# Patient Record
Sex: MALE | Race: WHITE | Employment: FULL TIME | URBAN - METROPOLITAN AREA
[De-identification: names, ages, dates, MRNs, and addresses within clinical notes are randomized per-mention and may not be internally consistent; named-entity substitution may affect disease eponyms.]

---

## 2017-03-07 ENCOUNTER — TELEPHONE (OUTPATIENT)
Dept: PULMONOLOGY | Age: 42
End: 2017-03-07

## 2017-04-07 RX ORDER — METHYLPREDNISOLONE SODIUM SUCCINATE 125 MG/2ML
125 INJECTION, POWDER, LYOPHILIZED, FOR SOLUTION INTRAMUSCULAR; INTRAVENOUS ONCE
Status: CANCELLED | OUTPATIENT
Start: 2017-04-10 | End: 2017-04-10

## 2017-04-07 RX ORDER — 0.9 % SODIUM CHLORIDE 0.9 %
10 VIAL (ML) INJECTION ONCE
Status: CANCELLED | OUTPATIENT
Start: 2017-04-10 | End: 2017-04-10

## 2017-04-07 RX ORDER — DIPHENHYDRAMINE HYDROCHLORIDE 50 MG/ML
50 INJECTION INTRAMUSCULAR; INTRAVENOUS ONCE
Status: CANCELLED | OUTPATIENT
Start: 2017-04-10 | End: 2017-04-10

## 2017-04-07 RX ORDER — SODIUM CHLORIDE 9 MG/ML
100 INJECTION, SOLUTION INTRAVENOUS CONTINUOUS
Status: CANCELLED | OUTPATIENT
Start: 2017-04-10

## 2017-04-10 ENCOUNTER — HOSPITAL ENCOUNTER (OUTPATIENT)
Dept: INFUSION THERAPY | Age: 42
Discharge: HOME | End: 2017-04-11
Admitting: INTERNAL MEDICINE

## 2017-04-10 VITALS
TEMPERATURE: 97.9 F | BODY MASS INDEX: 29.32 KG/M2 | RESPIRATION RATE: 18 BRPM | WEIGHT: 204.37 LBS | HEART RATE: 78 BPM | DIASTOLIC BLOOD PRESSURE: 74 MMHG | OXYGEN SATURATION: 97 % | SYSTOLIC BLOOD PRESSURE: 128 MMHG

## 2017-04-10 DIAGNOSIS — E88.01 ALPHA-1-ANTITRYPSIN DEFICIENCY (HCC): ICD-10-CM

## 2017-04-10 RX ORDER — SODIUM CHLORIDE 9 MG/ML
INJECTION, SOLUTION INTRAVENOUS ONCE
Status: CANCELLED | OUTPATIENT
Start: 2017-04-10

## 2017-04-10 RX ORDER — SODIUM CHLORIDE 0.9 % (FLUSH) 0.9 %
10 SYRINGE (ML) INJECTION PRN
Status: CANCELLED | OUTPATIENT
Start: 2017-04-10

## 2017-04-10 RX ORDER — DIPHENHYDRAMINE HYDROCHLORIDE 50 MG/ML
50 INJECTION INTRAMUSCULAR; INTRAVENOUS ONCE
Status: CANCELLED | OUTPATIENT
Start: 2017-04-10 | End: 2017-04-10

## 2017-04-10 RX ORDER — SODIUM CHLORIDE 9 MG/ML
INJECTION, SOLUTION INTRAVENOUS ONCE
Status: COMPLETED | OUTPATIENT
Start: 2017-04-10 | End: 2017-04-10

## 2017-04-10 RX ORDER — SODIUM CHLORIDE 9 MG/ML
100 INJECTION, SOLUTION INTRAVENOUS CONTINUOUS
Status: CANCELLED | OUTPATIENT
Start: 2017-04-10

## 2017-04-10 RX ORDER — METHYLPREDNISOLONE SODIUM SUCCINATE 125 MG/2ML
125 INJECTION, POWDER, LYOPHILIZED, FOR SOLUTION INTRAMUSCULAR; INTRAVENOUS ONCE
Status: CANCELLED | OUTPATIENT
Start: 2017-04-10 | End: 2017-04-10

## 2017-04-10 RX ORDER — 0.9 % SODIUM CHLORIDE 0.9 %
10 VIAL (ML) INJECTION ONCE
Status: CANCELLED | OUTPATIENT
Start: 2017-04-10 | End: 2017-04-10

## 2017-04-10 RX ORDER — SODIUM CHLORIDE 0.9 % (FLUSH) 0.9 %
10 SYRINGE (ML) INJECTION PRN
Status: ACTIVE | OUTPATIENT
Start: 2017-04-10 | End: 2017-04-11

## 2017-04-10 RX ADMIN — SODIUM CHLORIDE: 9 INJECTION, SOLUTION INTRAVENOUS at 13:18

## 2017-04-10 RX ADMIN — Medication 10 ML: at 13:18

## 2017-04-10 RX ADMIN — Medication 10 ML: at 14:46

## 2017-04-10 RX ADMIN — Medication 10 ML: at 15:02

## 2017-04-10 RX ADMIN — Medication 10 ML: at 14:03

## 2017-04-17 ENCOUNTER — HOSPITAL ENCOUNTER (OUTPATIENT)
Dept: INFUSION THERAPY | Age: 42
Discharge: HOME | End: 2017-04-18
Admitting: INTERNAL MEDICINE

## 2017-04-17 VITALS
TEMPERATURE: 97.5 F | WEIGHT: 205 LBS | DIASTOLIC BLOOD PRESSURE: 71 MMHG | SYSTOLIC BLOOD PRESSURE: 112 MMHG | BODY MASS INDEX: 29.41 KG/M2 | HEART RATE: 88 BPM | RESPIRATION RATE: 18 BRPM | OXYGEN SATURATION: 96 %

## 2017-04-17 DIAGNOSIS — E88.01 ALPHA-1-ANTITRYPSIN DEFICIENCY (HCC): ICD-10-CM

## 2017-04-17 RX ORDER — METHYLPREDNISOLONE SODIUM SUCCINATE 125 MG/2ML
125 INJECTION, POWDER, LYOPHILIZED, FOR SOLUTION INTRAMUSCULAR; INTRAVENOUS ONCE
Status: CANCELLED | OUTPATIENT
Start: 2017-04-17 | End: 2017-04-17

## 2017-04-17 RX ORDER — DIPHENHYDRAMINE HYDROCHLORIDE 50 MG/ML
50 INJECTION INTRAMUSCULAR; INTRAVENOUS ONCE
Status: CANCELLED | OUTPATIENT
Start: 2017-04-17 | End: 2017-04-17

## 2017-04-17 RX ORDER — SODIUM CHLORIDE 9 MG/ML
100 INJECTION, SOLUTION INTRAVENOUS CONTINUOUS
Status: CANCELLED | OUTPATIENT
Start: 2017-04-17

## 2017-04-17 RX ORDER — 0.9 % SODIUM CHLORIDE 0.9 %
10 VIAL (ML) INJECTION ONCE
Status: CANCELLED | OUTPATIENT
Start: 2017-04-17 | End: 2017-04-17

## 2017-04-17 RX ORDER — SODIUM CHLORIDE 9 MG/ML
INJECTION, SOLUTION INTRAVENOUS ONCE
Status: CANCELLED | OUTPATIENT
Start: 2017-04-17

## 2017-04-17 RX ORDER — SODIUM CHLORIDE 0.9 % (FLUSH) 0.9 %
10 SYRINGE (ML) INJECTION PRN
Status: CANCELLED | OUTPATIENT
Start: 2017-04-17

## 2017-04-17 RX ORDER — SODIUM CHLORIDE 9 MG/ML
INJECTION, SOLUTION INTRAVENOUS ONCE
Status: COMPLETED | OUTPATIENT
Start: 2017-04-17 | End: 2017-04-17

## 2017-04-17 RX ADMIN — SODIUM CHLORIDE: 9 INJECTION, SOLUTION INTRAVENOUS at 13:10

## 2017-04-24 ENCOUNTER — HOSPITAL ENCOUNTER (OUTPATIENT)
Dept: INFUSION THERAPY | Age: 42
Discharge: HOME | End: 2017-04-25
Admitting: INTERNAL MEDICINE

## 2017-04-24 VITALS
OXYGEN SATURATION: 97 % | TEMPERATURE: 98.1 F | RESPIRATION RATE: 18 BRPM | HEART RATE: 72 BPM | BODY MASS INDEX: 29.41 KG/M2 | WEIGHT: 205 LBS | DIASTOLIC BLOOD PRESSURE: 75 MMHG | SYSTOLIC BLOOD PRESSURE: 118 MMHG

## 2017-04-24 DIAGNOSIS — E88.01 ALPHA-1-ANTITRYPSIN DEFICIENCY (HCC): ICD-10-CM

## 2017-04-24 RX ORDER — 0.9 % SODIUM CHLORIDE 0.9 %
10 VIAL (ML) INJECTION ONCE
Status: CANCELLED | OUTPATIENT
Start: 2017-04-24 | End: 2017-04-24

## 2017-04-24 RX ORDER — DIPHENHYDRAMINE HYDROCHLORIDE 50 MG/ML
50 INJECTION INTRAMUSCULAR; INTRAVENOUS ONCE
Status: CANCELLED | OUTPATIENT
Start: 2017-04-24 | End: 2017-04-24

## 2017-04-24 RX ORDER — SODIUM CHLORIDE 0.9 % (FLUSH) 0.9 %
10 SYRINGE (ML) INJECTION PRN
Status: ACTIVE | OUTPATIENT
Start: 2017-04-24 | End: 2017-04-25

## 2017-04-24 RX ORDER — SODIUM CHLORIDE 0.9 % (FLUSH) 0.9 %
10 SYRINGE (ML) INJECTION PRN
Status: CANCELLED | OUTPATIENT
Start: 2017-04-24

## 2017-04-24 RX ORDER — METHYLPREDNISOLONE SODIUM SUCCINATE 125 MG/2ML
125 INJECTION, POWDER, LYOPHILIZED, FOR SOLUTION INTRAMUSCULAR; INTRAVENOUS ONCE
Status: CANCELLED | OUTPATIENT
Start: 2017-04-24 | End: 2017-04-24

## 2017-04-24 RX ORDER — SODIUM CHLORIDE 9 MG/ML
INJECTION, SOLUTION INTRAVENOUS ONCE
Status: CANCELLED | OUTPATIENT
Start: 2017-04-24

## 2017-04-24 RX ORDER — SODIUM CHLORIDE 9 MG/ML
INJECTION, SOLUTION INTRAVENOUS ONCE
Status: COMPLETED | OUTPATIENT
Start: 2017-04-24 | End: 2017-04-24

## 2017-04-24 RX ORDER — SODIUM CHLORIDE 9 MG/ML
100 INJECTION, SOLUTION INTRAVENOUS CONTINUOUS
Status: CANCELLED | OUTPATIENT
Start: 2017-04-24

## 2017-04-24 RX ADMIN — SODIUM CHLORIDE: 9 INJECTION, SOLUTION INTRAVENOUS at 08:54

## 2017-04-24 RX ADMIN — Medication 10 ML: at 08:54

## 2017-04-24 RX ADMIN — Medication 20 ML: at 08:44

## 2017-04-24 RX ADMIN — Medication 10 ML: at 09:07

## 2017-04-24 RX ADMIN — Medication 10 ML: at 09:49

## 2017-04-24 RX ADMIN — Medication 10 ML: at 10:05

## 2017-05-01 ENCOUNTER — HOSPITAL ENCOUNTER (OUTPATIENT)
Dept: INFUSION THERAPY | Age: 42
Discharge: HOME | End: 2017-05-02
Admitting: INTERNAL MEDICINE

## 2017-05-01 VITALS
HEART RATE: 74 BPM | WEIGHT: 205.47 LBS | BODY MASS INDEX: 29.42 KG/M2 | OXYGEN SATURATION: 96 % | DIASTOLIC BLOOD PRESSURE: 69 MMHG | RESPIRATION RATE: 18 BRPM | TEMPERATURE: 98.2 F | SYSTOLIC BLOOD PRESSURE: 104 MMHG | HEIGHT: 70 IN

## 2017-05-01 DIAGNOSIS — E88.01 ALPHA-1-ANTITRYPSIN DEFICIENCY (HCC): ICD-10-CM

## 2017-05-01 RX ORDER — DIPHENHYDRAMINE HYDROCHLORIDE 50 MG/ML
50 INJECTION INTRAMUSCULAR; INTRAVENOUS ONCE
Status: CANCELLED | OUTPATIENT
Start: 2017-05-01 | End: 2017-05-01

## 2017-05-01 RX ORDER — SODIUM CHLORIDE 0.9 % (FLUSH) 0.9 %
10 SYRINGE (ML) INJECTION PRN
Status: ACTIVE | OUTPATIENT
Start: 2017-05-01 | End: 2017-05-02

## 2017-05-01 RX ORDER — SODIUM CHLORIDE 9 MG/ML
INJECTION, SOLUTION INTRAVENOUS ONCE
Status: COMPLETED | OUTPATIENT
Start: 2017-05-01 | End: 2017-05-01

## 2017-05-01 RX ORDER — 0.9 % SODIUM CHLORIDE 0.9 %
10 VIAL (ML) INJECTION ONCE
Status: CANCELLED | OUTPATIENT
Start: 2017-05-01 | End: 2017-05-01

## 2017-05-01 RX ORDER — METHYLPREDNISOLONE SODIUM SUCCINATE 125 MG/2ML
125 INJECTION, POWDER, LYOPHILIZED, FOR SOLUTION INTRAMUSCULAR; INTRAVENOUS ONCE
Status: CANCELLED | OUTPATIENT
Start: 2017-05-01 | End: 2017-05-01

## 2017-05-01 RX ORDER — SODIUM CHLORIDE 0.9 % (FLUSH) 0.9 %
10 SYRINGE (ML) INJECTION PRN
Status: CANCELLED | OUTPATIENT
Start: 2017-05-01

## 2017-05-01 RX ORDER — SODIUM CHLORIDE 9 MG/ML
100 INJECTION, SOLUTION INTRAVENOUS CONTINUOUS
Status: CANCELLED | OUTPATIENT
Start: 2017-05-01

## 2017-05-01 RX ORDER — SODIUM CHLORIDE 9 MG/ML
INJECTION, SOLUTION INTRAVENOUS ONCE
Status: CANCELLED | OUTPATIENT
Start: 2017-05-01

## 2017-05-01 RX ADMIN — Medication 10 ML: at 13:38

## 2017-05-01 RX ADMIN — Medication 20 ML: at 13:04

## 2017-05-01 RX ADMIN — Medication 10 ML: at 14:19

## 2017-05-01 RX ADMIN — Medication 10 ML: at 14:57

## 2017-05-01 RX ADMIN — SODIUM CHLORIDE: 9 INJECTION, SOLUTION INTRAVENOUS at 13:38

## 2017-05-08 ENCOUNTER — HOSPITAL ENCOUNTER (OUTPATIENT)
Dept: INFUSION THERAPY | Age: 42
Discharge: HOME | End: 2017-05-09
Admitting: INTERNAL MEDICINE

## 2017-05-08 VITALS
BODY MASS INDEX: 29.41 KG/M2 | TEMPERATURE: 97.5 F | DIASTOLIC BLOOD PRESSURE: 75 MMHG | HEART RATE: 61 BPM | SYSTOLIC BLOOD PRESSURE: 113 MMHG | OXYGEN SATURATION: 97 % | RESPIRATION RATE: 17 BRPM | WEIGHT: 205 LBS

## 2017-05-08 DIAGNOSIS — E88.01 ALPHA-1-ANTITRYPSIN DEFICIENCY (HCC): ICD-10-CM

## 2017-05-08 RX ORDER — SODIUM CHLORIDE 9 MG/ML
100 INJECTION, SOLUTION INTRAVENOUS CONTINUOUS
Status: CANCELLED | OUTPATIENT
Start: 2017-05-08

## 2017-05-08 RX ORDER — SODIUM CHLORIDE 9 MG/ML
INJECTION, SOLUTION INTRAVENOUS ONCE
Status: CANCELLED | OUTPATIENT
Start: 2017-05-08

## 2017-05-08 RX ORDER — METHYLPREDNISOLONE SODIUM SUCCINATE 125 MG/2ML
125 INJECTION, POWDER, LYOPHILIZED, FOR SOLUTION INTRAMUSCULAR; INTRAVENOUS ONCE
Status: CANCELLED | OUTPATIENT
Start: 2017-05-08 | End: 2017-05-08

## 2017-05-08 RX ORDER — SODIUM CHLORIDE 9 MG/ML
INJECTION, SOLUTION INTRAVENOUS ONCE
Status: COMPLETED | OUTPATIENT
Start: 2017-05-08 | End: 2017-05-08

## 2017-05-08 RX ORDER — SODIUM CHLORIDE 0.9 % (FLUSH) 0.9 %
10 SYRINGE (ML) INJECTION PRN
Status: CANCELLED | OUTPATIENT
Start: 2017-05-08

## 2017-05-08 RX ORDER — DIPHENHYDRAMINE HYDROCHLORIDE 50 MG/ML
50 INJECTION INTRAMUSCULAR; INTRAVENOUS ONCE
Status: CANCELLED | OUTPATIENT
Start: 2017-05-08 | End: 2017-05-08

## 2017-05-08 RX ORDER — SODIUM CHLORIDE 0.9 % (FLUSH) 0.9 %
10 SYRINGE (ML) INJECTION PRN
Status: DISPENSED | OUTPATIENT
Start: 2017-05-08 | End: 2017-05-09

## 2017-05-08 RX ORDER — 0.9 % SODIUM CHLORIDE 0.9 %
10 VIAL (ML) INJECTION ONCE
Status: CANCELLED | OUTPATIENT
Start: 2017-05-08 | End: 2017-05-08

## 2017-05-08 RX ADMIN — Medication 10 ML: at 10:27

## 2017-05-08 RX ADMIN — SODIUM CHLORIDE: 9 INJECTION, SOLUTION INTRAVENOUS at 08:43

## 2017-05-08 RX ADMIN — Medication 10 ML: at 10:46

## 2017-05-08 RX ADMIN — Medication 20 ML: at 08:42

## 2017-05-08 RX ADMIN — Medication 10 ML: at 09:49

## 2017-05-15 ENCOUNTER — HOSPITAL ENCOUNTER (OUTPATIENT)
Dept: INFUSION THERAPY | Age: 42
Discharge: HOME | End: 2017-05-16
Admitting: INTERNAL MEDICINE

## 2017-05-15 VITALS
RESPIRATION RATE: 17 BRPM | DIASTOLIC BLOOD PRESSURE: 74 MMHG | SYSTOLIC BLOOD PRESSURE: 127 MMHG | OXYGEN SATURATION: 98 % | BODY MASS INDEX: 29.38 KG/M2 | HEART RATE: 75 BPM | TEMPERATURE: 97.3 F | WEIGHT: 205.25 LBS | HEIGHT: 70 IN

## 2017-05-15 DIAGNOSIS — E88.01 ALPHA-1-ANTITRYPSIN DEFICIENCY (HCC): ICD-10-CM

## 2017-05-15 RX ORDER — 0.9 % SODIUM CHLORIDE 0.9 %
10 VIAL (ML) INJECTION ONCE
Status: CANCELLED | OUTPATIENT
Start: 2017-05-15 | End: 2017-05-15

## 2017-05-15 RX ORDER — METHYLPREDNISOLONE SODIUM SUCCINATE 125 MG/2ML
125 INJECTION, POWDER, LYOPHILIZED, FOR SOLUTION INTRAMUSCULAR; INTRAVENOUS ONCE
Status: CANCELLED | OUTPATIENT
Start: 2017-05-15 | End: 2017-05-15

## 2017-05-15 RX ORDER — SODIUM CHLORIDE 9 MG/ML
INJECTION, SOLUTION INTRAVENOUS ONCE
Status: COMPLETED | OUTPATIENT
Start: 2017-05-15 | End: 2017-05-15

## 2017-05-15 RX ORDER — SODIUM CHLORIDE 9 MG/ML
100 INJECTION, SOLUTION INTRAVENOUS CONTINUOUS
Status: CANCELLED | OUTPATIENT
Start: 2017-05-15

## 2017-05-15 RX ORDER — SODIUM CHLORIDE 9 MG/ML
INJECTION, SOLUTION INTRAVENOUS ONCE
Status: CANCELLED | OUTPATIENT
Start: 2017-05-15

## 2017-05-15 RX ORDER — DIPHENHYDRAMINE HYDROCHLORIDE 50 MG/ML
50 INJECTION INTRAMUSCULAR; INTRAVENOUS ONCE
Status: CANCELLED | OUTPATIENT
Start: 2017-05-15 | End: 2017-05-15

## 2017-05-15 RX ORDER — SODIUM CHLORIDE 0.9 % (FLUSH) 0.9 %
10 SYRINGE (ML) INJECTION PRN
Status: CANCELLED | OUTPATIENT
Start: 2017-05-15

## 2017-05-15 RX ADMIN — SODIUM CHLORIDE: 9 INJECTION, SOLUTION INTRAVENOUS at 08:27

## 2017-05-22 ENCOUNTER — HOSPITAL ENCOUNTER (OUTPATIENT)
Dept: INFUSION THERAPY | Age: 42
Discharge: HOME | End: 2017-05-23
Admitting: INTERNAL MEDICINE

## 2017-05-22 VITALS
DIASTOLIC BLOOD PRESSURE: 77 MMHG | TEMPERATURE: 97.7 F | HEART RATE: 62 BPM | RESPIRATION RATE: 17 BRPM | WEIGHT: 204.15 LBS | SYSTOLIC BLOOD PRESSURE: 123 MMHG | BODY MASS INDEX: 29.29 KG/M2 | OXYGEN SATURATION: 99 %

## 2017-05-22 DIAGNOSIS — E88.01 ALPHA-1-ANTITRYPSIN DEFICIENCY (HCC): ICD-10-CM

## 2017-05-22 RX ORDER — SODIUM CHLORIDE 0.9 % (FLUSH) 0.9 %
10 SYRINGE (ML) INJECTION PRN
Status: CANCELLED | OUTPATIENT
Start: 2017-05-22

## 2017-05-22 RX ORDER — METHYLPREDNISOLONE SODIUM SUCCINATE 125 MG/2ML
125 INJECTION, POWDER, LYOPHILIZED, FOR SOLUTION INTRAMUSCULAR; INTRAVENOUS ONCE
Status: CANCELLED | OUTPATIENT
Start: 2017-05-22 | End: 2017-05-22

## 2017-05-22 RX ORDER — SODIUM CHLORIDE 9 MG/ML
INJECTION, SOLUTION INTRAVENOUS ONCE
Status: CANCELLED | OUTPATIENT
Start: 2017-05-22

## 2017-05-22 RX ORDER — 0.9 % SODIUM CHLORIDE 0.9 %
10 VIAL (ML) INJECTION ONCE
Status: CANCELLED | OUTPATIENT
Start: 2017-05-22 | End: 2017-05-22

## 2017-05-22 RX ORDER — DIPHENHYDRAMINE HYDROCHLORIDE 50 MG/ML
50 INJECTION INTRAMUSCULAR; INTRAVENOUS ONCE
Status: CANCELLED | OUTPATIENT
Start: 2017-05-22 | End: 2017-05-22

## 2017-05-22 RX ORDER — SODIUM CHLORIDE 9 MG/ML
INJECTION, SOLUTION INTRAVENOUS ONCE
Status: COMPLETED | OUTPATIENT
Start: 2017-05-22 | End: 2017-05-22

## 2017-05-22 RX ORDER — SODIUM CHLORIDE 9 MG/ML
100 INJECTION, SOLUTION INTRAVENOUS CONTINUOUS
Status: CANCELLED | OUTPATIENT
Start: 2017-05-22

## 2017-05-22 RX ORDER — SODIUM CHLORIDE 0.9 % (FLUSH) 0.9 %
10 SYRINGE (ML) INJECTION PRN
Status: DISPENSED | OUTPATIENT
Start: 2017-05-22 | End: 2017-05-23

## 2017-05-22 RX ADMIN — Medication 10 ML: at 09:44

## 2017-05-22 RX ADMIN — Medication 20 ML: at 08:39

## 2017-05-22 RX ADMIN — Medication 10 ML: at 09:12

## 2017-05-22 RX ADMIN — SODIUM CHLORIDE: 9 INJECTION, SOLUTION INTRAVENOUS at 08:39

## 2017-05-22 RX ADMIN — Medication 10 ML: at 09:56

## 2017-05-30 ENCOUNTER — HOSPITAL ENCOUNTER (OUTPATIENT)
Dept: INFUSION THERAPY | Age: 42
Discharge: HOME | End: 2017-05-31
Admitting: INTERNAL MEDICINE

## 2017-05-30 ENCOUNTER — HOSPITAL ENCOUNTER (OUTPATIENT)
Dept: INFUSION THERAPY | Age: 42
Discharge: HOME | End: 2017-05-31
Admitting: INTERNAL MEDICINE

## 2017-05-30 VITALS
RESPIRATION RATE: 17 BRPM | TEMPERATURE: 97.7 F | HEART RATE: 67 BPM | OXYGEN SATURATION: 95 % | SYSTOLIC BLOOD PRESSURE: 120 MMHG | DIASTOLIC BLOOD PRESSURE: 76 MMHG

## 2017-05-30 DIAGNOSIS — E88.01 ALPHA-1-ANTITRYPSIN DEFICIENCY (HCC): ICD-10-CM

## 2017-05-30 RX ORDER — 0.9 % SODIUM CHLORIDE 0.9 %
10 VIAL (ML) INJECTION ONCE
Status: CANCELLED | OUTPATIENT
Start: 2017-05-30 | End: 2017-05-30

## 2017-05-30 RX ORDER — METHYLPREDNISOLONE SODIUM SUCCINATE 125 MG/2ML
125 INJECTION, POWDER, LYOPHILIZED, FOR SOLUTION INTRAMUSCULAR; INTRAVENOUS ONCE
Status: CANCELLED | OUTPATIENT
Start: 2017-05-30 | End: 2017-05-30

## 2017-05-30 RX ORDER — SODIUM CHLORIDE 9 MG/ML
INJECTION, SOLUTION INTRAVENOUS ONCE
Status: CANCELLED | OUTPATIENT
Start: 2017-05-30

## 2017-05-30 RX ORDER — DIPHENHYDRAMINE HYDROCHLORIDE 50 MG/ML
50 INJECTION INTRAMUSCULAR; INTRAVENOUS ONCE
Status: CANCELLED | OUTPATIENT
Start: 2017-05-30 | End: 2017-05-30

## 2017-05-30 RX ORDER — SODIUM CHLORIDE 9 MG/ML
100 INJECTION, SOLUTION INTRAVENOUS CONTINUOUS
Status: CANCELLED | OUTPATIENT
Start: 2017-05-30

## 2017-05-30 RX ORDER — SODIUM CHLORIDE 0.9 % (FLUSH) 0.9 %
10 SYRINGE (ML) INJECTION PRN
Status: ACTIVE | OUTPATIENT
Start: 2017-05-30 | End: 2017-05-31

## 2017-05-30 RX ORDER — SODIUM CHLORIDE 0.9 % (FLUSH) 0.9 %
10 SYRINGE (ML) INJECTION PRN
Status: CANCELLED | OUTPATIENT
Start: 2017-05-30

## 2017-06-05 ENCOUNTER — HOSPITAL ENCOUNTER (OUTPATIENT)
Dept: INFUSION THERAPY | Age: 42
Discharge: HOME | End: 2017-06-06
Admitting: INTERNAL MEDICINE

## 2017-06-05 VITALS
BODY MASS INDEX: 29.36 KG/M2 | TEMPERATURE: 97.6 F | OXYGEN SATURATION: 96 % | RESPIRATION RATE: 18 BRPM | DIASTOLIC BLOOD PRESSURE: 80 MMHG | HEART RATE: 76 BPM | SYSTOLIC BLOOD PRESSURE: 124 MMHG | WEIGHT: 204.59 LBS

## 2017-06-05 DIAGNOSIS — E88.01 ALPHA-1-ANTITRYPSIN DEFICIENCY (HCC): ICD-10-CM

## 2017-06-05 RX ORDER — METHYLPREDNISOLONE SODIUM SUCCINATE 125 MG/2ML
125 INJECTION, POWDER, LYOPHILIZED, FOR SOLUTION INTRAMUSCULAR; INTRAVENOUS ONCE
Status: CANCELLED | OUTPATIENT
Start: 2017-06-05 | End: 2017-06-05

## 2017-06-05 RX ORDER — SODIUM CHLORIDE 9 MG/ML
INJECTION, SOLUTION INTRAVENOUS ONCE
Status: DISCONTINUED | OUTPATIENT
Start: 2017-06-05 | End: 2018-05-01 | Stop reason: HOSPADM

## 2017-06-05 RX ORDER — SODIUM CHLORIDE 9 MG/ML
100 INJECTION, SOLUTION INTRAVENOUS CONTINUOUS
Status: CANCELLED | OUTPATIENT
Start: 2017-06-05

## 2017-06-05 RX ORDER — LISINOPRIL 20 MG/1
TABLET ORAL
Qty: 90 TABLET | Refills: 3 | Status: SHIPPED | OUTPATIENT
Start: 2017-06-05 | End: 2017-12-12 | Stop reason: SDUPTHER

## 2017-06-05 RX ORDER — 0.9 % SODIUM CHLORIDE 0.9 %
10 VIAL (ML) INJECTION ONCE
Status: CANCELLED | OUTPATIENT
Start: 2017-06-05 | End: 2017-06-05

## 2017-06-05 RX ORDER — DIPHENHYDRAMINE HYDROCHLORIDE 50 MG/ML
50 INJECTION INTRAMUSCULAR; INTRAVENOUS ONCE
Status: CANCELLED | OUTPATIENT
Start: 2017-06-05 | End: 2017-06-05

## 2017-06-05 RX ORDER — SODIUM CHLORIDE 9 MG/ML
INJECTION, SOLUTION INTRAVENOUS ONCE
Status: CANCELLED | OUTPATIENT
Start: 2017-06-05

## 2017-06-05 RX ORDER — SODIUM CHLORIDE 0.9 % (FLUSH) 0.9 %
10 SYRINGE (ML) INJECTION PRN
Status: CANCELLED | OUTPATIENT
Start: 2017-06-05

## 2017-06-12 ENCOUNTER — HOSPITAL ENCOUNTER (OUTPATIENT)
Dept: INFUSION THERAPY | Age: 42
Discharge: HOME | End: 2017-06-13
Admitting: INTERNAL MEDICINE

## 2017-06-12 VITALS
DIASTOLIC BLOOD PRESSURE: 73 MMHG | WEIGHT: 202 LBS | HEART RATE: 56 BPM | RESPIRATION RATE: 17 BRPM | TEMPERATURE: 97.8 F | SYSTOLIC BLOOD PRESSURE: 104 MMHG | OXYGEN SATURATION: 96 % | HEIGHT: 70 IN | BODY MASS INDEX: 28.92 KG/M2

## 2017-06-12 DIAGNOSIS — E88.01 ALPHA-1-ANTITRYPSIN DEFICIENCY (HCC): ICD-10-CM

## 2017-06-12 RX ORDER — SODIUM CHLORIDE 9 MG/ML
INJECTION, SOLUTION INTRAVENOUS ONCE
Status: CANCELLED | OUTPATIENT
Start: 2017-06-12

## 2017-06-12 RX ORDER — SODIUM CHLORIDE 9 MG/ML
INJECTION, SOLUTION INTRAVENOUS ONCE
Status: COMPLETED | OUTPATIENT
Start: 2017-06-12 | End: 2017-06-12

## 2017-06-12 RX ORDER — METHYLPREDNISOLONE SODIUM SUCCINATE 125 MG/2ML
125 INJECTION, POWDER, LYOPHILIZED, FOR SOLUTION INTRAMUSCULAR; INTRAVENOUS ONCE
Status: CANCELLED | OUTPATIENT
Start: 2017-06-12 | End: 2017-06-12

## 2017-06-12 RX ORDER — 0.9 % SODIUM CHLORIDE 0.9 %
10 VIAL (ML) INJECTION ONCE
Status: CANCELLED | OUTPATIENT
Start: 2017-06-12 | End: 2017-06-12

## 2017-06-12 RX ORDER — SODIUM CHLORIDE 9 MG/ML
100 INJECTION, SOLUTION INTRAVENOUS CONTINUOUS
Status: CANCELLED | OUTPATIENT
Start: 2017-06-12

## 2017-06-12 RX ORDER — SODIUM CHLORIDE 0.9 % (FLUSH) 0.9 %
10 SYRINGE (ML) INJECTION PRN
Status: CANCELLED | OUTPATIENT
Start: 2017-06-12

## 2017-06-12 RX ORDER — DIPHENHYDRAMINE HYDROCHLORIDE 50 MG/ML
50 INJECTION INTRAMUSCULAR; INTRAVENOUS ONCE
Status: CANCELLED | OUTPATIENT
Start: 2017-06-12 | End: 2017-06-12

## 2017-06-12 RX ADMIN — SODIUM CHLORIDE: 9 INJECTION, SOLUTION INTRAVENOUS at 08:41

## 2017-06-19 ENCOUNTER — HOSPITAL ENCOUNTER (OUTPATIENT)
Dept: INFUSION THERAPY | Age: 42
Discharge: HOME | End: 2017-06-20
Admitting: INTERNAL MEDICINE

## 2017-06-19 VITALS
TEMPERATURE: 97.8 F | WEIGHT: 200 LBS | OXYGEN SATURATION: 97 % | BODY MASS INDEX: 28.7 KG/M2 | RESPIRATION RATE: 17 BRPM | HEART RATE: 58 BPM | SYSTOLIC BLOOD PRESSURE: 112 MMHG | DIASTOLIC BLOOD PRESSURE: 69 MMHG

## 2017-06-19 DIAGNOSIS — E88.01 ALPHA-1-ANTITRYPSIN DEFICIENCY (HCC): ICD-10-CM

## 2017-06-19 RX ORDER — SODIUM CHLORIDE 0.9 % (FLUSH) 0.9 %
10 SYRINGE (ML) INJECTION PRN
Status: ACTIVE | OUTPATIENT
Start: 2017-06-19 | End: 2017-06-20

## 2017-06-19 RX ORDER — 0.9 % SODIUM CHLORIDE 0.9 %
10 VIAL (ML) INJECTION ONCE
Status: CANCELLED | OUTPATIENT
Start: 2017-06-19 | End: 2017-06-19

## 2017-06-19 RX ORDER — SODIUM CHLORIDE 9 MG/ML
INJECTION, SOLUTION INTRAVENOUS ONCE
Status: CANCELLED | OUTPATIENT
Start: 2017-06-19

## 2017-06-19 RX ORDER — METHYLPREDNISOLONE SODIUM SUCCINATE 125 MG/2ML
125 INJECTION, POWDER, LYOPHILIZED, FOR SOLUTION INTRAMUSCULAR; INTRAVENOUS ONCE
Status: CANCELLED | OUTPATIENT
Start: 2017-06-19 | End: 2017-06-19

## 2017-06-19 RX ORDER — DIPHENHYDRAMINE HYDROCHLORIDE 50 MG/ML
50 INJECTION INTRAMUSCULAR; INTRAVENOUS ONCE
Status: CANCELLED | OUTPATIENT
Start: 2017-06-19 | End: 2017-06-19

## 2017-06-19 RX ORDER — SODIUM CHLORIDE 9 MG/ML
INJECTION, SOLUTION INTRAVENOUS ONCE
Status: COMPLETED | OUTPATIENT
Start: 2017-06-19 | End: 2017-06-19

## 2017-06-19 RX ORDER — SODIUM CHLORIDE 0.9 % (FLUSH) 0.9 %
10 SYRINGE (ML) INJECTION PRN
Status: CANCELLED | OUTPATIENT
Start: 2017-06-19

## 2017-06-19 RX ORDER — SODIUM CHLORIDE 9 MG/ML
100 INJECTION, SOLUTION INTRAVENOUS CONTINUOUS
Status: CANCELLED | OUTPATIENT
Start: 2017-06-19

## 2017-06-19 RX ADMIN — Medication 10 ML: at 09:50

## 2017-06-19 RX ADMIN — SODIUM CHLORIDE: 9 INJECTION, SOLUTION INTRAVENOUS at 08:58

## 2017-06-19 RX ADMIN — Medication 10 ML: at 08:58

## 2017-06-19 RX ADMIN — Medication 10 ML: at 09:18

## 2017-06-19 RX ADMIN — Medication 10 ML: at 10:05

## 2017-06-19 RX ADMIN — Medication 20 ML: at 08:44

## 2017-06-26 ENCOUNTER — HOSPITAL ENCOUNTER (OUTPATIENT)
Dept: INFUSION THERAPY | Age: 42
Discharge: HOME | End: 2017-06-27
Admitting: INTERNAL MEDICINE

## 2017-06-26 VITALS
WEIGHT: 200 LBS | OXYGEN SATURATION: 97 % | HEART RATE: 73 BPM | DIASTOLIC BLOOD PRESSURE: 84 MMHG | BODY MASS INDEX: 28.7 KG/M2 | SYSTOLIC BLOOD PRESSURE: 130 MMHG | RESPIRATION RATE: 18 BRPM | TEMPERATURE: 97.8 F

## 2017-06-26 DIAGNOSIS — E88.01 ALPHA-1-ANTITRYPSIN DEFICIENCY (HCC): ICD-10-CM

## 2017-06-26 RX ORDER — 0.9 % SODIUM CHLORIDE 0.9 %
10 VIAL (ML) INJECTION ONCE
Status: CANCELLED | OUTPATIENT
Start: 2017-06-26 | End: 2017-06-26

## 2017-06-26 RX ORDER — SODIUM CHLORIDE 9 MG/ML
INJECTION, SOLUTION INTRAVENOUS ONCE
Status: CANCELLED | OUTPATIENT
Start: 2017-06-26

## 2017-06-26 RX ORDER — METHYLPREDNISOLONE SODIUM SUCCINATE 125 MG/2ML
125 INJECTION, POWDER, LYOPHILIZED, FOR SOLUTION INTRAMUSCULAR; INTRAVENOUS ONCE
Status: CANCELLED | OUTPATIENT
Start: 2017-06-26 | End: 2017-06-26

## 2017-06-26 RX ORDER — SODIUM CHLORIDE 9 MG/ML
100 INJECTION, SOLUTION INTRAVENOUS CONTINUOUS
Status: CANCELLED | OUTPATIENT
Start: 2017-06-26

## 2017-06-26 RX ORDER — SODIUM CHLORIDE 9 MG/ML
INJECTION, SOLUTION INTRAVENOUS ONCE
Status: COMPLETED | OUTPATIENT
Start: 2017-06-26 | End: 2017-06-26

## 2017-06-26 RX ORDER — DIPHENHYDRAMINE HYDROCHLORIDE 50 MG/ML
50 INJECTION INTRAMUSCULAR; INTRAVENOUS ONCE
Status: CANCELLED | OUTPATIENT
Start: 2017-06-26 | End: 2017-06-26

## 2017-06-26 RX ORDER — SODIUM CHLORIDE 0.9 % (FLUSH) 0.9 %
10 SYRINGE (ML) INJECTION PRN
Status: ACTIVE | OUTPATIENT
Start: 2017-06-26 | End: 2017-06-27

## 2017-06-26 RX ORDER — SODIUM CHLORIDE 0.9 % (FLUSH) 0.9 %
10 SYRINGE (ML) INJECTION PRN
Status: CANCELLED | OUTPATIENT
Start: 2017-06-26

## 2017-06-26 RX ADMIN — Medication 10 ML: at 10:06

## 2017-06-26 RX ADMIN — Medication 10 ML: at 09:48

## 2017-06-26 RX ADMIN — SODIUM CHLORIDE: 9 INJECTION, SOLUTION INTRAVENOUS at 08:38

## 2017-06-26 RX ADMIN — Medication 10 ML: at 08:38

## 2017-06-26 RX ADMIN — Medication 10 ML: at 09:16

## 2017-07-03 ENCOUNTER — HOSPITAL ENCOUNTER (OUTPATIENT)
Dept: INFUSION THERAPY | Age: 42
Discharge: HOME | End: 2017-07-04
Admitting: INTERNAL MEDICINE

## 2017-07-03 VITALS
WEIGHT: 200 LBS | DIASTOLIC BLOOD PRESSURE: 78 MMHG | OXYGEN SATURATION: 97 % | RESPIRATION RATE: 17 BRPM | HEART RATE: 67 BPM | BODY MASS INDEX: 28.7 KG/M2 | TEMPERATURE: 97.7 F | SYSTOLIC BLOOD PRESSURE: 116 MMHG

## 2017-07-03 DIAGNOSIS — E88.01 ALPHA-1-ANTITRYPSIN DEFICIENCY (HCC): Primary | ICD-10-CM

## 2017-07-03 DIAGNOSIS — E88.01 ALPHA-1-ANTITRYPSIN DEFICIENCY (HCC): ICD-10-CM

## 2017-07-03 RX ORDER — DIPHENHYDRAMINE HYDROCHLORIDE 50 MG/ML
50 INJECTION INTRAMUSCULAR; INTRAVENOUS ONCE
Status: CANCELLED | OUTPATIENT
Start: 2017-07-03 | End: 2017-07-03

## 2017-07-03 RX ORDER — SODIUM CHLORIDE 0.9 % (FLUSH) 0.9 %
10 SYRINGE (ML) INJECTION PRN
Status: CANCELLED | OUTPATIENT
Start: 2017-07-03

## 2017-07-03 RX ORDER — 0.9 % SODIUM CHLORIDE 0.9 %
10 VIAL (ML) INJECTION ONCE
Status: CANCELLED | OUTPATIENT
Start: 2017-07-03 | End: 2017-07-03

## 2017-07-03 RX ORDER — METHYLPREDNISOLONE SODIUM SUCCINATE 125 MG/2ML
125 INJECTION, POWDER, LYOPHILIZED, FOR SOLUTION INTRAMUSCULAR; INTRAVENOUS ONCE
Status: CANCELLED | OUTPATIENT
Start: 2017-07-03 | End: 2017-07-03

## 2017-07-03 RX ORDER — SODIUM CHLORIDE 9 MG/ML
INJECTION, SOLUTION INTRAVENOUS ONCE
Status: CANCELLED | OUTPATIENT
Start: 2017-07-03

## 2017-07-03 RX ORDER — SODIUM CHLORIDE 9 MG/ML
INJECTION, SOLUTION INTRAVENOUS ONCE
Status: COMPLETED | OUTPATIENT
Start: 2017-07-03 | End: 2017-07-03

## 2017-07-03 RX ORDER — SODIUM CHLORIDE 0.9 % (FLUSH) 0.9 %
10 SYRINGE (ML) INJECTION PRN
Status: ACTIVE | OUTPATIENT
Start: 2017-07-03 | End: 2017-07-04

## 2017-07-03 RX ORDER — SODIUM CHLORIDE 9 MG/ML
100 INJECTION, SOLUTION INTRAVENOUS CONTINUOUS
Status: CANCELLED | OUTPATIENT
Start: 2017-07-03

## 2017-07-03 RX ADMIN — Medication 10 ML: at 09:13

## 2017-07-03 RX ADMIN — Medication 10 ML: at 09:48

## 2017-07-03 RX ADMIN — Medication 10 ML: at 10:07

## 2017-07-03 RX ADMIN — SODIUM CHLORIDE: 9 INJECTION, SOLUTION INTRAVENOUS at 08:39

## 2017-07-03 RX ADMIN — Medication 20 ML: at 08:39

## 2017-07-06 ENCOUNTER — OFFICE VISIT (OUTPATIENT)
Dept: FAMILY MEDICINE CLINIC | Age: 42
End: 2017-07-06

## 2017-07-06 VITALS
WEIGHT: 205 LBS | SYSTOLIC BLOOD PRESSURE: 108 MMHG | DIASTOLIC BLOOD PRESSURE: 69 MMHG | HEART RATE: 62 BPM | BODY MASS INDEX: 29.35 KG/M2 | HEIGHT: 70 IN

## 2017-07-06 DIAGNOSIS — I10 ESSENTIAL HYPERTENSION, BENIGN: ICD-10-CM

## 2017-07-06 DIAGNOSIS — Z11.4 SCREENING FOR HIV (HUMAN IMMUNODEFICIENCY VIRUS): ICD-10-CM

## 2017-07-06 DIAGNOSIS — E88.01 ALPHA-1-ANTITRYPSIN DEFICIENCY (HCC): Primary | ICD-10-CM

## 2017-07-06 DIAGNOSIS — K22.719 BARRETT'S ESOPHAGUS WITH DYSPLASIA: ICD-10-CM

## 2017-07-06 DIAGNOSIS — K21.00 GASTROESOPHAGEAL REFLUX DISEASE WITH ESOPHAGITIS: ICD-10-CM

## 2017-07-06 DIAGNOSIS — R73.09 ELEVATED RANDOM BLOOD GLUCOSE LEVEL: ICD-10-CM

## 2017-07-06 LAB
A/G RATIO: 1.6 (ref 1.1–2.2)
ALBUMIN SERPL-MCNC: 4.1 G/DL (ref 3.4–5)
ALP BLD-CCNC: 87 U/L (ref 40–129)
ALT SERPL-CCNC: 56 U/L (ref 10–40)
ANION GAP SERPL CALCULATED.3IONS-SCNC: 12 MMOL/L (ref 3–16)
AST SERPL-CCNC: 32 U/L (ref 15–37)
BILIRUB SERPL-MCNC: <0.2 MG/DL (ref 0–1)
BUN BLDV-MCNC: 10 MG/DL (ref 7–20)
CALCIUM SERPL-MCNC: 9.3 MG/DL (ref 8.3–10.6)
CHLORIDE BLD-SCNC: 103 MMOL/L (ref 99–110)
CHOLESTEROL, TOTAL: 203 MG/DL (ref 0–199)
CO2: 26 MMOL/L (ref 21–32)
CREAT SERPL-MCNC: 0.8 MG/DL (ref 0.9–1.3)
ESTIMATED AVERAGE GLUCOSE: 162.8 MG/DL
GFR AFRICAN AMERICAN: >60
GFR NON-AFRICAN AMERICAN: >60
GLOBULIN: 2.6 G/DL
GLUCOSE BLD-MCNC: 138 MG/DL (ref 70–99)
HBA1C MFR BLD: 7.3 %
HDLC SERPL-MCNC: 25 MG/DL (ref 40–60)
LDL CHOLESTEROL CALCULATED: ABNORMAL MG/DL
LDL CHOLESTEROL DIRECT: 111 MG/DL
POTASSIUM SERPL-SCNC: 5 MMOL/L (ref 3.5–5.1)
SODIUM BLD-SCNC: 141 MMOL/L (ref 136–145)
TOTAL PROTEIN: 6.7 G/DL (ref 6.4–8.2)
TRIGL SERPL-MCNC: 364 MG/DL (ref 0–150)
VLDLC SERPL CALC-MCNC: ABNORMAL MG/DL

## 2017-07-06 PROCEDURE — 99214 OFFICE O/P EST MOD 30 MIN: CPT | Performed by: FAMILY MEDICINE

## 2017-07-06 PROCEDURE — 36415 COLL VENOUS BLD VENIPUNCTURE: CPT | Performed by: FAMILY MEDICINE

## 2017-07-06 RX ORDER — ESOMEPRAZOLE MAGNESIUM 40 MG/1
40 CAPSULE, DELAYED RELEASE ORAL 2 TIMES DAILY
Qty: 180 CAPSULE | Refills: 3 | Status: SHIPPED | OUTPATIENT
Start: 2017-07-06 | End: 2017-12-12 | Stop reason: SDUPTHER

## 2017-07-07 LAB — HIV-1 AND HIV-2 ANTIBODIES: NORMAL

## 2017-07-10 ENCOUNTER — HOSPITAL ENCOUNTER (OUTPATIENT)
Dept: PULMONOLOGY | Age: 42
Discharge: HOME | End: 2017-07-10
Attending: INTERNAL MEDICINE | Admitting: INTERNAL MEDICINE

## 2017-07-10 ENCOUNTER — HOSPITAL ENCOUNTER (OUTPATIENT)
Dept: INFUSION THERAPY | Age: 42
Discharge: HOME | End: 2017-07-11
Admitting: INTERNAL MEDICINE

## 2017-07-10 VITALS
BODY MASS INDEX: 28.92 KG/M2 | DIASTOLIC BLOOD PRESSURE: 77 MMHG | RESPIRATION RATE: 18 BRPM | HEIGHT: 70 IN | OXYGEN SATURATION: 97 % | SYSTOLIC BLOOD PRESSURE: 120 MMHG | HEART RATE: 51 BPM | TEMPERATURE: 97.5 F | WEIGHT: 202 LBS

## 2017-07-10 DIAGNOSIS — E88.01 ALPHA-1-ANTITRYPSIN DEFICIENCY (HCC): ICD-10-CM

## 2017-07-10 LAB
DLCO: NORMAL ML/MMHG SEC
FEV1/FVC: NORMAL %
FEV1: NORMAL LITERS
FVC: NORMAL LITERS
TLC: NORMAL LITERS

## 2017-07-10 PROCEDURE — 94729 DIFFUSING CAPACITY: CPT | Performed by: INTERNAL MEDICINE

## 2017-07-10 PROCEDURE — 94060 EVALUATION OF WHEEZING: CPT | Performed by: INTERNAL MEDICINE

## 2017-07-10 PROCEDURE — 94727 GAS DIL/WSHOT DETER LNG VOL: CPT | Performed by: INTERNAL MEDICINE

## 2017-07-10 RX ORDER — SODIUM CHLORIDE 9 MG/ML
INJECTION, SOLUTION INTRAVENOUS ONCE
Status: CANCELLED | OUTPATIENT
Start: 2017-07-10

## 2017-07-10 RX ORDER — SODIUM CHLORIDE 0.9 % (FLUSH) 0.9 %
10 SYRINGE (ML) INJECTION PRN
Status: CANCELLED | OUTPATIENT
Start: 2017-07-10

## 2017-07-10 RX ORDER — SODIUM CHLORIDE 0.9 % (FLUSH) 0.9 %
10 SYRINGE (ML) INJECTION PRN
Status: DISPENSED | OUTPATIENT
Start: 2017-07-10 | End: 2017-07-11

## 2017-07-10 RX ORDER — ALBUTEROL SULFATE 90 UG/1
4 AEROSOL, METERED RESPIRATORY (INHALATION) ONCE
Status: COMPLETED | OUTPATIENT
Start: 2017-07-10 | End: 2017-07-10

## 2017-07-10 RX ORDER — DIPHENHYDRAMINE HYDROCHLORIDE 50 MG/ML
50 INJECTION INTRAMUSCULAR; INTRAVENOUS ONCE
Status: DISCONTINUED | OUTPATIENT
Start: 2017-07-10 | End: 2018-05-01 | Stop reason: HOSPADM

## 2017-07-10 RX ORDER — SODIUM CHLORIDE 9 MG/ML
100 INJECTION, SOLUTION INTRAVENOUS CONTINUOUS
Status: DISCONTINUED | OUTPATIENT
Start: 2017-07-10 | End: 2018-05-01 | Stop reason: HOSPADM

## 2017-07-10 RX ORDER — 0.9 % SODIUM CHLORIDE 0.9 %
10 VIAL (ML) INJECTION ONCE
Status: CANCELLED | OUTPATIENT
Start: 2017-07-10 | End: 2017-07-10

## 2017-07-10 RX ORDER — METHYLPREDNISOLONE SODIUM SUCCINATE 125 MG/2ML
125 INJECTION, POWDER, LYOPHILIZED, FOR SOLUTION INTRAMUSCULAR; INTRAVENOUS ONCE
Status: CANCELLED | OUTPATIENT
Start: 2017-07-10 | End: 2017-07-10

## 2017-07-10 RX ORDER — DIPHENHYDRAMINE HYDROCHLORIDE 50 MG/ML
50 INJECTION INTRAMUSCULAR; INTRAVENOUS ONCE
Status: CANCELLED | OUTPATIENT
Start: 2017-07-10 | End: 2017-07-10

## 2017-07-10 RX ORDER — SODIUM CHLORIDE 9 MG/ML
100 INJECTION, SOLUTION INTRAVENOUS CONTINUOUS
Status: CANCELLED | OUTPATIENT
Start: 2017-07-10

## 2017-07-10 RX ORDER — SODIUM CHLORIDE 9 MG/ML
INJECTION, SOLUTION INTRAVENOUS ONCE
Status: COMPLETED | OUTPATIENT
Start: 2017-07-10 | End: 2017-07-10

## 2017-07-10 RX ADMIN — Medication 10 ML: at 08:47

## 2017-07-10 RX ADMIN — ALBUTEROL SULFATE 4 PUFF: 90 AEROSOL, METERED RESPIRATORY (INHALATION) at 10:35

## 2017-07-10 RX ADMIN — Medication 10 ML: at 09:52

## 2017-07-10 RX ADMIN — SODIUM CHLORIDE: 9 INJECTION, SOLUTION INTRAVENOUS at 08:48

## 2017-07-17 ENCOUNTER — HOSPITAL ENCOUNTER (OUTPATIENT)
Dept: INFUSION THERAPY | Age: 42
Discharge: HOME | End: 2017-07-18
Admitting: INTERNAL MEDICINE

## 2017-07-17 VITALS
RESPIRATION RATE: 18 BRPM | SYSTOLIC BLOOD PRESSURE: 127 MMHG | HEIGHT: 70 IN | HEART RATE: 65 BPM | OXYGEN SATURATION: 96 % | DIASTOLIC BLOOD PRESSURE: 74 MMHG | BODY MASS INDEX: 29.57 KG/M2 | WEIGHT: 206.57 LBS

## 2017-07-17 DIAGNOSIS — E88.01 ALPHA-1-ANTITRYPSIN DEFICIENCY (HCC): ICD-10-CM

## 2017-07-17 RX ORDER — METHYLPREDNISOLONE SODIUM SUCCINATE 125 MG/2ML
125 INJECTION, POWDER, LYOPHILIZED, FOR SOLUTION INTRAMUSCULAR; INTRAVENOUS ONCE
Status: CANCELLED | OUTPATIENT
Start: 2017-07-17 | End: 2017-07-17

## 2017-07-17 RX ORDER — SODIUM CHLORIDE 9 MG/ML
INJECTION, SOLUTION INTRAVENOUS ONCE
Status: CANCELLED | OUTPATIENT
Start: 2017-07-17

## 2017-07-17 RX ORDER — SODIUM CHLORIDE 0.9 % (FLUSH) 0.9 %
10 SYRINGE (ML) INJECTION PRN
Status: CANCELLED | OUTPATIENT
Start: 2017-07-17

## 2017-07-17 RX ORDER — SODIUM CHLORIDE 9 MG/ML
100 INJECTION, SOLUTION INTRAVENOUS CONTINUOUS
Status: CANCELLED | OUTPATIENT
Start: 2017-07-17

## 2017-07-17 RX ORDER — SODIUM CHLORIDE 0.9 % (FLUSH) 0.9 %
10 SYRINGE (ML) INJECTION PRN
Status: ACTIVE | OUTPATIENT
Start: 2017-07-17 | End: 2017-07-18

## 2017-07-17 RX ORDER — DIPHENHYDRAMINE HYDROCHLORIDE 50 MG/ML
50 INJECTION INTRAMUSCULAR; INTRAVENOUS ONCE
Status: CANCELLED | OUTPATIENT
Start: 2017-07-17 | End: 2017-07-17

## 2017-07-17 RX ORDER — SODIUM CHLORIDE 9 MG/ML
INJECTION, SOLUTION INTRAVENOUS ONCE
Status: COMPLETED | OUTPATIENT
Start: 2017-07-17 | End: 2017-07-17

## 2017-07-17 RX ORDER — 0.9 % SODIUM CHLORIDE 0.9 %
10 VIAL (ML) INJECTION ONCE
Status: CANCELLED | OUTPATIENT
Start: 2017-07-17 | End: 2017-07-17

## 2017-07-17 RX ADMIN — SODIUM CHLORIDE: 9 INJECTION, SOLUTION INTRAVENOUS at 09:25

## 2017-07-18 ENCOUNTER — OFFICE VISIT (OUTPATIENT)
Dept: FAMILY MEDICINE CLINIC | Age: 42
End: 2017-07-18

## 2017-07-18 VITALS
WEIGHT: 206 LBS | DIASTOLIC BLOOD PRESSURE: 74 MMHG | HEIGHT: 70 IN | HEART RATE: 64 BPM | RESPIRATION RATE: 24 BRPM | SYSTOLIC BLOOD PRESSURE: 113 MMHG | OXYGEN SATURATION: 97 % | BODY MASS INDEX: 29.49 KG/M2

## 2017-07-18 DIAGNOSIS — E11.9 TYPE 2 DIABETES MELLITUS WITHOUT COMPLICATION, WITHOUT LONG-TERM CURRENT USE OF INSULIN (HCC): ICD-10-CM

## 2017-07-18 DIAGNOSIS — E88.01 ALPHA-1-ANTITRYPSIN DEFICIENCY (HCC): Primary | ICD-10-CM

## 2017-07-18 LAB
CREATININE URINE: 149.5 MG/DL (ref 39–259)
MICROALBUMIN UR-MCNC: <1.2 MG/DL
MICROALBUMIN/CREAT UR-RTO: NORMAL MG/G (ref 0–30)

## 2017-07-18 PROCEDURE — 99214 OFFICE O/P EST MOD 30 MIN: CPT | Performed by: FAMILY MEDICINE

## 2017-07-18 ASSESSMENT — PATIENT HEALTH QUESTIONNAIRE - PHQ9
SUM OF ALL RESPONSES TO PHQ9 QUESTIONS 1 & 2: 0
2. FEELING DOWN, DEPRESSED OR HOPELESS: 0
SUM OF ALL RESPONSES TO PHQ QUESTIONS 1-9: 0
1. LITTLE INTEREST OR PLEASURE IN DOING THINGS: 0

## 2017-07-21 ENCOUNTER — HOSPITAL ENCOUNTER (OUTPATIENT)
Dept: ULTRASOUND IMAGING | Age: 42
Discharge: HOME | End: 2017-07-21
Attending: FAMILY MEDICINE | Admitting: FAMILY MEDICINE

## 2017-07-21 ENCOUNTER — TELEPHONE (OUTPATIENT)
Dept: FAMILY MEDICINE CLINIC | Age: 42
End: 2017-07-21

## 2017-07-21 DIAGNOSIS — E88.01 ALPHA-1-ANTITRYPSIN DEFICIENCY (HCC): ICD-10-CM

## 2017-07-24 ENCOUNTER — HOSPITAL ENCOUNTER (OUTPATIENT)
Dept: INFUSION THERAPY | Age: 42
Discharge: HOME | End: 2017-07-25
Admitting: INTERNAL MEDICINE

## 2017-07-24 VITALS
HEART RATE: 56 BPM | HEIGHT: 70 IN | WEIGHT: 200.18 LBS | RESPIRATION RATE: 17 BRPM | BODY MASS INDEX: 28.66 KG/M2 | SYSTOLIC BLOOD PRESSURE: 104 MMHG | DIASTOLIC BLOOD PRESSURE: 69 MMHG | TEMPERATURE: 97.6 F | OXYGEN SATURATION: 97 %

## 2017-07-24 DIAGNOSIS — E88.01 ALPHA-1-ANTITRYPSIN DEFICIENCY (HCC): ICD-10-CM

## 2017-07-24 RX ORDER — SODIUM CHLORIDE 0.9 % (FLUSH) 0.9 %
10 SYRINGE (ML) INJECTION PRN
Status: CANCELLED | OUTPATIENT
Start: 2017-07-24

## 2017-07-24 RX ORDER — SODIUM CHLORIDE 9 MG/ML
100 INJECTION, SOLUTION INTRAVENOUS CONTINUOUS
Status: CANCELLED | OUTPATIENT
Start: 2017-07-24

## 2017-07-24 RX ORDER — SODIUM CHLORIDE 9 MG/ML
INJECTION, SOLUTION INTRAVENOUS ONCE
Status: CANCELLED | OUTPATIENT
Start: 2017-07-24

## 2017-07-24 RX ORDER — 0.9 % SODIUM CHLORIDE 0.9 %
10 VIAL (ML) INJECTION ONCE
Status: CANCELLED | OUTPATIENT
Start: 2017-07-24 | End: 2017-07-24

## 2017-07-24 RX ORDER — DIPHENHYDRAMINE HYDROCHLORIDE 50 MG/ML
50 INJECTION INTRAMUSCULAR; INTRAVENOUS ONCE
Status: CANCELLED | OUTPATIENT
Start: 2017-07-24 | End: 2017-07-24

## 2017-07-24 RX ORDER — SODIUM CHLORIDE 9 MG/ML
INJECTION, SOLUTION INTRAVENOUS ONCE
Status: COMPLETED | OUTPATIENT
Start: 2017-07-24 | End: 2017-07-24

## 2017-07-24 RX ORDER — METHYLPREDNISOLONE SODIUM SUCCINATE 125 MG/2ML
125 INJECTION, POWDER, LYOPHILIZED, FOR SOLUTION INTRAMUSCULAR; INTRAVENOUS ONCE
Status: CANCELLED | OUTPATIENT
Start: 2017-07-24 | End: 2017-07-24

## 2017-07-24 RX ORDER — SODIUM CHLORIDE 0.9 % (FLUSH) 0.9 %
10 SYRINGE (ML) INJECTION PRN
Status: DISPENSED | OUTPATIENT
Start: 2017-07-24 | End: 2017-07-25

## 2017-07-24 RX ADMIN — Medication 10 ML: at 10:28

## 2017-07-24 RX ADMIN — SODIUM CHLORIDE: 9 INJECTION, SOLUTION INTRAVENOUS at 08:31

## 2017-07-24 RX ADMIN — Medication 20 ML: at 08:31

## 2017-07-24 RX ADMIN — Medication 10 ML: at 10:11

## 2017-07-24 RX ADMIN — Medication 10 ML: at 09:34

## 2017-07-27 ENCOUNTER — OFFICE VISIT (OUTPATIENT)
Dept: PULMONOLOGY | Age: 42
End: 2017-07-27

## 2017-07-27 VITALS
WEIGHT: 199 LBS | BODY MASS INDEX: 28.49 KG/M2 | HEIGHT: 70 IN | RESPIRATION RATE: 16 BRPM | TEMPERATURE: 97.8 F | SYSTOLIC BLOOD PRESSURE: 98 MMHG | OXYGEN SATURATION: 94 % | DIASTOLIC BLOOD PRESSURE: 68 MMHG | HEART RATE: 62 BPM

## 2017-07-27 DIAGNOSIS — J96.11 CHRONIC HYPOXEMIC RESPIRATORY FAILURE (HCC): Primary | ICD-10-CM

## 2017-07-27 DIAGNOSIS — J43.9 PULMONARY EMPHYSEMA, UNSPECIFIED EMPHYSEMA TYPE (HCC): ICD-10-CM

## 2017-07-27 DIAGNOSIS — E88.01 ALPHA-1-ANTITRYPSIN DEFICIENCY (HCC): ICD-10-CM

## 2017-07-27 PROCEDURE — 99214 OFFICE O/P EST MOD 30 MIN: CPT | Performed by: INTERNAL MEDICINE

## 2017-07-27 RX ORDER — ALBUTEROL SULFATE 90 UG/1
2 AEROSOL, METERED RESPIRATORY (INHALATION) EVERY 6 HOURS PRN
Qty: 1 INHALER | Refills: 3 | Status: SHIPPED | OUTPATIENT
Start: 2017-07-27 | End: 2020-04-14

## 2017-08-01 ENCOUNTER — HOSPITAL ENCOUNTER (OUTPATIENT)
Dept: INFUSION THERAPY | Age: 42
Discharge: HOME | End: 2017-08-31
Admitting: INTERNAL MEDICINE

## 2017-08-01 ENCOUNTER — HOSPITAL ENCOUNTER (OUTPATIENT)
Dept: INFUSION THERAPY | Age: 42
Discharge: HOME | End: 2017-07-31
Admitting: INTERNAL MEDICINE

## 2017-08-01 ENCOUNTER — TELEPHONE (OUTPATIENT)
Dept: PULMONOLOGY | Age: 42
End: 2017-08-01

## 2017-08-01 VITALS
HEART RATE: 68 BPM | RESPIRATION RATE: 17 BRPM | OXYGEN SATURATION: 96 % | BODY MASS INDEX: 28.92 KG/M2 | DIASTOLIC BLOOD PRESSURE: 64 MMHG | HEIGHT: 70 IN | WEIGHT: 202 LBS | SYSTOLIC BLOOD PRESSURE: 112 MMHG | TEMPERATURE: 97.8 F

## 2017-08-01 DIAGNOSIS — E88.01 ALPHA-1-ANTITRYPSIN DEFICIENCY (HCC): ICD-10-CM

## 2017-08-01 DIAGNOSIS — J43.9 PULMONARY EMPHYSEMA, UNSPECIFIED EMPHYSEMA TYPE (HCC): Primary | ICD-10-CM

## 2017-08-01 RX ORDER — 0.9 % SODIUM CHLORIDE 0.9 %
10 VIAL (ML) INJECTION ONCE
Status: CANCELLED | OUTPATIENT
Start: 2017-08-01 | End: 2017-08-01

## 2017-08-01 RX ORDER — METHYLPREDNISOLONE SODIUM SUCCINATE 125 MG/2ML
125 INJECTION, POWDER, LYOPHILIZED, FOR SOLUTION INTRAMUSCULAR; INTRAVENOUS ONCE
Status: CANCELLED | OUTPATIENT
Start: 2017-08-01 | End: 2017-08-01

## 2017-08-01 RX ORDER — SODIUM CHLORIDE 9 MG/ML
100 INJECTION, SOLUTION INTRAVENOUS CONTINUOUS
Status: CANCELLED | OUTPATIENT
Start: 2017-08-01

## 2017-08-01 RX ORDER — SODIUM CHLORIDE 9 MG/ML
INJECTION, SOLUTION INTRAVENOUS ONCE
Status: CANCELLED | OUTPATIENT
Start: 2017-08-01

## 2017-08-01 RX ORDER — SODIUM CHLORIDE 9 MG/ML
INJECTION, SOLUTION INTRAVENOUS ONCE
Status: COMPLETED | OUTPATIENT
Start: 2017-08-01 | End: 2017-08-01

## 2017-08-01 RX ORDER — SODIUM CHLORIDE 0.9 % (FLUSH) 0.9 %
10 SYRINGE (ML) INJECTION PRN
Status: CANCELLED | OUTPATIENT
Start: 2017-08-01

## 2017-08-01 RX ORDER — SODIUM CHLORIDE 0.9 % (FLUSH) 0.9 %
10 SYRINGE (ML) INJECTION PRN
Status: DISPENSED | OUTPATIENT
Start: 2017-08-01 | End: 2017-08-02

## 2017-08-01 RX ORDER — DIPHENHYDRAMINE HYDROCHLORIDE 50 MG/ML
50 INJECTION INTRAMUSCULAR; INTRAVENOUS ONCE
Status: CANCELLED | OUTPATIENT
Start: 2017-08-01 | End: 2017-08-01

## 2017-08-01 RX ADMIN — SODIUM CHLORIDE: 9 INJECTION, SOLUTION INTRAVENOUS at 08:52

## 2017-08-01 RX ADMIN — Medication 10 ML: at 09:11

## 2017-08-01 RX ADMIN — Medication 10 ML: at 08:52

## 2017-08-01 RX ADMIN — Medication 10 ML: at 09:58

## 2017-08-01 RX ADMIN — Medication 10 ML: at 09:45

## 2017-08-02 RX ORDER — FLUTICASONE FUROATE AND VILANTEROL 200; 25 UG/1; UG/1
1 POWDER RESPIRATORY (INHALATION) DAILY
Qty: 1 EACH | Refills: 3 | Status: SHIPPED | OUTPATIENT
Start: 2017-08-02 | End: 2017-11-20

## 2017-08-07 ENCOUNTER — HOSPITAL ENCOUNTER (OUTPATIENT)
Dept: INFUSION THERAPY | Age: 42
Discharge: HOME | End: 2017-08-08
Admitting: INTERNAL MEDICINE

## 2017-08-07 VITALS
OXYGEN SATURATION: 95 % | WEIGHT: 202 LBS | RESPIRATION RATE: 17 BRPM | DIASTOLIC BLOOD PRESSURE: 72 MMHG | BODY MASS INDEX: 28.98 KG/M2 | HEART RATE: 64 BPM | SYSTOLIC BLOOD PRESSURE: 112 MMHG | TEMPERATURE: 97.9 F

## 2017-08-07 DIAGNOSIS — E88.01 ALPHA-1-ANTITRYPSIN DEFICIENCY (HCC): ICD-10-CM

## 2017-08-07 RX ORDER — SODIUM CHLORIDE 0.9 % (FLUSH) 0.9 %
10 SYRINGE (ML) INJECTION PRN
Status: CANCELLED | OUTPATIENT
Start: 2017-08-07

## 2017-08-07 RX ORDER — SODIUM CHLORIDE 0.9 % (FLUSH) 0.9 %
10 SYRINGE (ML) INJECTION PRN
Status: DISPENSED | OUTPATIENT
Start: 2017-08-07 | End: 2017-08-08

## 2017-08-07 RX ORDER — SODIUM CHLORIDE 9 MG/ML
INJECTION, SOLUTION INTRAVENOUS ONCE
Status: COMPLETED | OUTPATIENT
Start: 2017-08-07 | End: 2017-08-07

## 2017-08-07 RX ORDER — DIPHENHYDRAMINE HYDROCHLORIDE 50 MG/ML
50 INJECTION INTRAMUSCULAR; INTRAVENOUS ONCE
Status: CANCELLED | OUTPATIENT
Start: 2017-08-07 | End: 2017-08-07

## 2017-08-07 RX ORDER — 0.9 % SODIUM CHLORIDE 0.9 %
10 VIAL (ML) INJECTION ONCE
Status: CANCELLED | OUTPATIENT
Start: 2017-08-07 | End: 2017-08-07

## 2017-08-07 RX ORDER — SODIUM CHLORIDE 9 MG/ML
INJECTION, SOLUTION INTRAVENOUS ONCE
Status: CANCELLED | OUTPATIENT
Start: 2017-08-07

## 2017-08-07 RX ORDER — METHYLPREDNISOLONE SODIUM SUCCINATE 125 MG/2ML
125 INJECTION, POWDER, LYOPHILIZED, FOR SOLUTION INTRAMUSCULAR; INTRAVENOUS ONCE
Status: CANCELLED | OUTPATIENT
Start: 2017-08-07 | End: 2017-08-07

## 2017-08-07 RX ORDER — SODIUM CHLORIDE 9 MG/ML
100 INJECTION, SOLUTION INTRAVENOUS CONTINUOUS
Status: CANCELLED | OUTPATIENT
Start: 2017-08-07

## 2017-08-07 RX ADMIN — Medication 20 ML: at 08:32

## 2017-08-07 RX ADMIN — Medication 10 ML: at 08:54

## 2017-08-07 RX ADMIN — Medication 10 ML: at 09:25

## 2017-08-07 RX ADMIN — Medication 10 ML: at 09:42

## 2017-08-07 RX ADMIN — SODIUM CHLORIDE: 9 INJECTION, SOLUTION INTRAVENOUS at 08:32

## 2017-08-14 ENCOUNTER — HOSPITAL ENCOUNTER (OUTPATIENT)
Dept: INFUSION THERAPY | Age: 42
Discharge: HOME | End: 2017-08-15
Admitting: INTERNAL MEDICINE

## 2017-08-14 VITALS
HEART RATE: 77 BPM | BODY MASS INDEX: 28.98 KG/M2 | WEIGHT: 202 LBS | RESPIRATION RATE: 18 BRPM | TEMPERATURE: 97.9 F | DIASTOLIC BLOOD PRESSURE: 71 MMHG | SYSTOLIC BLOOD PRESSURE: 122 MMHG | OXYGEN SATURATION: 95 %

## 2017-08-14 DIAGNOSIS — E88.01 ALPHA-1-ANTITRYPSIN DEFICIENCY (HCC): ICD-10-CM

## 2017-08-14 RX ORDER — 0.9 % SODIUM CHLORIDE 0.9 %
10 VIAL (ML) INJECTION ONCE
Status: CANCELLED | OUTPATIENT
Start: 2017-08-14 | End: 2017-08-14

## 2017-08-14 RX ORDER — SODIUM CHLORIDE 9 MG/ML
INJECTION, SOLUTION INTRAVENOUS ONCE
Status: COMPLETED | OUTPATIENT
Start: 2017-08-14 | End: 2017-08-14

## 2017-08-14 RX ORDER — SODIUM CHLORIDE 0.9 % (FLUSH) 0.9 %
10 SYRINGE (ML) INJECTION PRN
Status: ACTIVE | OUTPATIENT
Start: 2017-08-14 | End: 2017-08-15

## 2017-08-14 RX ORDER — DIPHENHYDRAMINE HYDROCHLORIDE 50 MG/ML
50 INJECTION INTRAMUSCULAR; INTRAVENOUS ONCE
Status: CANCELLED | OUTPATIENT
Start: 2017-08-14 | End: 2017-08-14

## 2017-08-14 RX ORDER — METHYLPREDNISOLONE SODIUM SUCCINATE 125 MG/2ML
125 INJECTION, POWDER, LYOPHILIZED, FOR SOLUTION INTRAMUSCULAR; INTRAVENOUS ONCE
Status: CANCELLED | OUTPATIENT
Start: 2017-08-14 | End: 2017-08-14

## 2017-08-14 RX ORDER — SODIUM CHLORIDE 0.9 % (FLUSH) 0.9 %
10 SYRINGE (ML) INJECTION PRN
Status: CANCELLED | OUTPATIENT
Start: 2017-08-14

## 2017-08-14 RX ORDER — SODIUM CHLORIDE 9 MG/ML
100 INJECTION, SOLUTION INTRAVENOUS CONTINUOUS
Status: CANCELLED | OUTPATIENT
Start: 2017-08-14

## 2017-08-14 RX ORDER — SODIUM CHLORIDE 9 MG/ML
INJECTION, SOLUTION INTRAVENOUS ONCE
Status: CANCELLED | OUTPATIENT
Start: 2017-08-14

## 2017-08-14 RX ADMIN — SODIUM CHLORIDE: 9 INJECTION, SOLUTION INTRAVENOUS at 08:39

## 2017-08-21 ENCOUNTER — HOSPITAL ENCOUNTER (OUTPATIENT)
Dept: INFUSION THERAPY | Age: 42
Discharge: HOME | End: 2017-08-22
Admitting: INTERNAL MEDICINE

## 2017-08-21 VITALS
RESPIRATION RATE: 17 BRPM | HEART RATE: 70 BPM | SYSTOLIC BLOOD PRESSURE: 116 MMHG | TEMPERATURE: 97.6 F | DIASTOLIC BLOOD PRESSURE: 75 MMHG | BODY MASS INDEX: 28.84 KG/M2 | WEIGHT: 201 LBS | OXYGEN SATURATION: 97 %

## 2017-08-21 DIAGNOSIS — E88.01 ALPHA-1-ANTITRYPSIN DEFICIENCY (HCC): ICD-10-CM

## 2017-08-21 RX ORDER — DIPHENHYDRAMINE HYDROCHLORIDE 50 MG/ML
50 INJECTION INTRAMUSCULAR; INTRAVENOUS ONCE
Status: CANCELLED | OUTPATIENT
Start: 2017-08-21 | End: 2017-08-21

## 2017-08-21 RX ORDER — SODIUM CHLORIDE 0.9 % (FLUSH) 0.9 %
10 SYRINGE (ML) INJECTION PRN
Status: DISPENSED | OUTPATIENT
Start: 2017-08-21 | End: 2017-08-22

## 2017-08-21 RX ORDER — 0.9 % SODIUM CHLORIDE 0.9 %
10 VIAL (ML) INJECTION ONCE
Status: CANCELLED | OUTPATIENT
Start: 2017-08-21 | End: 2017-08-21

## 2017-08-21 RX ORDER — SODIUM CHLORIDE 0.9 % (FLUSH) 0.9 %
10 SYRINGE (ML) INJECTION PRN
Status: CANCELLED | OUTPATIENT
Start: 2017-08-21

## 2017-08-21 RX ORDER — SODIUM CHLORIDE 9 MG/ML
INJECTION, SOLUTION INTRAVENOUS ONCE
Status: CANCELLED | OUTPATIENT
Start: 2017-08-21

## 2017-08-21 RX ORDER — METHYLPREDNISOLONE SODIUM SUCCINATE 125 MG/2ML
125 INJECTION, POWDER, LYOPHILIZED, FOR SOLUTION INTRAMUSCULAR; INTRAVENOUS ONCE
Status: CANCELLED | OUTPATIENT
Start: 2017-08-21 | End: 2017-08-21

## 2017-08-21 RX ORDER — SODIUM CHLORIDE 9 MG/ML
100 INJECTION, SOLUTION INTRAVENOUS CONTINUOUS
Status: CANCELLED | OUTPATIENT
Start: 2017-08-21

## 2017-08-21 RX ORDER — SODIUM CHLORIDE 9 MG/ML
INJECTION, SOLUTION INTRAVENOUS ONCE
Status: COMPLETED | OUTPATIENT
Start: 2017-08-21 | End: 2017-08-21

## 2017-08-21 RX ADMIN — SODIUM CHLORIDE: 9 INJECTION, SOLUTION INTRAVENOUS at 08:29

## 2017-08-21 RX ADMIN — Medication 10 ML: at 08:49

## 2017-08-21 RX ADMIN — Medication 10 ML: at 09:36

## 2017-08-21 RX ADMIN — Medication 10 ML: at 09:22

## 2017-08-21 RX ADMIN — Medication 10 ML: at 08:29

## 2017-08-28 ENCOUNTER — HOSPITAL ENCOUNTER (OUTPATIENT)
Dept: INFUSION THERAPY | Age: 42
Discharge: HOME | End: 2017-08-29
Admitting: INTERNAL MEDICINE

## 2017-08-28 VITALS
SYSTOLIC BLOOD PRESSURE: 117 MMHG | BODY MASS INDEX: 28.92 KG/M2 | DIASTOLIC BLOOD PRESSURE: 72 MMHG | HEIGHT: 70 IN | OXYGEN SATURATION: 97 % | HEART RATE: 67 BPM | TEMPERATURE: 98 F | RESPIRATION RATE: 18 BRPM | WEIGHT: 202 LBS

## 2017-08-28 DIAGNOSIS — E88.01 ALPHA-1-ANTITRYPSIN DEFICIENCY (HCC): ICD-10-CM

## 2017-08-28 RX ORDER — SODIUM CHLORIDE 0.9 % (FLUSH) 0.9 %
10 SYRINGE (ML) INJECTION PRN
Status: ACTIVE | OUTPATIENT
Start: 2017-08-28 | End: 2017-08-29

## 2017-08-28 RX ORDER — SODIUM CHLORIDE 9 MG/ML
100 INJECTION, SOLUTION INTRAVENOUS CONTINUOUS
Status: CANCELLED | OUTPATIENT
Start: 2017-08-28

## 2017-08-28 RX ORDER — 0.9 % SODIUM CHLORIDE 0.9 %
10 VIAL (ML) INJECTION ONCE
Status: CANCELLED | OUTPATIENT
Start: 2017-08-28 | End: 2017-08-28

## 2017-08-28 RX ORDER — SODIUM CHLORIDE 9 MG/ML
INJECTION, SOLUTION INTRAVENOUS ONCE
Status: CANCELLED | OUTPATIENT
Start: 2017-08-28

## 2017-08-28 RX ORDER — DIPHENHYDRAMINE HYDROCHLORIDE 50 MG/ML
50 INJECTION INTRAMUSCULAR; INTRAVENOUS ONCE
Status: CANCELLED | OUTPATIENT
Start: 2017-08-28 | End: 2017-08-28

## 2017-08-28 RX ORDER — SODIUM CHLORIDE 0.9 % (FLUSH) 0.9 %
10 SYRINGE (ML) INJECTION PRN
Status: CANCELLED | OUTPATIENT
Start: 2017-08-28

## 2017-08-28 RX ORDER — METHYLPREDNISOLONE SODIUM SUCCINATE 125 MG/2ML
125 INJECTION, POWDER, LYOPHILIZED, FOR SOLUTION INTRAMUSCULAR; INTRAVENOUS ONCE
Status: CANCELLED | OUTPATIENT
Start: 2017-08-28 | End: 2017-08-28

## 2017-08-28 RX ORDER — SODIUM CHLORIDE 9 MG/ML
INJECTION, SOLUTION INTRAVENOUS ONCE
Status: COMPLETED | OUTPATIENT
Start: 2017-08-28 | End: 2017-08-28

## 2017-08-28 RX ADMIN — Medication 10 ML: at 09:50

## 2017-08-28 RX ADMIN — Medication 10 ML: at 08:35

## 2017-08-28 RX ADMIN — SODIUM CHLORIDE: 9 INJECTION, SOLUTION INTRAVENOUS at 08:48

## 2017-09-01 ENCOUNTER — HOSPITAL ENCOUNTER (OUTPATIENT)
Dept: INFUSION THERAPY | Age: 42
Discharge: HOME | End: 2017-09-30
Attending: INTERNAL MEDICINE | Admitting: INTERNAL MEDICINE

## 2017-09-05 ENCOUNTER — HOSPITAL ENCOUNTER (OUTPATIENT)
Dept: INFUSION THERAPY | Age: 42
Discharge: HOME | End: 2017-09-06
Admitting: INTERNAL MEDICINE

## 2017-09-05 VITALS
BODY MASS INDEX: 28.7 KG/M2 | HEART RATE: 72 BPM | SYSTOLIC BLOOD PRESSURE: 121 MMHG | WEIGHT: 200 LBS | OXYGEN SATURATION: 95 % | RESPIRATION RATE: 18 BRPM | TEMPERATURE: 98 F | DIASTOLIC BLOOD PRESSURE: 81 MMHG

## 2017-09-05 DIAGNOSIS — E88.01 ALPHA-1-ANTITRYPSIN DEFICIENCY (HCC): ICD-10-CM

## 2017-09-05 RX ORDER — SODIUM CHLORIDE 0.9 % (FLUSH) 0.9 %
10 SYRINGE (ML) INJECTION PRN
Status: CANCELLED | OUTPATIENT
Start: 2017-09-05

## 2017-09-05 RX ORDER — SODIUM CHLORIDE 9 MG/ML
INJECTION, SOLUTION INTRAVENOUS ONCE
Status: CANCELLED | OUTPATIENT
Start: 2017-09-05

## 2017-09-05 RX ORDER — 0.9 % SODIUM CHLORIDE 0.9 %
10 VIAL (ML) INJECTION ONCE
Status: CANCELLED | OUTPATIENT
Start: 2017-09-05 | End: 2017-09-05

## 2017-09-05 RX ORDER — METHYLPREDNISOLONE SODIUM SUCCINATE 125 MG/2ML
125 INJECTION, POWDER, LYOPHILIZED, FOR SOLUTION INTRAMUSCULAR; INTRAVENOUS ONCE
Status: CANCELLED | OUTPATIENT
Start: 2017-09-05 | End: 2017-09-05

## 2017-09-05 RX ORDER — SODIUM CHLORIDE 9 MG/ML
100 INJECTION, SOLUTION INTRAVENOUS CONTINUOUS
Status: CANCELLED | OUTPATIENT
Start: 2017-09-05

## 2017-09-05 RX ORDER — SODIUM CHLORIDE 0.9 % (FLUSH) 0.9 %
10 SYRINGE (ML) INJECTION PRN
Status: DISPENSED | OUTPATIENT
Start: 2017-09-05 | End: 2017-09-06

## 2017-09-05 RX ORDER — DIPHENHYDRAMINE HYDROCHLORIDE 50 MG/ML
50 INJECTION INTRAMUSCULAR; INTRAVENOUS ONCE
Status: CANCELLED | OUTPATIENT
Start: 2017-09-05 | End: 2017-09-05

## 2017-09-05 RX ORDER — SODIUM CHLORIDE 9 MG/ML
INJECTION, SOLUTION INTRAVENOUS ONCE
Status: COMPLETED | OUTPATIENT
Start: 2017-09-05 | End: 2017-09-05

## 2017-09-05 RX ADMIN — Medication 10 ML: at 08:55

## 2017-09-05 RX ADMIN — SODIUM CHLORIDE 500 ML: 9 INJECTION, SOLUTION INTRAVENOUS at 09:00

## 2017-09-07 ENCOUNTER — OFFICE VISIT (OUTPATIENT)
Dept: FAMILY MEDICINE CLINIC | Age: 42
End: 2017-09-07

## 2017-09-07 VITALS
BODY MASS INDEX: 29.35 KG/M2 | HEART RATE: 94 BPM | WEIGHT: 205 LBS | DIASTOLIC BLOOD PRESSURE: 75 MMHG | SYSTOLIC BLOOD PRESSURE: 111 MMHG | HEIGHT: 70 IN

## 2017-09-07 DIAGNOSIS — M79.641 PAIN OF RIGHT HAND: ICD-10-CM

## 2017-09-07 DIAGNOSIS — M25.562 ACUTE PAIN OF LEFT KNEE: Primary | ICD-10-CM

## 2017-09-07 PROCEDURE — 99213 OFFICE O/P EST LOW 20 MIN: CPT | Performed by: FAMILY MEDICINE

## 2017-09-08 ENCOUNTER — HOSPITAL ENCOUNTER (OUTPATIENT)
Dept: OTHER | Age: 42
Discharge: HOME | End: 2017-09-08
Attending: FAMILY MEDICINE | Admitting: FAMILY MEDICINE

## 2017-09-08 DIAGNOSIS — M25.562 ACUTE PAIN OF LEFT KNEE: ICD-10-CM

## 2017-09-11 ENCOUNTER — HOSPITAL ENCOUNTER (OUTPATIENT)
Dept: INFUSION THERAPY | Age: 42
Discharge: HOME | End: 2017-09-12
Admitting: INTERNAL MEDICINE

## 2017-09-11 VITALS
BODY MASS INDEX: 28.92 KG/M2 | RESPIRATION RATE: 18 BRPM | SYSTOLIC BLOOD PRESSURE: 124 MMHG | OXYGEN SATURATION: 95 % | HEIGHT: 70 IN | WEIGHT: 202 LBS | TEMPERATURE: 97.8 F | DIASTOLIC BLOOD PRESSURE: 74 MMHG | HEART RATE: 84 BPM

## 2017-09-11 DIAGNOSIS — E88.01 ALPHA-1-ANTITRYPSIN DEFICIENCY (HCC): ICD-10-CM

## 2017-09-11 RX ORDER — METHYLPREDNISOLONE SODIUM SUCCINATE 125 MG/2ML
125 INJECTION, POWDER, LYOPHILIZED, FOR SOLUTION INTRAMUSCULAR; INTRAVENOUS ONCE
Status: CANCELLED | OUTPATIENT
Start: 2017-09-11 | End: 2017-09-11

## 2017-09-11 RX ORDER — SODIUM CHLORIDE 0.9 % (FLUSH) 0.9 %
10 SYRINGE (ML) INJECTION PRN
Status: CANCELLED | OUTPATIENT
Start: 2017-09-11

## 2017-09-11 RX ORDER — SODIUM CHLORIDE 9 MG/ML
INJECTION, SOLUTION INTRAVENOUS ONCE
Status: COMPLETED | OUTPATIENT
Start: 2017-09-11 | End: 2017-09-11

## 2017-09-11 RX ORDER — 0.9 % SODIUM CHLORIDE 0.9 %
10 VIAL (ML) INJECTION ONCE
Status: CANCELLED | OUTPATIENT
Start: 2017-09-11 | End: 2017-09-11

## 2017-09-11 RX ORDER — SODIUM CHLORIDE 9 MG/ML
INJECTION, SOLUTION INTRAVENOUS ONCE
Status: CANCELLED | OUTPATIENT
Start: 2017-09-11

## 2017-09-11 RX ORDER — SODIUM CHLORIDE 9 MG/ML
100 INJECTION, SOLUTION INTRAVENOUS CONTINUOUS
Status: CANCELLED | OUTPATIENT
Start: 2017-09-11

## 2017-09-11 RX ORDER — DIPHENHYDRAMINE HYDROCHLORIDE 50 MG/ML
50 INJECTION INTRAMUSCULAR; INTRAVENOUS ONCE
Status: CANCELLED | OUTPATIENT
Start: 2017-09-11 | End: 2017-09-11

## 2017-09-11 RX ORDER — SODIUM CHLORIDE 0.9 % (FLUSH) 0.9 %
10 SYRINGE (ML) INJECTION PRN
Status: DISPENSED | OUTPATIENT
Start: 2017-09-11 | End: 2017-09-12

## 2017-09-11 RX ADMIN — Medication 10 ML: at 09:44

## 2017-09-11 RX ADMIN — SODIUM CHLORIDE: 9 INJECTION, SOLUTION INTRAVENOUS at 08:24

## 2017-09-11 RX ADMIN — Medication 20 ML: at 08:24

## 2017-09-11 RX ADMIN — Medication 10 ML: at 09:12

## 2017-09-11 RX ADMIN — Medication 10 ML: at 09:58

## 2017-09-12 ENCOUNTER — OFFICE VISIT (OUTPATIENT)
Dept: FAMILY MEDICINE CLINIC | Age: 42
End: 2017-09-12

## 2017-09-12 VITALS
DIASTOLIC BLOOD PRESSURE: 73 MMHG | SYSTOLIC BLOOD PRESSURE: 108 MMHG | HEART RATE: 79 BPM | HEIGHT: 70 IN | WEIGHT: 203 LBS | BODY MASS INDEX: 29.06 KG/M2

## 2017-09-12 DIAGNOSIS — Z23 NEEDS FLU SHOT: ICD-10-CM

## 2017-09-12 DIAGNOSIS — K22.719 BARRETT'S ESOPHAGUS WITH DYSPLASIA: ICD-10-CM

## 2017-09-12 DIAGNOSIS — E11.9 TYPE 2 DIABETES MELLITUS WITHOUT COMPLICATION, WITHOUT LONG-TERM CURRENT USE OF INSULIN (HCC): ICD-10-CM

## 2017-09-12 DIAGNOSIS — I10 ESSENTIAL HYPERTENSION, BENIGN: ICD-10-CM

## 2017-09-12 DIAGNOSIS — D12.6 ADENOMATOUS POLYP OF COLON, UNSPECIFIED PART OF COLON: ICD-10-CM

## 2017-09-12 DIAGNOSIS — E88.01 ALPHA-1-ANTITRYPSIN DEFICIENCY (HCC): ICD-10-CM

## 2017-09-12 DIAGNOSIS — Z01.818 PRE-OP EXAM: Primary | ICD-10-CM

## 2017-09-12 LAB
A/G RATIO: 1.6 (ref 1.1–2.2)
ALBUMIN SERPL-MCNC: 4.5 G/DL (ref 3.4–5)
ALP BLD-CCNC: 96 U/L (ref 40–129)
ALT SERPL-CCNC: 75 U/L (ref 10–40)
ANION GAP SERPL CALCULATED.3IONS-SCNC: 14 MMOL/L (ref 3–16)
AST SERPL-CCNC: 42 U/L (ref 15–37)
BILIRUB SERPL-MCNC: 0.6 MG/DL (ref 0–1)
BUN BLDV-MCNC: 11 MG/DL (ref 7–20)
CALCIUM SERPL-MCNC: 9.7 MG/DL (ref 8.3–10.6)
CHLORIDE BLD-SCNC: 98 MMOL/L (ref 99–110)
CO2: 25 MMOL/L (ref 21–32)
CREAT SERPL-MCNC: 0.8 MG/DL (ref 0.9–1.3)
GFR AFRICAN AMERICAN: >60
GFR NON-AFRICAN AMERICAN: >60
GLOBULIN: 2.9 G/DL
GLUCOSE BLD-MCNC: 121 MG/DL (ref 70–99)
POTASSIUM SERPL-SCNC: 4.4 MMOL/L (ref 3.5–5.1)
SODIUM BLD-SCNC: 137 MMOL/L (ref 136–145)
TOTAL PROTEIN: 7.4 G/DL (ref 6.4–8.2)

## 2017-09-12 PROCEDURE — 90471 IMMUNIZATION ADMIN: CPT | Performed by: FAMILY MEDICINE

## 2017-09-12 PROCEDURE — 36415 COLL VENOUS BLD VENIPUNCTURE: CPT | Performed by: FAMILY MEDICINE

## 2017-09-12 PROCEDURE — 90686 IIV4 VACC NO PRSV 0.5 ML IM: CPT | Performed by: FAMILY MEDICINE

## 2017-09-12 PROCEDURE — 99244 OFF/OP CNSLTJ NEW/EST MOD 40: CPT | Performed by: FAMILY MEDICINE

## 2017-09-12 PROCEDURE — 93000 ELECTROCARDIOGRAM COMPLETE: CPT | Performed by: FAMILY MEDICINE

## 2017-09-13 ENCOUNTER — PATIENT MESSAGE (OUTPATIENT)
Dept: FAMILY MEDICINE CLINIC | Age: 42
End: 2017-09-13

## 2017-09-13 LAB
ESTIMATED AVERAGE GLUCOSE: 162.8 MG/DL
HBA1C MFR BLD: 7.3 %

## 2017-09-19 ENCOUNTER — HOSPITAL ENCOUNTER (OUTPATIENT)
Dept: INFUSION THERAPY | Age: 42
Discharge: HOME | End: 2017-09-20
Admitting: INTERNAL MEDICINE

## 2017-09-19 VITALS
WEIGHT: 198 LBS | RESPIRATION RATE: 18 BRPM | OXYGEN SATURATION: 95 % | BODY MASS INDEX: 28.41 KG/M2 | TEMPERATURE: 97.8 F | DIASTOLIC BLOOD PRESSURE: 71 MMHG | HEART RATE: 75 BPM | SYSTOLIC BLOOD PRESSURE: 112 MMHG

## 2017-09-19 DIAGNOSIS — E88.01 ALPHA-1-ANTITRYPSIN DEFICIENCY (HCC): ICD-10-CM

## 2017-09-19 RX ORDER — DIPHENHYDRAMINE HYDROCHLORIDE 50 MG/ML
50 INJECTION INTRAMUSCULAR; INTRAVENOUS ONCE
Status: CANCELLED | OUTPATIENT
Start: 2017-09-19 | End: 2017-09-19

## 2017-09-19 RX ORDER — 0.9 % SODIUM CHLORIDE 0.9 %
10 VIAL (ML) INJECTION ONCE
Status: CANCELLED | OUTPATIENT
Start: 2017-09-19 | End: 2017-09-19

## 2017-09-19 RX ORDER — SODIUM CHLORIDE 9 MG/ML
100 INJECTION, SOLUTION INTRAVENOUS CONTINUOUS
Status: CANCELLED | OUTPATIENT
Start: 2017-09-19

## 2017-09-19 RX ORDER — SODIUM CHLORIDE 0.9 % (FLUSH) 0.9 %
10 SYRINGE (ML) INJECTION PRN
Status: DISPENSED | OUTPATIENT
Start: 2017-09-19 | End: 2017-09-20

## 2017-09-19 RX ORDER — SODIUM CHLORIDE 9 MG/ML
INJECTION, SOLUTION INTRAVENOUS ONCE
Status: CANCELLED | OUTPATIENT
Start: 2017-09-19

## 2017-09-19 RX ORDER — SODIUM CHLORIDE 0.9 % (FLUSH) 0.9 %
10 SYRINGE (ML) INJECTION PRN
Status: CANCELLED | OUTPATIENT
Start: 2017-09-19

## 2017-09-19 RX ORDER — SODIUM CHLORIDE 9 MG/ML
INJECTION, SOLUTION INTRAVENOUS ONCE
Status: COMPLETED | OUTPATIENT
Start: 2017-09-19 | End: 2017-09-19

## 2017-09-19 RX ORDER — METHYLPREDNISOLONE SODIUM SUCCINATE 125 MG/2ML
125 INJECTION, POWDER, LYOPHILIZED, FOR SOLUTION INTRAMUSCULAR; INTRAVENOUS ONCE
Status: CANCELLED | OUTPATIENT
Start: 2017-09-19 | End: 2017-09-19

## 2017-09-19 RX ADMIN — SODIUM CHLORIDE: 9 INJECTION, SOLUTION INTRAVENOUS at 09:19

## 2017-09-19 RX ADMIN — Medication 10 ML: at 09:19

## 2017-09-25 ENCOUNTER — HOSPITAL ENCOUNTER (OUTPATIENT)
Dept: INFUSION THERAPY | Age: 42
Discharge: HOME | End: 2017-09-26
Admitting: INTERNAL MEDICINE

## 2017-09-25 VITALS
DIASTOLIC BLOOD PRESSURE: 79 MMHG | SYSTOLIC BLOOD PRESSURE: 120 MMHG | BODY MASS INDEX: 28.91 KG/M2 | TEMPERATURE: 97.6 F | HEART RATE: 83 BPM | RESPIRATION RATE: 17 BRPM | OXYGEN SATURATION: 98 % | WEIGHT: 201.5 LBS

## 2017-09-25 DIAGNOSIS — E88.01 ALPHA-1-ANTITRYPSIN DEFICIENCY (HCC): ICD-10-CM

## 2017-09-25 RX ORDER — SODIUM CHLORIDE 0.9 % (FLUSH) 0.9 %
10 SYRINGE (ML) INJECTION PRN
Status: CANCELLED | OUTPATIENT
Start: 2017-09-25

## 2017-09-25 RX ORDER — SODIUM CHLORIDE 9 MG/ML
INJECTION, SOLUTION INTRAVENOUS ONCE
Status: COMPLETED | OUTPATIENT
Start: 2017-09-25 | End: 2017-09-25

## 2017-09-25 RX ORDER — 0.9 % SODIUM CHLORIDE 0.9 %
10 VIAL (ML) INJECTION ONCE
Status: CANCELLED | OUTPATIENT
Start: 2017-09-25 | End: 2017-09-25

## 2017-09-25 RX ORDER — SODIUM CHLORIDE 9 MG/ML
100 INJECTION, SOLUTION INTRAVENOUS CONTINUOUS
Status: CANCELLED | OUTPATIENT
Start: 2017-09-25

## 2017-09-25 RX ORDER — METHYLPREDNISOLONE SODIUM SUCCINATE 125 MG/2ML
125 INJECTION, POWDER, LYOPHILIZED, FOR SOLUTION INTRAMUSCULAR; INTRAVENOUS ONCE
Status: CANCELLED | OUTPATIENT
Start: 2017-09-25 | End: 2017-09-25

## 2017-09-25 RX ORDER — DIPHENHYDRAMINE HYDROCHLORIDE 50 MG/ML
50 INJECTION INTRAMUSCULAR; INTRAVENOUS ONCE
Status: CANCELLED | OUTPATIENT
Start: 2017-09-25 | End: 2017-09-25

## 2017-09-25 RX ORDER — SODIUM CHLORIDE 9 MG/ML
INJECTION, SOLUTION INTRAVENOUS ONCE
Status: CANCELLED | OUTPATIENT
Start: 2017-09-25

## 2017-09-25 RX ORDER — SODIUM CHLORIDE 0.9 % (FLUSH) 0.9 %
10 SYRINGE (ML) INJECTION PRN
Status: DISPENSED | OUTPATIENT
Start: 2017-09-25 | End: 2017-09-26

## 2017-09-25 RX ADMIN — Medication 10 ML: at 08:36

## 2017-09-25 RX ADMIN — SODIUM CHLORIDE: 9 INJECTION, SOLUTION INTRAVENOUS at 08:36

## 2017-10-02 ENCOUNTER — HOSPITAL ENCOUNTER (OUTPATIENT)
Dept: INFUSION THERAPY | Age: 42
Discharge: HOME | End: 2017-10-03
Admitting: INTERNAL MEDICINE

## 2017-10-02 VITALS
DIASTOLIC BLOOD PRESSURE: 72 MMHG | BODY MASS INDEX: 29.2 KG/M2 | HEART RATE: 74 BPM | OXYGEN SATURATION: 97 % | WEIGHT: 204 LBS | HEIGHT: 70 IN | SYSTOLIC BLOOD PRESSURE: 116 MMHG | RESPIRATION RATE: 17 BRPM | TEMPERATURE: 97.8 F

## 2017-10-02 DIAGNOSIS — E88.01 ALPHA-1-ANTITRYPSIN DEFICIENCY (HCC): ICD-10-CM

## 2017-10-02 RX ORDER — SODIUM CHLORIDE 0.9 % (FLUSH) 0.9 %
10 SYRINGE (ML) INJECTION PRN
Status: CANCELLED | OUTPATIENT
Start: 2017-10-02

## 2017-10-02 RX ORDER — SODIUM CHLORIDE 0.9 % (FLUSH) 0.9 %
10 SYRINGE (ML) INJECTION PRN
Status: DISPENSED | OUTPATIENT
Start: 2017-10-02 | End: 2017-10-03

## 2017-10-02 RX ORDER — METHYLPREDNISOLONE SODIUM SUCCINATE 125 MG/2ML
125 INJECTION, POWDER, LYOPHILIZED, FOR SOLUTION INTRAMUSCULAR; INTRAVENOUS ONCE
Status: CANCELLED | OUTPATIENT
Start: 2017-10-02 | End: 2017-10-02

## 2017-10-02 RX ORDER — DIPHENHYDRAMINE HYDROCHLORIDE 50 MG/ML
50 INJECTION INTRAMUSCULAR; INTRAVENOUS ONCE
Status: CANCELLED | OUTPATIENT
Start: 2017-10-02 | End: 2017-10-02

## 2017-10-02 RX ORDER — SODIUM CHLORIDE 9 MG/ML
INJECTION, SOLUTION INTRAVENOUS ONCE
Status: COMPLETED | OUTPATIENT
Start: 2017-10-02 | End: 2017-10-02

## 2017-10-02 RX ORDER — SODIUM CHLORIDE 9 MG/ML
INJECTION, SOLUTION INTRAVENOUS ONCE
Status: CANCELLED | OUTPATIENT
Start: 2017-10-02

## 2017-10-02 RX ORDER — SODIUM CHLORIDE 9 MG/ML
100 INJECTION, SOLUTION INTRAVENOUS CONTINUOUS
Status: CANCELLED | OUTPATIENT
Start: 2017-10-02

## 2017-10-02 RX ORDER — 0.9 % SODIUM CHLORIDE 0.9 %
10 VIAL (ML) INJECTION ONCE
Status: CANCELLED | OUTPATIENT
Start: 2017-10-02 | End: 2017-10-02

## 2017-10-02 RX ADMIN — SODIUM CHLORIDE: 9 INJECTION, SOLUTION INTRAVENOUS at 08:32

## 2017-10-02 RX ADMIN — Medication 10 ML: at 10:04

## 2017-10-02 RX ADMIN — Medication 10 ML: at 09:27

## 2017-10-02 RX ADMIN — Medication 10 ML: at 10:17

## 2017-10-02 RX ADMIN — Medication 10 ML: at 08:28

## 2017-10-09 ENCOUNTER — HOSPITAL ENCOUNTER (OUTPATIENT)
Dept: INFUSION THERAPY | Age: 42
Discharge: HOME | End: 2017-10-10
Admitting: INTERNAL MEDICINE

## 2017-10-09 VITALS
RESPIRATION RATE: 18 BRPM | BODY MASS INDEX: 28.98 KG/M2 | WEIGHT: 202 LBS | DIASTOLIC BLOOD PRESSURE: 61 MMHG | HEART RATE: 85 BPM | TEMPERATURE: 97.8 F | SYSTOLIC BLOOD PRESSURE: 113 MMHG | OXYGEN SATURATION: 96 %

## 2017-10-09 DIAGNOSIS — E88.01 ALPHA-1-ANTITRYPSIN DEFICIENCY (HCC): ICD-10-CM

## 2017-10-09 RX ORDER — SODIUM CHLORIDE 0.9 % (FLUSH) 0.9 %
10 SYRINGE (ML) INJECTION PRN
Status: ACTIVE | OUTPATIENT
Start: 2017-10-09 | End: 2017-10-10

## 2017-10-09 RX ORDER — SODIUM CHLORIDE 9 MG/ML
100 INJECTION, SOLUTION INTRAVENOUS CONTINUOUS
Status: CANCELLED | OUTPATIENT
Start: 2017-10-09

## 2017-10-09 RX ORDER — DIPHENHYDRAMINE HYDROCHLORIDE 50 MG/ML
50 INJECTION INTRAMUSCULAR; INTRAVENOUS ONCE
Status: CANCELLED | OUTPATIENT
Start: 2017-10-09 | End: 2017-10-09

## 2017-10-09 RX ORDER — 0.9 % SODIUM CHLORIDE 0.9 %
10 VIAL (ML) INJECTION ONCE
Status: CANCELLED | OUTPATIENT
Start: 2017-10-09 | End: 2017-10-09

## 2017-10-09 RX ORDER — SODIUM CHLORIDE 9 MG/ML
INJECTION, SOLUTION INTRAVENOUS ONCE
Status: CANCELLED | OUTPATIENT
Start: 2017-10-09

## 2017-10-09 RX ORDER — SODIUM CHLORIDE 9 MG/ML
INJECTION, SOLUTION INTRAVENOUS ONCE
Status: COMPLETED | OUTPATIENT
Start: 2017-10-09 | End: 2017-10-09

## 2017-10-09 RX ORDER — SODIUM CHLORIDE 0.9 % (FLUSH) 0.9 %
10 SYRINGE (ML) INJECTION PRN
Status: CANCELLED | OUTPATIENT
Start: 2017-10-09

## 2017-10-09 RX ORDER — METHYLPREDNISOLONE SODIUM SUCCINATE 125 MG/2ML
125 INJECTION, POWDER, LYOPHILIZED, FOR SOLUTION INTRAMUSCULAR; INTRAVENOUS ONCE
Status: CANCELLED | OUTPATIENT
Start: 2017-10-09 | End: 2017-10-09

## 2017-10-09 RX ADMIN — Medication 10 ML: at 08:45

## 2017-10-09 RX ADMIN — SODIUM CHLORIDE 250 ML: 9 INJECTION, SOLUTION INTRAVENOUS at 08:54

## 2017-10-16 ENCOUNTER — HOSPITAL ENCOUNTER (OUTPATIENT)
Dept: INFUSION THERAPY | Age: 42
Discharge: HOME | End: 2017-10-17
Admitting: INTERNAL MEDICINE

## 2017-10-16 VITALS
RESPIRATION RATE: 17 BRPM | DIASTOLIC BLOOD PRESSURE: 75 MMHG | WEIGHT: 200 LBS | OXYGEN SATURATION: 98 % | HEART RATE: 81 BPM | SYSTOLIC BLOOD PRESSURE: 125 MMHG | BODY MASS INDEX: 28.7 KG/M2 | TEMPERATURE: 97.7 F

## 2017-10-16 DIAGNOSIS — E88.01 ALPHA-1-ANTITRYPSIN DEFICIENCY (HCC): ICD-10-CM

## 2017-10-16 RX ORDER — SODIUM CHLORIDE 0.9 % (FLUSH) 0.9 %
10 SYRINGE (ML) INJECTION PRN
Status: DISPENSED | OUTPATIENT
Start: 2017-10-16 | End: 2017-10-17

## 2017-10-16 RX ORDER — SODIUM CHLORIDE 9 MG/ML
INJECTION, SOLUTION INTRAVENOUS ONCE
Status: COMPLETED | OUTPATIENT
Start: 2017-10-16 | End: 2017-10-16

## 2017-10-16 RX ORDER — SODIUM CHLORIDE 0.9 % (FLUSH) 0.9 %
10 SYRINGE (ML) INJECTION PRN
Status: CANCELLED | OUTPATIENT
Start: 2017-10-16

## 2017-10-16 RX ORDER — DIPHENHYDRAMINE HYDROCHLORIDE 50 MG/ML
50 INJECTION INTRAMUSCULAR; INTRAVENOUS ONCE
Status: CANCELLED | OUTPATIENT
Start: 2017-10-16 | End: 2017-10-16

## 2017-10-16 RX ORDER — SODIUM CHLORIDE 9 MG/ML
100 INJECTION, SOLUTION INTRAVENOUS CONTINUOUS
Status: CANCELLED | OUTPATIENT
Start: 2017-10-16

## 2017-10-16 RX ORDER — SODIUM CHLORIDE 9 MG/ML
INJECTION, SOLUTION INTRAVENOUS ONCE
Status: CANCELLED | OUTPATIENT
Start: 2017-10-16

## 2017-10-16 RX ORDER — METHYLPREDNISOLONE SODIUM SUCCINATE 125 MG/2ML
125 INJECTION, POWDER, LYOPHILIZED, FOR SOLUTION INTRAMUSCULAR; INTRAVENOUS ONCE
Status: CANCELLED | OUTPATIENT
Start: 2017-10-16 | End: 2017-10-16

## 2017-10-16 RX ORDER — 0.9 % SODIUM CHLORIDE 0.9 %
10 VIAL (ML) INJECTION ONCE
Status: CANCELLED | OUTPATIENT
Start: 2017-10-16 | End: 2017-10-16

## 2017-10-16 RX ADMIN — Medication 10 ML: at 08:27

## 2017-10-16 RX ADMIN — SODIUM CHLORIDE: 9 INJECTION, SOLUTION INTRAVENOUS at 08:27

## 2017-10-23 ENCOUNTER — HOSPITAL ENCOUNTER (OUTPATIENT)
Dept: INFUSION THERAPY | Age: 42
Discharge: HOME | End: 2017-10-24
Admitting: INTERNAL MEDICINE

## 2017-10-23 VITALS
SYSTOLIC BLOOD PRESSURE: 113 MMHG | OXYGEN SATURATION: 99 % | HEART RATE: 77 BPM | WEIGHT: 198 LBS | RESPIRATION RATE: 17 BRPM | TEMPERATURE: 97.4 F | DIASTOLIC BLOOD PRESSURE: 74 MMHG | BODY MASS INDEX: 28.41 KG/M2

## 2017-10-23 DIAGNOSIS — E88.01 ALPHA-1-ANTITRYPSIN DEFICIENCY (HCC): ICD-10-CM

## 2017-10-23 RX ORDER — 0.9 % SODIUM CHLORIDE 0.9 %
10 VIAL (ML) INJECTION ONCE
Status: CANCELLED | OUTPATIENT
Start: 2017-10-23 | End: 2017-10-23

## 2017-10-23 RX ORDER — METHYLPREDNISOLONE SODIUM SUCCINATE 125 MG/2ML
125 INJECTION, POWDER, LYOPHILIZED, FOR SOLUTION INTRAMUSCULAR; INTRAVENOUS ONCE
Status: CANCELLED | OUTPATIENT
Start: 2017-10-23 | End: 2017-10-23

## 2017-10-23 RX ORDER — SODIUM CHLORIDE 9 MG/ML
INJECTION, SOLUTION INTRAVENOUS ONCE
Status: COMPLETED | OUTPATIENT
Start: 2017-10-23 | End: 2017-10-23

## 2017-10-23 RX ORDER — DIPHENHYDRAMINE HYDROCHLORIDE 50 MG/ML
50 INJECTION INTRAMUSCULAR; INTRAVENOUS ONCE
Status: CANCELLED | OUTPATIENT
Start: 2017-10-23 | End: 2017-10-23

## 2017-10-23 RX ORDER — SODIUM CHLORIDE 9 MG/ML
100 INJECTION, SOLUTION INTRAVENOUS CONTINUOUS
Status: CANCELLED | OUTPATIENT
Start: 2017-10-23

## 2017-10-23 RX ORDER — SODIUM CHLORIDE 9 MG/ML
INJECTION, SOLUTION INTRAVENOUS ONCE
Status: CANCELLED | OUTPATIENT
Start: 2017-10-23

## 2017-10-23 RX ORDER — SODIUM CHLORIDE 0.9 % (FLUSH) 0.9 %
10 SYRINGE (ML) INJECTION PRN
Status: CANCELLED | OUTPATIENT
Start: 2017-10-23

## 2017-10-23 RX ADMIN — SODIUM CHLORIDE: 9 INJECTION, SOLUTION INTRAVENOUS at 08:34

## 2017-10-30 ENCOUNTER — HOSPITAL ENCOUNTER (OUTPATIENT)
Dept: INFUSION THERAPY | Age: 42
Discharge: HOME | End: 2017-10-31
Admitting: INTERNAL MEDICINE

## 2017-10-30 VITALS
DIASTOLIC BLOOD PRESSURE: 70 MMHG | HEART RATE: 74 BPM | OXYGEN SATURATION: 97 % | SYSTOLIC BLOOD PRESSURE: 109 MMHG | WEIGHT: 197 LBS | BODY MASS INDEX: 28.27 KG/M2 | RESPIRATION RATE: 17 BRPM | TEMPERATURE: 97.4 F

## 2017-10-30 DIAGNOSIS — E88.01 ALPHA-1-ANTITRYPSIN DEFICIENCY (HCC): ICD-10-CM

## 2017-10-30 RX ORDER — SODIUM CHLORIDE 9 MG/ML
INJECTION, SOLUTION INTRAVENOUS ONCE
Status: CANCELLED | OUTPATIENT
Start: 2017-10-30

## 2017-10-30 RX ORDER — SODIUM CHLORIDE 9 MG/ML
100 INJECTION, SOLUTION INTRAVENOUS CONTINUOUS
Status: CANCELLED | OUTPATIENT
Start: 2017-10-30

## 2017-10-30 RX ORDER — SODIUM CHLORIDE 0.9 % (FLUSH) 0.9 %
10 SYRINGE (ML) INJECTION PRN
Status: CANCELLED | OUTPATIENT
Start: 2017-10-30

## 2017-10-30 RX ORDER — SODIUM CHLORIDE 9 MG/ML
INJECTION, SOLUTION INTRAVENOUS ONCE
Status: COMPLETED | OUTPATIENT
Start: 2017-10-30 | End: 2017-10-30

## 2017-10-30 RX ORDER — DIPHENHYDRAMINE HYDROCHLORIDE 50 MG/ML
50 INJECTION INTRAMUSCULAR; INTRAVENOUS ONCE
Status: CANCELLED | OUTPATIENT
Start: 2017-10-30 | End: 2017-10-30

## 2017-10-30 RX ORDER — METHYLPREDNISOLONE SODIUM SUCCINATE 125 MG/2ML
125 INJECTION, POWDER, LYOPHILIZED, FOR SOLUTION INTRAMUSCULAR; INTRAVENOUS ONCE
Status: CANCELLED | OUTPATIENT
Start: 2017-10-30 | End: 2017-10-30

## 2017-10-30 RX ORDER — SODIUM CHLORIDE 0.9 % (FLUSH) 0.9 %
10 SYRINGE (ML) INJECTION PRN
Status: DISPENSED | OUTPATIENT
Start: 2017-10-30 | End: 2017-10-31

## 2017-10-30 RX ORDER — 0.9 % SODIUM CHLORIDE 0.9 %
10 VIAL (ML) INJECTION ONCE
Status: CANCELLED | OUTPATIENT
Start: 2017-10-30 | End: 2017-10-30

## 2017-10-30 RX ADMIN — SODIUM CHLORIDE: 9 INJECTION, SOLUTION INTRAVENOUS at 08:35

## 2017-10-30 RX ADMIN — Medication 10 ML: at 08:12

## 2017-10-30 RX ADMIN — Medication 10 ML: at 10:18

## 2017-10-30 RX ADMIN — Medication 10 ML: at 08:35

## 2017-10-30 RX ADMIN — Medication 10 ML: at 10:02

## 2017-11-01 ENCOUNTER — HOSPITAL ENCOUNTER (OUTPATIENT)
Dept: INFUSION THERAPY | Age: 42
Discharge: HOME | End: 2017-11-30
Attending: INTERNAL MEDICINE | Admitting: INTERNAL MEDICINE

## 2017-11-06 ENCOUNTER — HOSPITAL ENCOUNTER (OUTPATIENT)
Dept: INFUSION THERAPY | Age: 42
Discharge: HOME | End: 2017-11-07
Admitting: INTERNAL MEDICINE

## 2017-11-06 VITALS
BODY MASS INDEX: 27.84 KG/M2 | DIASTOLIC BLOOD PRESSURE: 73 MMHG | WEIGHT: 194 LBS | RESPIRATION RATE: 18 BRPM | OXYGEN SATURATION: 96 % | SYSTOLIC BLOOD PRESSURE: 114 MMHG | TEMPERATURE: 97.7 F | HEART RATE: 66 BPM

## 2017-11-06 DIAGNOSIS — E88.01 ALPHA-1-ANTITRYPSIN DEFICIENCY (HCC): ICD-10-CM

## 2017-11-06 RX ORDER — METHYLPREDNISOLONE SODIUM SUCCINATE 125 MG/2ML
125 INJECTION, POWDER, LYOPHILIZED, FOR SOLUTION INTRAMUSCULAR; INTRAVENOUS ONCE
Status: CANCELLED | OUTPATIENT
Start: 2017-11-06 | End: 2017-11-06

## 2017-11-06 RX ORDER — SODIUM CHLORIDE 9 MG/ML
100 INJECTION, SOLUTION INTRAVENOUS CONTINUOUS
Status: CANCELLED | OUTPATIENT
Start: 2017-11-06

## 2017-11-06 RX ORDER — DIPHENHYDRAMINE HYDROCHLORIDE 50 MG/ML
50 INJECTION INTRAMUSCULAR; INTRAVENOUS ONCE
Status: CANCELLED | OUTPATIENT
Start: 2017-11-06 | End: 2017-11-06

## 2017-11-06 RX ORDER — 0.9 % SODIUM CHLORIDE 0.9 %
10 VIAL (ML) INJECTION ONCE
Status: CANCELLED | OUTPATIENT
Start: 2017-11-06 | End: 2017-11-06

## 2017-11-06 RX ORDER — SODIUM CHLORIDE 9 MG/ML
INJECTION, SOLUTION INTRAVENOUS ONCE
Status: COMPLETED | OUTPATIENT
Start: 2017-11-06 | End: 2017-11-06

## 2017-11-06 RX ORDER — SODIUM CHLORIDE 9 MG/ML
INJECTION, SOLUTION INTRAVENOUS ONCE
Status: CANCELLED | OUTPATIENT
Start: 2017-11-06

## 2017-11-06 RX ORDER — SODIUM CHLORIDE 0.9 % (FLUSH) 0.9 %
10 SYRINGE (ML) INJECTION PRN
Status: DISPENSED | OUTPATIENT
Start: 2017-11-06 | End: 2017-11-07

## 2017-11-06 RX ORDER — SODIUM CHLORIDE 0.9 % (FLUSH) 0.9 %
10 SYRINGE (ML) INJECTION PRN
Status: CANCELLED | OUTPATIENT
Start: 2017-11-06

## 2017-11-06 RX ADMIN — Medication 10 ML: at 08:22

## 2017-11-06 RX ADMIN — SODIUM CHLORIDE 1000 ML: 9 INJECTION, SOLUTION INTRAVENOUS at 09:05

## 2017-11-07 ENCOUNTER — PATIENT MESSAGE (OUTPATIENT)
Dept: FAMILY MEDICINE CLINIC | Age: 42
End: 2017-11-07

## 2017-11-07 DIAGNOSIS — E88.01 ALPHA-1-ANTITRYPSIN DEFICIENCY (HCC): Primary | ICD-10-CM

## 2017-11-07 NOTE — TELEPHONE ENCOUNTER
From: Eusebio Petersen  To: Velvet Hale MD  Sent: 11/7/2017 2:44 PM EST  Subject: Non-Urgent Brush [place]    I have been thinking about getting a port installed for my infusions. The reason why is that it has been getting harder to find my veins for the IV. I am also starting to bruise more often than normal. I was wondering what I needed to do in order to possibly have it placed this year.     Thanks  Adeel Cleveland

## 2017-11-13 ENCOUNTER — INITIAL CONSULT (OUTPATIENT)
Dept: SURGERY | Age: 42
End: 2017-11-13

## 2017-11-13 ENCOUNTER — HOSPITAL ENCOUNTER (OUTPATIENT)
Dept: INFUSION THERAPY | Age: 42
Discharge: HOME | End: 2017-11-14
Admitting: INTERNAL MEDICINE

## 2017-11-13 VITALS
HEART RATE: 74 BPM | RESPIRATION RATE: 17 BRPM | OXYGEN SATURATION: 96 % | SYSTOLIC BLOOD PRESSURE: 113 MMHG | TEMPERATURE: 98.1 F | BODY MASS INDEX: 27.76 KG/M2 | WEIGHT: 193.5 LBS | DIASTOLIC BLOOD PRESSURE: 72 MMHG

## 2017-11-13 VITALS
SYSTOLIC BLOOD PRESSURE: 113 MMHG | BODY MASS INDEX: 27.63 KG/M2 | HEIGHT: 70 IN | WEIGHT: 193 LBS | DIASTOLIC BLOOD PRESSURE: 72 MMHG

## 2017-11-13 DIAGNOSIS — E88.01 ALPHA-1-ANTITRYPSIN DEFICIENCY (HCC): ICD-10-CM

## 2017-11-13 DIAGNOSIS — E88.01 ALPHA-1-ANTITRYPSIN DEFICIENCY (HCC): Primary | ICD-10-CM

## 2017-11-13 PROCEDURE — 99243 OFF/OP CNSLTJ NEW/EST LOW 30: CPT | Performed by: SURGERY

## 2017-11-13 RX ORDER — SODIUM CHLORIDE 9 MG/ML
INJECTION, SOLUTION INTRAVENOUS ONCE
Status: COMPLETED | OUTPATIENT
Start: 2017-11-13 | End: 2017-11-13

## 2017-11-13 RX ORDER — SODIUM CHLORIDE 9 MG/ML
100 INJECTION, SOLUTION INTRAVENOUS CONTINUOUS
Status: CANCELLED | OUTPATIENT
Start: 2017-11-13

## 2017-11-13 RX ORDER — SODIUM CHLORIDE 9 MG/ML
INJECTION, SOLUTION INTRAVENOUS ONCE
Status: CANCELLED | OUTPATIENT
Start: 2017-11-13

## 2017-11-13 RX ORDER — EPINEPHRINE 1 MG/ML
0.3 INJECTION, SOLUTION, CONCENTRATE INTRAVENOUS PRN
Status: CANCELLED | OUTPATIENT
Start: 2017-11-13

## 2017-11-13 RX ORDER — SODIUM CHLORIDE 0.9 % (FLUSH) 0.9 %
10 SYRINGE (ML) INJECTION PRN
Status: CANCELLED | OUTPATIENT
Start: 2017-11-13

## 2017-11-13 RX ORDER — METHYLPREDNISOLONE SODIUM SUCCINATE 125 MG/2ML
125 INJECTION, POWDER, LYOPHILIZED, FOR SOLUTION INTRAMUSCULAR; INTRAVENOUS ONCE
Status: CANCELLED | OUTPATIENT
Start: 2017-11-13 | End: 2017-11-13

## 2017-11-13 RX ORDER — 0.9 % SODIUM CHLORIDE 0.9 %
10 VIAL (ML) INJECTION ONCE
Status: CANCELLED | OUTPATIENT
Start: 2017-11-13 | End: 2017-11-13

## 2017-11-13 RX ORDER — SODIUM CHLORIDE 0.9 % (FLUSH) 0.9 %
10 SYRINGE (ML) INJECTION PRN
Status: ACTIVE | OUTPATIENT
Start: 2017-11-13 | End: 2017-11-14

## 2017-11-13 RX ORDER — DIPHENHYDRAMINE HYDROCHLORIDE 50 MG/ML
50 INJECTION INTRAMUSCULAR; INTRAVENOUS ONCE
Status: CANCELLED | OUTPATIENT
Start: 2017-11-13 | End: 2017-11-13

## 2017-11-13 RX ADMIN — SODIUM CHLORIDE: 9 INJECTION, SOLUTION INTRAVENOUS at 08:41

## 2017-11-13 NOTE — LETTER
[address] [facility]  [facility] [address]  Phone: [phone]  Fax: [phone]    Jasiel Marshall MD        November 14, 2017     Jesse Locke MD  [address] [place] [address]    Patient: Tello Ramachandran  MR Number: [id number]  YOB: 1975  Date of Visit: 11/13/2017    Dear Dr. Jesse Locke:    Thank you for the request for consultation for Tello Ramachandran to me for the evaluation of port placement. Below are the relevant portions of my assessment and plan of care. We will move ahead next week and I will keep you posted of any new issues. If you have questions, please do not hesitate to call me. I look forward to following Sydni George along with you.     Sincerely,          Jasiel Marshall MD

## 2017-11-14 ASSESSMENT — ENCOUNTER SYMPTOMS: SHORTNESS OF BREATH: 1

## 2017-11-14 NOTE — PROGRESS NOTES
Subjective:      Patient ID: Matt Virk is a 36 y.o. male. HPI  Chief Complaint: Alpha-1-antitrypsin deficiency [facility])     Patient referred by Dr. Carissa Jacinto for evaluation of a port placement. Patient reports symptoms of SOB requiring oxygen therapy. . Symptoms were first noted years ago and now require weekly infusion therapy. IV access has become a challenge and port was recommended. Plan right sided port as patient uses left shoulder to fire his gun. Past Medical History:   Diagnosis Date    Advance care planning     LIving will on chart    Alpha-1-antitrypsin deficiency ([address] 11/15/2013    PFT's FEV1 72%    Ariza's esophagus with dysplasia April 2012    Dr. Sue Denton f/u EGD w/o metaplasia 8/29/13  EGD 9/25/13  recheck 2 yrs    CAP (community acquired pneumonia) 11/4/14    Colon polyp 09/25/2013    Recheck 4 years    Elevated liver enzymes     Emphysema lung ([address] Oct 2013    on oxygen at night.  and prn during the day     Essential hypertension, benign 12/9/2014    GERD (gastroesophageal reflux disease) 11/10/2011    EGD 4/3/11 with Ariza's Esophagus    Kidney stone     Kidney stone     Pleural effusion     at age 15    Type 2 diabetes mellitus without complication, without long-term current use of insulin ([facility] [address] [facility] 7/18/2017       Past Surgical History:   Procedure Laterality Date    CHOLECYSTECTOMY, LAPAROSCOPIC  2005    COLONOSCOPY  2017    every 4 years     ENDOSCOPY, COLON, DIAGNOSTIC      EGD August 2015,pt states BX. negative       Current Outpatient Prescriptions   Medication Sig Dispense Refill    metFORMIN (GLUCOPHAGE) 500 MG tablet Take 1 tablet by mouth 2 times daily (with meals) 60 tablet 11    Fluticasone Furoate-Vilanterol 200-25 MCG/INH AEPB Inhale 1 puff into the lungs daily 1 each 3    albuterol sulfate HFA (PROAIR HFA) 108 (90 Base) MCG/ACT inhaler Inhale 2 puffs into the lungs every 6 hours as needed for Wheezing or Shortness of Breath 1 Inhaler 3    tiotropium Relation Age of Onset    Cancer Father 61     colon    Cancer Maternal Grandfather      Lung       Review of Systems   Respiratory: Positive for shortness of breath. All other systems reviewed and are negative. Objective:   Physical Exam   Constitutional: He is oriented to person, place, and time. He appears well-developed and well-nourished. HENT:   Head: Normocephalic and atraumatic. Neck: Neck supple. No tracheal deviation present. No thyromegaly present. Cardiovascular: Normal heart sounds. No murmur heard. Pulmonary/Chest: Breath sounds normal. He is in respiratory distress (mild SOB with exertion). Abdominal: Soft. He exhibits no distension. There is no tenderness. There is no guarding. Musculoskeletal: He exhibits no edema or tenderness. Neurological: He is alert and oriented to person, place, and time. No cranial nerve deficit. Skin: Skin is warm and dry. Psychiatric: He has a normal mood and affect. His behavior is normal.       Assessment:      1. Alpha-1-antitrypsin deficiency [facility])             Plan:      Port placement, right side    The risks, benefits and alternatives to the planned procedure were discussed. Patient expressed an understanding and is willing to proceed.

## 2017-11-20 ENCOUNTER — HOSPITAL ENCOUNTER (OUTPATIENT)
Dept: INFUSION THERAPY | Age: 42
Discharge: HOME | End: 2017-11-21
Admitting: INTERNAL MEDICINE

## 2017-11-20 ENCOUNTER — PAT TELEPHONE (OUTPATIENT)
Dept: PREADMISSION TESTING | Age: 42
End: 2017-11-20

## 2017-11-20 VITALS — HEIGHT: 70 IN | BODY MASS INDEX: 27.49 KG/M2 | WEIGHT: 192 LBS

## 2017-11-20 VITALS
DIASTOLIC BLOOD PRESSURE: 68 MMHG | BODY MASS INDEX: 27.49 KG/M2 | RESPIRATION RATE: 16 BRPM | HEART RATE: 69 BPM | OXYGEN SATURATION: 97 % | WEIGHT: 192 LBS | HEIGHT: 70 IN | SYSTOLIC BLOOD PRESSURE: 108 MMHG | TEMPERATURE: 97.3 F

## 2017-11-20 DIAGNOSIS — E88.01 ALPHA-1-ANTITRYPSIN DEFICIENCY (HCC): ICD-10-CM

## 2017-11-20 RX ORDER — SODIUM CHLORIDE 9 MG/ML
INJECTION, SOLUTION INTRAVENOUS ONCE
Status: CANCELLED | OUTPATIENT
Start: 2017-11-20

## 2017-11-20 RX ORDER — DIPHENHYDRAMINE HYDROCHLORIDE 50 MG/ML
50 INJECTION INTRAMUSCULAR; INTRAVENOUS ONCE
Status: CANCELLED | OUTPATIENT
Start: 2017-11-20 | End: 2017-11-20

## 2017-11-20 RX ORDER — SODIUM CHLORIDE 9 MG/ML
100 INJECTION, SOLUTION INTRAVENOUS CONTINUOUS
Status: CANCELLED | OUTPATIENT
Start: 2017-11-20

## 2017-11-20 RX ORDER — 0.9 % SODIUM CHLORIDE 0.9 %
10 VIAL (ML) INJECTION ONCE
Status: CANCELLED | OUTPATIENT
Start: 2017-11-20 | End: 2017-11-20

## 2017-11-20 RX ORDER — SODIUM CHLORIDE 0.9 % (FLUSH) 0.9 %
10 SYRINGE (ML) INJECTION PRN
Status: ACTIVE | OUTPATIENT
Start: 2017-11-20 | End: 2017-11-21

## 2017-11-20 RX ORDER — SODIUM CHLORIDE 9 MG/ML
INJECTION, SOLUTION INTRAVENOUS ONCE
Status: DISCONTINUED | OUTPATIENT
Start: 2017-11-20 | End: 2018-05-01 | Stop reason: HOSPADM

## 2017-11-20 RX ORDER — METHYLPREDNISOLONE SODIUM SUCCINATE 125 MG/2ML
125 INJECTION, POWDER, LYOPHILIZED, FOR SOLUTION INTRAMUSCULAR; INTRAVENOUS ONCE
Status: CANCELLED | OUTPATIENT
Start: 2017-11-20 | End: 2017-11-20

## 2017-11-20 RX ORDER — EPINEPHRINE 1 MG/ML
0.3 INJECTION, SOLUTION, CONCENTRATE INTRAVENOUS PRN
Status: CANCELLED | OUTPATIENT
Start: 2017-11-20

## 2017-11-20 RX ORDER — FLUTICASONE FUROATE AND VILANTEROL 200; 25 UG/1; UG/1
1 POWDER RESPIRATORY (INHALATION) DAILY
COMMUNITY
End: 2019-11-08 | Stop reason: SDUPTHER

## 2017-11-20 RX ORDER — SODIUM CHLORIDE 0.9 % (FLUSH) 0.9 %
10 SYRINGE (ML) INJECTION PRN
Status: CANCELLED | OUTPATIENT
Start: 2017-11-20

## 2017-11-20 NOTE — PRE-PROCEDURE INSTRUCTIONS
[address] time____1030________        Surgery time__1200__________    Take the following medications with a sip of water:  Bring albuterol inhaler, use your breo, Spiriva, take lisinopril and nexium    Do not eat or drink anything after 12:00 midnight prior to your surgery. This includes water chewing gum, mints and ice chips. You may brush your teeth and gargle the morning of your surgery, but do not swallow the water     Please see your family doctor/pediatrician for a history and physical and/or concerning medications. Bring any test results/reports from your physicians office. If you are under the care of a heart doctor or specialist doctor, please be aware that you may be asked to them for clearance    You may be asked to stop blood thinners such as Coumadin, Plavix, Fragmin, Lovenox, etc., or any anti-inflammatories such as:  Aspirin, Ibuprofen, Advil, Naproxen prior to your surgery. We also ask that you stop any OTC medications such as fish oil, vitamin E, glucosamine, garlic, Multivitamins, COQ 10, etc.    We ask that you do not smoke 24 hours prior to surgery  We ask that you do not  drink any alcoholic beverages 24 hours prior to surgery     You must make arrangements for a responsible adult to take you home after your surgery. For your safety you will not be allowed to leave alone or drive yourself home. Your surgery will be cancelled if you do not have a ride home. Also for your safety, it is strongly suggested that someone stay with you the first 24 hours after your surgery. A parent or legal guardian must accompany a child scheduled for surgery and plan to stay at the hospital until the child is discharged. Please do not bring other children with you. For your comfort, please wear simple loose fitting clothing to the hospital.  Please do not bring valuables.     Do not wear any make-up or nail polish on your fingers or toes      For your safety, please do not wear any jewelry or body piercing's on the day of surgery. All jewelry must be removed. If you have dentures, they will be removed before going to operating room. For your convenience, we will provide you with a container. If you wear contact lenses or glasses, they will be removed, please bring a case for them. If you have a living will and a durable power of  for healthcare, please bring in a copy. As part of our patient safety program to minimize surgical site infections, we ask you to do the following:    · Please notify your surgeon if you develop any illness between         now and the  day of your surgery. · This includes a cough, cold, fever, sore throat, nausea,         or vomiting, and diarrhea, etc.  ·  Please notify your surgeon if you experience dizziness, shortness         of breath or blurred vision between now and the time of your surgery. Do not shave your operative site 96 hours prior to surgery. For face and neck surgery, men may use an electric razor 48 hours   prior to surgery. You may shower the night before surgery or the morning of   your surgery with an antibacterial soap. You will need to bring a photo ID and insurance card    [facility] has an onsite pharmacy, would you like to utilize our pharmacy     If you will be staying overnight and use a C-pap machine, please bring   your C-pap to hospital     Our goal is to provide you with excellent care, therefore, visitors will be limited to two(2) in the room at a time so that we may focus on providing this care for you. Please contact pre-admission testing if you have any further questions. [facility] phone number:  [phone] PAT fax number:  [phone]  Please note these are generalized instructions for all surgical cases, you may be provided with more specific instructions according to your surgery.

## 2017-11-22 ENCOUNTER — HOSPITAL ENCOUNTER (OUTPATIENT)
Dept: SURGERY | Age: 42
Discharge: HOME | End: 2017-11-22
Attending: SURGERY | Admitting: SURGERY

## 2017-11-22 VITALS
RESPIRATION RATE: 16 BRPM | HEART RATE: 54 BPM | SYSTOLIC BLOOD PRESSURE: 101 MMHG | BODY MASS INDEX: 27.77 KG/M2 | OXYGEN SATURATION: 96 % | TEMPERATURE: 97 F | HEIGHT: 70 IN | DIASTOLIC BLOOD PRESSURE: 65 MMHG | WEIGHT: 194 LBS

## 2017-11-22 LAB
ANION GAP SERPL CALCULATED.3IONS-SCNC: 13 MMOL/L (ref 3–16)
BUN BLDV-MCNC: 11 MG/DL (ref 7–20)
CALCIUM SERPL-MCNC: 8.7 MG/DL (ref 8.3–10.6)
CHLORIDE BLD-SCNC: 103 MMOL/L (ref 99–110)
CO2: 22 MMOL/L (ref 21–32)
CREAT SERPL-MCNC: 0.7 MG/DL (ref 0.9–1.3)
GFR AFRICAN AMERICAN: >60
GFR NON-AFRICAN AMERICAN: >60
GLUCOSE BLD-MCNC: 90 MG/DL (ref 70–99)
GLUCOSE BLD-MCNC: 90 MG/DL (ref 70–99)
GLUCOSE BLD-MCNC: 95 MG/DL (ref 70–99)
HCT VFR BLD CALC: 47 % (ref 40.5–52.5)
HEMOGLOBIN: 15.9 G/DL (ref 13.5–17.5)
INR BLD: 1.03 (ref 0.85–1.15)
MCH RBC QN AUTO: 29.4 PG (ref 26–34)
MCHC RBC AUTO-ENTMCNC: 33.8 G/DL (ref 31–36)
MCV RBC AUTO: 87 FL (ref 80–100)
PDW BLD-RTO: 12.9 % (ref 12.4–15.4)
PERFORMED ON: NORMAL
PERFORMED ON: NORMAL
PLATELET # BLD: 134 K/UL (ref 135–450)
PMV BLD AUTO: 11.1 FL (ref 5–10.5)
POTASSIUM SERPL-SCNC: 4.8 MMOL/L (ref 3.5–5.1)
PROTHROMBIN TIME: 11.6 SEC (ref 9.6–13)
RBC # BLD: 5.4 M/UL (ref 4.2–5.9)
SODIUM BLD-SCNC: 138 MMOL/L (ref 136–145)
WBC # BLD: 7.4 K/UL (ref 4–11)

## 2017-11-22 PROCEDURE — 77001 FLUOROGUIDE FOR VEIN DEVICE: CPT | Performed by: SURGERY

## 2017-11-22 PROCEDURE — 36561 INSERT TUNNELED CV CATH: CPT | Performed by: SURGERY

## 2017-11-22 RX ORDER — FENTANYL CITRATE 50 UG/ML
25 INJECTION, SOLUTION INTRAMUSCULAR; INTRAVENOUS EVERY 5 MIN PRN
Status: DISCONTINUED | OUTPATIENT
Start: 2017-11-22 | End: 2017-11-23 | Stop reason: HOSPADM

## 2017-11-22 RX ORDER — FENTANYL CITRATE 50 UG/ML
50 INJECTION, SOLUTION INTRAMUSCULAR; INTRAVENOUS EVERY 5 MIN PRN
Status: DISCONTINUED | OUTPATIENT
Start: 2017-11-22 | End: 2017-11-23 | Stop reason: HOSPADM

## 2017-11-22 RX ORDER — SODIUM CHLORIDE 0.9 % (FLUSH) 0.9 %
10 SYRINGE (ML) INJECTION EVERY 12 HOURS SCHEDULED
Status: DISCONTINUED | OUTPATIENT
Start: 2017-11-22 | End: 2017-11-23 | Stop reason: HOSPADM

## 2017-11-22 RX ORDER — SODIUM CHLORIDE 9 MG/ML
INJECTION, SOLUTION INTRAVENOUS CONTINUOUS
Status: DISCONTINUED | OUTPATIENT
Start: 2017-11-22 | End: 2017-11-23 | Stop reason: HOSPADM

## 2017-11-22 RX ORDER — OXYCODONE HYDROCHLORIDE 5 MG/1
5 TABLET ORAL PRN
Status: ACTIVE | OUTPATIENT
Start: 2017-11-22 | End: 2017-11-22

## 2017-11-22 RX ORDER — MORPHINE SULFATE 2 MG/ML
2 INJECTION, SOLUTION INTRAMUSCULAR; INTRAVENOUS EVERY 5 MIN PRN
Status: DISCONTINUED | OUTPATIENT
Start: 2017-11-22 | End: 2017-11-23 | Stop reason: HOSPADM

## 2017-11-22 RX ORDER — OXYCODONE HYDROCHLORIDE 5 MG/1
10 TABLET ORAL PRN
Status: ACTIVE | OUTPATIENT
Start: 2017-11-22 | End: 2017-11-22

## 2017-11-22 RX ORDER — SODIUM CHLORIDE 0.9 % (FLUSH) 0.9 %
10 SYRINGE (ML) INJECTION PRN
Status: DISCONTINUED | OUTPATIENT
Start: 2017-11-22 | End: 2017-11-23 | Stop reason: HOSPADM

## 2017-11-22 RX ORDER — MORPHINE SULFATE 2 MG/ML
1 INJECTION, SOLUTION INTRAMUSCULAR; INTRAVENOUS EVERY 5 MIN PRN
Status: DISCONTINUED | OUTPATIENT
Start: 2017-11-22 | End: 2017-11-23 | Stop reason: HOSPADM

## 2017-11-22 RX ORDER — ONDANSETRON 2 MG/ML
4 INJECTION INTRAMUSCULAR; INTRAVENOUS
Status: ACTIVE | OUTPATIENT
Start: 2017-11-22 | End: 2017-11-22

## 2017-11-22 RX ORDER — MEPERIDINE HYDROCHLORIDE 25 MG/ML
12.5 INJECTION INTRAMUSCULAR; INTRAVENOUS; SUBCUTANEOUS EVERY 5 MIN PRN
Status: DISCONTINUED | OUTPATIENT
Start: 2017-11-22 | End: 2017-11-23 | Stop reason: HOSPADM

## 2017-11-22 RX ORDER — TRAMADOL HYDROCHLORIDE 50 MG/1
50 TABLET ORAL EVERY 6 HOURS PRN
Qty: 10 TABLET | Refills: 0 | Status: SHIPPED | OUTPATIENT
Start: 2017-11-22 | End: 2017-12-02

## 2017-11-22 RX ADMIN — SODIUM CHLORIDE: 9 INJECTION, SOLUTION INTRAVENOUS at 10:51

## 2017-11-22 RX ADMIN — FENTANYL CITRATE 25 MCG: 50 INJECTION, SOLUTION INTRAMUSCULAR; INTRAVENOUS at 12:32

## 2017-11-22 ASSESSMENT — PAIN - FUNCTIONAL ASSESSMENT: PAIN_FUNCTIONAL_ASSESSMENT: 0-10

## 2017-11-22 ASSESSMENT — ENCOUNTER SYMPTOMS: SHORTNESS OF BREATH: 1

## 2017-11-22 ASSESSMENT — PAIN SCALES - GENERAL
PAINLEVEL_OUTOF10: 0
PAINLEVEL_OUTOF10: 4
PAINLEVEL_OUTOF10: 2

## 2017-11-22 ASSESSMENT — PAIN DESCRIPTION - PAIN TYPE: TYPE: ACUTE PAIN

## 2017-11-22 ASSESSMENT — PAIN DESCRIPTION - ORIENTATION: ORIENTATION: RIGHT

## 2017-11-22 ASSESSMENT — LIFESTYLE VARIABLES: SMOKING_STATUS: 0

## 2017-11-22 ASSESSMENT — PAIN DESCRIPTION - LOCATION: LOCATION: SHOULDER

## 2017-11-22 NOTE — ANESTHESIA PRE-OP
years     ENDOSCOPY, COLON, DIAGNOSTIC      EGD August 2015,pt states BX. negative    LYMPH NODE DISSECTION  2011    groin     Social History   Substance Use Topics    Smoking status: Former Smoker     Packs/day: 1.60     Years: 30.00     Types: Cigarettes     Quit date: 10/5/2011    Smokeless tobacco: Never Used    Alcohol use No     Medications  Current Outpatient Prescriptions on File Prior to Encounter   Medication Sig Dispense Refill    Fluticasone Furoate-Vilanterol (BREO ELLIPTA) 200-25 MCG/INH AEPB Inhale 1 puff into the lungs daily      metFORMIN (GLUCOPHAGE) 500 MG tablet Take 1 tablet by mouth 2 times daily (with meals) 60 tablet 11    albuterol sulfate HFA (PROAIR HFA) 108 (90 Base) MCG/ACT inhaler Inhale 2 puffs into the lungs every 6 hours as needed for Wheezing or Shortness of Breath 1 Inhaler 3    tiotropium (SPIRIVA RESPIMAT) 2.5 MCG/ACT AERS inhaler INHALE 2 PUFFS BY MOUTH INTO THE LUNGS DAILY 4 g 5    esomeprazole (NEXIUM) 40 MG delayed release capsule Take 1 capsule by mouth 2 times daily 180 capsule 3    lisinopril (PRINIVIL;ZESTRIL) 20 MG tablet TAKE 1 TABLET BY MOUTH DAILY 90 tablet 3    alpha1-proteinase Inhibitor, Human, (ARALAST, ZEMAIRA, PROLASTIN-C) 1000 MG SOLR Infuse 60 mg/kg intravenously once a week Ordered as 60 mg/kg +/- 10% (6 vials of 980 mg each = 5880 mg)      acetaminophen (TYLENOL) 325 MG tablet Take 650 mg by mouth every 6 hours as needed for Pain.  Spacer/Aero-Holding Chambers KIT Please give the patient one spacer.  1 kit 0     Current Facility-Administered Medications on File Prior to Encounter   Medication Dose Route Frequency Provider Last Rate Last Dose    0.9 % sodium chloride infusion   Intravenous Once Mathew Raines MD        0.9 % sodium chloride infusion  100 mL/hr Intravenous Continuous Mathew Raines MD   Stopped at 07/10/17 0954    diphenhydrAMINE (BENADRYL) injection 50 mg  50 mg Intravenous Once MD Steven Ordonez hydrocortisone sodium succinate PF (SOLU-CORTEF) injection 100 mg  100 mg Intravenous Once Mira Bonilla MD        0.9 % sodium chloride infusion   Intravenous Once Mira Bonilla MD         Current Outpatient Prescriptions   Medication Sig Dispense Refill    Fluticasone Furoate-Vilanterol (BREO ELLIPTA) 200-25 MCG/INH AEPB Inhale 1 puff into the lungs daily      metFORMIN (GLUCOPHAGE) 500 MG tablet Take 1 tablet by mouth 2 times daily (with meals) 60 tablet 11    albuterol sulfate HFA (PROAIR HFA) 108 (90 Base) MCG/ACT inhaler Inhale 2 puffs into the lungs every 6 hours as needed for Wheezing or Shortness of Breath 1 Inhaler 3    tiotropium (SPIRIVA RESPIMAT) 2.5 MCG/ACT AERS inhaler INHALE 2 PUFFS BY MOUTH INTO THE LUNGS DAILY 4 g 5    esomeprazole (NEXIUM) 40 MG delayed release capsule Take 1 capsule by mouth 2 times daily 180 capsule 3    lisinopril (PRINIVIL;ZESTRIL) 20 MG tablet TAKE 1 TABLET BY MOUTH DAILY 90 tablet 3    alpha1-proteinase Inhibitor, Human, (ARALAST, ZEMAIRA, PROLASTIN-C) 1000 MG SOLR Infuse 60 mg/kg intravenously once a week Ordered as 60 mg/kg +/- 10% (6 vials of 980 mg each = 5880 mg)      acetaminophen (TYLENOL) 325 MG tablet Take 650 mg by mouth every 6 hours as needed for Pain.  Spacer/Aero-Holding Chambers KIT Please give the patient one spacer. 1 kit 0     No current facility-administered medications for this encounter.       Facility-Administered Medications Ordered in Other Encounters   Medication Dose Route Frequency Provider Last Rate Last Dose    0.9 % sodium chloride infusion   Intravenous Once Mira Bonilla MD        0.9 % sodium chloride infusion  100 mL/hr Intravenous Continuous Mira Bonilla MD   Stopped at 07/10/17 0954    diphenhydrAMINE (BENADRYL) injection 50 mg  50 mg Intravenous Once Mira Bonilla MD        hydrocortisone sodium succinate PF (SOLU-CORTEF) injection 100 mg  100 mg Intravenous Once Mira Bonilla MD  0.9 % sodium chloride infusion   Intravenous Once Heike Winters MD         Vital Signs (Current) There were no vitals filed for this visit. Vital Signs Statistics (for past 48 hrs)     No Data Recorded    BP Readings from Last 3 Encounters:   11/20/17 108/68   11/13/17 113/72   11/13/17 113/72     BMI  There is no height or weight on file to calculate BMI. Estimated body mass index is 27.55 kg/m² as calculated from the following:    Height as of 11/20/17: 5' 10\" (1.778 m). Weight as of 11/20/17: 192 lb (87.1 kg). CBC   Lab Results   Component Value Date    WBC 6.5 11/03/2016    RBC 5.62 11/03/2016    HGB 15.8 11/03/2016    HCT 49.2 11/03/2016    MCV 87.4 11/03/2016    RDW 13.0 11/03/2016     11/03/2016     CMP    Lab Results   Component Value Date     09/12/2017    K 4.4 09/12/2017    CL 98 09/12/2017    CO2 25 09/12/2017    BUN 11 09/12/2017    CREATININE 0.8 09/12/2017    GFRAA >60 09/12/2017    GFRAA >60 09/01/2011    AGRATIO 1.6 09/12/2017    LABGLOM >60 09/12/2017    GLUCOSE 121 09/12/2017    PROT 7.4 09/12/2017    PROT 7.2 09/01/2011    CALCIUM 9.7 09/12/2017    BILITOT 0.6 09/12/2017    ALKPHOS 96 09/12/2017    AST 42 09/12/2017    ALT 75 09/12/2017     BMP    Lab Results   Component Value Date     09/12/2017    K 4.4 09/12/2017    CL 98 09/12/2017    CO2 25 09/12/2017    BUN 11 09/12/2017    CREATININE 0.8 09/12/2017    CALCIUM 9.7 09/12/2017    GFRAA >60 09/12/2017    GFRAA >60 09/01/2011    LABGLOM >60 09/12/2017    GLUCOSE 121 09/12/2017     POCGlucose  No results for input(s): GLUCOSE in the last 72 hours.    Coags    Lab Results   Component Value Date    PROTIME 11.6 11/06/2014    INR 1.07 32/75/8989     HCG (If Applicable) No results found for: PREGTESTUR, PREGSERUM, HCG, HCGQUANT   ABGs No results found for: PHART, PO2ART, MVL1VZO, AJB2HQB, BEART, O5RCJKIU   Type & Screen (If Applicable)  No results found for: LABABO, inhaler INHALE 2 PUFFS BY MOUTH INTO THE LUNGS DAILY 4 g 5    esomeprazole (NEXIUM) 40 MG delayed release capsule Take 1 capsule by mouth 2 times daily 180 capsule 3    lisinopril (PRINIVIL;ZESTRIL) 20 MG tablet TAKE 1 TABLET BY MOUTH DAILY 90 tablet 3    alpha1-proteinase Inhibitor, Human, (ARALAST, ZEMAIRA, PROLASTIN-C) 1000 MG SOLR Infuse 60 mg/kg intravenously once a week Ordered as 60 mg/kg +/- 10% (6 vials of 980 mg each = 5880 mg)      acetaminophen (TYLENOL) 325 MG tablet Take 650 mg by mouth every 6 hours as needed for Pain.  Spacer/Aero-Holding Chambers KIT Please give the patient one spacer. 1 kit 0     No current facility-administered medications for this encounter. Facility-Administered Medications Ordered in Other Encounters   Medication Dose Route Frequency Provider Last Rate Last Dose    0.9 % sodium chloride infusion   Intravenous Once Truman Acuña MD        0.9 % sodium chloride infusion  100 mL/hr Intravenous Continuous Truman Acuña MD   Stopped at 07/10/17 0954    diphenhydrAMINE (BENADRYL) injection 50 mg  50 mg Intravenous Once Truman Acuña MD        hydrocortisone sodium succinate PF (SOLU-CORTEF) injection 100 mg  100 mg Intravenous Once Truman Acuña MD        0.9 % sodium chloride infusion   Intravenous Once Truman Acuña MD           Allergies: Allergies   Allergen Reactions    Ibuprofen Other (See Comments)     Pt states ibuprofen gave him cramps in his stomach.        Problem List:    Patient Active Problem List   Diagnosis Code    Abdominal pain R10.9    Lymphadenopathy R59.1    GERD (gastroesophageal reflux disease) K21.9    Ariza's esophagus with dysplasia K22.719    Emphysema lung J43.9    Alpha-1-antitrypsin deficiency ([facility] [address] E88.01    Essential hypertension, benign I10    Advance care planning Z71.89    Type 2 diabetes mellitus without complication, without long-term current use of insulin ([facility] [address] E11.9    Colon polyp K63.5       Past Medical History:        Diagnosis Date    Advance care planning     LIving will on chart    Alpha-1-antitrypsin deficiency ([facility] [address] 11/15/2013    PFT's FEV1 72%    Ariza's esophagus with dysplasia April 2012    Dr. Do Im f/u EGD w/o metaplasia 8/29/13  EGD 9/25/13  recheck 2 yrs    CAP (community acquired pneumonia) 11/4/14    Colon polyp 09/25/2013    Recheck 4 years    Elevated liver enzymes     Emphysema lung ([facility] [address] Oct 2013    on oxygen at night. and prn during the day     Essential hypertension, benign 12/9/2014    GERD (gastroesophageal reflux disease) 11/10/2011    EGD 4/3/11 with Ariza's Esophagus    Kidney stone     Kidney stone     Pleural effusion     at age 15    Type 2 diabetes mellitus without complication, without long-term current use of insulin ([facility] [address] 7/18/2017    Wears glasses        Past Surgical History:        Procedure Laterality Date    CHOLECYSTECTOMY, LAPAROSCOPIC  2005    COLONOSCOPY  2017    every 4 years     ENDOSCOPY, COLON, DIAGNOSTIC      EGD August 2015,pt states BX. negative    LYMPH NODE DISSECTION  2011    groin       Social History:    Social History   Substance Use Topics    Smoking status: Former Smoker     Packs/day: 1.60     Years: 30.00     Types: Cigarettes     Quit date: 10/5/2011    Smokeless tobacco: Never Used    Alcohol use No                                Counseling given: Not Answered      Vital Signs (Current): There were no vitals filed for this visit. BP Readings from Last 3 Encounters:   11/20/17 108/68   11/13/17 113/72   11/13/17 113/72       NPO Status:                                                                                 BMI:   Wt Readings from Last 3 Encounters:   11/20/17 192 lb (87.1 kg)   11/20/17 192 lb (87.1 kg)   11/13/17 193 lb (87.5 kg)     There is no height or weight on file to calculate BMI.     Anesthesia Evaluation  Patient summary reviewed and

## 2017-11-22 NOTE — ANESTHESIA POST-OP
[facility] Department of Anesthesiology  Post-Anesthesia Note       Name:  Troi Castellon                                  Age:  36 y.o. MRN:  [id number]     Last Vitals & Oxygen Saturation: /67   Pulse 57   Temp 97.2 °F (36.2 °C) (Temporal)   Resp 15   Ht 5' 10\" (1.778 m)   Wt 194 lb 0.1 oz (88 kg)   SpO2 93%   BMI 27.84 kg/m²   Patient Vitals for the past 4 hrs:   BP Temp Temp src Pulse Resp SpO2 Height Weight   11/22/17 1230 106/67 - - 57 15 93 % - -   11/22/17 1220 103/60 - - 66 22 93 % - -   11/22/17 1215 101/72 - - 64 22 94 % - -   11/22/17 1210 103/61 - - 56 14 94 % - -   11/22/17 1207 89/62 - - 69 18 93 % - -   11/22/17 1205 (!) 94/58 97.2 °F (36.2 °C) Temporal 62 14 93 % - -   11/22/17 1025 125/72 97.2 °F (36.2 °C) - 52 16 99 % - -   11/22/17 1024 - - - - - - 5' 10\" (1.778 m) 194 lb 0.1 oz (88 kg)       Level of consciousness:  Awake, alert    Respiratory: Respirations easy, no distress. Stable. Cardiovascular: Hemodynamically stable. Hydration: Adequate. PONV: Adequately managed. Post-op pain: Adequately controlled. Post-op assessment: Tolerated anesthetic well without complication. Complications:  None.     Avis Ashton MD  November 22, 2017   1:01 PM

## 2017-11-22 NOTE — PROGRESS NOTES
Pt up to chair. Rates shoulder pain 2/10 and tolerable. Discharge instructions provided, verbalized understanding.

## 2017-11-22 NOTE — PROGRESS NOTES
Pt arrived to PACU from OR, monitors on VSS. Dressing to right chest clean dry intact. IV infusing.  Pt denies pain to right chest.

## 2017-11-22 NOTE — H&P
H&P Update    Patient's History and Physical from November 14, 2017 was reviewed. Patient examined. Alert and oriented  No chest wall masses    There has been no change. Alpha 1 antitrypsin deficiency in need on ongoing infusion therapy    For Port today    The risks, benefits and alternatives to the planned procedure were discussed. Patient expressed an understanding and is willing to proceed.     Electronically signed by Eloise Jeffrey MD on 11/22/2017 at [address]

## 2017-11-22 NOTE — BRIEF OP NOTE
Brief Postoperative Note    David Rollins  YOB: 1975  [id number]    Pre-operative Diagnosis: alpha 1 antitrypsin deficiency    Post-operative Diagnosis: Same    Procedure: right subclavian port a cath placement with fluoroscopy    Anesthesia: MAC and Local    Surgeons/Assistants: Pancho Wei    Estimated Blood Loss: less than 50     Complications: None    Specimens: Was Not Obtained    Findings: no complications    Electronically signed by Swati De Jesus MD on 11/22/2017 at 11:58 AM

## 2017-11-23 NOTE — OP NOTE
[address] [place], [place] [address]                                 OPERATIVE REPORT    PATIENT NAME: Martin Martinez                      :        1975  MED REC NO:   [id number]                          ROOM:  ACCOUNT NO:   [de-identified]                          ADMIT DATE: 2017  PROVIDER:     Tanesha Gil MD    DATE OF PROCEDURE:  2017    PREOPERATIVE DIAGNOSIS:  Alpha-1 antitrypsin deficiency. POSTOPERATIVE DIAGNOSIS:  Alpha-1 antitrypsin deficiency. OPERATION PERFORMED:  Right subclavian Port-A-Cath placement with  fluoroscopic visualization. SURGEON:  Tanesha Gil MD    SPECIMENS:  None. COMPLICATIONS:  None. INDICATIONS:  The patient is a 67-year-old male with alpha-1 antitrypsin  deficiency. He undergoes weekly infusions and has had increasing  difficulty with venous access. In order to facilitate his ongoing care, a  Port-A-Cath was recommended. The risks, benefits, and alternatives were  reviewed and he agreed to proceed. The patient requests right-sided  placement. OPERATIVE PROCEDURE:  The patient was brought to the operating room, placed  supine, and the chest and neck prepped and draped in a sterile fashion. Sedation had been delivered by anesthesia and then local anesthetic infused  near the right clavicle. He was repositioned into Trendelenburg and the  right subclavian vein cannulated on the first attempt. A guidewire was  passed without resistance and confirmed to be in the vena cava with  fluoroscopy. A transverse incision was made and carried down through the subcutaneous  tissue to the pectoralis fascia. Blunt dissection was then used to create  a pocket in the subcutaneous space of the right upper chest.  The sheath  and dilator were now passed over the guidewire and followed on fluoroscopy  into the vena cava.   The dilator and guidewire were removed, and

## 2017-11-27 ENCOUNTER — HOSPITAL ENCOUNTER (OUTPATIENT)
Dept: INFUSION THERAPY | Age: 42
Discharge: HOME | End: 2017-11-28
Admitting: INTERNAL MEDICINE

## 2017-11-27 VITALS
DIASTOLIC BLOOD PRESSURE: 69 MMHG | SYSTOLIC BLOOD PRESSURE: 118 MMHG | WEIGHT: 192 LBS | RESPIRATION RATE: 17 BRPM | BODY MASS INDEX: 27.55 KG/M2 | TEMPERATURE: 97.8 F | HEART RATE: 79 BPM | OXYGEN SATURATION: 94 %

## 2017-11-27 DIAGNOSIS — E88.01 ALPHA-1-ANTITRYPSIN DEFICIENCY (HCC): ICD-10-CM

## 2017-11-27 RX ORDER — 0.9 % SODIUM CHLORIDE 0.9 %
10 VIAL (ML) INJECTION ONCE
Status: CANCELLED | OUTPATIENT
Start: 2017-11-27 | End: 2017-11-27

## 2017-11-27 RX ORDER — DIPHENHYDRAMINE HYDROCHLORIDE 50 MG/ML
50 INJECTION INTRAMUSCULAR; INTRAVENOUS ONCE
Status: CANCELLED | OUTPATIENT
Start: 2017-11-27 | End: 2017-11-27

## 2017-11-27 RX ORDER — SODIUM CHLORIDE 9 MG/ML
INJECTION, SOLUTION INTRAVENOUS ONCE
Status: CANCELLED | OUTPATIENT
Start: 2017-11-27

## 2017-11-27 RX ORDER — SODIUM CHLORIDE 0.9 % (FLUSH) 0.9 %
10 SYRINGE (ML) INJECTION PRN
Status: CANCELLED | OUTPATIENT
Start: 2017-11-27

## 2017-11-27 RX ORDER — METHYLPREDNISOLONE SODIUM SUCCINATE 125 MG/2ML
125 INJECTION, POWDER, LYOPHILIZED, FOR SOLUTION INTRAMUSCULAR; INTRAVENOUS ONCE
Status: CANCELLED | OUTPATIENT
Start: 2017-11-27 | End: 2017-11-27

## 2017-11-27 RX ORDER — SODIUM CHLORIDE 9 MG/ML
INJECTION, SOLUTION INTRAVENOUS ONCE
Status: COMPLETED | OUTPATIENT
Start: 2017-11-27 | End: 2017-11-27

## 2017-11-27 RX ORDER — SODIUM CHLORIDE 9 MG/ML
100 INJECTION, SOLUTION INTRAVENOUS CONTINUOUS
Status: CANCELLED | OUTPATIENT
Start: 2017-11-27

## 2017-11-27 RX ORDER — SODIUM CHLORIDE 0.9 % (FLUSH) 0.9 %
10 SYRINGE (ML) INJECTION PRN
Status: DISPENSED | OUTPATIENT
Start: 2017-11-27 | End: 2017-11-28

## 2017-11-27 RX ORDER — EPINEPHRINE 1 MG/ML
0.3 INJECTION, SOLUTION, CONCENTRATE INTRAVENOUS PRN
Status: CANCELLED | OUTPATIENT
Start: 2017-11-27

## 2017-11-27 RX ADMIN — Medication 10 ML: at 08:28

## 2017-11-27 RX ADMIN — SODIUM CHLORIDE: 9 INJECTION, SOLUTION INTRAVENOUS at 08:28

## 2017-11-27 RX ADMIN — Medication 10 ML: at 09:41

## 2017-12-01 ENCOUNTER — HOSPITAL ENCOUNTER (OUTPATIENT)
Dept: INFUSION THERAPY | Age: 42
Discharge: HOME | End: 2017-12-31
Attending: INTERNAL MEDICINE | Admitting: INTERNAL MEDICINE

## 2017-12-04 ENCOUNTER — HOSPITAL ENCOUNTER (OUTPATIENT)
Dept: INFUSION THERAPY | Age: 42
Discharge: HOME | End: 2017-12-05
Admitting: INTERNAL MEDICINE

## 2017-12-04 ENCOUNTER — TELEPHONE (OUTPATIENT)
Dept: PULMONOLOGY | Age: 42
End: 2017-12-04

## 2017-12-04 VITALS
DIASTOLIC BLOOD PRESSURE: 72 MMHG | TEMPERATURE: 97.5 F | OXYGEN SATURATION: 96 % | HEART RATE: 86 BPM | RESPIRATION RATE: 19 BRPM | BODY MASS INDEX: 27.63 KG/M2 | HEIGHT: 70 IN | SYSTOLIC BLOOD PRESSURE: 118 MMHG | WEIGHT: 193 LBS

## 2017-12-04 DIAGNOSIS — E88.01 ALPHA-1-ANTITRYPSIN DEFICIENCY (HCC): ICD-10-CM

## 2017-12-04 RX ORDER — 0.9 % SODIUM CHLORIDE 0.9 %
10 VIAL (ML) INJECTION ONCE
Status: COMPLETED | OUTPATIENT
Start: 2017-12-04 | End: 2017-12-04

## 2017-12-04 RX ORDER — EPINEPHRINE 1 MG/ML
0.3 INJECTION, SOLUTION, CONCENTRATE INTRAVENOUS PRN
Status: CANCELLED | OUTPATIENT
Start: 2017-12-04

## 2017-12-04 RX ORDER — SODIUM CHLORIDE 9 MG/ML
100 INJECTION, SOLUTION INTRAVENOUS CONTINUOUS
Status: CANCELLED | OUTPATIENT
Start: 2017-12-04

## 2017-12-04 RX ORDER — SODIUM CHLORIDE 0.9 % (FLUSH) 0.9 %
10 SYRINGE (ML) INJECTION PRN
Status: CANCELLED | OUTPATIENT
Start: 2017-12-04

## 2017-12-04 RX ORDER — METHYLPREDNISOLONE SODIUM SUCCINATE 125 MG/2ML
125 INJECTION, POWDER, LYOPHILIZED, FOR SOLUTION INTRAMUSCULAR; INTRAVENOUS ONCE
Status: CANCELLED | OUTPATIENT
Start: 2017-12-04 | End: 2017-12-04

## 2017-12-04 RX ORDER — SODIUM CHLORIDE 9 MG/ML
INJECTION, SOLUTION INTRAVENOUS ONCE
Status: COMPLETED | OUTPATIENT
Start: 2017-12-04 | End: 2017-12-04

## 2017-12-04 RX ORDER — 0.9 % SODIUM CHLORIDE 0.9 %
10 VIAL (ML) INJECTION ONCE
Status: CANCELLED | OUTPATIENT
Start: 2017-12-04 | End: 2017-12-04

## 2017-12-04 RX ORDER — SODIUM CHLORIDE 9 MG/ML
INJECTION, SOLUTION INTRAVENOUS ONCE
Status: CANCELLED | OUTPATIENT
Start: 2017-12-04

## 2017-12-04 RX ORDER — DIPHENHYDRAMINE HYDROCHLORIDE 50 MG/ML
50 INJECTION INTRAMUSCULAR; INTRAVENOUS ONCE
Status: CANCELLED | OUTPATIENT
Start: 2017-12-04 | End: 2017-12-04

## 2017-12-04 RX ADMIN — SODIUM CHLORIDE: 9 INJECTION, SOLUTION INTRAVENOUS at 09:25

## 2017-12-04 RX ADMIN — Medication 10 ML: at 10:11

## 2017-12-04 RX ADMIN — Medication 10 ML: at 08:35

## 2017-12-04 NOTE — TELEPHONE ENCOUNTER
Assuming the Plethysmography that was ordered in July, is what you want completed prior to his appt?

## 2017-12-04 NOTE — TELEPHONE ENCOUNTER
Patient came to the office to drop off prescriptions and to make an appt. Appt is scheduled for February. Does patient need PFT beforehand?

## 2017-12-05 ENCOUNTER — TELEPHONE (OUTPATIENT)
Dept: PULMONOLOGY | Age: 42
End: 2017-12-05

## 2017-12-05 DIAGNOSIS — J43.9 PULMONARY EMPHYSEMA, UNSPECIFIED EMPHYSEMA TYPE (HCC): ICD-10-CM

## 2017-12-05 RX ORDER — FLUTICASONE FUROATE AND VILANTEROL 200; 25 UG/1; UG/1
1 POWDER RESPIRATORY (INHALATION) DAILY
Qty: 3 EACH | Refills: 3 | Status: SHIPPED | OUTPATIENT
Start: 2017-12-05 | End: 2017-12-12 | Stop reason: SDUPTHER

## 2017-12-11 ENCOUNTER — HOSPITAL ENCOUNTER (OUTPATIENT)
Dept: INFUSION THERAPY | Age: 42
Discharge: HOME | End: 2017-12-12
Admitting: INTERNAL MEDICINE

## 2017-12-11 VITALS
BODY MASS INDEX: 27.49 KG/M2 | HEART RATE: 66 BPM | RESPIRATION RATE: 19 BRPM | WEIGHT: 192 LBS | OXYGEN SATURATION: 98 % | HEIGHT: 70 IN | SYSTOLIC BLOOD PRESSURE: 122 MMHG | TEMPERATURE: 98 F | DIASTOLIC BLOOD PRESSURE: 75 MMHG

## 2017-12-11 DIAGNOSIS — E88.01 ALPHA-1-ANTITRYPSIN DEFICIENCY (HCC): ICD-10-CM

## 2017-12-11 RX ORDER — DIPHENHYDRAMINE HYDROCHLORIDE 50 MG/ML
50 INJECTION INTRAMUSCULAR; INTRAVENOUS ONCE
Status: CANCELLED | OUTPATIENT
Start: 2017-12-11 | End: 2017-12-11

## 2017-12-11 RX ORDER — SODIUM CHLORIDE 9 MG/ML
INJECTION, SOLUTION INTRAVENOUS ONCE
Status: COMPLETED | OUTPATIENT
Start: 2017-12-11 | End: 2017-12-11

## 2017-12-11 RX ORDER — METHYLPREDNISOLONE SODIUM SUCCINATE 125 MG/2ML
125 INJECTION, POWDER, LYOPHILIZED, FOR SOLUTION INTRAMUSCULAR; INTRAVENOUS ONCE
Status: CANCELLED | OUTPATIENT
Start: 2017-12-11 | End: 2017-12-11

## 2017-12-11 RX ORDER — SODIUM CHLORIDE 9 MG/ML
100 INJECTION, SOLUTION INTRAVENOUS CONTINUOUS
Status: CANCELLED | OUTPATIENT
Start: 2017-12-11

## 2017-12-11 RX ORDER — SODIUM CHLORIDE 0.9 % (FLUSH) 0.9 %
10 SYRINGE (ML) INJECTION PRN
Status: DISPENSED | OUTPATIENT
Start: 2017-12-11 | End: 2017-12-12

## 2017-12-11 RX ORDER — SODIUM CHLORIDE 0.9 % (FLUSH) 0.9 %
10 SYRINGE (ML) INJECTION PRN
Status: CANCELLED | OUTPATIENT
Start: 2017-12-11

## 2017-12-11 RX ORDER — 0.9 % SODIUM CHLORIDE 0.9 %
10 VIAL (ML) INJECTION ONCE
Status: CANCELLED | OUTPATIENT
Start: 2017-12-11 | End: 2017-12-11

## 2017-12-11 RX ORDER — EPINEPHRINE 1 MG/ML
0.3 INJECTION, SOLUTION, CONCENTRATE INTRAVENOUS PRN
Status: CANCELLED | OUTPATIENT
Start: 2017-12-11

## 2017-12-11 RX ORDER — SODIUM CHLORIDE 9 MG/ML
INJECTION, SOLUTION INTRAVENOUS ONCE
Status: CANCELLED | OUTPATIENT
Start: 2017-12-11

## 2017-12-11 RX ADMIN — Medication 10 ML: at 08:49

## 2017-12-11 RX ADMIN — SODIUM CHLORIDE: 9 INJECTION, SOLUTION INTRAVENOUS at 08:49

## 2017-12-11 RX ADMIN — Medication 10 ML: at 09:44

## 2017-12-11 RX ADMIN — Medication 30 ML: at 09:57

## 2017-12-11 RX ADMIN — Medication 10 ML: at 09:10

## 2017-12-12 DIAGNOSIS — J43.9 PULMONARY EMPHYSEMA, UNSPECIFIED EMPHYSEMA TYPE (HCC): ICD-10-CM

## 2017-12-12 DIAGNOSIS — E11.9 TYPE 2 DIABETES MELLITUS WITHOUT COMPLICATION, WITHOUT LONG-TERM CURRENT USE OF INSULIN (HCC): ICD-10-CM

## 2017-12-12 DIAGNOSIS — K22.719 BARRETT'S ESOPHAGUS WITH DYSPLASIA: ICD-10-CM

## 2017-12-12 RX ORDER — ESOMEPRAZOLE MAGNESIUM 40 MG/1
40 CAPSULE, DELAYED RELEASE ORAL 2 TIMES DAILY
Qty: 180 CAPSULE | Refills: 3 | Status: SHIPPED | OUTPATIENT
Start: 2017-12-12 | End: 2018-09-25 | Stop reason: SDUPTHER

## 2017-12-12 RX ORDER — LISINOPRIL 20 MG/1
20 TABLET ORAL DAILY
Qty: 90 TABLET | Refills: 3 | Status: SHIPPED | OUTPATIENT
Start: 2017-12-12 | End: 2018-01-09

## 2017-12-12 RX ORDER — FLUTICASONE FUROATE AND VILANTEROL 200; 25 UG/1; UG/1
1 POWDER RESPIRATORY (INHALATION) DAILY
Qty: 3 EACH | Refills: 3 | Status: SHIPPED | OUTPATIENT
Start: 2017-12-12 | End: 2017-12-14 | Stop reason: SDUPTHER

## 2017-12-14 ENCOUNTER — OFFICE VISIT (OUTPATIENT)
Dept: FAMILY MEDICINE CLINIC | Age: 42
End: 2017-12-14

## 2017-12-14 VITALS
DIASTOLIC BLOOD PRESSURE: 68 MMHG | BODY MASS INDEX: 27.77 KG/M2 | HEART RATE: 57 BPM | HEIGHT: 70 IN | WEIGHT: 194 LBS | SYSTOLIC BLOOD PRESSURE: 112 MMHG

## 2017-12-14 DIAGNOSIS — E11.9 TYPE 2 DIABETES MELLITUS WITHOUT COMPLICATION, WITHOUT LONG-TERM CURRENT USE OF INSULIN (HCC): ICD-10-CM

## 2017-12-14 DIAGNOSIS — E88.01 ALPHA-1-ANTITRYPSIN DEFICIENCY (HCC): ICD-10-CM

## 2017-12-14 DIAGNOSIS — K21.00 GASTROESOPHAGEAL REFLUX DISEASE WITH ESOPHAGITIS: Primary | ICD-10-CM

## 2017-12-14 DIAGNOSIS — I10 ESSENTIAL HYPERTENSION, BENIGN: ICD-10-CM

## 2017-12-14 DIAGNOSIS — D12.6 ADENOMATOUS POLYP OF COLON, UNSPECIFIED PART OF COLON: ICD-10-CM

## 2017-12-14 LAB — HBA1C MFR BLD: 6.1 %

## 2017-12-14 PROCEDURE — 99214 OFFICE O/P EST MOD 30 MIN: CPT | Performed by: FAMILY MEDICINE

## 2017-12-14 PROCEDURE — 83036 HEMOGLOBIN GLYCOSYLATED A1C: CPT | Performed by: FAMILY MEDICINE

## 2017-12-14 NOTE — PROGRESS NOTES
Chief Complaint   Patient presents with    Follow-up     O2       HPI: Esme Quiroga presents for evaluation and management Of multiple medical problems. Ruel Duffy notes he gave up soda pop and has restarted his metformin at our direction in September. He does not check his blood sugars but generally feels pretty good. Tells me he lost 10 pounds. He remains compliant with his some air first off for one antitrypsin disease. Notes chronic dyspnea with carrying groceries walking on level ground or walking up one flight of steps. He notes he is taking and tolerating his blood pressure medication. He reports he continues with some heartburn despite taking Nexium but also got an EGD and colonoscopy in September per Dr. Dennis Bertrand for his Ariza's esophagus and colon polyp. ROS: no fever or chills, some cough, no sob, no chest pain, no palpitation, no abd pain, no n/v/d/c, no urinary frequency or dysuria,     Allergies   Allergen Reactions    Ibuprofen Other (See Comments)     Pt states ibuprofen gave him cramps in his stomach.      New Prescriptions    No medications on file     Current Outpatient Prescriptions   Medication Sig Dispense Refill    tiotropium (SPIRIVA RESPIMAT) 2.5 MCG/ACT AERS inhaler INHALE 2 PUFFS BY MOUTH INTO THE LUNGS DAILY 3 Inhaler 3    metFORMIN (GLUCOPHAGE) 500 MG tablet Take 1 tablet by mouth 2 times daily (with meals) 180 tablet 3    esomeprazole (NEXIUM) 40 MG delayed release capsule Take 1 capsule by mouth 2 times daily 180 capsule 3    lisinopril (PRINIVIL;ZESTRIL) 20 MG tablet Take 1 tablet by mouth daily 90 tablet 3    Fluticasone Furoate-Vilanterol (BREO ELLIPTA) 200-25 MCG/INH AEPB Inhale 1 puff into the lungs daily      albuterol sulfate HFA (PROAIR HFA) 108 (90 Base) MCG/ACT inhaler Inhale 2 puffs into the lungs every 6 hours as needed for Wheezing or Shortness of Breath 1 Inhaler 3    alpha1-proteinase Inhibitor, Human, (ARALAST, ZEMAIRA, PROLASTIN-C) 1000 MG SOLR Infuse 60 mg/kg intravenously once a week Ordered as 60 mg/kg +/- 10% (6 vials of 980 mg each = 5880 mg)      acetaminophen (TYLENOL) 325 MG tablet Take 650 mg by mouth every 6 hours as needed for Pain.  Spacer/Aero-Holding Chambers KIT Please give the patient one spacer. 1 kit 0     No current facility-administered medications for this visit. Facility-Administered Medications Ordered in Other Visits   Medication Dose Route Frequency Provider Last Rate Last Dose    0.9 % sodium chloride infusion   Intravenous Once Sindhu Downey MD        0.9 % sodium chloride infusion  100 mL/hr Intravenous Continuous Sindhu Downey MD   Stopped at 07/10/17 0954    diphenhydrAMINE (BENADRYL) injection 50 mg  50 mg Intravenous Once Sindhu Downey MD        hydrocortisone sodium succinate PF (SOLU-CORTEF) injection 100 mg  100 mg Intravenous Once Sindhu Downey MD        0.9 % sodium chloride infusion   Intravenous Once Sindhu Downey MD           Past Medical History:   Diagnosis Date    Advance care planning     LIving will on chart    Alpha-1-antitrypsin deficiency ([facility] [address] 11/15/2013    PFT's FEV1 72%    Ariza's esophagus with dysplasia 04/2012    Had checked 9/18/17 recheck in 4 years    CAP (community acquired pneumonia) 11/4/14    Colon polyp 09/25/2013    Had checked 9/18/17 recheck in 4 years    Elevated liver enzymes     Emphysema lung ([address] Oct 2013    on oxygen at night.  and prn during the day     Essential hypertension, benign 12/9/2014    GERD (gastroesophageal reflux disease) 11/10/2011    EGD 4/3/11 with Ariza's Esophagus    Kidney stone     Kidney stone     Pleural effusion     at age 15    Type 2 diabetes mellitus without complication, without long-term current use of insulin ([address] 7/18/2017    Wears glasses          Objective   /68   Pulse 57   Ht 5' 10\" (1.778 m)   Wt 194 lb (88 kg)   BMI 27.84 kg/m²   Wt Readings from Last 3 Encounters:   12/14/17 and follow-up with pulmonology    3. Essential hypertension, benign  Controlled: Continue meds recheck 3 months with labs at that time    4. Type 2 diabetes mellitus without complication, without long-term current use of insulin (HCC)  Clinically improved. We will confirm with hemoglobin A1c testing today. Praise patient on giving up soda pop. Continue meds and recheck 3 months  - POCT glycosylated hemoglobin (Hb A1C)    5. Adenomatous polyp of colon, unspecified part of colon  Continue follow-up with Dr. Maldonado Comment with repeat colonoscopy in 4 years        Shameka Chaparro received counseling on the following healthy behaviors: nutrition, exercise and medication adherence    Patient given educational materials on Nutrition and Exercise    I have instructed Shameka Chaparro to complete a self tracking handout on Weights and instructed them to bring it with them to his next appointment. Discussed use, benefit, and side effects of prescribed medications. Barriers to medication compliance addressed. All patient questions answered. Pt voiced understanding.        RTC in 3 months

## 2017-12-14 NOTE — PATIENT INSTRUCTIONS
García Douglas  [address] [place] [address]  Phone: [phone]  Fax: [phone]\"

## 2017-12-18 ENCOUNTER — HOSPITAL ENCOUNTER (OUTPATIENT)
Dept: INFUSION THERAPY | Age: 42
Discharge: HOME | End: 2017-12-19
Admitting: INTERNAL MEDICINE

## 2017-12-18 VITALS
DIASTOLIC BLOOD PRESSURE: 68 MMHG | BODY MASS INDEX: 27.55 KG/M2 | WEIGHT: 192 LBS | RESPIRATION RATE: 18 BRPM | HEART RATE: 79 BPM | TEMPERATURE: 97.5 F | SYSTOLIC BLOOD PRESSURE: 103 MMHG

## 2017-12-18 DIAGNOSIS — E88.01 ALPHA-1-ANTITRYPSIN DEFICIENCY (HCC): ICD-10-CM

## 2017-12-18 RX ORDER — SODIUM CHLORIDE 9 MG/ML
INJECTION, SOLUTION INTRAVENOUS ONCE
Status: DISCONTINUED | OUTPATIENT
Start: 2017-12-18 | End: 2018-05-01 | Stop reason: HOSPADM

## 2017-12-18 RX ORDER — SODIUM CHLORIDE 9 MG/ML
100 INJECTION, SOLUTION INTRAVENOUS CONTINUOUS
Status: CANCELLED | OUTPATIENT
Start: 2017-12-18

## 2017-12-18 RX ORDER — SODIUM CHLORIDE 0.9 % (FLUSH) 0.9 %
10 SYRINGE (ML) INJECTION PRN
Status: ACTIVE | OUTPATIENT
Start: 2017-12-18 | End: 2017-12-19

## 2017-12-18 RX ORDER — METHYLPREDNISOLONE SODIUM SUCCINATE 125 MG/2ML
125 INJECTION, POWDER, LYOPHILIZED, FOR SOLUTION INTRAMUSCULAR; INTRAVENOUS ONCE
Status: CANCELLED | OUTPATIENT
Start: 2017-12-18 | End: 2017-12-18

## 2017-12-18 RX ORDER — 0.9 % SODIUM CHLORIDE 0.9 %
10 VIAL (ML) INJECTION ONCE
Status: CANCELLED | OUTPATIENT
Start: 2017-12-18 | End: 2017-12-18

## 2017-12-18 RX ORDER — EPINEPHRINE 1 MG/ML
0.3 INJECTION, SOLUTION, CONCENTRATE INTRAVENOUS PRN
Status: CANCELLED | OUTPATIENT
Start: 2017-12-18

## 2017-12-18 RX ORDER — DIPHENHYDRAMINE HYDROCHLORIDE 50 MG/ML
50 INJECTION INTRAMUSCULAR; INTRAVENOUS ONCE
Status: CANCELLED | OUTPATIENT
Start: 2017-12-18 | End: 2017-12-18

## 2017-12-18 RX ORDER — SODIUM CHLORIDE 0.9 % (FLUSH) 0.9 %
10 SYRINGE (ML) INJECTION PRN
Status: CANCELLED | OUTPATIENT
Start: 2017-12-18

## 2017-12-18 RX ORDER — SODIUM CHLORIDE 9 MG/ML
INJECTION, SOLUTION INTRAVENOUS ONCE
Status: CANCELLED | OUTPATIENT
Start: 2017-12-18

## 2017-12-18 RX ADMIN — Medication 10 ML: at 08:15

## 2017-12-18 RX ADMIN — Medication 30 ML: at 09:50

## 2017-12-26 ENCOUNTER — HOSPITAL ENCOUNTER (OUTPATIENT)
Dept: INFUSION THERAPY | Age: 42
Discharge: HOME | End: 2017-12-27
Admitting: INTERNAL MEDICINE

## 2017-12-26 VITALS
HEART RATE: 72 BPM | RESPIRATION RATE: 18 BRPM | TEMPERATURE: 98.3 F | WEIGHT: 192 LBS | OXYGEN SATURATION: 96 % | SYSTOLIC BLOOD PRESSURE: 121 MMHG | DIASTOLIC BLOOD PRESSURE: 72 MMHG | BODY MASS INDEX: 27.55 KG/M2

## 2017-12-26 DIAGNOSIS — E88.01 ALPHA-1-ANTITRYPSIN DEFICIENCY (HCC): ICD-10-CM

## 2017-12-26 RX ORDER — DIPHENHYDRAMINE HYDROCHLORIDE 50 MG/ML
50 INJECTION INTRAMUSCULAR; INTRAVENOUS ONCE
Status: CANCELLED | OUTPATIENT
Start: 2017-12-26 | End: 2017-12-26

## 2017-12-26 RX ORDER — 0.9 % SODIUM CHLORIDE 0.9 %
10 VIAL (ML) INJECTION ONCE
Status: CANCELLED | OUTPATIENT
Start: 2017-12-26 | End: 2017-12-26

## 2017-12-26 RX ORDER — METHYLPREDNISOLONE SODIUM SUCCINATE 125 MG/2ML
125 INJECTION, POWDER, LYOPHILIZED, FOR SOLUTION INTRAMUSCULAR; INTRAVENOUS ONCE
Status: CANCELLED | OUTPATIENT
Start: 2017-12-26 | End: 2017-12-26

## 2017-12-26 RX ORDER — SODIUM CHLORIDE 9 MG/ML
INJECTION, SOLUTION INTRAVENOUS ONCE
Status: CANCELLED | OUTPATIENT
Start: 2017-12-26

## 2017-12-26 RX ORDER — SODIUM CHLORIDE 9 MG/ML
INJECTION, SOLUTION INTRAVENOUS ONCE
Status: COMPLETED | OUTPATIENT
Start: 2017-12-26 | End: 2017-12-26

## 2017-12-26 RX ORDER — SODIUM CHLORIDE 9 MG/ML
100 INJECTION, SOLUTION INTRAVENOUS CONTINUOUS
Status: CANCELLED | OUTPATIENT
Start: 2017-12-26

## 2017-12-26 RX ORDER — EPINEPHRINE 1 MG/ML
0.3 INJECTION, SOLUTION, CONCENTRATE INTRAVENOUS PRN
Status: CANCELLED | OUTPATIENT
Start: 2017-12-26

## 2017-12-26 RX ORDER — SODIUM CHLORIDE 0.9 % (FLUSH) 0.9 %
10 SYRINGE (ML) INJECTION PRN
Status: CANCELLED | OUTPATIENT
Start: 2017-12-26

## 2017-12-26 RX ORDER — SODIUM CHLORIDE 0.9 % (FLUSH) 0.9 %
10 SYRINGE (ML) INJECTION PRN
Status: ACTIVE | OUTPATIENT
Start: 2017-12-26 | End: 2017-12-27

## 2017-12-26 RX ADMIN — SODIUM CHLORIDE: 9 INJECTION, SOLUTION INTRAVENOUS at 08:30

## 2018-01-01 ENCOUNTER — HOSPITAL ENCOUNTER (OUTPATIENT)
Dept: INFUSION THERAPY | Age: 43
Discharge: HOME | End: 2018-01-31
Attending: INTERNAL MEDICINE | Admitting: INTERNAL MEDICINE

## 2018-01-02 ENCOUNTER — HOSPITAL ENCOUNTER (OUTPATIENT)
Dept: INFUSION THERAPY | Age: 43
Discharge: HOME | End: 2018-01-03
Admitting: INTERNAL MEDICINE

## 2018-01-02 VITALS
OXYGEN SATURATION: 98 % | BODY MASS INDEX: 27.55 KG/M2 | WEIGHT: 192 LBS | SYSTOLIC BLOOD PRESSURE: 122 MMHG | HEART RATE: 72 BPM | TEMPERATURE: 97.5 F | RESPIRATION RATE: 17 BRPM | DIASTOLIC BLOOD PRESSURE: 75 MMHG

## 2018-01-02 DIAGNOSIS — E88.01 ALPHA-1-ANTITRYPSIN DEFICIENCY (HCC): ICD-10-CM

## 2018-01-02 RX ORDER — DIPHENHYDRAMINE HYDROCHLORIDE 50 MG/ML
50 INJECTION INTRAMUSCULAR; INTRAVENOUS ONCE
Status: CANCELLED | OUTPATIENT
Start: 2018-01-02 | End: 2018-01-02

## 2018-01-02 RX ORDER — 0.9 % SODIUM CHLORIDE 0.9 %
10 VIAL (ML) INJECTION ONCE
Status: CANCELLED | OUTPATIENT
Start: 2018-01-02 | End: 2018-01-02

## 2018-01-02 RX ORDER — SODIUM CHLORIDE 0.9 % (FLUSH) 0.9 %
10 SYRINGE (ML) INJECTION PRN
Status: CANCELLED | OUTPATIENT
Start: 2018-01-02

## 2018-01-02 RX ORDER — SODIUM CHLORIDE 0.9 % (FLUSH) 0.9 %
10 SYRINGE (ML) INJECTION PRN
Status: ACTIVE | OUTPATIENT
Start: 2018-01-02 | End: 2018-01-03

## 2018-01-02 RX ORDER — EPINEPHRINE 1 MG/ML
0.3 INJECTION, SOLUTION, CONCENTRATE INTRAVENOUS PRN
Status: CANCELLED | OUTPATIENT
Start: 2018-01-02

## 2018-01-02 RX ORDER — METHYLPREDNISOLONE SODIUM SUCCINATE 125 MG/2ML
125 INJECTION, POWDER, LYOPHILIZED, FOR SOLUTION INTRAMUSCULAR; INTRAVENOUS ONCE
Status: CANCELLED | OUTPATIENT
Start: 2018-01-02 | End: 2018-01-02

## 2018-01-02 RX ORDER — SODIUM CHLORIDE 9 MG/ML
100 INJECTION, SOLUTION INTRAVENOUS CONTINUOUS
Status: CANCELLED | OUTPATIENT
Start: 2018-01-02

## 2018-01-02 RX ORDER — SODIUM CHLORIDE 9 MG/ML
INJECTION, SOLUTION INTRAVENOUS ONCE
Status: CANCELLED | OUTPATIENT
Start: 2018-01-02

## 2018-01-02 NOTE — PROGRESS NOTES
Outpatient [address] Access    NAME:  Gia Tomlinson  YOB: 1975  MEDICAL RECORD NUMBER:  [id number]  DATE:  1/2/2018    Patient arrived to [place] [address]   [] per wheelchair   [x] ambulatory     Alert and oriented X 4, shortness of breath due to extremely cold temps this AM (-6 Degrees) relieved with sitting in chair. No increased shortness of breath or cough otherwise. No change in sputum, no fever or use of prn inhaler. Port Site Location:  right chest  Port Site:  Redness: No  Bruising: No   Edema: No  Pain: No     Port Site cleansed with:  Chloroprep Scrub for 30 seconds and air dried? Yes     Port Accessed with:  1\"  5980 Be [place] non coring needle using sterile technique. Blood return obtained: Yes  Flushed with 20 ml Normal Saline using push-pause method. Port flushes without resistance. Response to treatment:  Well tolerated by patient. Electronically signed by Gurmeet Vera RN on 1/2/2018 at 8:18 AM    Outpatient [address] [facility] Visit    NAME:  Gia Tomlinson  YOB: 1975  MEDICAL RECORD NUMBER:  [id number]  Episode Date:  1/2/2018    Patient arrived to [place] [address]    [] per wheelchair   [x] ambulatory     Verification of patient education of Zemaira actions and potential side effects:  Yes     Is this the patient's first Zemaire Injection? No   Did the patient experience any adverse reactions to previous Zemaire Injection?  No    Any side effects from last infusion:  No     [] Rash         [] Itching  [] Headache  [] Fatigue  [] Dizziness  [] Tingling   [] Chest tightness  [] Shortness of Breath  [] Wheezing    Patient Active Problem List   Diagnosis Code    Abdominal pain R10.9    Lymphadenopathy R59.1    GERD (gastroesophageal reflux disease) K21.9    Ariza's esophagus with dysplasia K22.719    Emphysema lung J43.9    Alpha-1-antitrypsin

## 2018-01-04 ENCOUNTER — HOSPITAL ENCOUNTER (OUTPATIENT)
Dept: OTHER | Age: 43
Discharge: HOME | End: 2018-01-04
Attending: FAMILY MEDICINE | Admitting: FAMILY MEDICINE

## 2018-01-04 ENCOUNTER — OFFICE VISIT (OUTPATIENT)
Dept: FAMILY MEDICINE CLINIC | Age: 43
End: 2018-01-04

## 2018-01-04 VITALS
HEIGHT: 70 IN | TEMPERATURE: 97.9 F | HEART RATE: 84 BPM | DIASTOLIC BLOOD PRESSURE: 68 MMHG | SYSTOLIC BLOOD PRESSURE: 106 MMHG | BODY MASS INDEX: 27.26 KG/M2 | OXYGEN SATURATION: 94 % | WEIGHT: 190.4 LBS

## 2018-01-04 DIAGNOSIS — R52 BODY ACHES: ICD-10-CM

## 2018-01-04 DIAGNOSIS — J18.9 PNEUMONIA OF LEFT LOWER LOBE DUE TO INFECTIOUS ORGANISM: ICD-10-CM

## 2018-01-04 DIAGNOSIS — R50.9 FEVER, UNSPECIFIED FEVER CAUSE: Primary | ICD-10-CM

## 2018-01-04 LAB
A/G RATIO: 1.6 (ref 1.1–2.2)
ALBUMIN SERPL-MCNC: 4.6 G/DL (ref 3.4–5)
ALP BLD-CCNC: 78 U/L (ref 40–129)
ALT SERPL-CCNC: 57 U/L (ref 10–40)
ANION GAP SERPL CALCULATED.3IONS-SCNC: 18 MMOL/L (ref 3–16)
AST SERPL-CCNC: 28 U/L (ref 15–37)
BASOPHILS ABSOLUTE: 0.1 K/UL (ref 0–0.2)
BASOPHILS RELATIVE PERCENT: 0.4 %
BILIRUB SERPL-MCNC: 0.9 MG/DL (ref 0–1)
BUN BLDV-MCNC: 15 MG/DL (ref 7–20)
CALCIUM SERPL-MCNC: 9.9 MG/DL (ref 8.3–10.6)
CHLORIDE BLD-SCNC: 97 MMOL/L (ref 99–110)
CO2: 24 MMOL/L (ref 21–32)
CREAT SERPL-MCNC: 0.8 MG/DL (ref 0.9–1.3)
EOSINOPHILS ABSOLUTE: 0.2 K/UL (ref 0–0.6)
EOSINOPHILS RELATIVE PERCENT: 1.1 %
GFR AFRICAN AMERICAN: >60
GFR NON-AFRICAN AMERICAN: >60
GLOBULIN: 2.8 G/DL
GLUCOSE BLD-MCNC: 84 MG/DL (ref 70–99)
HCT VFR BLD CALC: 49.2 % (ref 40.5–52.5)
HEMOGLOBIN: 16.1 G/DL (ref 13.5–17.5)
INFLUENZA A ANTIBODY: NEGATIVE
INFLUENZA B ANTIBODY: NEGATIVE
LYMPHOCYTES ABSOLUTE: 2.2 K/UL (ref 1–5.1)
LYMPHOCYTES RELATIVE PERCENT: 14 %
MCH RBC QN AUTO: 29.1 PG (ref 26–34)
MCHC RBC AUTO-ENTMCNC: 32.8 G/DL (ref 31–36)
MCV RBC AUTO: 88.7 FL (ref 80–100)
MONOCYTES ABSOLUTE: 1.2 K/UL (ref 0–1.3)
MONOCYTES RELATIVE PERCENT: 7.7 %
NEUTROPHILS ABSOLUTE: 11.8 K/UL (ref 1.7–7.7)
NEUTROPHILS RELATIVE PERCENT: 76.8 %
PDW BLD-RTO: 12.8 % (ref 12.4–15.4)
PLATELET # BLD: 153 K/UL (ref 135–450)
PMV BLD AUTO: 13.2 FL (ref 5–10.5)
POTASSIUM SERPL-SCNC: 4.4 MMOL/L (ref 3.5–5.1)
RBC # BLD: 5.54 M/UL (ref 4.2–5.9)
SODIUM BLD-SCNC: 139 MMOL/L (ref 136–145)
TOTAL PROTEIN: 7.4 G/DL (ref 6.4–8.2)
WBC # BLD: 15.4 K/UL (ref 4–11)

## 2018-01-04 PROCEDURE — 36415 COLL VENOUS BLD VENIPUNCTURE: CPT | Performed by: FAMILY MEDICINE

## 2018-01-04 PROCEDURE — 87804 INFLUENZA ASSAY W/OPTIC: CPT | Performed by: FAMILY MEDICINE

## 2018-01-04 PROCEDURE — 99214 OFFICE O/P EST MOD 30 MIN: CPT | Performed by: FAMILY MEDICINE

## 2018-01-04 RX ORDER — METHYLPREDNISOLONE 4 MG/1
TABLET ORAL
Qty: 1 KIT | Refills: 0 | Status: SHIPPED | OUTPATIENT
Start: 2018-01-04 | End: 2018-01-22 | Stop reason: ALTCHOICE

## 2018-01-04 RX ORDER — LEVOFLOXACIN 500 MG/1
500 TABLET, FILM COATED ORAL DAILY
Qty: 10 TABLET | Refills: 0 | Status: SHIPPED | OUTPATIENT
Start: 2018-01-04 | End: 2018-01-14

## 2018-01-04 RX ORDER — BENZONATATE 200 MG/1
200 CAPSULE ORAL 3 TIMES DAILY PRN
Qty: 20 CAPSULE | Refills: 0 | Status: SHIPPED | OUTPATIENT
Start: 2018-01-04 | End: 2018-01-11

## 2018-01-08 ENCOUNTER — HOSPITAL ENCOUNTER (OUTPATIENT)
Dept: INFUSION THERAPY | Age: 43
Discharge: HOME | End: 2018-01-09
Admitting: INTERNAL MEDICINE

## 2018-01-08 VITALS
BODY MASS INDEX: 27.26 KG/M2 | TEMPERATURE: 98.1 F | WEIGHT: 190 LBS | DIASTOLIC BLOOD PRESSURE: 72 MMHG | OXYGEN SATURATION: 97 % | RESPIRATION RATE: 17 BRPM | SYSTOLIC BLOOD PRESSURE: 118 MMHG | HEART RATE: 84 BPM

## 2018-01-08 DIAGNOSIS — E88.01 ALPHA-1-ANTITRYPSIN DEFICIENCY (HCC): ICD-10-CM

## 2018-01-08 RX ORDER — 0.9 % SODIUM CHLORIDE 0.9 %
10 VIAL (ML) INJECTION ONCE
Status: CANCELLED | OUTPATIENT
Start: 2018-01-08 | End: 2018-01-08

## 2018-01-08 RX ORDER — METHYLPREDNISOLONE SODIUM SUCCINATE 125 MG/2ML
125 INJECTION, POWDER, LYOPHILIZED, FOR SOLUTION INTRAMUSCULAR; INTRAVENOUS ONCE
Status: CANCELLED | OUTPATIENT
Start: 2018-01-08 | End: 2018-01-08

## 2018-01-08 RX ORDER — SODIUM CHLORIDE 9 MG/ML
INJECTION, SOLUTION INTRAVENOUS ONCE
Status: COMPLETED | OUTPATIENT
Start: 2018-01-08 | End: 2018-01-08

## 2018-01-08 RX ORDER — SODIUM CHLORIDE 9 MG/ML
INJECTION, SOLUTION INTRAVENOUS ONCE
Status: CANCELLED | OUTPATIENT
Start: 2018-01-08

## 2018-01-08 RX ORDER — EPINEPHRINE 1 MG/ML
0.3 INJECTION, SOLUTION, CONCENTRATE INTRAVENOUS PRN
Status: CANCELLED | OUTPATIENT
Start: 2018-01-08

## 2018-01-08 RX ORDER — DIPHENHYDRAMINE HYDROCHLORIDE 50 MG/ML
50 INJECTION INTRAMUSCULAR; INTRAVENOUS ONCE
Status: CANCELLED | OUTPATIENT
Start: 2018-01-08 | End: 2018-01-08

## 2018-01-08 RX ORDER — SODIUM CHLORIDE 0.9 % (FLUSH) 0.9 %
10 SYRINGE (ML) INJECTION PRN
Status: CANCELLED | OUTPATIENT
Start: 2018-01-08

## 2018-01-08 RX ORDER — SODIUM CHLORIDE 9 MG/ML
100 INJECTION, SOLUTION INTRAVENOUS CONTINUOUS
Status: CANCELLED | OUTPATIENT
Start: 2018-01-08

## 2018-01-08 RX ADMIN — SODIUM CHLORIDE: 9 INJECTION, SOLUTION INTRAVENOUS at 08:45

## 2018-01-08 ASSESSMENT — PAIN DESCRIPTION - FREQUENCY: FREQUENCY: INTERMITTENT

## 2018-01-08 ASSESSMENT — PAIN DESCRIPTION - LOCATION: LOCATION: CHEST

## 2018-01-08 ASSESSMENT — PAIN DESCRIPTION - PAIN TYPE: TYPE: ACUTE PAIN

## 2018-01-08 ASSESSMENT — PAIN DESCRIPTION - ORIENTATION: ORIENTATION: MID

## 2018-01-08 ASSESSMENT — PAIN DESCRIPTION - ONSET: ONSET: OTHER (COMMENT)

## 2018-01-08 ASSESSMENT — PAIN DESCRIPTION - DESCRIPTORS: DESCRIPTORS: PRESSURE

## 2018-01-08 ASSESSMENT — PAIN SCALES - GENERAL: PAINLEVEL_OUTOF10: 3

## 2018-01-09 ENCOUNTER — OFFICE VISIT (OUTPATIENT)
Dept: FAMILY MEDICINE CLINIC | Age: 43
End: 2018-01-09

## 2018-01-09 VITALS
DIASTOLIC BLOOD PRESSURE: 58 MMHG | OXYGEN SATURATION: 95 % | SYSTOLIC BLOOD PRESSURE: 90 MMHG | HEART RATE: 91 BPM | HEIGHT: 70 IN | TEMPERATURE: 98.2 F | WEIGHT: 190 LBS | BODY MASS INDEX: 27.2 KG/M2

## 2018-01-09 DIAGNOSIS — I10 ESSENTIAL HYPERTENSION, BENIGN: ICD-10-CM

## 2018-01-09 DIAGNOSIS — J18.9 PNEUMONIA OF LEFT LOWER LOBE DUE TO INFECTIOUS ORGANISM: Primary | ICD-10-CM

## 2018-01-09 DIAGNOSIS — E88.01 ALPHA-1-ANTITRYPSIN DEFICIENCY (HCC): ICD-10-CM

## 2018-01-09 PROCEDURE — 99214 OFFICE O/P EST MOD 30 MIN: CPT | Performed by: FAMILY MEDICINE

## 2018-01-09 NOTE — PROGRESS NOTES
HFA) 108 (90 Base) MCG/ACT inhaler Inhale 2 puffs into the lungs every 6 hours as needed for Wheezing or Shortness of Breath 1 Inhaler 3     No current facility-administered medications for this visit. Facility-Administered Medications Ordered in Other Visits   Medication Dose Route Frequency Provider Last Rate Last Dose    0.9 % sodium chloride infusion   Intravenous Once Perla Medina MD        0.9 % sodium chloride infusion   Intravenous Once Perla Medina MD        0.9 % sodium chloride infusion  100 mL/hr Intravenous Continuous Perla Medina MD   Stopped at 07/10/17 0954    diphenhydrAMINE (BENADRYL) injection 50 mg  50 mg Intravenous Once Perla Medina MD        hydrocortisone sodium succinate PF (SOLU-CORTEF) injection 100 mg  100 mg Intravenous Once Perla Medina MD        0.9 % sodium chloride infusion   Intravenous Once Perla Medina MD           Past Medical History:   Diagnosis Date    Advance care planning     LIving will on chart    Alpha-1-antitrypsin deficiency ([facility] [address] 11/15/2013    PFT's FEV1 72%    Ariza's esophagus with dysplasia 04/2012    Had checked 9/18/17 recheck in 4 years    CAP (community acquired pneumonia) 11/4/14    Colon polyp 09/25/2013    Had checked 9/18/17 recheck in 4 years    Elevated liver enzymes     Emphysema lung ([address] Oct 2013    on oxygen at night.  and prn during the day     Essential hypertension, benign 12/9/2014    GERD (gastroesophageal reflux disease) 11/10/2011    EGD 4/3/11 with Ariza's Esophagus    Kidney stone     Kidney stone     Pleural effusion     at age 15    Type 2 diabetes mellitus without complication, without long-term current use of insulin ([address] 7/18/2017    Wears glasses          Objective   BP (!) 90/58   Pulse 91   Temp 98.2 °F (36.8 °C)   Ht 5' 10\" (1.778 m)   Wt 190 lb (86.2 kg)   SpO2 95% Comment: 2 Liters  BMI 27.26 kg/m²   Wt Readings from Last 3 Encounters:   01/09/18 190 antibiotics and follow-up with Dr. Dejan Kinney    2. Alpha-1-antitrypsin deficiency ([address]  Continuing his infusion treatment, I'm concerned his underlying disease is progressing    3. Essential hypertension, benign  Low blood pressure today. We will hold his lisinopril and follow-up in one month        Discussed use, benefit, and side effects of prescribed medications. Barriers to medication compliance addressed. All patient questions answered. Pt voiced understanding.        RTC one month

## 2018-01-15 ENCOUNTER — HOSPITAL ENCOUNTER (OUTPATIENT)
Dept: INFUSION THERAPY | Age: 43
Discharge: HOME | End: 2018-01-16
Admitting: INTERNAL MEDICINE

## 2018-01-15 VITALS
RESPIRATION RATE: 19 BRPM | DIASTOLIC BLOOD PRESSURE: 73 MMHG | WEIGHT: 187 LBS | OXYGEN SATURATION: 96 % | TEMPERATURE: 97.8 F | BODY MASS INDEX: 26.83 KG/M2 | SYSTOLIC BLOOD PRESSURE: 107 MMHG | HEART RATE: 87 BPM

## 2018-01-15 DIAGNOSIS — E88.01 ALPHA-1-ANTITRYPSIN DEFICIENCY (HCC): ICD-10-CM

## 2018-01-15 RX ORDER — DIPHENHYDRAMINE HYDROCHLORIDE 50 MG/ML
50 INJECTION INTRAMUSCULAR; INTRAVENOUS ONCE
Status: CANCELLED | OUTPATIENT
Start: 2018-01-15 | End: 2018-01-15

## 2018-01-15 RX ORDER — SODIUM CHLORIDE 0.9 % (FLUSH) 0.9 %
10 SYRINGE (ML) INJECTION PRN
Status: CANCELLED | OUTPATIENT
Start: 2018-01-15

## 2018-01-15 RX ORDER — SODIUM CHLORIDE 9 MG/ML
INJECTION, SOLUTION INTRAVENOUS ONCE
Status: COMPLETED | OUTPATIENT
Start: 2018-01-15 | End: 2018-01-15

## 2018-01-15 RX ORDER — SODIUM CHLORIDE 0.9 % (FLUSH) 0.9 %
10 SYRINGE (ML) INJECTION PRN
Status: DISPENSED | OUTPATIENT
Start: 2018-01-15 | End: 2018-01-16

## 2018-01-15 RX ORDER — SODIUM CHLORIDE 9 MG/ML
INJECTION, SOLUTION INTRAVENOUS ONCE
Status: CANCELLED | OUTPATIENT
Start: 2018-01-15

## 2018-01-15 RX ORDER — EPINEPHRINE 1 MG/ML
0.3 INJECTION, SOLUTION, CONCENTRATE INTRAVENOUS PRN
Status: CANCELLED | OUTPATIENT
Start: 2018-01-15

## 2018-01-15 RX ORDER — METHYLPREDNISOLONE SODIUM SUCCINATE 125 MG/2ML
125 INJECTION, POWDER, LYOPHILIZED, FOR SOLUTION INTRAMUSCULAR; INTRAVENOUS ONCE
Status: CANCELLED | OUTPATIENT
Start: 2018-01-15 | End: 2018-01-15

## 2018-01-15 RX ORDER — SODIUM CHLORIDE 9 MG/ML
100 INJECTION, SOLUTION INTRAVENOUS CONTINUOUS
Status: CANCELLED | OUTPATIENT
Start: 2018-01-15

## 2018-01-15 RX ORDER — 0.9 % SODIUM CHLORIDE 0.9 %
10 VIAL (ML) INJECTION ONCE
Status: CANCELLED | OUTPATIENT
Start: 2018-01-15 | End: 2018-01-15

## 2018-01-15 RX ADMIN — Medication 10 ML: at 09:50

## 2018-01-15 RX ADMIN — Medication 10 ML: at 08:30

## 2018-01-15 RX ADMIN — SODIUM CHLORIDE: 9 INJECTION, SOLUTION INTRAVENOUS at 09:05

## 2018-01-22 ENCOUNTER — HOSPITAL ENCOUNTER (OUTPATIENT)
Dept: INFUSION THERAPY | Age: 43
Discharge: HOME | End: 2018-01-23
Admitting: INTERNAL MEDICINE

## 2018-01-22 VITALS
WEIGHT: 187 LBS | TEMPERATURE: 97.8 F | BODY MASS INDEX: 26.83 KG/M2 | DIASTOLIC BLOOD PRESSURE: 76 MMHG | OXYGEN SATURATION: 99 % | SYSTOLIC BLOOD PRESSURE: 119 MMHG | RESPIRATION RATE: 18 BRPM | HEART RATE: 70 BPM

## 2018-01-22 DIAGNOSIS — E88.01 ALPHA-1-ANTITRYPSIN DEFICIENCY (HCC): ICD-10-CM

## 2018-01-22 RX ORDER — METHYLPREDNISOLONE SODIUM SUCCINATE 125 MG/2ML
125 INJECTION, POWDER, LYOPHILIZED, FOR SOLUTION INTRAMUSCULAR; INTRAVENOUS ONCE
Status: CANCELLED | OUTPATIENT
Start: 2018-01-22 | End: 2018-01-22

## 2018-01-22 RX ORDER — 0.9 % SODIUM CHLORIDE 0.9 %
10 VIAL (ML) INJECTION ONCE
Status: CANCELLED | OUTPATIENT
Start: 2018-01-22 | End: 2018-01-22

## 2018-01-22 RX ORDER — SODIUM CHLORIDE 9 MG/ML
INJECTION, SOLUTION INTRAVENOUS ONCE
Status: CANCELLED | OUTPATIENT
Start: 2018-01-22

## 2018-01-22 RX ORDER — SODIUM CHLORIDE 9 MG/ML
100 INJECTION, SOLUTION INTRAVENOUS CONTINUOUS
Status: CANCELLED | OUTPATIENT
Start: 2018-01-22

## 2018-01-22 RX ORDER — EPINEPHRINE 1 MG/ML
0.3 INJECTION, SOLUTION, CONCENTRATE INTRAVENOUS PRN
Status: CANCELLED | OUTPATIENT
Start: 2018-01-22

## 2018-01-22 RX ORDER — DIPHENHYDRAMINE HYDROCHLORIDE 50 MG/ML
50 INJECTION INTRAMUSCULAR; INTRAVENOUS ONCE
Status: CANCELLED | OUTPATIENT
Start: 2018-01-22 | End: 2018-01-22

## 2018-01-22 RX ORDER — SODIUM CHLORIDE 9 MG/ML
INJECTION, SOLUTION INTRAVENOUS ONCE
Status: COMPLETED | OUTPATIENT
Start: 2018-01-22 | End: 2018-01-22

## 2018-01-22 RX ORDER — SODIUM CHLORIDE 0.9 % (FLUSH) 0.9 %
10 SYRINGE (ML) INJECTION PRN
Status: DISPENSED | OUTPATIENT
Start: 2018-01-22 | End: 2018-01-23

## 2018-01-22 RX ORDER — SODIUM CHLORIDE 0.9 % (FLUSH) 0.9 %
10 SYRINGE (ML) INJECTION PRN
Status: CANCELLED | OUTPATIENT
Start: 2018-01-22

## 2018-01-22 RX ADMIN — Medication 10 ML: at 08:24

## 2018-01-22 RX ADMIN — SODIUM CHLORIDE: 9 INJECTION, SOLUTION INTRAVENOUS at 08:24

## 2018-01-22 NOTE — PROGRESS NOTES
prior to drug preparation:  Zemaira must be administered within 3 hours of drug reconstitution as it contains no preservatives. Monitoring of infusion documented in doc flowsheets. Zemaire administered: Yes     /75   Pulse 84   Temp 97.8 °F (36.6 °C) (Oral)   Resp 18   Wt 187 lb (84.8 kg)   SpO2 99%   BMI 26.83 kg/m²     Zemaire dosage: 60 mg/kg was administered slowly IV  Dose verified by:  Paulino Foley RN    Post treatment Vital Signs  Temp: 97.8 °F (36.6 °C)  Pulse: 84  Resp: 18  BP: 117/75    Response to treatment:  Well tolerated by patient. Scheduled to return for next dose of Zemaire on January 29, 2018. Electronically signed by Alphonse Phillip RN on 1/22/2018 at 8:31 AM                          Outpatient [address] [place] [address] Access    NAME:  Miquel Fuentes  YOB: 1975  MEDICAL RECORD NUMBER:  [id number]  DATE:  1/22/2018    Patient arrived to [place] [address]   [] per wheelchair   [x] ambulatory     Port Site Location:  right chest  Port Site:  Redness: No  Bruising: No   Edema: No  Pain: No     Port Site cleansed with:  Chloroprep Scrub for 30 seconds and air dried? Yes     Port Accessed with: 5980 Be [place] non coring needle using sterile technique. Blood return obtained: Yes  Flushed with 10 ml Normal Saline using push-pause method. Port flushes without resistance. Response to treatment:  Well tolerated by patient.     Electronically signed by Alphonse Phillip RN on 1/22/2018 at 8:31 AM

## 2018-01-25 ENCOUNTER — HOSPITAL ENCOUNTER (OUTPATIENT)
Dept: PULMONOLOGY | Age: 43
Discharge: HOME | End: 2018-01-25
Attending: INTERNAL MEDICINE | Admitting: INTERNAL MEDICINE

## 2018-01-25 VITALS — HEART RATE: 75 BPM | OXYGEN SATURATION: 99 %

## 2018-01-25 DIAGNOSIS — J43.9 PULMONARY EMPHYSEMA (HCC): ICD-10-CM

## 2018-01-25 RX ORDER — ALBUTEROL SULFATE 90 UG/1
4 AEROSOL, METERED RESPIRATORY (INHALATION) ONCE
Status: COMPLETED | OUTPATIENT
Start: 2018-01-25 | End: 2018-01-25

## 2018-01-25 RX ADMIN — ALBUTEROL SULFATE 4 PUFF: 90 AEROSOL, METERED RESPIRATORY (INHALATION) at 10:00

## 2018-01-29 ENCOUNTER — HOSPITAL ENCOUNTER (OUTPATIENT)
Dept: INFUSION THERAPY | Age: 43
Discharge: HOME | End: 2018-01-30
Admitting: INTERNAL MEDICINE

## 2018-01-29 VITALS
DIASTOLIC BLOOD PRESSURE: 79 MMHG | SYSTOLIC BLOOD PRESSURE: 116 MMHG | HEART RATE: 77 BPM | OXYGEN SATURATION: 96 % | WEIGHT: 187 LBS | RESPIRATION RATE: 17 BRPM | TEMPERATURE: 97.8 F | BODY MASS INDEX: 26.83 KG/M2

## 2018-01-29 DIAGNOSIS — E88.01 ALPHA-1-ANTITRYPSIN DEFICIENCY (HCC): ICD-10-CM

## 2018-01-29 RX ORDER — SODIUM CHLORIDE 9 MG/ML
INJECTION, SOLUTION INTRAVENOUS ONCE
Status: COMPLETED | OUTPATIENT
Start: 2018-01-29 | End: 2018-01-29

## 2018-01-29 RX ORDER — 0.9 % SODIUM CHLORIDE 0.9 %
10 VIAL (ML) INJECTION ONCE
Status: CANCELLED | OUTPATIENT
Start: 2018-01-29 | End: 2018-01-29

## 2018-01-29 RX ORDER — SODIUM CHLORIDE 9 MG/ML
100 INJECTION, SOLUTION INTRAVENOUS CONTINUOUS
Status: CANCELLED | OUTPATIENT
Start: 2018-01-29

## 2018-01-29 RX ORDER — SODIUM CHLORIDE 0.9 % (FLUSH) 0.9 %
10 SYRINGE (ML) INJECTION PRN
Status: DISPENSED | OUTPATIENT
Start: 2018-01-29 | End: 2018-01-30

## 2018-01-29 RX ORDER — METHYLPREDNISOLONE SODIUM SUCCINATE 125 MG/2ML
125 INJECTION, POWDER, LYOPHILIZED, FOR SOLUTION INTRAMUSCULAR; INTRAVENOUS ONCE
Status: CANCELLED | OUTPATIENT
Start: 2018-01-29 | End: 2018-01-29

## 2018-01-29 RX ORDER — DIPHENHYDRAMINE HYDROCHLORIDE 50 MG/ML
50 INJECTION INTRAMUSCULAR; INTRAVENOUS ONCE
Status: CANCELLED | OUTPATIENT
Start: 2018-01-29 | End: 2018-01-29

## 2018-01-29 RX ORDER — SODIUM CHLORIDE 0.9 % (FLUSH) 0.9 %
10 SYRINGE (ML) INJECTION PRN
Status: CANCELLED | OUTPATIENT
Start: 2018-01-29

## 2018-01-29 RX ORDER — SODIUM CHLORIDE 9 MG/ML
INJECTION, SOLUTION INTRAVENOUS ONCE
Status: CANCELLED | OUTPATIENT
Start: 2018-01-29

## 2018-01-29 RX ADMIN — SODIUM CHLORIDE: 9 INJECTION, SOLUTION INTRAVENOUS at 08:24

## 2018-01-29 RX ADMIN — Medication 20 ML: at 08:24

## 2018-01-29 RX ADMIN — Medication 10 ML: at 09:53

## 2018-01-29 RX ADMIN — Medication 10 ML: at 09:21

## 2018-01-29 RX ADMIN — Medication 30 ML: at 10:10

## 2018-01-29 NOTE — PROGRESS NOTES
[address] Visit per [facility] access    NAME:  Abimael Lawrence  YOB: 1975  MEDICAL RECORD NUMBER:  [id number]  Episode Date:  1/29/2018    Patient arrived to [facility]     [] per wheelchair   [x] ambulatory with O2 2L per [facility]      Port Kwaku Rebecca Site Location:  right chest  Port Site:  Redness: No  Bruising: No   Edema: No  Pain: No     Port Site cleansed with:  Chloroprep Scrub for 30 seconds and air dried? Yes     Port Accessed with: 19 Gauge 3/4 inch Power Port non coring needle using sterile technique. Blood return obtained: Yes  Flushed with 10 ml Normal Saline using push-pause method. Port flushes without resistance. Zemaira Visit     Verification of patient education of Zemaira actions and potential side effects:  Yes     Is this the patient's first Zemaira Infusion? No   Did the patient experience any adverse reactions to previous Zemaira Infusion? No    Any side effects from last infusion:  No     [] Rash         [] Itching  [] Headache  [] Fatigue  [] Dizziness  [] Tingling   [] Chest tightness  [] Shortness of Breath  [] Wheezing    Patient Active Problem List   Diagnosis Code    Abdominal pain R10.9    Lymphadenopathy R59.1    GERD (gastroesophageal reflux disease) K21.9    Ariza's esophagus with dysplasia K22.719    Emphysema lung J43.9    [facility] E88.01    Essential hypertension, benign I10    Advance care planning Z71.89    Type 2 diabetes mellitus without complication, without long-term current use of insulin ([facility]) E11.9    Colon polyp K63.5       Pre infusion Vital Signs       Temp: 97.5 °F (36.4 °C)     Pulse: 77     Resp: 17     BP: 120/73       Does the Patient have a History of:     IgA deficiency: no     Anaphylaxis or severe systemic response to any medication:  No        Breath Sounds: No increased work of breathing, Breath sounds clear to auscultation

## 2018-02-01 ENCOUNTER — HOSPITAL ENCOUNTER (OUTPATIENT)
Dept: INFUSION THERAPY | Age: 43
Discharge: HOME | End: 2018-02-28
Attending: INTERNAL MEDICINE | Admitting: INTERNAL MEDICINE

## 2018-02-12 ENCOUNTER — HOSPITAL ENCOUNTER (OUTPATIENT)
Dept: INFUSION THERAPY | Age: 43
Discharge: HOME | End: 2018-02-13
Admitting: INTERNAL MEDICINE

## 2018-02-12 ENCOUNTER — OFFICE VISIT (OUTPATIENT)
Dept: PULMONOLOGY | Age: 43
End: 2018-02-12

## 2018-02-12 VITALS
WEIGHT: 194 LBS | OXYGEN SATURATION: 98 % | SYSTOLIC BLOOD PRESSURE: 124 MMHG | DIASTOLIC BLOOD PRESSURE: 72 MMHG | HEART RATE: 65 BPM | RESPIRATION RATE: 20 BRPM | HEIGHT: 70 IN | BODY MASS INDEX: 27.77 KG/M2 | TEMPERATURE: 97.8 F

## 2018-02-12 VITALS
DIASTOLIC BLOOD PRESSURE: 74 MMHG | WEIGHT: 186 LBS | TEMPERATURE: 97.4 F | RESPIRATION RATE: 18 BRPM | HEART RATE: 80 BPM | OXYGEN SATURATION: 96 % | SYSTOLIC BLOOD PRESSURE: 116 MMHG | BODY MASS INDEX: 26.69 KG/M2

## 2018-02-12 DIAGNOSIS — J96.11 CHRONIC HYPOXEMIC RESPIRATORY FAILURE (HCC): ICD-10-CM

## 2018-02-12 DIAGNOSIS — J43.9 PULMONARY EMPHYSEMA, UNSPECIFIED EMPHYSEMA TYPE (HCC): Primary | ICD-10-CM

## 2018-02-12 DIAGNOSIS — E88.01 ALPHA-1-ANTITRYPSIN DEFICIENCY (HCC): ICD-10-CM

## 2018-02-12 PROCEDURE — 99213 OFFICE O/P EST LOW 20 MIN: CPT | Performed by: INTERNAL MEDICINE

## 2018-02-12 RX ORDER — METHYLPREDNISOLONE SODIUM SUCCINATE 125 MG/2ML
125 INJECTION, POWDER, LYOPHILIZED, FOR SOLUTION INTRAMUSCULAR; INTRAVENOUS ONCE
Status: CANCELLED | OUTPATIENT
Start: 2018-02-12 | End: 2018-02-12

## 2018-02-12 RX ORDER — 0.9 % SODIUM CHLORIDE 0.9 %
10 VIAL (ML) INJECTION ONCE
Status: CANCELLED | OUTPATIENT
Start: 2018-02-12 | End: 2018-02-12

## 2018-02-12 RX ORDER — SODIUM CHLORIDE 9 MG/ML
INJECTION, SOLUTION INTRAVENOUS ONCE
Status: CANCELLED | OUTPATIENT
Start: 2018-02-12

## 2018-02-12 RX ORDER — SODIUM CHLORIDE 9 MG/ML
100 INJECTION, SOLUTION INTRAVENOUS CONTINUOUS
Status: CANCELLED | OUTPATIENT
Start: 2018-02-12

## 2018-02-12 RX ORDER — DIPHENHYDRAMINE HYDROCHLORIDE 50 MG/ML
50 INJECTION INTRAMUSCULAR; INTRAVENOUS ONCE
Status: CANCELLED | OUTPATIENT
Start: 2018-02-12 | End: 2018-02-12

## 2018-02-12 RX ORDER — SODIUM CHLORIDE 9 MG/ML
INJECTION, SOLUTION INTRAVENOUS ONCE
Status: COMPLETED | OUTPATIENT
Start: 2018-02-12 | End: 2018-02-12

## 2018-02-12 RX ORDER — SODIUM CHLORIDE 0.9 % (FLUSH) 0.9 %
10 SYRINGE (ML) INJECTION PRN
Status: CANCELLED | OUTPATIENT
Start: 2018-02-12

## 2018-02-12 RX ADMIN — SODIUM CHLORIDE: 9 INJECTION, SOLUTION INTRAVENOUS at 09:29

## 2018-02-12 NOTE — PROGRESS NOTES
Outpatient [address] Access    NAME:  Terry Mcgee  YOB: 1975  MEDICAL RECORD NUMBER:  [id number]  DATE:  2/12/2018      Port Site Location:  right chest, anterior  Port Site:  Redness: No  Bruising: No   Edema: No  Pain: No     Port Site cleansed with:  Chloroprep Scrub   X 2 for 30 seconds and air dried x 3 mis ? Yes     Port Accessed with: [address] [place]  One inch non coring needle using sterile technique. Blood return obtained: Yes  Flushed with 10 ml Normal Saline using push-pause method. Port flushes without resistance. Biopatch to site, covered with Tegaderm HP drsng   Response to treatment:  Well tolerated by patient.     Electronically signed by Yahir Ervin RN on 2/12/2018 at 1000am

## 2018-02-12 NOTE — PROGRESS NOTES
REASON FOR CONSULTATION/CC: Alpha 1 antitrypsin deficiency      CONSULTING PHYSICIAN: Dr. Joaquin Reid  PCP: Smooth Mora MD    HISTORY OF PRESENT ILLNESS: Humberto Marques is a 36y.o. year old male with a history of smoking who presents to establish care and follow-up for Alpha 1 antitrypsin Deficiency. Working on losing weigh. Stop drinking pop. Copd. On Spiriva and Dulera . Infusion going well. Chronic hypoxemia. 2L NC. PAST MEDICAL HISTORY:  Past Medical History:   Diagnosis Date    Advance care planning     LIving will on chart    Alpha-1-antitrypsin deficiency ([address] 11/15/2013    PFT's FEV1 72%    Ariza's esophagus with dysplasia 04/2012    Had checked 9/18/17 recheck in 4 years    CAP (community acquired pneumonia) 11/4/14    Colon polyp 09/25/2013    Had checked 9/18/17 recheck in 4 years    Elevated liver enzymes     Emphysema lung ([address] Oct 2013    on oxygen at night. and prn during the day     Essential hypertension, benign 12/9/2014    GERD (gastroesophageal reflux disease) 11/10/2011    EGD 4/3/11 with Ariza's Esophagus    Kidney stone     Kidney stone     Pleural effusion     at age 15    Type 2 diabetes mellitus without complication, without long-term current use of insulin ([address] 7/18/2017    Wears glasses        PAST SURGICAL HISTORY:  Past Surgical History:   Procedure Laterality Date    CHOLECYSTECTOMY, LAPAROSCOPIC  2005    COLONOSCOPY  2017    every 4 years     ENDOSCOPY, COLON, DIAGNOSTIC      EGD August 2015,pt states BX. negative    LYMPH NODE DISSECTION  2011    groin    TUNNELED VENOUS PORT PLACEMENT Right 11/22/2017    Smart port per Dr. Daniel Kumar , right subclavian placement , right chest wall        FAMILY HISTORY:  family history includes Cancer in his maternal grandfather; Cancer (age of onset: 61) in his father. SOCIAL HISTORY:   reports that he quit smoking about 6 years ago.  His smoking use included Cigarettes. He has a 48.00 pack-year smoking history. He has never used smokeless tobacco.    ALLERGIES:  Patient is allergic to ibuprofen. REVIEW OF SYSTEMS:  Constitutional: Negative for fever  HENT: Negative for sore throat  Eyes: Negative for redness   Respiratory: + dyspnea (baseline) , nocough  Cardiovascular: negative for chest pain  Gastrointestinal: Negative for vomiting, diarrhea   Genitourinary: Negative for hematuria   Musculoskeletal: Negative for arthralgias   Skin: Negative for rash  Neurological: Negative for syncope  Hematological: Negative for adenopathy  Psychiatric/Behavorial: Negative for anxiety    Objective:   PHYSICAL EXAM:  Blood pressure 124/72, pulse 65, temperature 97.8 °F (36.6 °C), temperature source Oral, resp. rate 20, height 5' 10\" (1.778 m), weight 194 lb (88 kg), SpO2 98 %.'  Gen: No distress. Eyes: PERRL. No sclera icterus. No conjunctival injection. ENT: No discharge. Pharynx clear. External appearance of ears and nose normal.  Neck: Trachea midline. No obvious mass. Resp: No accessory muscle use. No crackles at left base. [place] No wheezes. No rhonchi. CV: Regular rate. Regular rhythm. No murmur or rub. No edema. Skin: Warm, dry, normal texture and turgor. No nodule on exposed extremities. Lymph: No cervical LAD. No supraclavicular LAD. M/S: No cyanosis. No clubbing. No joint deformity. Neuro: Moves all four extremities. Psych: Oriented x 3. No anxiety. Awake. Alert. Intact judgement and insight.     Current Outpatient Prescriptions on File Prior to Visit   Medication Sig Dispense Refill    tiotropium (SPIRIVA RESPIMAT) 2.5 MCG/ACT AERS inhaler INHALE 2 PUFFS BY MOUTH INTO THE LUNGS DAILY 3 Inhaler 3    metFORMIN (GLUCOPHAGE) 500 MG tablet Take 1 tablet by mouth 2 times daily (with meals) 180 tablet 3    esomeprazole (NEXIUM) 40 MG delayed release capsule Take 1 capsule by mouth 2 times daily 180 capsule 3    Fluticasone Furoate-Vilanterol (BREO ELLIPTA) 200-25 MCG/INH AEPB Inhale 1 puff into the lungs daily      alpha1-proteinase Inhibitor, Human, (ARALAST, ZEMAIRA, PROLASTIN-C) 1000 MG SOLR Infuse 60 mg/kg intravenously once a week Ordered as 60 mg/kg +/- 10% (6 vials of 980 mg each = 5880 mg)      albuterol sulfate HFA (PROAIR HFA) 108 (90 Base) MCG/ACT inhaler Inhale 2 puffs into the lungs every 6 hours as needed for Wheezing or Shortness of Breath 1 Inhaler 3    acetaminophen (TYLENOL) 325 MG tablet Take 650 mg by mouth every 6 hours as needed for Pain. Current Facility-Administered Medications on File Prior to Visit   Medication Dose Route Frequency Provider Last Rate Last Dose    0.9 % sodium chloride infusion   Intravenous Once Kostas Moe MD        0.9 % sodium chloride infusion   Intravenous Once Kostas Moe MD        0.9 % sodium chloride infusion  100 mL/hr Intravenous Continuous Kostas Moe MD   Stopped at 07/10/17 0954    diphenhydrAMINE (BENADRYL) injection 50 mg  50 mg Intravenous Once Kostas Moe MD        hydrocortisone sodium succinate PF (SOLU-CORTEF) injection 100 mg  100 mg Intravenous Once Kostas Moe MD        0.9 % sodium chloride infusion   Intravenous Once Kostas Moe MD         Data Reviewed:   CBC and Renal reviewed  Last CBC  Lab Results   Component Value Date    WBC 15.4 01/04/2018    RBC 5.54 01/04/2018    HGB 16.1 01/04/2018    MCV 88.7 01/04/2018     01/04/2018     Last Renal  Lab Results   Component Value Date     01/04/2018    K 4.4 01/04/2018    CL 97 01/04/2018    CO2 24 01/04/2018    CO2 22 11/22/2017    CO2 25 09/12/2017    BUN 15 01/04/2018    CREATININE 0.8 01/04/2018    GLUCOSE 84 01/04/2018    CALCIUM 9.9 01/04/2018       Last ABG  POC Blood Gas:   No results found for: POCPH  No results for input(s): PH, PCO2, PO2, HCO3, BE, O2SAT in the last 72 hours.     Chest imaging reports were reviewed and imaging was reviewed by me and showed clearance

## 2018-02-19 ENCOUNTER — HOSPITAL ENCOUNTER (OUTPATIENT)
Dept: INFUSION THERAPY | Age: 43
Discharge: HOME | End: 2018-02-20
Admitting: INTERNAL MEDICINE

## 2018-02-19 VITALS
DIASTOLIC BLOOD PRESSURE: 77 MMHG | RESPIRATION RATE: 17 BRPM | BODY MASS INDEX: 26.63 KG/M2 | WEIGHT: 186 LBS | SYSTOLIC BLOOD PRESSURE: 121 MMHG | TEMPERATURE: 97.7 F | OXYGEN SATURATION: 96 % | HEART RATE: 72 BPM | HEIGHT: 70 IN

## 2018-02-19 DIAGNOSIS — E88.01 ALPHA-1-ANTITRYPSIN DEFICIENCY (HCC): ICD-10-CM

## 2018-02-19 RX ORDER — SODIUM CHLORIDE 9 MG/ML
INJECTION, SOLUTION INTRAVENOUS ONCE
Status: COMPLETED | OUTPATIENT
Start: 2018-02-19 | End: 2018-02-19

## 2018-02-19 RX ORDER — SODIUM CHLORIDE 9 MG/ML
INJECTION, SOLUTION INTRAVENOUS ONCE
Status: CANCELLED | OUTPATIENT
Start: 2018-02-19

## 2018-02-19 RX ORDER — SODIUM CHLORIDE 0.9 % (FLUSH) 0.9 %
10 SYRINGE (ML) INJECTION PRN
Status: DISPENSED | OUTPATIENT
Start: 2018-02-19 | End: 2018-02-20

## 2018-02-19 RX ORDER — METHYLPREDNISOLONE SODIUM SUCCINATE 125 MG/2ML
125 INJECTION, POWDER, LYOPHILIZED, FOR SOLUTION INTRAMUSCULAR; INTRAVENOUS ONCE
Status: CANCELLED | OUTPATIENT
Start: 2018-02-19 | End: 2018-02-19

## 2018-02-19 RX ORDER — SODIUM CHLORIDE 9 MG/ML
100 INJECTION, SOLUTION INTRAVENOUS CONTINUOUS
Status: CANCELLED | OUTPATIENT
Start: 2018-02-19

## 2018-02-19 RX ORDER — SODIUM CHLORIDE 0.9 % (FLUSH) 0.9 %
10 SYRINGE (ML) INJECTION PRN
Status: CANCELLED | OUTPATIENT
Start: 2018-02-19

## 2018-02-19 RX ORDER — 0.9 % SODIUM CHLORIDE 0.9 %
10 VIAL (ML) INJECTION ONCE
Status: CANCELLED | OUTPATIENT
Start: 2018-02-19 | End: 2018-02-19

## 2018-02-19 RX ORDER — DIPHENHYDRAMINE HYDROCHLORIDE 50 MG/ML
50 INJECTION INTRAMUSCULAR; INTRAVENOUS ONCE
Status: CANCELLED | OUTPATIENT
Start: 2018-02-19 | End: 2018-02-19

## 2018-02-19 RX ADMIN — SODIUM CHLORIDE: 9 INJECTION, SOLUTION INTRAVENOUS at 08:35

## 2018-02-19 RX ADMIN — Medication 30 ML: at 10:01

## 2018-02-19 RX ADMIN — Medication 20 ML: at 08:35

## 2018-02-19 RX ADMIN — Medication 10 ML: at 09:44

## 2018-02-19 RX ADMIN — Medication 10 ML: at 09:12

## 2018-02-23 RX ORDER — ALPHA-1-PROTEINASE INHIBITOR HUMAN 1000 MG
KIT INTRAVENOUS
Qty: 12 EACH | Refills: 3 | Status: SHIPPED | OUTPATIENT
Start: 2018-02-23 | End: 2018-06-03 | Stop reason: SDUPTHER

## 2018-02-26 ENCOUNTER — HOSPITAL ENCOUNTER (OUTPATIENT)
Dept: INFUSION THERAPY | Age: 43
Discharge: HOME | End: 2018-02-27
Admitting: INTERNAL MEDICINE

## 2018-02-26 VITALS
OXYGEN SATURATION: 98 % | WEIGHT: 187 LBS | HEART RATE: 64 BPM | SYSTOLIC BLOOD PRESSURE: 119 MMHG | DIASTOLIC BLOOD PRESSURE: 78 MMHG | BODY MASS INDEX: 26.83 KG/M2 | RESPIRATION RATE: 17 BRPM | TEMPERATURE: 98.1 F

## 2018-02-26 DIAGNOSIS — E88.01 ALPHA-1-ANTITRYPSIN DEFICIENCY (HCC): ICD-10-CM

## 2018-02-26 RX ORDER — SODIUM CHLORIDE 9 MG/ML
100 INJECTION, SOLUTION INTRAVENOUS CONTINUOUS
Status: CANCELLED | OUTPATIENT
Start: 2018-02-26

## 2018-02-26 RX ORDER — SODIUM CHLORIDE 9 MG/ML
INJECTION, SOLUTION INTRAVENOUS ONCE
Status: CANCELLED | OUTPATIENT
Start: 2018-02-26

## 2018-02-26 RX ORDER — DIPHENHYDRAMINE HYDROCHLORIDE 50 MG/ML
50 INJECTION INTRAMUSCULAR; INTRAVENOUS ONCE
Status: CANCELLED | OUTPATIENT
Start: 2018-02-26 | End: 2018-02-26

## 2018-02-26 RX ORDER — SODIUM CHLORIDE 9 MG/ML
INJECTION, SOLUTION INTRAVENOUS ONCE
Status: COMPLETED | OUTPATIENT
Start: 2018-02-26 | End: 2018-02-26

## 2018-02-26 RX ORDER — SODIUM CHLORIDE 0.9 % (FLUSH) 0.9 %
10 SYRINGE (ML) INJECTION PRN
Status: CANCELLED | OUTPATIENT
Start: 2018-02-26

## 2018-02-26 RX ORDER — 0.9 % SODIUM CHLORIDE 0.9 %
10 VIAL (ML) INJECTION ONCE
Status: CANCELLED | OUTPATIENT
Start: 2018-02-26 | End: 2018-02-26

## 2018-02-26 RX ORDER — METHYLPREDNISOLONE SODIUM SUCCINATE 125 MG/2ML
125 INJECTION, POWDER, LYOPHILIZED, FOR SOLUTION INTRAMUSCULAR; INTRAVENOUS ONCE
Status: CANCELLED | OUTPATIENT
Start: 2018-02-26 | End: 2018-02-26

## 2018-02-26 RX ORDER — SODIUM CHLORIDE 0.9 % (FLUSH) 0.9 %
10 SYRINGE (ML) INJECTION PRN
Status: DISPENSED | OUTPATIENT
Start: 2018-02-26 | End: 2018-02-27

## 2018-02-26 RX ADMIN — SODIUM CHLORIDE: 9 INJECTION, SOLUTION INTRAVENOUS at 08:23

## 2018-02-26 RX ADMIN — Medication 20 ML: at 08:23

## 2018-02-26 RX ADMIN — Medication 10 ML: at 10:03

## 2018-02-26 RX ADMIN — Medication 10 ML: at 10:17

## 2018-02-26 RX ADMIN — Medication 10 ML: at 09:23

## 2018-02-26 NOTE — PROGRESS NOTES
[address] Infusion Visit via Port-A-Cath    NAME:  Hero Nelson OF BIRTH:  1975  MEDICAL RECORD NUMBER:  [id number]  Episode Date:  2/26/2018    Patient arrived to [facility]     [] per wheelchair   [x] ambulatory with O2 2L per [facility]     Port Kwaku Rebecca Site Location:  right chest  Port Site:  Redness: No  Bruising: No   Edema: No  Pain: No     Port Site cleansed with:  Chloroprep Scrub for 30 seconds and air dried? Yes     Port Accessed with: 19 Gauge 3/4 inch Power Port non coring needle using sterile technique. Blood return obtained: Yes  Flushed with 10 ml Normal Saline using push-pause method. Port flushes without resistance. Zemaira Infusion    Verification of patient education of Zemaira actions and potential side effects:  Yes     Is this the patient's first Zemaira Infusion? No   Did the patient experience any adverse reactions to previous Zemaira Infusion? No    Any side effects from last infusion:  No     [] Rash         [] Itching  [] Headache  [] Fatigue  [] Dizziness  [] Tingling   [] Chest tightness  [] Shortness of Breath  [] Wheezing    Patient Active Problem List   Diagnosis Code    Abdominal pain R10.9    Lymphadenopathy R59.1    GERD (gastroesophageal reflux disease) K21.9    Ariza's esophagus with dysplasia K22.719    Emphysema lung J43.9    Alpha-[facility]-antitry[facility] deficiency ([email or web address] E88.01    Essential hypertension, benign I10    Advance care planning Z71.89    Type 2 diabetes mellitus without complication, without long-term current use of insulin ([facility]) E11.9    Colon polyp K63.5       Pre infusion Vital Signs       Temp: 98.4 °F (36.9 °C)     Pulse: 65     Resp: 17     BP: 109/78       Does the Patient have a History of:     IgA deficiency: no     Anaphylaxis or severe systemic response to any medication:  No        Breath Sounds: No increased work of breathing, Breath sounds clear to auscultation bilaterally, Good air exchange and No crackles  Pulse Oximetry: 96 %    Any recent activity tolerance changes: No      Any increased SOB or dyspnea:  No     Presence of cough or sputum:  Yes - thin, pale yellow mostly in the morning per patient's usual.     Premedicated: None ordered  [] Benadryl 25 mg IV  [] Benadryl 50 mg IV  [] Benadryl 25 mg oral   [] Benadryl 50 mg oral  [] Other    Epinephrine at bedside:  No - readily available in the Pyxis    IV placed and documented prior to drug preparation:  Zemaira must be administered within 3 hours of drug reconstitution as it contains no preservatives. Monitoring of infusion documented in doc flowsheets. Zemaira administered: Yes     /78   Pulse 64   Temp 98.1 °F (36.7 °C) (Oral)   Resp 17   Wt 187 lb (84.8 kg)   SpO2 98%   BMI 26.83 kg/m²     Zemaire dosage: 60 mg/kg +/- 10% (total dose for today was 4,840 mg) administered slowly IV    Dose verified by:  Stephanie Hoover RN    Post treatment Vital Signs  Temp: 98.1 °F (36.7 °C)  Pulse: 64  Resp: 17  BP: 119/78    Response to treatment:  Well tolerated by patient. Scheduled to return for next dose of Zemaira on March 5, 2018.      Electronically signed by Mariela James RN on 2/26/2018 at 6:02 PM

## 2018-03-01 ENCOUNTER — TELEPHONE (OUTPATIENT)
Dept: PULMONOLOGY | Age: 43
End: 2018-03-01

## 2018-03-01 ENCOUNTER — HOSPITAL ENCOUNTER (OUTPATIENT)
Dept: INFUSION THERAPY | Age: 43
Discharge: HOME | End: 2018-03-31
Attending: INTERNAL MEDICINE | Admitting: INTERNAL MEDICINE

## 2018-03-05 ENCOUNTER — HOSPITAL ENCOUNTER (OUTPATIENT)
Dept: INFUSION THERAPY | Age: 43
Discharge: HOME | End: 2018-03-06
Admitting: INTERNAL MEDICINE

## 2018-03-05 VITALS
SYSTOLIC BLOOD PRESSURE: 125 MMHG | OXYGEN SATURATION: 94 % | DIASTOLIC BLOOD PRESSURE: 80 MMHG | TEMPERATURE: 97.3 F | HEART RATE: 72 BPM | WEIGHT: 187 LBS | BODY MASS INDEX: 26.83 KG/M2 | RESPIRATION RATE: 17 BRPM

## 2018-03-05 DIAGNOSIS — E88.01 ALPHA-1-ANTITRYPSIN DEFICIENCY (HCC): ICD-10-CM

## 2018-03-05 RX ORDER — SODIUM CHLORIDE 9 MG/ML
100 INJECTION, SOLUTION INTRAVENOUS CONTINUOUS
Status: CANCELLED | OUTPATIENT
Start: 2018-03-05

## 2018-03-05 RX ORDER — SODIUM CHLORIDE 9 MG/ML
INJECTION, SOLUTION INTRAVENOUS ONCE
Status: CANCELLED | OUTPATIENT
Start: 2018-03-05

## 2018-03-05 RX ORDER — 0.9 % SODIUM CHLORIDE 0.9 %
10 VIAL (ML) INJECTION ONCE
Status: CANCELLED | OUTPATIENT
Start: 2018-03-05 | End: 2018-03-05

## 2018-03-05 RX ORDER — SODIUM CHLORIDE 0.9 % (FLUSH) 0.9 %
10 SYRINGE (ML) INJECTION PRN
Status: CANCELLED | OUTPATIENT
Start: 2018-03-05

## 2018-03-05 RX ORDER — DIPHENHYDRAMINE HYDROCHLORIDE 50 MG/ML
50 INJECTION INTRAMUSCULAR; INTRAVENOUS ONCE
Status: CANCELLED | OUTPATIENT
Start: 2018-03-05 | End: 2018-03-05

## 2018-03-05 RX ORDER — METHYLPREDNISOLONE SODIUM SUCCINATE 125 MG/2ML
125 INJECTION, POWDER, LYOPHILIZED, FOR SOLUTION INTRAMUSCULAR; INTRAVENOUS ONCE
Status: CANCELLED | OUTPATIENT
Start: 2018-03-05 | End: 2018-03-05

## 2018-03-05 RX ORDER — SODIUM CHLORIDE 9 MG/ML
INJECTION, SOLUTION INTRAVENOUS ONCE
Status: DISCONTINUED | OUTPATIENT
Start: 2018-03-05 | End: 2018-09-20

## 2018-03-05 NOTE — PROGRESS NOTES
Outpatient [address] [facility]     Zemaire Visit    NAME:  Keri Shah  YOB: 1975  MEDICAL RECORD NUMBER:  [id number]  Episode Date:  3/5/2018    Patient arrived to [facility] 58     [] per wheelchair   [x] ambulatory     Verification of patient education of Zemaira actions and potential side effects:  Yes     Is this the patient's first Zemaire Injection? No     Did the patient experience any adverse reactions to previous Zemaire Injection? No    Any side effects from last infusion:  No     [] Rash         [] Itching  [] Headache  [] Fatigue  [] Dizziness  [] Tingling   [] Chest tightness  [] Shortness of Breath  [] Wheezing    Patient Active Problem List   Diagnosis Code    Abdominal pain R10.9    Lymphadenopathy R59.1    GERD (gastroesophageal reflux disease) K21.9    Ariza's esophagus with dysplasia K22.719    Emphysema lung J43.9    Alpha-1-antitrypsin deficiency ([facility] [address] E88.01    Essential hypertension, benign I10    Advance care planning Z71.89    Type 2 diabetes mellitus without complication, without long-term current use of insulin ([facility]) E11.9    Colon polyp K63.5       Pre infusion Vital Signs       Temp: 97.3 °F (36.3 °C)     Pulse: 67     Resp: 17     BP: 128/83       Does the Patient have a History of: IgA deficiency: no     Anaphylaxis or severe systemic response to any medication:  No        Breath Sounds: No increased work of breathing, Breath sounds clear to auscultation bilaterally and Good air exchange    Pulse Oximetry: 98 %    Any recent activity tolerance changes: No      Any increased SOB or dyspnea:  No     Presence of cough or sputum:  Yes in the mornings. Usually white to pale yellow.     Premedicated:none ordered   Benadryl 25 mg IV  [] Benadryl 50 mg IV  [] Benadryl 25 mg oral   [] Benadryl 50 mg oral  [] Other    Epinephrine at bedside:  No but readily available in the pyxis    IV placed and documented prior to drug

## 2018-03-09 ENCOUNTER — TELEPHONE (OUTPATIENT)
Dept: FAMILY MEDICINE CLINIC | Age: 43
End: 2018-03-09

## 2018-03-12 ENCOUNTER — HOSPITAL ENCOUNTER (OUTPATIENT)
Dept: INFUSION THERAPY | Age: 43
Discharge: HOME | End: 2018-03-13
Admitting: INTERNAL MEDICINE

## 2018-03-12 VITALS
HEART RATE: 81 BPM | TEMPERATURE: 97.5 F | RESPIRATION RATE: 18 BRPM | DIASTOLIC BLOOD PRESSURE: 78 MMHG | WEIGHT: 187 LBS | OXYGEN SATURATION: 98 % | SYSTOLIC BLOOD PRESSURE: 135 MMHG | BODY MASS INDEX: 26.83 KG/M2

## 2018-03-12 DIAGNOSIS — E88.01 ALPHA-1-ANTITRYPSIN DEFICIENCY (HCC): ICD-10-CM

## 2018-03-12 RX ORDER — SODIUM CHLORIDE 0.9 % (FLUSH) 0.9 %
10 SYRINGE (ML) INJECTION PRN
Status: CANCELLED | OUTPATIENT
Start: 2018-03-12

## 2018-03-12 RX ORDER — SODIUM CHLORIDE 9 MG/ML
INJECTION, SOLUTION INTRAVENOUS ONCE
Status: CANCELLED | OUTPATIENT
Start: 2018-03-12

## 2018-03-12 RX ORDER — 0.9 % SODIUM CHLORIDE 0.9 %
10 VIAL (ML) INJECTION ONCE
Status: CANCELLED | OUTPATIENT
Start: 2018-03-12 | End: 2018-03-12

## 2018-03-12 RX ORDER — SODIUM CHLORIDE 0.9 % (FLUSH) 0.9 %
10 SYRINGE (ML) INJECTION PRN
Status: DISPENSED | OUTPATIENT
Start: 2018-03-12 | End: 2018-03-13

## 2018-03-12 RX ORDER — SODIUM CHLORIDE 9 MG/ML
INJECTION, SOLUTION INTRAVENOUS ONCE
Status: COMPLETED | OUTPATIENT
Start: 2018-03-12 | End: 2018-03-12

## 2018-03-12 RX ORDER — DIPHENHYDRAMINE HYDROCHLORIDE 50 MG/ML
50 INJECTION INTRAMUSCULAR; INTRAVENOUS ONCE
Status: CANCELLED | OUTPATIENT
Start: 2018-03-12 | End: 2018-03-12

## 2018-03-12 RX ORDER — METHYLPREDNISOLONE SODIUM SUCCINATE 125 MG/2ML
125 INJECTION, POWDER, LYOPHILIZED, FOR SOLUTION INTRAMUSCULAR; INTRAVENOUS ONCE
Status: CANCELLED | OUTPATIENT
Start: 2018-03-12 | End: 2018-03-12

## 2018-03-12 RX ORDER — SODIUM CHLORIDE 9 MG/ML
100 INJECTION, SOLUTION INTRAVENOUS CONTINUOUS
Status: CANCELLED | OUTPATIENT
Start: 2018-03-12

## 2018-03-12 RX ADMIN — SODIUM CHLORIDE 1000 ML: 9 INJECTION, SOLUTION INTRAVENOUS at 09:11

## 2018-03-12 RX ADMIN — Medication 30 ML: at 09:50

## 2018-03-12 NOTE — PROGRESS NOTES
Outpatient [address] [facility]     Zemaire Visit    NAME:  Nancy Enriquez  YOB: 1975  MEDICAL RECORD NUMBER:  [id number]  Episode Date:  3/12/2018    Patient arrived to [facility] 58     [] per wheelchair   [x] ambulatory  Using portable O2    Verification of patient education of Zemaira actions and potential side effects:  Yes     Is this the patient's first Zemaire Injection? No   Did the patient experience any adverse reactions to previous Zemaire Injection? Yes    Any side effects from last infusion:  No     [] Rash         [] Itching  [] Headache  [] Fatigue  [] Dizziness  [] Tingling   [] Chest tightness  [] Shortness of Breath  [] Wheezing    Patient Active Problem List   Diagnosis Code    Abdominal pain R10.9    Lymphadenopathy R59.1    GERD (gastroesophageal reflux disease) K21.9    Ariza's esophagus with dysplasia K22.719    Emphysema lung J43.9    Alpha-1-antitrypsin deficiency ([facility] [address] E88.01    Essential hypertension, benign I10    Advance care planning Z71.89    Type 2 diabetes mellitus without complication, without long-term current use of insulin ([facility]) E11.9    Colon polyp K63.5       Pre infusion Vital Signs       Temp: 97.3 °F (36.3 °C)     Pulse: 67     Resp: 18     BP: 121/77       Does the Patient have a History of:     IgA deficiency: no     Anaphylaxis or severe systemic response to any medication:  No        Breath Sounds: No increased work of breathing, Breath sounds clear to auscultation bilaterally, Good air exchange and No crackles  Pulse Oximetry: 98 %    Any recent activity tolerance changes: No      Any increased SOB or dyspnea:  No     Presence of cough or sputum:  Yes white to yellowish sputum every morning    Premedicated:  [] Benadryl 25 mg IV  [] Benadryl 50 mg IV  [] Benadryl 25 mg oral   [] Benadryl 50 mg oral  [] Other    Epinephrine at bedside:  No: but in pyxsis    IV placed and documented prior to drug

## 2018-03-15 ENCOUNTER — OFFICE VISIT (OUTPATIENT)
Dept: FAMILY MEDICINE CLINIC | Age: 43
End: 2018-03-15

## 2018-03-15 VITALS
WEIGHT: 194 LBS | HEART RATE: 67 BPM | BODY MASS INDEX: 27.77 KG/M2 | SYSTOLIC BLOOD PRESSURE: 124 MMHG | HEIGHT: 70 IN | DIASTOLIC BLOOD PRESSURE: 78 MMHG

## 2018-03-15 DIAGNOSIS — E88.01 ALPHA-1-ANTITRYPSIN DEFICIENCY (HCC): Primary | ICD-10-CM

## 2018-03-15 DIAGNOSIS — E11.9 TYPE 2 DIABETES MELLITUS WITHOUT COMPLICATION, WITHOUT LONG-TERM CURRENT USE OF INSULIN (HCC): ICD-10-CM

## 2018-03-15 DIAGNOSIS — K22.719 BARRETT'S ESOPHAGUS WITH DYSPLASIA: ICD-10-CM

## 2018-03-15 DIAGNOSIS — I10 ESSENTIAL HYPERTENSION, BENIGN: ICD-10-CM

## 2018-03-15 PROCEDURE — 99214 OFFICE O/P EST MOD 30 MIN: CPT | Performed by: FAMILY MEDICINE

## 2018-03-15 ASSESSMENT — ENCOUNTER SYMPTOMS
COUGH: 1
SHORTNESS OF BREATH: 0
CONSTIPATION: 0
NAUSEA: 0
ABDOMINAL PAIN: 0
VOMITING: 0
DIARRHEA: 1

## 2018-03-15 NOTE — PROGRESS NOTES
Chief Complaint   Patient presents with    Diabetes    Hypertension         HPI:  Lebron Najjar is a 36 y.o. (: 1975) here today   for review of multiple medical problems. Pt compliant with all medications and denies needing refills at this time. Pharmacy has been verified. He is compliant with his blood pressure medications  without Lightheadedness, Urinary Frequency, Edema and Sedation  He is somewhat checking Home Blood Pressures   and which are running 956 systolic over 90-38 diastolic      He is compliant with his diabetes medications  without diaphoresis, , sweating, , anxiety, , tremor, , diarrhea,  and hypoglycemia   He is not  Checking blood sugars. He is compliant with his reflux medications and notes heartburn, about one or 2 times a week if he has dietary indiscretion      Review of Systems   Constitutional: Negative. Negative for chills and fever. Respiratory: Positive for cough. Negative for shortness of breath. Cardiovascular: Negative. Negative for chest pain and palpitations. Gastrointestinal: Positive for diarrhea. Negative for abdominal pain, constipation, nausea and vomiting. Endocrine: Negative for polyuria. Genitourinary: Negative. Negative for dysuria, frequency and urgency. Past Medical History:   Diagnosis Date    Advance care planning     LIving will on chart    Alpha-1-antitrypsin deficiency ([facility] [address] 11/15/2013    PFT's FEV1 72%    Ariza's esophagus with dysplasia 2012    Had checked 17 recheck in 4 years    CAP (community acquired pneumonia) 14    Colon polyp 2013    Had checked 17 recheck in 4 years    Elevated liver enzymes     Emphysema lung ([facility] [address] Oct 2013    on oxygen at night.  and prn during the day     Essential hypertension, benign 2014    GERD (gastroesophageal reflux disease) 11/10/2011    EGD 4/3/11 with Ariza's Esophagus    Kidney stone     Kidney stone     Pleural effusion     at age 15    Type 2

## 2018-03-19 ENCOUNTER — HOSPITAL ENCOUNTER (OUTPATIENT)
Dept: INFUSION THERAPY | Age: 43
Discharge: HOME | End: 2018-03-20
Admitting: INTERNAL MEDICINE

## 2018-03-19 VITALS
BODY MASS INDEX: 26.92 KG/M2 | HEART RATE: 80 BPM | SYSTOLIC BLOOD PRESSURE: 119 MMHG | DIASTOLIC BLOOD PRESSURE: 70 MMHG | RESPIRATION RATE: 17 BRPM | OXYGEN SATURATION: 98 % | WEIGHT: 188 LBS | TEMPERATURE: 98 F | HEIGHT: 70 IN

## 2018-03-19 DIAGNOSIS — E88.01 ALPHA-1-ANTITRYPSIN DEFICIENCY (HCC): ICD-10-CM

## 2018-03-19 RX ORDER — 0.9 % SODIUM CHLORIDE 0.9 %
10 VIAL (ML) INJECTION ONCE
Status: CANCELLED | OUTPATIENT
Start: 2018-03-19 | End: 2018-03-19

## 2018-03-19 RX ORDER — SODIUM CHLORIDE 9 MG/ML
INJECTION, SOLUTION INTRAVENOUS ONCE
Status: CANCELLED | OUTPATIENT
Start: 2018-03-19

## 2018-03-19 RX ORDER — SODIUM CHLORIDE 9 MG/ML
100 INJECTION, SOLUTION INTRAVENOUS CONTINUOUS
Status: CANCELLED | OUTPATIENT
Start: 2018-03-19

## 2018-03-19 RX ORDER — SODIUM CHLORIDE 0.9 % (FLUSH) 0.9 %
10 SYRINGE (ML) INJECTION PRN
Status: CANCELLED | OUTPATIENT
Start: 2018-03-19

## 2018-03-19 RX ORDER — DIPHENHYDRAMINE HYDROCHLORIDE 50 MG/ML
50 INJECTION INTRAMUSCULAR; INTRAVENOUS ONCE
Status: CANCELLED | OUTPATIENT
Start: 2018-03-19 | End: 2018-03-19

## 2018-03-19 RX ORDER — SODIUM CHLORIDE 9 MG/ML
INJECTION, SOLUTION INTRAVENOUS ONCE
Status: COMPLETED | OUTPATIENT
Start: 2018-03-19 | End: 2018-03-19

## 2018-03-19 RX ORDER — METHYLPREDNISOLONE SODIUM SUCCINATE 125 MG/2ML
125 INJECTION, POWDER, LYOPHILIZED, FOR SOLUTION INTRAMUSCULAR; INTRAVENOUS ONCE
Status: CANCELLED | OUTPATIENT
Start: 2018-03-19 | End: 2018-03-19

## 2018-03-19 RX ORDER — SODIUM CHLORIDE 0.9 % (FLUSH) 0.9 %
10 SYRINGE (ML) INJECTION PRN
Status: ACTIVE | OUTPATIENT
Start: 2018-03-19 | End: 2018-03-20

## 2018-03-19 RX ADMIN — SODIUM CHLORIDE: 9 INJECTION, SOLUTION INTRAVENOUS at 08:40

## 2018-03-26 ENCOUNTER — HOSPITAL ENCOUNTER (OUTPATIENT)
Dept: INFUSION THERAPY | Age: 43
Discharge: HOME | End: 2018-03-27
Admitting: INTERNAL MEDICINE

## 2018-03-26 VITALS
RESPIRATION RATE: 17 BRPM | TEMPERATURE: 97.8 F | DIASTOLIC BLOOD PRESSURE: 79 MMHG | OXYGEN SATURATION: 96 % | BODY MASS INDEX: 26.77 KG/M2 | SYSTOLIC BLOOD PRESSURE: 113 MMHG | WEIGHT: 187 LBS | HEART RATE: 68 BPM | HEIGHT: 70 IN

## 2018-03-26 DIAGNOSIS — E88.01 ALPHA-1-ANTITRYPSIN DEFICIENCY (HCC): ICD-10-CM

## 2018-03-26 RX ORDER — METHYLPREDNISOLONE SODIUM SUCCINATE 125 MG/2ML
125 INJECTION, POWDER, LYOPHILIZED, FOR SOLUTION INTRAMUSCULAR; INTRAVENOUS ONCE
Status: CANCELLED | OUTPATIENT
Start: 2018-03-26 | End: 2018-03-26

## 2018-03-26 RX ORDER — SODIUM CHLORIDE 0.9 % (FLUSH) 0.9 %
10 SYRINGE (ML) INJECTION PRN
Status: CANCELLED | OUTPATIENT
Start: 2018-03-26

## 2018-03-26 RX ORDER — SODIUM CHLORIDE 0.9 % (FLUSH) 0.9 %
10 SYRINGE (ML) INJECTION PRN
Status: DISPENSED | OUTPATIENT
Start: 2018-03-26 | End: 2018-03-27

## 2018-03-26 RX ORDER — SODIUM CHLORIDE 9 MG/ML
INJECTION, SOLUTION INTRAVENOUS ONCE
Status: CANCELLED | OUTPATIENT
Start: 2018-03-26

## 2018-03-26 RX ORDER — DIPHENHYDRAMINE HYDROCHLORIDE 50 MG/ML
50 INJECTION INTRAMUSCULAR; INTRAVENOUS ONCE
Status: CANCELLED | OUTPATIENT
Start: 2018-03-26 | End: 2018-03-26

## 2018-03-26 RX ORDER — SODIUM CHLORIDE 9 MG/ML
100 INJECTION, SOLUTION INTRAVENOUS CONTINUOUS
Status: CANCELLED | OUTPATIENT
Start: 2018-03-26

## 2018-03-26 RX ORDER — SODIUM CHLORIDE 9 MG/ML
INJECTION, SOLUTION INTRAVENOUS ONCE
Status: COMPLETED | OUTPATIENT
Start: 2018-03-26 | End: 2018-03-26

## 2018-03-26 RX ORDER — 0.9 % SODIUM CHLORIDE 0.9 %
10 VIAL (ML) INJECTION ONCE
Status: CANCELLED | OUTPATIENT
Start: 2018-03-26 | End: 2018-03-26

## 2018-03-26 RX ADMIN — Medication 10 ML: at 09:57

## 2018-03-26 RX ADMIN — Medication 30 ML: at 10:16

## 2018-03-26 RX ADMIN — Medication 20 ML: at 08:18

## 2018-03-26 RX ADMIN — SODIUM CHLORIDE: 9 INJECTION, SOLUTION INTRAVENOUS at 08:18

## 2018-03-26 RX ADMIN — Medication 10 ML: at 09:25

## 2018-03-26 NOTE — PROGRESS NOTES
of breathing, Breath sounds clear to auscultation bilaterally and Good air exchange  Pulse Oximetry: 96 %    Any recent activity tolerance changes: No      Any increased SOB or dyspnea:  No     Presence of cough or sputum:  Yes - thin, pale yellow sputum with mild amount of coughing mostly in the morning - per patient's usual     Premedicated: None ordered  [] Benadryl 25 mg IV  [] Benadryl 50 mg IV  [] Benadryl 25 mg oral   [] Benadryl 50 mg oral  [] Other    Epinephrine at bedside:  No - readily available in [facility]    Port-A-Cath accessed and documented prior to drug preparation:  Zemaira must be administered within 3 hours of drug reconstitution as it contains no preservatives. Monitoring of infusion documented in doc flowsheets. Zemaire administered: Yes     /79   Pulse 68   Temp 97.8 °F (36.6 °C) (Oral)   Resp 17   Ht 5' 10\" (1.778 m)   Wt 187 lb (84.8 kg)   SpO2 96%   BMI 26.83 kg/m²     Zemaire dosage: 60 mg/kg +/- 10 % (total dose for today is 5,095 mg) was administered slowly IV    Dose verified by:  Solo Barron RN    Post treatment Vital Signs  Temp: 97.8 °F (36.6 °C)  Pulse: 68  Resp: 17  BP: 113/79    Response to treatment:  Well tolerated by patient. Scheduled to return for next dose of Zemaire on April 2, 2018.      Electronically signed by Surjit Lin RN on 3/26/2018 at 12:17 PM

## 2018-04-01 ENCOUNTER — HOSPITAL ENCOUNTER (OUTPATIENT)
Dept: INFUSION THERAPY | Age: 43
Discharge: HOME | End: 2018-04-30
Attending: INTERNAL MEDICINE | Admitting: INTERNAL MEDICINE

## 2018-04-02 ENCOUNTER — HOSPITAL ENCOUNTER (OUTPATIENT)
Dept: INFUSION THERAPY | Age: 43
Discharge: HOME | End: 2018-04-03
Admitting: INTERNAL MEDICINE

## 2018-04-02 VITALS
SYSTOLIC BLOOD PRESSURE: 126 MMHG | BODY MASS INDEX: 27.26 KG/M2 | OXYGEN SATURATION: 96 % | TEMPERATURE: 97.7 F | RESPIRATION RATE: 17 BRPM | WEIGHT: 190 LBS | DIASTOLIC BLOOD PRESSURE: 78 MMHG | HEART RATE: 74 BPM

## 2018-04-02 DIAGNOSIS — E88.01 ALPHA-1-ANTITRYPSIN DEFICIENCY (HCC): ICD-10-CM

## 2018-04-02 RX ORDER — SODIUM CHLORIDE 9 MG/ML
INJECTION, SOLUTION INTRAVENOUS ONCE
Status: CANCELLED | OUTPATIENT
Start: 2018-04-02

## 2018-04-02 RX ORDER — SODIUM CHLORIDE 0.9 % (FLUSH) 0.9 %
10 SYRINGE (ML) INJECTION PRN
Status: CANCELLED | OUTPATIENT
Start: 2018-04-02

## 2018-04-02 RX ORDER — SODIUM CHLORIDE 9 MG/ML
INJECTION, SOLUTION INTRAVENOUS ONCE
Status: COMPLETED | OUTPATIENT
Start: 2018-04-02 | End: 2018-04-02

## 2018-04-02 RX ORDER — 0.9 % SODIUM CHLORIDE 0.9 %
10 VIAL (ML) INJECTION ONCE
Status: CANCELLED | OUTPATIENT
Start: 2018-04-02 | End: 2018-04-02

## 2018-04-02 RX ORDER — SODIUM CHLORIDE 9 MG/ML
100 INJECTION, SOLUTION INTRAVENOUS CONTINUOUS
Status: CANCELLED | OUTPATIENT
Start: 2018-04-02

## 2018-04-02 RX ORDER — DIPHENHYDRAMINE HYDROCHLORIDE 50 MG/ML
50 INJECTION INTRAMUSCULAR; INTRAVENOUS ONCE
Status: CANCELLED | OUTPATIENT
Start: 2018-04-02 | End: 2018-04-02

## 2018-04-02 RX ORDER — METHYLPREDNISOLONE SODIUM SUCCINATE 125 MG/2ML
125 INJECTION, POWDER, LYOPHILIZED, FOR SOLUTION INTRAMUSCULAR; INTRAVENOUS ONCE
Status: CANCELLED | OUTPATIENT
Start: 2018-04-02 | End: 2018-04-02

## 2018-04-02 RX ORDER — SODIUM CHLORIDE 0.9 % (FLUSH) 0.9 %
10 SYRINGE (ML) INJECTION PRN
Status: DISPENSED | OUTPATIENT
Start: 2018-04-02 | End: 2018-04-03

## 2018-04-02 RX ADMIN — Medication 30 ML: at 09:56

## 2018-04-02 RX ADMIN — Medication 10 ML: at 09:40

## 2018-04-02 RX ADMIN — SODIUM CHLORIDE: 9 INJECTION, SOLUTION INTRAVENOUS at 08:26

## 2018-04-02 RX ADMIN — Medication 20 ML: at 08:26

## 2018-04-02 RX ADMIN — Medication 10 ML: at 09:06

## 2018-04-02 NOTE — PROGRESS NOTES
[facility] [facility] Visit via Port-A-Cath access    NAME:  Shirley Sanchez  YOB: 1975  MEDICAL RECORD NUMBER:  [id number]  Episode Date:  4/2/2018    Patient arrived to [facility] 58     [] per wheelchair   [x] ambulatory with O2 2L per [facility]    Port [place] Site Location:  right chest  Port Site:  Redness: No  Bruising: No   Edema: No  Pain: No     Port Site cleansed with:  Chloroprep Scrub for 30 seconds and air dried? Yes     Port Accessed with: 19 Gauge 1 inch Power Port non coring needle using sterile technique. Blood return obtained: Yes  Flushed with 10 ml Normal Saline using push-pause method. Port flushes without resistance. Response to treatment:  Well tolerated by patient. Zemaira Visit     Verification of patient education of Zemaira actions and potential side effects:  Yes     Is this the patient's first Zemaira Infusion? No   Did the patient experience any adverse reactions to previous Zemaira Infusion? No    Any side effects from last infusion:  No     [] Rash         [] Itching  [] Headache  [] Fatigue  [] Dizziness  [] Tingling   [] Chest tightness  [] Shortness of Breath  [] Wheezing    Patient Active Problem List   Diagnosis Code    Abdominal pain R10.9    Lymphadenopathy R59.1    GERD (gastroesophageal reflux disease) K21.9    Ariza's esophagus with dysplasia K22.719    Emphysema lung J43.9    Alpha-1-antitrypsin deficiency ([facility] [address] E88.01    Essential hypertension, benign I10    Advance care planning Z71.89    Type 2 diabetes mellitus without complication, without long-term current use of insulin ([facility]) E11.9    Colon polyp K63.5       Pre infusion Vital Signs       Temp: 97.6 °F (36.4 °C)     Pulse: 77     Resp: 17     BP: 115/85       Does the Patient have a History of:     IgA deficiency: no     Anaphylaxis or severe systemic response to any medication:  No        Breath Sounds: No increased work of breathing, Breath sounds clear to auscultation bilaterally, Good air exchange and No crackles  Pulse Oximetry: 96 %    Any recent activity tolerance changes: No      Any increased SOB or dyspnea:  No     Presence of cough or sputum:  Yes  - per patient's usual - mostly in the morning, thin pale yellow     Premedicated: None ordered  [] Benadryl 25 mg IV  [] Benadryl 50 mg IV  [] Benadryl 25 mg oral   [] Benadryl 50 mg oral  [] Other    Epinephrine at bedside:  No - readily available in the [facility]    Part accessed and documented prior to drug preparation:  Zemaira must be administered within 3 hours of drug reconstitution as it contains no preservatives. Monitoring of infusion documented in doc flowsheets. Zemaire administered: Yes     /78   Pulse 74   Temp 97.7 °F (36.5 °C) (Oral)   Resp 17   Wt 190 lb (86.2 kg)   SpO2 96%   BMI 27.26 kg/m²     Zemaire dosage: 60 mg/kg +/- 10 % (today's dose =  5,095 mg) was administered slowly IV  Dose verified by:  Merline Buddy, RN    Post treatment Vital Signs  Temp: 97.7 °F (36.5 °C)  Pulse: 74  Resp: 17  BP: 126/78    Response to treatment:  Well tolerated by patient. Scheduled to return for next dose of Zemaire on April 9, 2018.      Electronically signed by Saint Rainwater, RN on 4/2/2018 at 10:43 AM

## 2018-04-09 ENCOUNTER — HOSPITAL ENCOUNTER (OUTPATIENT)
Dept: INFUSION THERAPY | Age: 43
Discharge: HOME | End: 2018-04-10
Admitting: INTERNAL MEDICINE

## 2018-04-09 VITALS
DIASTOLIC BLOOD PRESSURE: 75 MMHG | SYSTOLIC BLOOD PRESSURE: 129 MMHG | BODY MASS INDEX: 26.98 KG/M2 | TEMPERATURE: 97.3 F | WEIGHT: 188 LBS | OXYGEN SATURATION: 96 % | HEART RATE: 67 BPM | RESPIRATION RATE: 17 BRPM

## 2018-04-09 DIAGNOSIS — E88.01 ALPHA-1-ANTITRYPSIN DEFICIENCY (HCC): ICD-10-CM

## 2018-04-09 RX ORDER — SODIUM CHLORIDE 9 MG/ML
100 INJECTION, SOLUTION INTRAVENOUS CONTINUOUS
Status: CANCELLED | OUTPATIENT
Start: 2018-04-09

## 2018-04-09 RX ORDER — METHYLPREDNISOLONE SODIUM SUCCINATE 125 MG/2ML
125 INJECTION, POWDER, LYOPHILIZED, FOR SOLUTION INTRAMUSCULAR; INTRAVENOUS ONCE
Status: CANCELLED | OUTPATIENT
Start: 2018-04-09 | End: 2018-04-09

## 2018-04-09 RX ORDER — DIPHENHYDRAMINE HYDROCHLORIDE 50 MG/ML
50 INJECTION INTRAMUSCULAR; INTRAVENOUS ONCE
Status: CANCELLED | OUTPATIENT
Start: 2018-04-09 | End: 2018-04-09

## 2018-04-09 RX ORDER — SODIUM CHLORIDE 9 MG/ML
INJECTION, SOLUTION INTRAVENOUS ONCE
Status: DISCONTINUED | OUTPATIENT
Start: 2018-04-09 | End: 2018-09-20

## 2018-04-09 RX ORDER — 0.9 % SODIUM CHLORIDE 0.9 %
10 VIAL (ML) INJECTION ONCE
Status: CANCELLED | OUTPATIENT
Start: 2018-04-09 | End: 2018-04-09

## 2018-04-09 RX ORDER — SODIUM CHLORIDE 9 MG/ML
INJECTION, SOLUTION INTRAVENOUS ONCE
Status: CANCELLED | OUTPATIENT
Start: 2018-04-09

## 2018-04-09 RX ORDER — SODIUM CHLORIDE 0.9 % (FLUSH) 0.9 %
10 SYRINGE (ML) INJECTION PRN
Status: CANCELLED | OUTPATIENT
Start: 2018-04-09

## 2018-04-09 RX ORDER — SODIUM CHLORIDE 0.9 % (FLUSH) 0.9 %
10 SYRINGE (ML) INJECTION PRN
Status: ACTIVE | OUTPATIENT
Start: 2018-04-09 | End: 2018-04-10

## 2018-04-09 RX ADMIN — Medication 10 ML: at 09:32

## 2018-04-09 NOTE — PROGRESS NOTES
Outpatient [address] [facility]     Zemaire Visit    NAME:  Ines Kanner  YOB: 1975  MEDICAL RECORD NUMBER:  [id number]  Episode Date:  4/9/2018    Patient arrived to [facility] 58   [] per wheelchair   [x] ambulatory     Verification of patient education of Zemaira actions and potential side effects:  Yes     Is this the patient's first Zemaire Injection? No   Did the patient experience any adverse reactions to previous Zemaire Injection? No    Any side effects from last infusion:  No     [] Rash         [] Itching  [] Headache  [] Fatigue  [] Dizziness  [] Tingling   [] Chest tightness  [] Shortness of Breath  [] Wheezing    Patient Active Problem List   Diagnosis Code    Abdominal pain R10.9    Lymphadenopathy R59.1    GERD (gastroesophageal reflux disease) K21.9    Ariza's esophagus with dysplasia K22.719    Emphysema lung J43.9    Alpha-1-antitrypsin deficiency ([facility] [address] E88.01    Essential hypertension, benign I10    Advance care planning Z71.89    Type 2 diabetes mellitus without complication, without long-term current use of insulin ([facility]) E11.9    Colon polyp K63.5       Pre infusion Vital Signs       Temp: 97.3 °F (36.3 °C)     Pulse: 73     Resp: 17     BP: (!) 144/82       Does the Patient have a History of:     IgA deficiency: no     Anaphylaxis or severe systemic response to any medication:  No        Breath Sounds: No increased work of breathing, Breath sounds clear to auscultation bilaterally and Good air exchange  Pulse Oximetry: 96 %    Any recent activity tolerance changes: No      Any increased SOB or dyspnea:  No     Presence of cough or sputum:  Yes in the mornings, clear to yellow phlem     Premedicated:none ordered  [] Benadryl 25 mg IV  [] Benadryl 50 mg IV  [] Benadryl 25 mg oral   [] Benadryl 50 mg oral  [] Other    Epinephrine at bedside:  No: but is available in the pyxis    IV placed and documented prior to drug

## 2018-04-16 ENCOUNTER — HOSPITAL ENCOUNTER (OUTPATIENT)
Dept: INFUSION THERAPY | Age: 43
Discharge: HOME | End: 2018-04-17
Admitting: INTERNAL MEDICINE

## 2018-04-16 VITALS
BODY MASS INDEX: 26.92 KG/M2 | WEIGHT: 188 LBS | RESPIRATION RATE: 17 BRPM | HEART RATE: 76 BPM | OXYGEN SATURATION: 98 % | HEIGHT: 70 IN | TEMPERATURE: 97.4 F | DIASTOLIC BLOOD PRESSURE: 80 MMHG | SYSTOLIC BLOOD PRESSURE: 118 MMHG

## 2018-04-16 DIAGNOSIS — E88.01 ALPHA-1-ANTITRYPSIN DEFICIENCY (HCC): ICD-10-CM

## 2018-04-16 RX ORDER — SODIUM CHLORIDE 9 MG/ML
INJECTION, SOLUTION INTRAVENOUS ONCE
Status: CANCELLED | OUTPATIENT
Start: 2018-04-16

## 2018-04-16 RX ORDER — SODIUM CHLORIDE 9 MG/ML
INJECTION, SOLUTION INTRAVENOUS ONCE
Status: DISCONTINUED | OUTPATIENT
Start: 2018-04-16 | End: 2018-09-20

## 2018-04-16 RX ORDER — METHYLPREDNISOLONE SODIUM SUCCINATE 125 MG/2ML
125 INJECTION, POWDER, LYOPHILIZED, FOR SOLUTION INTRAMUSCULAR; INTRAVENOUS ONCE
Status: CANCELLED | OUTPATIENT
Start: 2018-04-16 | End: 2018-04-16

## 2018-04-16 RX ORDER — SODIUM CHLORIDE 9 MG/ML
100 INJECTION, SOLUTION INTRAVENOUS CONTINUOUS
Status: CANCELLED | OUTPATIENT
Start: 2018-04-16

## 2018-04-16 RX ORDER — 0.9 % SODIUM CHLORIDE 0.9 %
10 VIAL (ML) INJECTION ONCE
Status: CANCELLED | OUTPATIENT
Start: 2018-04-16 | End: 2018-04-16

## 2018-04-16 RX ORDER — SODIUM CHLORIDE 0.9 % (FLUSH) 0.9 %
10 SYRINGE (ML) INJECTION PRN
Status: CANCELLED | OUTPATIENT
Start: 2018-04-16

## 2018-04-16 RX ORDER — SODIUM CHLORIDE 0.9 % (FLUSH) 0.9 %
10 SYRINGE (ML) INJECTION PRN
Status: ACTIVE | OUTPATIENT
Start: 2018-04-16 | End: 2018-04-17

## 2018-04-16 RX ORDER — DIPHENHYDRAMINE HYDROCHLORIDE 50 MG/ML
50 INJECTION INTRAMUSCULAR; INTRAVENOUS ONCE
Status: CANCELLED | OUTPATIENT
Start: 2018-04-16 | End: 2018-04-16

## 2018-04-16 RX ADMIN — Medication 10 ML: at 08:45

## 2018-04-16 RX ADMIN — Medication 30 ML: at 09:24

## 2018-04-16 ASSESSMENT — PAIN SCALES - GENERAL: PAINLEVEL_OUTOF10: 0

## 2018-04-16 NOTE — PROGRESS NOTES
as it contains no preservatives. Monitoring of infusion documented in doc flowsheets. Zemaire administered: {yes no:870815::\"Yes\"}     /80   Pulse 76   Temp 97.4 °F (36.3 °C) (Oral)   Resp 17   Ht 5' 10\" (1.778 m)   Wt 188 lb (85.3 kg)   SpO2 98%   BMI 26.98 kg/m²     Zemaire dosage: *** mg/kg was administered slowly IV  Dose verified by:  *** RN    Post treatment Vital Signs  Temp: 97.4 °F (36.3 °C)  Pulse: 76  Resp: 17  BP: 118/80    Response to treatment:  [facility] TOLERATED:[id number]}     Scheduled to return for next dose of Zemaire on {MONTH:34325} {NUMBERS 1-31 (DATE):15180}, {BMZJ:[id number]}.      Electronically signed by Amber Dunlap RN on 4/16/2018 at 8:31 AM

## 2018-04-23 ENCOUNTER — HOSPITAL ENCOUNTER (OUTPATIENT)
Dept: INFUSION THERAPY | Age: 43
Discharge: HOME | End: 2018-04-24
Admitting: INTERNAL MEDICINE

## 2018-04-23 VITALS
TEMPERATURE: 97.6 F | RESPIRATION RATE: 18 BRPM | OXYGEN SATURATION: 97 % | DIASTOLIC BLOOD PRESSURE: 79 MMHG | HEART RATE: 76 BPM | BODY MASS INDEX: 26.98 KG/M2 | SYSTOLIC BLOOD PRESSURE: 126 MMHG | WEIGHT: 188 LBS

## 2018-04-23 DIAGNOSIS — E88.01 ALPHA-1-ANTITRYPSIN DEFICIENCY (HCC): ICD-10-CM

## 2018-04-23 RX ORDER — 0.9 % SODIUM CHLORIDE 0.9 %
10 VIAL (ML) INJECTION ONCE
Status: CANCELLED | OUTPATIENT
Start: 2018-04-23 | End: 2018-04-23

## 2018-04-23 RX ORDER — DIPHENHYDRAMINE HYDROCHLORIDE 50 MG/ML
50 INJECTION INTRAMUSCULAR; INTRAVENOUS ONCE
Status: CANCELLED | OUTPATIENT
Start: 2018-04-23 | End: 2018-04-23

## 2018-04-23 RX ORDER — SODIUM CHLORIDE 0.9 % (FLUSH) 0.9 %
10 SYRINGE (ML) INJECTION PRN
Status: CANCELLED | OUTPATIENT
Start: 2018-04-23

## 2018-04-23 RX ORDER — SODIUM CHLORIDE 9 MG/ML
INJECTION, SOLUTION INTRAVENOUS ONCE
Status: CANCELLED | OUTPATIENT
Start: 2018-04-23

## 2018-04-23 RX ORDER — METHYLPREDNISOLONE SODIUM SUCCINATE 125 MG/2ML
125 INJECTION, POWDER, LYOPHILIZED, FOR SOLUTION INTRAMUSCULAR; INTRAVENOUS ONCE
Status: CANCELLED | OUTPATIENT
Start: 2018-04-23 | End: 2018-04-23

## 2018-04-23 RX ORDER — SODIUM CHLORIDE 9 MG/ML
INJECTION, SOLUTION INTRAVENOUS ONCE
Status: DISCONTINUED | OUTPATIENT
Start: 2018-04-23 | End: 2018-09-20

## 2018-04-23 RX ORDER — SODIUM CHLORIDE 9 MG/ML
100 INJECTION, SOLUTION INTRAVENOUS CONTINUOUS
Status: CANCELLED | OUTPATIENT
Start: 2018-04-23

## 2018-04-23 RX ORDER — SODIUM CHLORIDE 0.9 % (FLUSH) 0.9 %
10 SYRINGE (ML) INJECTION PRN
Status: DISPENSED | OUTPATIENT
Start: 2018-04-23 | End: 2018-04-24

## 2018-04-23 RX ADMIN — Medication 10 ML: at 08:45

## 2018-04-23 NOTE — PROGRESS NOTES
mg IV  [] Benadryl 25 mg oral   [] Benadryl 50 mg oral  [] Other    Epinephrine at bedside:  Yes    IV placed and documented prior to drug preparation:  Zemaira must be administered within 3 hours of drug reconstitution as it contains no preservatives. Monitoring of infusion documented in doc flowsheets. Zemaire administered: Yes     Wt 188 lb (85.3 kg)   BMI 26.98 kg/m²     Zemaire dosage:  60  mg/kg was administered slowly IV-- total of 5095 mg in IVPB hung   Dose verified by:   Obi Cordova RN      Post treatment Vital Signs--see flow sheets                 Response to treatment:  Well tolerated by patient. Scheduled to return for next dose of Zemaire on April 30, 2018.      Electronically signed by Candice Camargo RN on 4/23/2018 at 1200pm

## 2018-04-30 ENCOUNTER — HOSPITAL ENCOUNTER (OUTPATIENT)
Dept: INFUSION THERAPY | Age: 43
Discharge: HOME | End: 2018-05-01
Admitting: INTERNAL MEDICINE

## 2018-04-30 VITALS
TEMPERATURE: 98 F | WEIGHT: 186 LBS | RESPIRATION RATE: 18 BRPM | SYSTOLIC BLOOD PRESSURE: 125 MMHG | HEART RATE: 76 BPM | OXYGEN SATURATION: 96 % | DIASTOLIC BLOOD PRESSURE: 79 MMHG | HEIGHT: 70 IN | BODY MASS INDEX: 26.63 KG/M2

## 2018-04-30 DIAGNOSIS — E88.01 ALPHA-1-ANTITRYPSIN DEFICIENCY (HCC): ICD-10-CM

## 2018-04-30 RX ORDER — METHYLPREDNISOLONE SODIUM SUCCINATE 125 MG/2ML
125 INJECTION, POWDER, LYOPHILIZED, FOR SOLUTION INTRAMUSCULAR; INTRAVENOUS ONCE
Status: CANCELLED | OUTPATIENT
Start: 2018-04-30 | End: 2018-04-30

## 2018-04-30 RX ORDER — 0.9 % SODIUM CHLORIDE 0.9 %
10 VIAL (ML) INJECTION ONCE
Status: CANCELLED | OUTPATIENT
Start: 2018-04-30 | End: 2018-04-30

## 2018-04-30 RX ORDER — SODIUM CHLORIDE 9 MG/ML
INJECTION, SOLUTION INTRAVENOUS ONCE
Status: DISCONTINUED | OUTPATIENT
Start: 2018-04-30 | End: 2018-09-20

## 2018-04-30 RX ORDER — SODIUM CHLORIDE 0.9 % (FLUSH) 0.9 %
10 SYRINGE (ML) INJECTION PRN
Status: CANCELLED | OUTPATIENT
Start: 2018-04-30

## 2018-04-30 RX ORDER — DIPHENHYDRAMINE HYDROCHLORIDE 50 MG/ML
50 INJECTION INTRAMUSCULAR; INTRAVENOUS ONCE
Status: CANCELLED | OUTPATIENT
Start: 2018-04-30 | End: 2018-04-30

## 2018-04-30 RX ORDER — SODIUM CHLORIDE 9 MG/ML
100 INJECTION, SOLUTION INTRAVENOUS CONTINUOUS
Status: CANCELLED | OUTPATIENT
Start: 2018-04-30

## 2018-04-30 RX ORDER — SODIUM CHLORIDE 9 MG/ML
INJECTION, SOLUTION INTRAVENOUS ONCE
Status: CANCELLED | OUTPATIENT
Start: 2018-04-30

## 2018-04-30 ASSESSMENT — PAIN SCALES - GENERAL: PAINLEVEL_OUTOF10: 0

## 2018-04-30 NOTE — PROGRESS NOTES
Outpatient [address] [facility]     Zemaire Visit    NAME:  Steven Doherty  YOB: 1975  MEDICAL RECORD NUMBER:  [id number]  Episode Date:  4/30/2018    Patient arrived to [facility] 58   [] per wheelchair   [x] ambulatory     Verification of patient education of Zemaira actions and potential side effects:  Yes     Is this the patient's first Zemaire Injection? No   Did the patient experience any adverse reactions to previous Zemaire Injection? No    Any side effects from last infusion:  No     [] Rash         [] Itching  [] Headache  [] Fatigue  [] Dizziness  [] Tingling   [] Chest tightness  [] Shortness of Breath  [] Wheezing    Patient Active Problem List   Diagnosis Code    Abdominal pain R10.9    Lymphadenopathy R59.1    GERD (gastroesophageal reflux disease) K21.9    Ariza's esophagus with dysplasia K22.719    Emphysema lung J43.9    Alpha-1-antitrypsin deficiency ([facility] [address] E88.01    Essential hypertension, benign I10    Advance care planning Z71.89    Type 2 diabetes mellitus without complication, without long-term current use of insulin ([facility]) E11.9    Colon polyp K63.5       Pre infusion Vital Signs       Temp: 98 °F (36.7 °C)     Pulse: 78     Resp: 18     BP: 126/78       Does the Patient have a History of: IgA deficiency: no     Anaphylaxis or severe systemic response to any medication:  No        Breath Sounds: No increased work of breathing, Breath sounds clear to auscultation bilaterally and Good air exchange  Pulse Oximetry: 96 %    Any recent activity tolerance changes: No      Any increased SOB or dyspnea:  No     Presence of cough or sputum:  No     Premedicated:  [] Benadryl 25 mg IV  [] Benadryl 50 mg IV  [] Benadryl 25 mg oral   [] Benadryl 50 mg oral  [] Other    Epinephrine at bedside:  Yes    IV placed and documented prior to drug preparation:port accessed.   Zemaira must be administered within 3 hours of drug reconstitution as it contains no preservatives. Monitoring of infusion documented in doc flowsheets. Zemaire administered: Yes     /78   Pulse 78   Temp 98 °F (36.7 °C) (Oral)   Resp 18   Ht 5' 10\" (1.778 m)   Wt 186 lb (84.4 kg)   SpO2 96%   BMI 26.69 kg/m²     Zemaire dosage: 60 mg/kg was administered slowly IV  Dose verified by:  Iam Jarvis RN    Post treatment Vital Signs  Temp: 98 °F (36.7 °C)  Pulse: 78  Resp: 18  BP: 126/78    Response to treatment:  Well tolerated by patient. Scheduled to return for next dose of Zemaire on May 7, 2018.      Electronically signed by Angela Altman RN on 4/30/2018 at 8:30 AM

## 2018-05-01 ENCOUNTER — HOSPITAL ENCOUNTER (OUTPATIENT)
Dept: INFUSION THERAPY | Age: 43
Discharge: HOME | End: 2018-05-31
Attending: INTERNAL MEDICINE | Admitting: INTERNAL MEDICINE

## 2018-05-07 ENCOUNTER — HOSPITAL ENCOUNTER (OUTPATIENT)
Dept: INFUSION THERAPY | Age: 43
Discharge: HOME | End: 2018-05-08
Admitting: INTERNAL MEDICINE

## 2018-05-07 VITALS
TEMPERATURE: 98.5 F | BODY MASS INDEX: 26.83 KG/M2 | SYSTOLIC BLOOD PRESSURE: 146 MMHG | WEIGHT: 187 LBS | DIASTOLIC BLOOD PRESSURE: 80 MMHG | OXYGEN SATURATION: 95 % | HEART RATE: 68 BPM | RESPIRATION RATE: 18 BRPM

## 2018-05-07 DIAGNOSIS — E88.01 ALPHA-1-ANTITRYPSIN DEFICIENCY (HCC): ICD-10-CM

## 2018-05-07 RX ORDER — SODIUM CHLORIDE 9 MG/ML
INJECTION, SOLUTION INTRAVENOUS ONCE
Status: CANCELLED | OUTPATIENT
Start: 2018-05-07

## 2018-05-07 RX ORDER — METHYLPREDNISOLONE SODIUM SUCCINATE 125 MG/2ML
125 INJECTION, POWDER, LYOPHILIZED, FOR SOLUTION INTRAMUSCULAR; INTRAVENOUS ONCE
Status: CANCELLED | OUTPATIENT
Start: 2018-05-07 | End: 2018-05-07

## 2018-05-07 RX ORDER — SODIUM CHLORIDE 0.9 % (FLUSH) 0.9 %
10 SYRINGE (ML) INJECTION PRN
Status: CANCELLED | OUTPATIENT
Start: 2018-05-07

## 2018-05-07 RX ORDER — SODIUM CHLORIDE 0.9 % (FLUSH) 0.9 %
10 SYRINGE (ML) INJECTION PRN
Status: DISPENSED | OUTPATIENT
Start: 2018-05-07 | End: 2018-05-08

## 2018-05-07 RX ORDER — DIPHENHYDRAMINE HYDROCHLORIDE 50 MG/ML
50 INJECTION INTRAMUSCULAR; INTRAVENOUS ONCE
Status: CANCELLED | OUTPATIENT
Start: 2018-05-07 | End: 2018-05-07

## 2018-05-07 RX ORDER — 0.9 % SODIUM CHLORIDE 0.9 %
10 VIAL (ML) INJECTION ONCE
Status: CANCELLED | OUTPATIENT
Start: 2018-05-07 | End: 2018-05-07

## 2018-05-07 RX ORDER — SODIUM CHLORIDE 9 MG/ML
INJECTION, SOLUTION INTRAVENOUS ONCE
Status: COMPLETED | OUTPATIENT
Start: 2018-05-07 | End: 2018-05-07

## 2018-05-07 RX ORDER — SODIUM CHLORIDE 9 MG/ML
100 INJECTION, SOLUTION INTRAVENOUS CONTINUOUS
Status: CANCELLED | OUTPATIENT
Start: 2018-05-07

## 2018-05-07 RX ADMIN — SODIUM CHLORIDE 1000 ML: 9 INJECTION, SOLUTION INTRAVENOUS at 08:55

## 2018-05-07 RX ADMIN — Medication 10 ML: at 08:45

## 2018-05-07 NOTE — PROGRESS NOTES
Outpatient [facility]     Zemaire Visit    NAME:  Abimael Lawrence  YOB: 1975  MEDICAL RECORD NUMBER:  [id number]  Episode Date:  5/7/2018    Patient arrived to [facility] 58    [] per wheelchair   [x] ambulatory with portable oxygen on   Color sl. Flushed, pt has no new medical issues and no new c/o's. Pt denies any issues with his port a cath. Verification of patient education of Zemaira actions and potential side effects:  Yes     Is this the patient's first Zemaire Injection? No   Did the patient experience any adverse reactions to previous Zemaire Injection? No    Any side effects from last infusion:  No     [] Rash         [] Itching  [] Headache  [] Fatigue  [] Dizziness  [] Tingling   [] Chest tightness  [] Shortness of Breath  [] Wheezing    Patient Active Problem List   Diagnosis Code    Abdominal pain R10.9    Lymphadenopathy R59.1    GERD (gastroesophageal reflux disease) K21.9    Ariza's esophagus with dysplasia K22.719    Emphysema lung J43.9    Alpha-1-antitrypsin deficiency ([facility] [address] E88.01    Essential hypertension, benign I10    Advance care planning Z71.89    Type 2 diabetes mellitus without complication, without long-term current use of insulin (HCC) E11.9    Colon polyp K63.5       Pre infusion Vital Signs --see flow sheet                                 Does the Patient have a History of:     IgA deficiency: no     Anaphylaxis or severe systemic response to any medication:  No        Breath Sounds: No increased work of breathing, Breath sounds clear to auscultation bilaterally and Good air exchange      Any recent activity tolerance changes: No  , started to play golf, is tolerating well     Any increased SOB or dyspnea:  No     Presence of cough or sputum: yes, sputum is yellow , no increase in amount or change in color      Premedicated: no premeds ordered [] Benadryl 25 mg IV  [] Benadryl 50 mg IV  [] Benadryl 25 mg oral   [] Benadryl 50 mg oral  [] Other    Epinephrine at bedside:  Yes    IV placed and documented prior to drug preparation:  Zemaira must be administered within 3 hours of drug reconstitution as it contains no preservatives. Monitoring of infusion documented in doc flowsheets. Zemaire administered: Yes     Wt 187 lb (84.8 kg)   BMI 26.83 kg/m²     Zemaire dosage: 60 mg/kg was administered slowly IVPB - see Mar==== Filtered extension set used to infuse Med. With o.2 um filter   Dose verified by:   Rayshawn Bishop RN    Post treatment Vital Signs--see flow sheet                Response to treatment:  Well tolerated by patient. Scheduled to return for next dose of Zemaire on May 14, 2018.      Electronically signed by Rory Jones RN on 5/7/2018 at 1100am

## 2018-05-14 ENCOUNTER — HOSPITAL ENCOUNTER (OUTPATIENT)
Dept: INFUSION THERAPY | Age: 43
Discharge: HOME | End: 2018-05-15
Admitting: INTERNAL MEDICINE

## 2018-05-14 VITALS
DIASTOLIC BLOOD PRESSURE: 69 MMHG | RESPIRATION RATE: 17 BRPM | SYSTOLIC BLOOD PRESSURE: 127 MMHG | WEIGHT: 187 LBS | BODY MASS INDEX: 26.83 KG/M2 | TEMPERATURE: 98.3 F | HEART RATE: 68 BPM | OXYGEN SATURATION: 96 %

## 2018-05-14 DIAGNOSIS — E88.01 ALPHA-1-ANTITRYPSIN DEFICIENCY (HCC): ICD-10-CM

## 2018-05-14 RX ORDER — 0.9 % SODIUM CHLORIDE 0.9 %
10 VIAL (ML) INJECTION ONCE
Status: CANCELLED | OUTPATIENT
Start: 2018-05-14 | End: 2018-05-14

## 2018-05-14 RX ORDER — SODIUM CHLORIDE 9 MG/ML
INJECTION, SOLUTION INTRAVENOUS ONCE
Status: CANCELLED | OUTPATIENT
Start: 2018-05-14

## 2018-05-14 RX ORDER — DIPHENHYDRAMINE HYDROCHLORIDE 50 MG/ML
50 INJECTION INTRAMUSCULAR; INTRAVENOUS ONCE
Status: CANCELLED | OUTPATIENT
Start: 2018-05-14 | End: 2018-05-14

## 2018-05-14 RX ORDER — SODIUM CHLORIDE 0.9 % (FLUSH) 0.9 %
10 SYRINGE (ML) INJECTION PRN
Status: DISPENSED | OUTPATIENT
Start: 2018-05-14 | End: 2018-05-15

## 2018-05-14 RX ORDER — METHYLPREDNISOLONE SODIUM SUCCINATE 125 MG/2ML
125 INJECTION, POWDER, LYOPHILIZED, FOR SOLUTION INTRAMUSCULAR; INTRAVENOUS ONCE
Status: CANCELLED | OUTPATIENT
Start: 2018-05-14 | End: 2018-05-14

## 2018-05-14 RX ORDER — SODIUM CHLORIDE 0.9 % (FLUSH) 0.9 %
10 SYRINGE (ML) INJECTION PRN
Status: CANCELLED | OUTPATIENT
Start: 2018-05-14

## 2018-05-14 RX ORDER — SODIUM CHLORIDE 9 MG/ML
INJECTION, SOLUTION INTRAVENOUS ONCE
Status: COMPLETED | OUTPATIENT
Start: 2018-05-14 | End: 2018-05-14

## 2018-05-14 RX ORDER — SODIUM CHLORIDE 9 MG/ML
100 INJECTION, SOLUTION INTRAVENOUS CONTINUOUS
Status: CANCELLED | OUTPATIENT
Start: 2018-05-14

## 2018-05-14 RX ADMIN — Medication 10 ML: at 09:14

## 2018-05-14 RX ADMIN — Medication 10 ML: at 08:30

## 2018-05-14 RX ADMIN — SODIUM CHLORIDE: 9 INJECTION, SOLUTION INTRAVENOUS at 08:30

## 2018-05-14 RX ADMIN — Medication 10 ML: at 09:44

## 2018-05-14 RX ADMIN — Medication 20 ML: at 08:24

## 2018-05-14 RX ADMIN — Medication 30 ML: at 10:01

## 2018-05-14 NOTE — PROGRESS NOTES
[facility] Visit with Caryl Dickinson [address] access    NAME:  Ines Kanner  YOB: 1975  MEDICAL RECORD NUMBER:  [id number]  Episode Date:  5/14/2018    Patient arrived to [facility] 58     [] per wheelchair   [x] ambulatory with O2 2L per [place]    Verification of patient education of Zemaira actions and potential side effects:  Yes     Is this the patient's first Zemaire Injection? No   Did the patient experience any adverse reactions to previous Zemaire Injection? No    Any side effects from last infusion:  No     [] Rash         [] Itching  [] Headache  [] Fatigue  [] Dizziness  [] Tingling   [] Chest tightness  [] Shortness of Breath  [] Wheezing    Patient Active Problem List   Diagnosis Code    Abdominal pain R10.9    Lymphadenopathy R59.1    GERD (gastroesophageal reflux disease) K21.9    Ariza's esophagus with dysplasia K22.719    Emphysema lung J43.9    [facility] E88.[facility]    Essential hypertension, benign I10    Advance care planning Z71.89    Type 2 diabetes mellitus without complication, without long-term current use of insulin ([facility]) E11.9    Colon polyp K63.5       Pre infusion Vital Signs       Temp: 97.6 °F (36.4 °C)     Pulse: 76     Resp: 17     BP: 105/71       Does the Patient have a History of:     IgA deficiency: no     Anaphylaxis or severe systemic response to any medication:  No        Breath Sounds: No increased work of breathing, Breath sounds clear to auscultation bilaterally, Good air exchange and No crackles  Pulse Oximetry: 96 %    Any recent activity tolerance changes: No      Any increased SOB or dyspnea:  No     Presence of cough or sputum:  Yes - mostly in the morning - thin, pale yellow sputum     Premedicated: None ordered  [] Benadryl 25 mg IV  [] Benadryl 50 mg IV  [] Benadryl 25 mg oral   [] Benadryl 50 mg oral  [] Other    Epinephrine at bedside:  No - readily available in

## 2018-05-21 ENCOUNTER — HOSPITAL ENCOUNTER (OUTPATIENT)
Dept: INFUSION THERAPY | Age: 43
Discharge: HOME | End: 2018-05-22
Admitting: INTERNAL MEDICINE

## 2018-05-21 VITALS
RESPIRATION RATE: 17 BRPM | WEIGHT: 188 LBS | OXYGEN SATURATION: 95 % | BODY MASS INDEX: 26.98 KG/M2 | TEMPERATURE: 97.5 F | DIASTOLIC BLOOD PRESSURE: 76 MMHG | SYSTOLIC BLOOD PRESSURE: 122 MMHG | HEART RATE: 74 BPM

## 2018-05-21 DIAGNOSIS — E88.01 ALPHA-1-ANTITRYPSIN DEFICIENCY (HCC): ICD-10-CM

## 2018-05-21 RX ORDER — 0.9 % SODIUM CHLORIDE 0.9 %
10 VIAL (ML) INJECTION ONCE
Status: CANCELLED | OUTPATIENT
Start: 2018-05-21 | End: 2018-05-21

## 2018-05-21 RX ORDER — METHYLPREDNISOLONE SODIUM SUCCINATE 125 MG/2ML
125 INJECTION, POWDER, LYOPHILIZED, FOR SOLUTION INTRAMUSCULAR; INTRAVENOUS ONCE
Status: CANCELLED | OUTPATIENT
Start: 2018-05-21 | End: 2018-05-21

## 2018-05-21 RX ORDER — SODIUM CHLORIDE 9 MG/ML
INJECTION, SOLUTION INTRAVENOUS ONCE
Status: CANCELLED | OUTPATIENT
Start: 2018-05-21

## 2018-05-21 RX ORDER — SODIUM CHLORIDE 0.9 % (FLUSH) 0.9 %
10 SYRINGE (ML) INJECTION PRN
Status: DISPENSED | OUTPATIENT
Start: 2018-05-21 | End: 2018-05-22

## 2018-05-21 RX ORDER — SODIUM CHLORIDE 0.9 % (FLUSH) 0.9 %
10 SYRINGE (ML) INJECTION PRN
Status: CANCELLED | OUTPATIENT
Start: 2018-05-21

## 2018-05-21 RX ORDER — SODIUM CHLORIDE 9 MG/ML
INJECTION, SOLUTION INTRAVENOUS ONCE
Status: COMPLETED | OUTPATIENT
Start: 2018-05-21 | End: 2018-05-21

## 2018-05-21 RX ORDER — SODIUM CHLORIDE 9 MG/ML
100 INJECTION, SOLUTION INTRAVENOUS CONTINUOUS
Status: CANCELLED | OUTPATIENT
Start: 2018-05-21

## 2018-05-21 RX ORDER — DIPHENHYDRAMINE HYDROCHLORIDE 50 MG/ML
50 INJECTION INTRAMUSCULAR; INTRAVENOUS ONCE
Status: CANCELLED | OUTPATIENT
Start: 2018-05-21 | End: 2018-05-21

## 2018-05-21 RX ADMIN — Medication 10 ML: at 10:08

## 2018-05-21 RX ADMIN — Medication 20 ML: at 08:22

## 2018-05-21 RX ADMIN — Medication 10 ML: at 09:34

## 2018-05-21 RX ADMIN — SODIUM CHLORIDE: 9 INJECTION, SOLUTION INTRAVENOUS at 08:22

## 2018-05-21 RX ADMIN — Medication 30 ML: at 10:26

## 2018-05-29 ENCOUNTER — HOSPITAL ENCOUNTER (OUTPATIENT)
Dept: INFUSION THERAPY | Age: 43
Discharge: HOME | End: 2018-05-30
Admitting: INTERNAL MEDICINE

## 2018-05-29 VITALS
TEMPERATURE: 98.1 F | WEIGHT: 188 LBS | BODY MASS INDEX: 26.98 KG/M2 | RESPIRATION RATE: 18 BRPM | OXYGEN SATURATION: 97 % | SYSTOLIC BLOOD PRESSURE: 137 MMHG | HEART RATE: 64 BPM | DIASTOLIC BLOOD PRESSURE: 81 MMHG

## 2018-05-29 DIAGNOSIS — E88.01 ALPHA-1-ANTITRYPSIN DEFICIENCY (HCC): ICD-10-CM

## 2018-05-29 RX ORDER — SODIUM CHLORIDE 9 MG/ML
INJECTION, SOLUTION INTRAVENOUS ONCE
Status: CANCELLED | OUTPATIENT
Start: 2018-05-29

## 2018-05-29 RX ORDER — SODIUM CHLORIDE 0.9 % (FLUSH) 0.9 %
10 SYRINGE (ML) INJECTION PRN
Status: ACTIVE | OUTPATIENT
Start: 2018-05-29 | End: 2018-05-30

## 2018-05-29 RX ORDER — METHYLPREDNISOLONE SODIUM SUCCINATE 125 MG/2ML
125 INJECTION, POWDER, LYOPHILIZED, FOR SOLUTION INTRAMUSCULAR; INTRAVENOUS ONCE
Status: CANCELLED | OUTPATIENT
Start: 2018-05-29 | End: 2018-05-29

## 2018-05-29 RX ORDER — 0.9 % SODIUM CHLORIDE 0.9 %
10 VIAL (ML) INJECTION ONCE
Status: CANCELLED | OUTPATIENT
Start: 2018-05-29 | End: 2018-05-29

## 2018-05-29 RX ORDER — SODIUM CHLORIDE 9 MG/ML
INJECTION, SOLUTION INTRAVENOUS ONCE
Status: DISCONTINUED | OUTPATIENT
Start: 2018-05-29 | End: 2018-09-20

## 2018-05-29 RX ORDER — SODIUM CHLORIDE 9 MG/ML
100 INJECTION, SOLUTION INTRAVENOUS CONTINUOUS
Status: CANCELLED | OUTPATIENT
Start: 2018-05-29

## 2018-05-29 RX ORDER — SODIUM CHLORIDE 0.9 % (FLUSH) 0.9 %
10 SYRINGE (ML) INJECTION PRN
Status: CANCELLED | OUTPATIENT
Start: 2018-05-29

## 2018-05-29 RX ORDER — DIPHENHYDRAMINE HYDROCHLORIDE 50 MG/ML
50 INJECTION INTRAMUSCULAR; INTRAVENOUS ONCE
Status: CANCELLED | OUTPATIENT
Start: 2018-05-29 | End: 2018-05-29

## 2018-05-29 NOTE — PROGRESS NOTES
Outpatient [address] Access    NAME:  Letha Vogel  YOB: 1975  MEDICAL RECORD NUMBER:  [id number]  DATE:  5/29/2018    Patient arrived to [place] [address]   [] per wheelchair   [x] ambulatory     Port Site Location:  right chest  Port Site:  Redness: No  Bruising: No   Edema: No  Pain: No     Port Site cleansed with:  Chloroprep Scrub for 30 seconds and air dried? Yes     Port Accessed with: 19 Gauge 0.75 Power Port non coring needle using sterile technique. Blood return obtained: Yes  Flushed with 10 ml Normal Saline using push-pause method. Port flushes without resistance. Response to treatment:  Well tolerated by patient. Electronically signed by Rboert Paniagua RN on 5/29/2018 at 9:17 AM    Outpatient [address] [facility]     Zemaire Visit    NAME:  Letha Vogel  YOB: 1975  MEDICAL RECORD NUMBER:  [id number]  Episode Date:  5/29/2018    Patient arrived to [place] [address]    [] per wheelchair   [x] ambulatory     Verification of patient education of Zemaira actions and potential side effects:  Yes     Is this the patient's first Zemaire Injection? No   Did the patient experience any adverse reactions to previous Zemaire Injection?  Yes    Any side effects from last infusion:  No     [] Rash         [] Itching  [] Headache  [] Fatigue  [] Dizziness  [] Tingling   [] Chest tightness  [] Shortness of Breath  [] Wheezing    Patient Active Problem List   Diagnosis Code    Abdominal pain R10.9    Lymphadenopathy R59.1    GERD (gastroesophageal reflux disease) K21.9    Ariza's esophagus with dysplasia K22.719    Emphysema lung J43.9    [facility] E88[facility]    Essential hypertension, benign I10    Advance care planning Z71.89    Type 2 diabetes mellitus without complication, without long-term current use of insulin (HCC) E11.9    Colon polyp K63.5       Pre

## 2018-06-01 ENCOUNTER — HOSPITAL ENCOUNTER (OUTPATIENT)
Dept: INFUSION THERAPY | Age: 43
Discharge: HOME | End: 2018-06-30
Attending: INTERNAL MEDICINE | Admitting: INTERNAL MEDICINE

## 2018-06-04 ENCOUNTER — HOSPITAL ENCOUNTER (OUTPATIENT)
Dept: INFUSION THERAPY | Age: 43
Discharge: HOME | End: 2018-06-05
Admitting: INTERNAL MEDICINE

## 2018-06-04 VITALS
BODY MASS INDEX: 26.92 KG/M2 | RESPIRATION RATE: 18 BRPM | WEIGHT: 188 LBS | DIASTOLIC BLOOD PRESSURE: 85 MMHG | HEART RATE: 80 BPM | HEIGHT: 70 IN | TEMPERATURE: 97.4 F | OXYGEN SATURATION: 98 % | SYSTOLIC BLOOD PRESSURE: 130 MMHG

## 2018-06-04 DIAGNOSIS — E88.01 ALPHA-1-ANTITRYPSIN DEFICIENCY (HCC): ICD-10-CM

## 2018-06-04 RX ORDER — SODIUM CHLORIDE 0.9 % (FLUSH) 0.9 %
10 SYRINGE (ML) INJECTION PRN
Status: CANCELLED | OUTPATIENT
Start: 2018-06-04

## 2018-06-04 RX ORDER — METHYLPREDNISOLONE SODIUM SUCCINATE 125 MG/2ML
125 INJECTION, POWDER, LYOPHILIZED, FOR SOLUTION INTRAMUSCULAR; INTRAVENOUS ONCE
Status: CANCELLED | OUTPATIENT
Start: 2018-06-04 | End: 2018-06-04

## 2018-06-04 RX ORDER — SODIUM CHLORIDE 9 MG/ML
100 INJECTION, SOLUTION INTRAVENOUS CONTINUOUS
Status: CANCELLED | OUTPATIENT
Start: 2018-06-04

## 2018-06-04 RX ORDER — 0.9 % SODIUM CHLORIDE 0.9 %
10 VIAL (ML) INJECTION ONCE
Status: CANCELLED | OUTPATIENT
Start: 2018-06-04 | End: 2018-06-04

## 2018-06-04 RX ORDER — SODIUM CHLORIDE 9 MG/ML
INJECTION, SOLUTION INTRAVENOUS ONCE
Status: CANCELLED | OUTPATIENT
Start: 2018-06-04

## 2018-06-04 RX ORDER — SODIUM CHLORIDE 0.9 % (FLUSH) 0.9 %
10 SYRINGE (ML) INJECTION PRN
Status: ACTIVE | OUTPATIENT
Start: 2018-06-04 | End: 2018-06-05

## 2018-06-04 RX ORDER — SODIUM CHLORIDE 9 MG/ML
INJECTION, SOLUTION INTRAVENOUS ONCE
Status: DISCONTINUED | OUTPATIENT
Start: 2018-06-04 | End: 2018-09-20

## 2018-06-04 RX ORDER — DIPHENHYDRAMINE HYDROCHLORIDE 50 MG/ML
50 INJECTION INTRAMUSCULAR; INTRAVENOUS ONCE
Status: CANCELLED | OUTPATIENT
Start: 2018-06-04 | End: 2018-06-04

## 2018-06-04 RX ADMIN — Medication 20 ML: at 09:28

## 2018-06-04 RX ADMIN — Medication 20 ML: at 08:50

## 2018-06-04 ASSESSMENT — PAIN SCALES - GENERAL: PAINLEVEL_OUTOF10: 0

## 2018-06-04 NOTE — PROGRESS NOTES
Outpatient [address] Access    NAME:  Jesus Portillo  YOB: 1975  MEDICAL RECORD NUMBER:  [id number]  DATE:  6/4/2018    Patient arrived to [place] [address]   [] per wheelchair   [x] ambulatory     Port Site Location:  right chest  Port Site:  Redness: No  Bruising: No   Edema: No  Pain: No     Port Site cleansed with:  Chloroprep Scrub for 30 seconds and air dried? Yes     Port Accessed with: [address] [employer] non coring needle using sterile technique. Blood return obtained: Yes  Flushed with 10 ml Normal Saline using push-pause method. Port flushes without resistance. Response to treatment:  Well tolerated by patient. Outpatient [address] [facility]     Zemaire Visit    NAME:  Jesus Portillo  YOB: 1975  MEDICAL RECORD NUMBER:  [id number]  Episode Date:  6/4/2018    Patient arrived to [place] [address]    [] per wheelchair   [x] ambulatory     Verification of patient education of Zemaira actions and potential side effects:  Yes     Is this the patient's first Zemaire Injection? No   Did the patient experience any adverse reactions to previous Zemaire Injection?  No    Any side effects from last infusion:  No     [] Rash         [] Itching  [] Headache  [] Fatigue  [] Dizziness  [] Tingling   [] Chest tightness  [] Shortness of Breath  [] Wheezing    Patient Active Problem List   Diagnosis Code    Abdominal pain R10.9    Lymphadenopathy R59.1    GERD (gastroesophageal reflux disease) K21.9    Ariza's esophagus with dysplasia K22.719    Emphysema lung J43.9    [facility]itry[facility] E88.01    Essential hypertension, benign I10    Advance care planning Z71.89    Type 2 diabetes mellitus without complication, without long-term current use of insulin (HCC) E11.9    Colon polyp K63.5       Pre infusion Vital Signs       Temp: 97.4 °F (36.3 °C)     Pulse: 80     Resp:

## 2018-06-11 ENCOUNTER — HOSPITAL ENCOUNTER (OUTPATIENT)
Dept: INFUSION THERAPY | Age: 43
Discharge: HOME | End: 2018-06-12
Admitting: INTERNAL MEDICINE

## 2018-06-11 VITALS
OXYGEN SATURATION: 93 % | RESPIRATION RATE: 20 BRPM | HEART RATE: 88 BPM | WEIGHT: 188 LBS | BODY MASS INDEX: 26.92 KG/M2 | DIASTOLIC BLOOD PRESSURE: 79 MMHG | TEMPERATURE: 98 F | SYSTOLIC BLOOD PRESSURE: 126 MMHG | HEIGHT: 70 IN

## 2018-06-11 DIAGNOSIS — E88.01 ALPHA-1-ANTITRYPSIN DEFICIENCY (HCC): ICD-10-CM

## 2018-06-11 RX ORDER — 0.9 % SODIUM CHLORIDE 0.9 %
10 VIAL (ML) INJECTION ONCE
Status: CANCELLED | OUTPATIENT
Start: 2018-06-11 | End: 2018-06-11

## 2018-06-11 RX ORDER — SODIUM CHLORIDE 9 MG/ML
INJECTION, SOLUTION INTRAVENOUS ONCE
Status: COMPLETED | OUTPATIENT
Start: 2018-06-11 | End: 2018-06-11

## 2018-06-11 RX ORDER — SODIUM CHLORIDE 9 MG/ML
INJECTION, SOLUTION INTRAVENOUS ONCE
Status: CANCELLED | OUTPATIENT
Start: 2018-06-11

## 2018-06-11 RX ORDER — SODIUM CHLORIDE 0.9 % (FLUSH) 0.9 %
10 SYRINGE (ML) INJECTION PRN
Status: CANCELLED | OUTPATIENT
Start: 2018-06-11

## 2018-06-11 RX ORDER — SODIUM CHLORIDE 0.9 % (FLUSH) 0.9 %
10 SYRINGE (ML) INJECTION PRN
Status: DISPENSED | OUTPATIENT
Start: 2018-06-11 | End: 2018-06-12

## 2018-06-11 RX ORDER — METHYLPREDNISOLONE SODIUM SUCCINATE 125 MG/2ML
125 INJECTION, POWDER, LYOPHILIZED, FOR SOLUTION INTRAMUSCULAR; INTRAVENOUS ONCE
Status: CANCELLED | OUTPATIENT
Start: 2018-06-11 | End: 2018-06-11

## 2018-06-11 RX ORDER — SODIUM CHLORIDE 9 MG/ML
100 INJECTION, SOLUTION INTRAVENOUS CONTINUOUS
Status: CANCELLED | OUTPATIENT
Start: 2018-06-11

## 2018-06-11 RX ORDER — DIPHENHYDRAMINE HYDROCHLORIDE 50 MG/ML
50 INJECTION INTRAMUSCULAR; INTRAVENOUS ONCE
Status: CANCELLED | OUTPATIENT
Start: 2018-06-11 | End: 2018-06-11

## 2018-06-11 RX ADMIN — Medication 30 ML: at 09:52

## 2018-06-11 RX ADMIN — Medication 20 ML: at 09:16

## 2018-06-11 RX ADMIN — SODIUM CHLORIDE: 9 INJECTION, SOLUTION INTRAVENOUS at 09:17

## 2018-06-11 NOTE — PROGRESS NOTES
Outpatient [address] Access    NAME:  Meenakshi Dudley  YOB: 1975  MEDICAL RECORD NUMBER:  [id number]  DATE:  6/11/2018    Patient arrived to [place] [address]   [] per wheelchair   [x] ambulatory     Port Site Location:  right chest  Port Site:  Redness: No  Bruising: No   Edema: No  Pain: No     Port Site cleansed with:  Chloroprep Scrub for 30 seconds and air dried? Yes     Port Accessed with: [address] Be [employer] non coring needle using sterile technique. Blood return obtained: Yes  Flushed with 10 ml Normal Saline using push-pause method. Port flushes without resistance. Response to treatment:  Well tolerated by patient. Outpatient [address] [facility]     Zemaire Visit    NAME:  Meenakshi Dudley  YOB: 1975  MEDICAL RECORD NUMBER:  [id number]  Episode Date:  6/11/2018    Patient arrived to [place] [address]    [] per wheelchair   [x] ambulatory     Verification of patient education of Zemaira actions and potential side effects:  Yes     Is this the patient's first Zemaire Injection? No   Did the patient experience any adverse reactions to previous Zemaire Injection?  No    Any side effects from last infusion:  No     [] Rash         [] Itching  [] Headache  [] Fatigue  [] Dizziness  [] Tingling   [] Chest tightness  [] Shortness of Breath  [] Wheezing    Patient Active Problem List   Diagnosis Code    Abdominal pain R10.9    Lymphadenopathy R59.1    GERD (gastroesophageal reflux disease) K21.9    Ariza's esophagus with dysplasia K22.719    Emphysema lung J43.9    [facility]itry[facility] E88.01    Essential hypertension, benign I10    Advance care planning Z71.89    Type 2 diabetes mellitus without complication, without long-term current use of insulin (HCC) E11.9    Colon polyp K63.5       Pre infusion Vital Signs       Temp: 98 °F (36.7 °C)     Pulse: 88     Resp: 20     BP: 126/79       Does the Patient have a History of: IgA deficiency: no     Anaphylaxis or severe systemic response to any medication:  No        Breath Sounds: No increased work of breathing, Breath sounds clear to auscultation bilaterally and Good air exchange  Pulse Oximetry: 93 %    Any recent activity tolerance changes: No      Any increased SOB or dyspnea:  No     Presence of cough or sputum:  Clear in am  Premedicated:  [] Benadryl 25 mg IV  [] Benadryl 50 mg IV  [] Benadryl 25 mg oral   [] Benadryl 50 mg oral  [] Other    Epinephrine at bedside:  Yes    IV placed and documented prior to drug preparation:Port accessZemaira must be administered within 3 hours of drug reconstitution as it contains no preservatives. Monitoring of infusion documented in doc flowsheets. yes    Zemaire administered: Yes     /79   Pulse 88   Temp 98 °F (36.7 °C) (Oral)   Resp 20   Ht 5' 10\" (1.778 m)   Wt 188 lb (85.3 kg)   SpO2 93%   BMI 26.98 kg/m²     Zemaire dosage: 60 mg/kg was administered slowly IV  Dose verified by:  Da Real RN    Post treatment Vital Signs  Temp: 98 °F (36.7 °C)  Pulse: 88  Resp: 20  BP: 126/79    Response to treatment:  Well tolerated by patient. Scheduled to return for next dose of Zemaire on June 18, 2018.      Electronically signed by Xin Mcgraw RN on 6/11/2018 at 8:35 AM                              Electronically signed by Xin Mcgraw RN on 6/11/2018 at 8:34 AM

## 2018-06-12 ENCOUNTER — TELEPHONE (OUTPATIENT)
Dept: FAMILY MEDICINE CLINIC | Age: 43
End: 2018-06-12

## 2018-06-18 ENCOUNTER — HOSPITAL ENCOUNTER (OUTPATIENT)
Dept: INFUSION THERAPY | Age: 43
Discharge: HOME | End: 2018-06-19
Admitting: INTERNAL MEDICINE

## 2018-06-18 VITALS
TEMPERATURE: 97.8 F | OXYGEN SATURATION: 95 % | WEIGHT: 188 LBS | BODY MASS INDEX: 26.98 KG/M2 | DIASTOLIC BLOOD PRESSURE: 73 MMHG | RESPIRATION RATE: 18 BRPM | SYSTOLIC BLOOD PRESSURE: 135 MMHG | HEART RATE: 76 BPM

## 2018-06-18 DIAGNOSIS — E88.01 ALPHA-1-ANTITRYPSIN DEFICIENCY (HCC): ICD-10-CM

## 2018-06-18 RX ORDER — SODIUM CHLORIDE 9 MG/ML
INJECTION, SOLUTION INTRAVENOUS ONCE
Status: COMPLETED | OUTPATIENT
Start: 2018-06-18 | End: 2018-06-18

## 2018-06-18 RX ORDER — SODIUM CHLORIDE 0.9 % (FLUSH) 0.9 %
10 SYRINGE (ML) INJECTION PRN
Status: CANCELLED | OUTPATIENT
Start: 2018-06-18

## 2018-06-18 RX ORDER — SODIUM CHLORIDE 9 MG/ML
INJECTION, SOLUTION INTRAVENOUS ONCE
Status: CANCELLED | OUTPATIENT
Start: 2018-06-18

## 2018-06-18 RX ORDER — METHYLPREDNISOLONE SODIUM SUCCINATE 125 MG/2ML
125 INJECTION, POWDER, LYOPHILIZED, FOR SOLUTION INTRAMUSCULAR; INTRAVENOUS ONCE
Status: CANCELLED | OUTPATIENT
Start: 2018-06-18 | End: 2018-06-18

## 2018-06-18 RX ORDER — SODIUM CHLORIDE 0.9 % (FLUSH) 0.9 %
10 SYRINGE (ML) INJECTION PRN
Status: DISPENSED | OUTPATIENT
Start: 2018-06-18 | End: 2018-06-19

## 2018-06-18 RX ORDER — SODIUM CHLORIDE 9 MG/ML
100 INJECTION, SOLUTION INTRAVENOUS CONTINUOUS
Status: CANCELLED | OUTPATIENT
Start: 2018-06-18

## 2018-06-18 RX ORDER — DIPHENHYDRAMINE HYDROCHLORIDE 50 MG/ML
50 INJECTION INTRAMUSCULAR; INTRAVENOUS ONCE
Status: CANCELLED | OUTPATIENT
Start: 2018-06-18 | End: 2018-06-18

## 2018-06-18 RX ORDER — 0.9 % SODIUM CHLORIDE 0.9 %
10 VIAL (ML) INJECTION ONCE
Status: CANCELLED | OUTPATIENT
Start: 2018-06-18 | End: 2018-06-18

## 2018-06-18 RX ADMIN — Medication 20 ML: at 08:36

## 2018-06-18 RX ADMIN — Medication 10 ML: at 09:30

## 2018-06-18 RX ADMIN — Medication 30 ML: at 09:48

## 2018-06-18 RX ADMIN — Medication 10 ML: at 08:57

## 2018-06-18 RX ADMIN — SODIUM CHLORIDE: 9 INJECTION, SOLUTION INTRAVENOUS at 08:36

## 2018-06-18 NOTE — PROGRESS NOTES
Outpatient [address] Visit    NAME:  Nancy Enriquez  YOB: 1975  MEDICAL RECORD NUMBER:  [id number]  Episode Date:  6/18/2018    Patient arrived to [facility]     [] per wheelchair   [x] ambulatory with O2 2L per NC    Verification of patient education of Zemaira actions and potential side effects:  Yes     Is this the patient's first Zemaira Injection? No   Did the patient experience any adverse reactions to previous Zemaira Injection? No    Any side effects from last infusion:  No     [] Rash         [] Itching  [] Headache  [] Fatigue  [] Dizziness  [] Tingling   [] Chest tightness  [] Shortness of Breath  [] Wheezing    Patient Active Problem List   Diagnosis Code    Abdominal pain R10.9    Lymphadenopathy R59.1    GERD (gastroesophageal reflux disease) K21.9    Ariza's esophagus with dysplasia K22.719    Emphysema lung J43.9    Alpha-1-antitrypsin deficiency ([facility] E88.01    Essential hypertension, benign I10    Advance care planning Z71.89    Type 2 diabetes mellitus without complication, without long-term current use of insulin ([facility]) E11.9    Colon polyp K63.5       Pre infusion Vital Signs       Temp: 97.5 °F (36.4 °C)     Pulse: 80     Resp: 19     BP: 115/77       Does the Patient have a History of:     IgA deficiency: no     Anaphylaxis or severe systemic response to any medication:  No        Breath Sounds: No increased work of breathing, Breath sounds clear to auscultation bilaterally, Good air exchange and No crackles  Pulse Oximetry: 94 %    Any recent activity tolerance changes: No      Any increased SOB or dyspnea:  No     Presence of cough or sputum:  Yes - thin pale yellow in the morning when he first gets up per his normal     Premedicated: None ordered  [] Benadryl 25 mg IV  [] Benadryl 50 mg IV  [] Benadryl 25 mg oral   [] Benadryl 50 mg oral  [] Other    Epinephrine at bedside:  No - readily available in

## 2018-06-19 ENCOUNTER — OFFICE VISIT (OUTPATIENT)
Dept: FAMILY MEDICINE CLINIC | Age: 43
End: 2018-06-19

## 2018-06-19 VITALS
HEART RATE: 71 BPM | OXYGEN SATURATION: 96 % | WEIGHT: 193 LBS | DIASTOLIC BLOOD PRESSURE: 77 MMHG | SYSTOLIC BLOOD PRESSURE: 109 MMHG | BODY MASS INDEX: 27.63 KG/M2 | HEIGHT: 70 IN

## 2018-06-19 DIAGNOSIS — E88.01 ALPHA-1-ANTITRYPSIN DEFICIENCY (HCC): ICD-10-CM

## 2018-06-19 DIAGNOSIS — K21.00 GASTROESOPHAGEAL REFLUX DISEASE WITH ESOPHAGITIS: ICD-10-CM

## 2018-06-19 DIAGNOSIS — E11.9 TYPE 2 DIABETES MELLITUS WITHOUT COMPLICATION, WITHOUT LONG-TERM CURRENT USE OF INSULIN (HCC): Primary | ICD-10-CM

## 2018-06-19 DIAGNOSIS — K22.719 BARRETT'S ESOPHAGUS WITH DYSPLASIA: ICD-10-CM

## 2018-06-19 LAB
A/G RATIO: 1.4 (ref 1.1–2.2)
ALBUMIN SERPL-MCNC: 4.1 G/DL (ref 3.4–5)
ALP BLD-CCNC: 80 U/L (ref 40–129)
ALT SERPL-CCNC: 74 U/L (ref 10–40)
ANION GAP SERPL CALCULATED.3IONS-SCNC: 15 MMOL/L (ref 3–16)
AST SERPL-CCNC: 50 U/L (ref 15–37)
BILIRUB SERPL-MCNC: 0.4 MG/DL (ref 0–1)
BUN BLDV-MCNC: 11 MG/DL (ref 7–20)
CALCIUM SERPL-MCNC: 9.4 MG/DL (ref 8.3–10.6)
CHLORIDE BLD-SCNC: 100 MMOL/L (ref 99–110)
CHOLESTEROL, TOTAL: 190 MG/DL (ref 0–199)
CO2: 24 MMOL/L (ref 21–32)
CREAT SERPL-MCNC: 0.9 MG/DL (ref 0.9–1.3)
GFR AFRICAN AMERICAN: >60
GFR NON-AFRICAN AMERICAN: >60
GLOBULIN: 2.9 G/DL
GLUCOSE BLD-MCNC: 108 MG/DL (ref 70–99)
HBA1C MFR BLD: 5.9 %
HDLC SERPL-MCNC: 34 MG/DL (ref 40–60)
LDL CHOLESTEROL CALCULATED: 130 MG/DL
POTASSIUM SERPL-SCNC: 4.6 MMOL/L (ref 3.5–5.1)
SODIUM BLD-SCNC: 139 MMOL/L (ref 136–145)
TOTAL PROTEIN: 7 G/DL (ref 6.4–8.2)
TRIGL SERPL-MCNC: 130 MG/DL (ref 0–150)
VLDLC SERPL CALC-MCNC: 26 MG/DL

## 2018-06-19 PROCEDURE — 99214 OFFICE O/P EST MOD 30 MIN: CPT | Performed by: FAMILY MEDICINE

## 2018-06-19 PROCEDURE — 83036 HEMOGLOBIN GLYCOSYLATED A1C: CPT | Performed by: FAMILY MEDICINE

## 2018-06-19 PROCEDURE — 36415 COLL VENOUS BLD VENIPUNCTURE: CPT | Performed by: FAMILY MEDICINE

## 2018-06-19 ASSESSMENT — ENCOUNTER SYMPTOMS
CONSTIPATION: 0
DIARRHEA: 0
COUGH: 1
VOMITING: 0
SHORTNESS OF BREATH: 1
ABDOMINAL PAIN: 0
NAUSEA: 0

## 2018-06-19 ASSESSMENT — PATIENT HEALTH QUESTIONNAIRE - PHQ9
SUM OF ALL RESPONSES TO PHQ9 QUESTIONS 1 & 2: 0
1. LITTLE INTEREST OR PLEASURE IN DOING THINGS: 0
2. FEELING DOWN, DEPRESSED OR HOPELESS: 0
SUM OF ALL RESPONSES TO PHQ QUESTIONS 1-9: 0

## 2018-06-25 ENCOUNTER — HOSPITAL ENCOUNTER (OUTPATIENT)
Dept: INFUSION THERAPY | Age: 43
Discharge: HOME | End: 2018-06-26
Admitting: INTERNAL MEDICINE

## 2018-06-25 VITALS
DIASTOLIC BLOOD PRESSURE: 82 MMHG | WEIGHT: 189 LBS | TEMPERATURE: 97.6 F | HEIGHT: 70 IN | BODY MASS INDEX: 27.06 KG/M2 | SYSTOLIC BLOOD PRESSURE: 130 MMHG | HEART RATE: 77 BPM | RESPIRATION RATE: 18 BRPM | OXYGEN SATURATION: 97 %

## 2018-06-25 DIAGNOSIS — E88.01 ALPHA-1-ANTITRYPSIN DEFICIENCY (HCC): ICD-10-CM

## 2018-06-25 RX ORDER — SODIUM CHLORIDE 9 MG/ML
INJECTION, SOLUTION INTRAVENOUS ONCE
Status: CANCELLED | OUTPATIENT
Start: 2018-06-25

## 2018-06-25 RX ORDER — SODIUM CHLORIDE 9 MG/ML
INJECTION, SOLUTION INTRAVENOUS ONCE
Status: COMPLETED | OUTPATIENT
Start: 2018-06-25 | End: 2018-06-25

## 2018-06-25 RX ORDER — DIPHENHYDRAMINE HYDROCHLORIDE 50 MG/ML
50 INJECTION INTRAMUSCULAR; INTRAVENOUS ONCE
Status: CANCELLED | OUTPATIENT
Start: 2018-06-25 | End: 2018-06-25

## 2018-06-25 RX ORDER — METHYLPREDNISOLONE SODIUM SUCCINATE 125 MG/2ML
125 INJECTION, POWDER, LYOPHILIZED, FOR SOLUTION INTRAMUSCULAR; INTRAVENOUS ONCE
Status: CANCELLED | OUTPATIENT
Start: 2018-06-25 | End: 2018-06-25

## 2018-06-25 RX ORDER — 0.9 % SODIUM CHLORIDE 0.9 %
10 VIAL (ML) INJECTION ONCE
Status: CANCELLED | OUTPATIENT
Start: 2018-06-25 | End: 2018-06-25

## 2018-06-25 RX ORDER — SODIUM CHLORIDE 0.9 % (FLUSH) 0.9 %
10 SYRINGE (ML) INJECTION PRN
Status: CANCELLED | OUTPATIENT
Start: 2018-06-25

## 2018-06-25 RX ORDER — SODIUM CHLORIDE 9 MG/ML
100 INJECTION, SOLUTION INTRAVENOUS CONTINUOUS
Status: CANCELLED | OUTPATIENT
Start: 2018-06-25

## 2018-06-25 RX ADMIN — SODIUM CHLORIDE: 9 INJECTION, SOLUTION INTRAVENOUS at 08:34

## 2018-07-01 ENCOUNTER — HOSPITAL ENCOUNTER (OUTPATIENT)
Dept: INFUSION THERAPY | Age: 43
Discharge: HOME | End: 2018-07-31
Attending: INTERNAL MEDICINE | Admitting: INTERNAL MEDICINE

## 2018-07-02 ENCOUNTER — HOSPITAL ENCOUNTER (OUTPATIENT)
Dept: INFUSION THERAPY | Age: 43
Discharge: HOME | End: 2018-07-03
Admitting: INTERNAL MEDICINE

## 2018-07-02 VITALS
RESPIRATION RATE: 18 BRPM | BODY MASS INDEX: 27.12 KG/M2 | WEIGHT: 189 LBS | SYSTOLIC BLOOD PRESSURE: 129 MMHG | DIASTOLIC BLOOD PRESSURE: 82 MMHG | TEMPERATURE: 98.2 F | OXYGEN SATURATION: 94 % | HEART RATE: 64 BPM

## 2018-07-02 DIAGNOSIS — E88.01 ALPHA-1-ANTITRYPSIN DEFICIENCY (HCC): ICD-10-CM

## 2018-07-02 RX ORDER — SODIUM CHLORIDE 0.9 % (FLUSH) 0.9 %
10 SYRINGE (ML) INJECTION PRN
Status: CANCELLED | OUTPATIENT
Start: 2018-07-02

## 2018-07-02 RX ORDER — SODIUM CHLORIDE 9 MG/ML
INJECTION, SOLUTION INTRAVENOUS ONCE
Status: CANCELLED | OUTPATIENT
Start: 2018-07-02

## 2018-07-02 RX ORDER — DIPHENHYDRAMINE HYDROCHLORIDE 50 MG/ML
50 INJECTION INTRAMUSCULAR; INTRAVENOUS ONCE
Status: CANCELLED | OUTPATIENT
Start: 2018-07-02 | End: 2018-07-02

## 2018-07-02 RX ORDER — SODIUM CHLORIDE 9 MG/ML
INJECTION, SOLUTION INTRAVENOUS ONCE
Status: COMPLETED | OUTPATIENT
Start: 2018-07-02 | End: 2018-07-02

## 2018-07-02 RX ORDER — SODIUM CHLORIDE 0.9 % (FLUSH) 0.9 %
10 SYRINGE (ML) INJECTION PRN
Status: DISPENSED | OUTPATIENT
Start: 2018-07-02 | End: 2018-07-03

## 2018-07-02 RX ORDER — SODIUM CHLORIDE 9 MG/ML
100 INJECTION, SOLUTION INTRAVENOUS CONTINUOUS
Status: CANCELLED | OUTPATIENT
Start: 2018-07-02

## 2018-07-02 RX ORDER — 0.9 % SODIUM CHLORIDE 0.9 %
10 VIAL (ML) INJECTION ONCE
Status: CANCELLED | OUTPATIENT
Start: 2018-07-02 | End: 2018-07-02

## 2018-07-02 RX ORDER — METHYLPREDNISOLONE SODIUM SUCCINATE 125 MG/2ML
125 INJECTION, POWDER, LYOPHILIZED, FOR SOLUTION INTRAMUSCULAR; INTRAVENOUS ONCE
Status: CANCELLED | OUTPATIENT
Start: 2018-07-02 | End: 2018-07-02

## 2018-07-02 RX ADMIN — SODIUM CHLORIDE 1000 ML: 9 INJECTION, SOLUTION INTRAVENOUS at 09:10

## 2018-07-02 RX ADMIN — Medication 10 ML: at 08:50

## 2018-07-02 NOTE — PROGRESS NOTES
Outpatient [address] Access    NAME:  Keri Shah  YOB: 1975  MEDICAL RECORD NUMBER:  [id number]  DATE:  7/2/2018    Patient arrived to [facility]   [] per wheelchair   [x] ambulatory     Port Site Location:  right chest, anterior                                                                                                                                                                                                                                                                                                                                                          Port Site:  Redness: No  Bruising: No   Edema: No  Pain: No     Port Site cleansed with:  Chloroprep Scrub x 2  for 30 seconds and air dried x 3 mis? Yes     Port Accessed with: 19 Gauge one inch  Power Port non coring needle using sterile technique. Blood return obtained: Yes, after pt turned his head to left side   Flushed with 20 ml Normal Saline using push-pause method. Port flushes without resistance. Biopatch and Tegaderm HP to site , double T connector to site   Response to treatment:  Well tolerated by patient.     Electronically signed by Valdo Barajas RN on 7/2/2018 at 1020am

## 2018-07-02 NOTE — PROGRESS NOTES
Outpatient [address] [facility]     Zemaire Visit    NAME:  Nikhil Ventura  YOB: 1975  MEDICAL RECORD NUMBER:  [id number]  Episode Date:  7/2/2018    Patient arrived to [facility]  [] per wheelchair   [x] ambulatory   Color good, pt has no c/o's , denies new medical issues. Pt denies any increase in sob with the humid , hot weather we have had in the last week . Pt continues to work fulltime and plays golf twice week . Pt wears portable oxygen at all times . Verification of patient education of Zemaira actions and potential side effects:  Yes     Is this the patient's first Zemaire Injection? No   Did the patient experience any adverse reactions to previous Zemaire Injection? No    Any side effects from last infusion:  No     [] Rash         [] Itching  [] Headache  [] Fatigue  [] Dizziness  [] Tingling   [] Chest tightness  [] Shortness of Breath  [] Wheezing    Patient Active Problem List   Diagnosis Code    Abdominal pain R10.9    Lymphadenopathy R59.1    GERD (gastroesophageal reflux disease) K21.9    Ariza's esophagus with dysplasia K22.719    Emphysema lung J43.9    Alpha-1-antitrypsin deficiency ([facility] E88.01    Essential hypertension, benign I10    Advance care planning Z71.89    Type 2 diabetes mellitus without complication, without long-term current use of insulin ([facility]) E11.9    Colon polyp K63.5       Pre infusion Vital Signs       Temp: 98.2 °F (36.8 °C)     Pulse: 82     Resp: 18     BP: 127/79       Does the Patient have a History of:     IgA deficiency: no     Anaphylaxis or severe systemic response to any medication:  No        Breath Sounds: No increased work of breathing, Breath sounds clear to auscultation bilaterally and Good air exchange, sl. wheesing noted in right upper lobe, posteriorly      Any recent activity tolerance changes: No      Any increased SOB or dyspnea:  No     Presence of cough or sputum:  Yes , has clear to white mucus, no increase in production     Premedicated:  No premeds                   [] Benadryl 50 mg IV  [] Benadryl 25 mg oral   [] Benadryl 50 mg oral  [] Other    Epinephrine at bedside:  Yes    IV placed and documented prior to drug preparation:  Zemaira must be administered within 3 hours of drug reconstitution as it contains no preservatives. Monitoring of infusion documented in doc flowsheets. Zemaire administered: Yes     /79   Pulse 82   Temp 98.2 °F (36.8 °C) (Oral)   Resp 18   Wt 189 lb (85.7 kg)   SpO2 95%   BMI 27.12 kg/m²     Zemaire dosage: 60  mg/kg was administered slowly IV--total of 4841 mg in approx 120ml IVPB --use of 5  micron filter with IV infusion   Dose verified by:   Evangelist Chaudhari RN    Post treatment Vital Signs--see flow sheet     Response to treatment:  Well tolerated by patient. Scheduled to return for next dose of Zemaire on July 9, 2018. Pt to see Dr. Deepthi Kapadia in August and is to have a repeat Pulmonary fx.  Test done in August .   Electronically signed by Verna Faye RN on 7/2/2018 at 1015am

## 2018-07-09 ENCOUNTER — HOSPITAL ENCOUNTER (OUTPATIENT)
Dept: INFUSION THERAPY | Age: 43
Discharge: HOME | End: 2018-07-10
Admitting: INTERNAL MEDICINE

## 2018-07-09 ENCOUNTER — TELEPHONE (OUTPATIENT)
Dept: PRIMARY CARE CLINIC | Age: 43
End: 2018-07-09

## 2018-07-09 VITALS
WEIGHT: 187 LBS | OXYGEN SATURATION: 93 % | HEART RATE: 78 BPM | DIASTOLIC BLOOD PRESSURE: 79 MMHG | RESPIRATION RATE: 18 BRPM | BODY MASS INDEX: 26.77 KG/M2 | HEIGHT: 70 IN | SYSTOLIC BLOOD PRESSURE: 120 MMHG | TEMPERATURE: 97.8 F

## 2018-07-09 DIAGNOSIS — E88.01 ALPHA-1-ANTITRYPSIN DEFICIENCY (HCC): ICD-10-CM

## 2018-07-09 RX ORDER — SODIUM CHLORIDE 9 MG/ML
100 INJECTION, SOLUTION INTRAVENOUS CONTINUOUS
Status: CANCELLED | OUTPATIENT
Start: 2018-07-09

## 2018-07-09 RX ORDER — DIPHENHYDRAMINE HYDROCHLORIDE 50 MG/ML
50 INJECTION INTRAMUSCULAR; INTRAVENOUS ONCE
Status: CANCELLED | OUTPATIENT
Start: 2018-07-09 | End: 2018-07-09

## 2018-07-09 RX ORDER — SODIUM CHLORIDE 0.9 % (FLUSH) 0.9 %
10 SYRINGE (ML) INJECTION PRN
Status: ACTIVE | OUTPATIENT
Start: 2018-07-09 | End: 2018-07-10

## 2018-07-09 RX ORDER — SODIUM CHLORIDE 9 MG/ML
INJECTION, SOLUTION INTRAVENOUS ONCE
Status: DISCONTINUED | OUTPATIENT
Start: 2018-07-09 | End: 2018-09-20

## 2018-07-09 RX ORDER — 0.9 % SODIUM CHLORIDE 0.9 %
10 VIAL (ML) INJECTION ONCE
Status: CANCELLED | OUTPATIENT
Start: 2018-07-09 | End: 2018-07-09

## 2018-07-09 RX ORDER — SODIUM CHLORIDE 0.9 % (FLUSH) 0.9 %
10 SYRINGE (ML) INJECTION PRN
Status: CANCELLED | OUTPATIENT
Start: 2018-07-09

## 2018-07-09 RX ORDER — SODIUM CHLORIDE 9 MG/ML
INJECTION, SOLUTION INTRAVENOUS ONCE
Status: CANCELLED | OUTPATIENT
Start: 2018-07-09

## 2018-07-09 RX ORDER — METHYLPREDNISOLONE SODIUM SUCCINATE 125 MG/2ML
125 INJECTION, POWDER, LYOPHILIZED, FOR SOLUTION INTRAMUSCULAR; INTRAVENOUS ONCE
Status: CANCELLED | OUTPATIENT
Start: 2018-07-09 | End: 2018-07-09

## 2018-07-09 RX ADMIN — Medication 20 ML: at 08:19

## 2018-07-09 RX ADMIN — Medication 20 ML: at 09:36

## 2018-07-09 ASSESSMENT — PAIN SCALES - GENERAL: PAINLEVEL_OUTOF10: 0

## 2018-07-09 NOTE — PROGRESS NOTES
Outpatient [address] Access    NAME:  Lisbeth Fitzpatrick  YOB: 1975  MEDICAL RECORD NUMBER:  [id number]  DATE:  7/9/2018    Patient arrived to [place] [address]   [] per wheelchair   [x] ambulatory     Port Site Location:  right chest  Port Site:  Redness: No  Bruising: No   Edema: No  Pain: No     Port Site cleansed with:  Chloroprep Scrub for 30 seconds and air dried? Yes     Port Accessed with: [address] [employer] non coring needle using sterile technique. Blood return obtained: Yes  Flushed with 10 ml Normal Saline using push-pause method. Port flushes without resistance. Response to treatment:  Well tolerated by patient. Electronically signed by Irma Kelsey RN on 7/9/2018 at [facility]     Zemaire Visit    NAME:  Lisbeth Fitzpatrick  YOB: 1975  MEDICAL RECORD NUMBER:  [id number]  Episode Date:  7/9/2018    Patient arrived to [place] [address]    [] per wheelchair   [x] ambulatory     Verification of patient education of Zemaira actions and potential side effects:  Yes     Is this the patient's first Zemaire Injection? No   Did the patient experience any adverse reactions to previous Zemaire Injection?  No    Any side effects from last infusion:  No     [] Rash         [] Itching  [] Headache  [] Fatigue  [] Dizziness  [] Tingling   [] Chest tightness  [] Shortness of Breath  [] Wheezing    Patient Active Problem List   Diagnosis Code    Abdominal pain R10.9    Lymphadenopathy R59.1    GERD (gastroesophageal reflux disease) K21.9    Ariza's esophagus with dysplasia K22.719    Emphysema lung J43.9    Alpha-[facility]-antitrypsin deficiency ([facility] E88.01    Essential hypertension, benign I10    Advance care planning Z71.89    Type 2 diabetes mellitus without complication, without long-term current use of insulin (HCC) E11.9    Colon polyp K63.5       Pre infusion Vital Signs       Temp: 97.8 °F (36.6 °C)     Pulse: 78     Resp: 18     BP: 120/79       Does the Patient have a History of: IgA deficiency: no     Anaphylaxis or severe systemic response to any medication:  No        Breath Sounds: No increased work of breathing, Breath sounds clear to auscultation bilaterally and Good air exchange  Pulse Oximetry: 93 %    Any recent activity tolerance changes: No      Any increased SOB or dyspnea:  No     Presence of cough or sputum:  No     Premedicated:  [] Benadryl 25 mg IV  [] Benadryl 50 mg IV  [] Benadryl 25 mg oral   [] Benadryl 50 mg oral  [] Other    Epinephrine at bedside:  Yes    IV placed and documented prior to drug preparation:  Zemaira must be administered within 3 hours of drug reconstitution as it contains no preservatives. Monitoring of infusion documented in doc flowsheets. Zemaire administered: Yes     /79   Pulse 78   Temp 97.8 °F (36.6 °C) (Oral)   Resp 18   Ht 5' 10\" (1.778 m)   Wt 187 lb (84.8 kg)   SpO2 93%   BMI 26.83 kg/m²     Zemaire dosage: 60 mg/kg was administered slowly IV  Dose verified by:  Carina Priest RN    Post treatment Vital Signs  Temp: 97.8 °F (36.6 °C)  Pulse: 78  Resp: 18  BP: 120/79    Response to treatment:  Well tolerated by patient. Scheduled to return for next dose of Zemaire on July 16, 2018.      Electronically signed by Gena Murcia RN on 7/9/2018 at 8:21 AM

## 2018-07-16 ENCOUNTER — HOSPITAL ENCOUNTER (OUTPATIENT)
Dept: INFUSION THERAPY | Age: 43
Discharge: HOME | End: 2018-07-17
Admitting: INTERNAL MEDICINE

## 2018-07-16 VITALS
TEMPERATURE: 97.4 F | OXYGEN SATURATION: 95 % | HEIGHT: 70 IN | BODY MASS INDEX: 26.92 KG/M2 | WEIGHT: 188 LBS | SYSTOLIC BLOOD PRESSURE: 117 MMHG | HEART RATE: 87 BPM | DIASTOLIC BLOOD PRESSURE: 80 MMHG | RESPIRATION RATE: 18 BRPM

## 2018-07-16 DIAGNOSIS — E88.01 ALPHA-1-ANTITRYPSIN DEFICIENCY (HCC): ICD-10-CM

## 2018-07-16 RX ORDER — SODIUM CHLORIDE 9 MG/ML
INJECTION, SOLUTION INTRAVENOUS ONCE
Status: COMPLETED | OUTPATIENT
Start: 2018-07-16 | End: 2018-07-16

## 2018-07-16 RX ORDER — METHYLPREDNISOLONE SODIUM SUCCINATE 125 MG/2ML
125 INJECTION, POWDER, LYOPHILIZED, FOR SOLUTION INTRAMUSCULAR; INTRAVENOUS ONCE
Status: CANCELLED | OUTPATIENT
Start: 2018-07-16 | End: 2018-07-16

## 2018-07-16 RX ORDER — SODIUM CHLORIDE 0.9 % (FLUSH) 0.9 %
10 SYRINGE (ML) INJECTION PRN
Status: DISPENSED | OUTPATIENT
Start: 2018-07-16 | End: 2018-07-17

## 2018-07-16 RX ORDER — SODIUM CHLORIDE 9 MG/ML
100 INJECTION, SOLUTION INTRAVENOUS CONTINUOUS
Status: CANCELLED | OUTPATIENT
Start: 2018-07-16

## 2018-07-16 RX ORDER — SODIUM CHLORIDE 9 MG/ML
INJECTION, SOLUTION INTRAVENOUS ONCE
Status: CANCELLED | OUTPATIENT
Start: 2018-07-16

## 2018-07-16 RX ORDER — DIPHENHYDRAMINE HYDROCHLORIDE 50 MG/ML
50 INJECTION INTRAMUSCULAR; INTRAVENOUS ONCE
Status: CANCELLED | OUTPATIENT
Start: 2018-07-16 | End: 2018-07-16

## 2018-07-16 RX ORDER — SODIUM CHLORIDE 0.9 % (FLUSH) 0.9 %
10 SYRINGE (ML) INJECTION PRN
Status: CANCELLED | OUTPATIENT
Start: 2018-07-16

## 2018-07-16 RX ORDER — 0.9 % SODIUM CHLORIDE 0.9 %
10 VIAL (ML) INJECTION ONCE
Status: CANCELLED | OUTPATIENT
Start: 2018-07-16 | End: 2018-07-16

## 2018-07-16 RX ADMIN — Medication 30 ML: at 08:53

## 2018-07-16 RX ADMIN — Medication 20 ML: at 09:43

## 2018-07-16 RX ADMIN — SODIUM CHLORIDE: 9 INJECTION, SOLUTION INTRAVENOUS at 08:52

## 2018-07-16 ASSESSMENT — PAIN SCALES - GENERAL: PAINLEVEL_OUTOF10: 0

## 2018-07-23 ENCOUNTER — HOSPITAL ENCOUNTER (OUTPATIENT)
Dept: INFUSION THERAPY | Age: 43
Discharge: HOME | End: 2018-07-24
Admitting: INTERNAL MEDICINE

## 2018-07-23 VITALS
HEIGHT: 70 IN | DIASTOLIC BLOOD PRESSURE: 80 MMHG | TEMPERATURE: 97.8 F | HEART RATE: 68 BPM | BODY MASS INDEX: 27.06 KG/M2 | OXYGEN SATURATION: 96 % | SYSTOLIC BLOOD PRESSURE: 123 MMHG | RESPIRATION RATE: 17 BRPM | WEIGHT: 189 LBS

## 2018-07-23 DIAGNOSIS — E88.01 ALPHA-1-ANTITRYPSIN DEFICIENCY (HCC): ICD-10-CM

## 2018-07-23 RX ORDER — SODIUM CHLORIDE 9 MG/ML
INJECTION, SOLUTION INTRAVENOUS ONCE
Status: COMPLETED | OUTPATIENT
Start: 2018-07-23 | End: 2018-07-23

## 2018-07-23 RX ORDER — DIPHENHYDRAMINE HYDROCHLORIDE 50 MG/ML
50 INJECTION INTRAMUSCULAR; INTRAVENOUS ONCE
Status: CANCELLED | OUTPATIENT
Start: 2018-07-23 | End: 2018-07-23

## 2018-07-23 RX ORDER — 0.9 % SODIUM CHLORIDE 0.9 %
10 VIAL (ML) INJECTION ONCE
Status: CANCELLED | OUTPATIENT
Start: 2018-07-23 | End: 2018-07-23

## 2018-07-23 RX ORDER — SODIUM CHLORIDE 0.9 % (FLUSH) 0.9 %
10 SYRINGE (ML) INJECTION PRN
Status: CANCELLED | OUTPATIENT
Start: 2018-07-23

## 2018-07-23 RX ORDER — METHYLPREDNISOLONE SODIUM SUCCINATE 125 MG/2ML
125 INJECTION, POWDER, LYOPHILIZED, FOR SOLUTION INTRAMUSCULAR; INTRAVENOUS ONCE
Status: CANCELLED | OUTPATIENT
Start: 2018-07-23 | End: 2018-07-23

## 2018-07-23 RX ORDER — SODIUM CHLORIDE 9 MG/ML
100 INJECTION, SOLUTION INTRAVENOUS CONTINUOUS
Status: CANCELLED | OUTPATIENT
Start: 2018-07-23

## 2018-07-23 RX ORDER — SODIUM CHLORIDE 0.9 % (FLUSH) 0.9 %
10 SYRINGE (ML) INJECTION PRN
Status: ACTIVE | OUTPATIENT
Start: 2018-07-23 | End: 2018-07-24

## 2018-07-23 RX ORDER — SODIUM CHLORIDE 9 MG/ML
INJECTION, SOLUTION INTRAVENOUS ONCE
Status: CANCELLED | OUTPATIENT
Start: 2018-07-23

## 2018-07-23 RX ADMIN — Medication 20 ML: at 08:37

## 2018-07-23 RX ADMIN — Medication 30 ML: at 09:42

## 2018-07-23 RX ADMIN — Medication 10 ML: at 08:56

## 2018-07-23 RX ADMIN — Medication 10 ML: at 09:23

## 2018-07-23 RX ADMIN — SODIUM CHLORIDE: 9 INJECTION, SOLUTION INTRAVENOUS at 08:38

## 2018-07-30 ENCOUNTER — HOSPITAL ENCOUNTER (OUTPATIENT)
Dept: INFUSION THERAPY | Age: 43
Discharge: HOME | End: 2018-08-31
Admitting: INTERNAL MEDICINE

## 2018-07-30 VITALS
RESPIRATION RATE: 17 BRPM | TEMPERATURE: 97.8 F | SYSTOLIC BLOOD PRESSURE: 130 MMHG | HEART RATE: 74 BPM | DIASTOLIC BLOOD PRESSURE: 78 MMHG | BODY MASS INDEX: 26.98 KG/M2 | WEIGHT: 188 LBS | OXYGEN SATURATION: 97 %

## 2018-07-30 DIAGNOSIS — E88.01 ALPHA-1-ANTITRYPSIN DEFICIENCY (HCC): ICD-10-CM

## 2018-07-30 RX ORDER — SODIUM CHLORIDE 0.9 % (FLUSH) 0.9 %
10 SYRINGE (ML) INJECTION PRN
Status: CANCELLED | OUTPATIENT
Start: 2018-07-30

## 2018-07-30 RX ORDER — SODIUM CHLORIDE 9 MG/ML
INJECTION, SOLUTION INTRAVENOUS ONCE
Status: CANCELLED | OUTPATIENT
Start: 2018-07-30

## 2018-07-30 RX ORDER — SODIUM CHLORIDE 9 MG/ML
INJECTION, SOLUTION INTRAVENOUS ONCE
Status: COMPLETED | OUTPATIENT
Start: 2018-07-30 | End: 2018-07-30

## 2018-07-30 RX ORDER — SODIUM CHLORIDE 0.9 % (FLUSH) 0.9 %
10 SYRINGE (ML) INJECTION PRN
Status: DISPENSED | OUTPATIENT
Start: 2018-07-30 | End: 2018-07-31

## 2018-07-30 RX ORDER — SODIUM CHLORIDE 9 MG/ML
100 INJECTION, SOLUTION INTRAVENOUS CONTINUOUS
Status: CANCELLED | OUTPATIENT
Start: 2018-07-30

## 2018-07-30 RX ORDER — METHYLPREDNISOLONE SODIUM SUCCINATE 125 MG/2ML
125 INJECTION, POWDER, LYOPHILIZED, FOR SOLUTION INTRAMUSCULAR; INTRAVENOUS ONCE
Status: CANCELLED | OUTPATIENT
Start: 2018-07-30 | End: 2018-07-30

## 2018-07-30 RX ORDER — DIPHENHYDRAMINE HYDROCHLORIDE 50 MG/ML
50 INJECTION INTRAMUSCULAR; INTRAVENOUS ONCE
Status: CANCELLED | OUTPATIENT
Start: 2018-07-30 | End: 2018-07-30

## 2018-07-30 RX ORDER — 0.9 % SODIUM CHLORIDE 0.9 %
10 VIAL (ML) INJECTION ONCE
Status: CANCELLED | OUTPATIENT
Start: 2018-07-30 | End: 2018-07-30

## 2018-07-30 RX ADMIN — Medication 10 ML: at 09:49

## 2018-07-30 RX ADMIN — Medication 40 ML: at 08:34

## 2018-07-30 RX ADMIN — Medication 10 ML: at 09:18

## 2018-07-30 RX ADMIN — SODIUM CHLORIDE: 9 INJECTION, SOLUTION INTRAVENOUS at 08:34

## 2018-07-30 RX ADMIN — Medication 40 ML: at 10:05

## 2018-07-30 NOTE — PROGRESS NOTES
to auscultation bilaterally, Good air exchange and No crackles  Pulse Oximetry: 95 %    Any recent activity tolerance changes: No      Any increased SOB or dyspnea:  No     Presence of cough or sputum:  Yes - mostly in the morning - thin, pale yellow - small amount. Premedicated: None ordered  [] Benadryl 25 mg IV  [] Benadryl 50 mg IV  [] Benadryl 25 mg oral   [] Benadryl 50 mg oral  [] Other    Epinephrine at bedside:  No - readily available in Pyxis    IV placed and documented prior to drug preparation:  Zemaira must be administered within 3 hours of drug reconstitution as it contains no preservatives. Monitoring of infusion documented in doc flowsheets. Zemaire administered: Yes     /78   Pulse 74   Temp 97.8 °F (36.6 °C) (Oral)   Resp 17   Wt 188 lb (85.3 kg)   SpO2 97%   BMI 26.98 kg/m²     Zemaire dosage: 60 mg/kg +/- 10% (total dose for today was  Was 4,820 mg) administered slowly IV  Dose verified by:  Fernando Garces RN    Post treatment Vital Signs  Temp: 97.8 °F (36.6 °C)  Pulse: 74  Resp: 17  BP: 130/78    Response to treatment:  Well tolerated by patient. Scheduled to return for next dose of Zemaire on August 6, 2018.      Electronically signed by Liliana Sanchez RN on 7/30/2018 at 12:05 PM

## 2018-07-31 ENCOUNTER — TELEPHONE (OUTPATIENT)
Dept: PULMONOLOGY | Age: 43
End: 2018-07-31

## 2018-07-31 NOTE — TELEPHONE ENCOUNTER
Patient called because he has an upcoming appointment and he said he was doing PFT's every 6 months and didn't know if he needed another one before his visit. I didn't see an order in for it but just to make sure. Please advise.

## 2018-07-31 NOTE — TELEPHONE ENCOUNTER
Looking at Dr Betty Cronin last office note from 2/12/18 he commented that he would plan to repeat PFT in a year.  So no he does not need one prior to his upcoming appt 8/13/18

## 2018-08-01 ENCOUNTER — HOSPITAL ENCOUNTER (OUTPATIENT)
Dept: INFUSION THERAPY | Age: 43
Discharge: HOME | End: 2018-08-01
Attending: INTERNAL MEDICINE | Admitting: INTERNAL MEDICINE

## 2018-08-03 RX ORDER — SODIUM CHLORIDE 0.9 % (FLUSH) 0.9 %
20 SYRINGE (ML) INJECTION PRN
Status: CANCELLED | OUTPATIENT
Start: 2018-08-06

## 2018-08-03 RX ORDER — HEPARIN SODIUM (PORCINE) LOCK FLUSH IV SOLN 100 UNIT/ML 100 UNIT/ML
500 SOLUTION INTRAVENOUS PRN
Status: CANCELLED | OUTPATIENT
Start: 2018-08-06

## 2018-08-06 ENCOUNTER — HOSPITAL ENCOUNTER (OUTPATIENT)
Dept: INFUSION THERAPY | Age: 43
Discharge: HOME | End: 2018-08-07
Admitting: INTERNAL MEDICINE

## 2018-08-06 VITALS
SYSTOLIC BLOOD PRESSURE: 123 MMHG | WEIGHT: 188 LBS | BODY MASS INDEX: 26.98 KG/M2 | RESPIRATION RATE: 17 BRPM | DIASTOLIC BLOOD PRESSURE: 71 MMHG | OXYGEN SATURATION: 94 % | HEART RATE: 72 BPM | TEMPERATURE: 97.6 F

## 2018-08-06 DIAGNOSIS — E88.01 ALPHA-1-ANTITRYPSIN DEFICIENCY (HCC): ICD-10-CM

## 2018-08-06 RX ORDER — SODIUM CHLORIDE 0.9 % (FLUSH) 0.9 %
10 SYRINGE (ML) INJECTION PRN
Status: CANCELLED | OUTPATIENT
Start: 2018-08-06

## 2018-08-06 RX ORDER — SODIUM CHLORIDE 0.9 % (FLUSH) 0.9 %
10 SYRINGE (ML) INJECTION PRN
Status: DISPENSED | OUTPATIENT
Start: 2018-08-06 | End: 2018-08-07

## 2018-08-06 RX ORDER — SODIUM CHLORIDE 9 MG/ML
INJECTION, SOLUTION INTRAVENOUS ONCE
Status: CANCELLED | OUTPATIENT
Start: 2018-08-06

## 2018-08-06 RX ORDER — SODIUM CHLORIDE 9 MG/ML
INJECTION, SOLUTION INTRAVENOUS ONCE
Status: COMPLETED | OUTPATIENT
Start: 2018-08-06 | End: 2018-08-06

## 2018-08-06 RX ORDER — SODIUM CHLORIDE 0.9 % (FLUSH) 0.9 %
10 SYRINGE (ML) INJECTION PRN
Status: ACTIVE | OUTPATIENT
Start: 2018-08-06 | End: 2018-08-07

## 2018-08-06 RX ORDER — HEPARIN SODIUM (PORCINE) LOCK FLUSH IV SOLN 100 UNIT/ML 100 UNIT/ML
500 SOLUTION INTRAVENOUS PRN
Status: CANCELLED | OUTPATIENT
Start: 2018-08-06

## 2018-08-06 RX ORDER — SODIUM CHLORIDE 0.9 % (FLUSH) 0.9 %
20 SYRINGE (ML) INJECTION PRN
Status: CANCELLED | OUTPATIENT
Start: 2018-08-06

## 2018-08-06 RX ADMIN — Medication 20 ML: at 08:18

## 2018-08-06 RX ADMIN — Medication 10 ML: at 08:57

## 2018-08-06 RX ADMIN — Medication 30 ML: at 09:46

## 2018-08-06 RX ADMIN — Medication 10 ML: at 09:28

## 2018-08-06 RX ADMIN — SODIUM CHLORIDE: 9 INJECTION, SOLUTION INTRAVENOUS at 08:18

## 2018-08-06 NOTE — PROGRESS NOTES
[facility] Visit with [facility] Access    NAME:  Denisa Rocha  YOB: 1975  MEDICAL RECORD NUMBER:  [id number]  Episode Date:  8/6/2018    Patient arrived to [facility]     [] per wheelchair   [x] ambulatory with O2 2L per [facility]     Port [place] Site Location:  right chest  Port Site:  Redness: No  Bruising: No   Edema: No  Pain: No     Port Site cleansed with:  Chloroprep Scrub for 30 seconds and air dried? Yes     Port Accessed with: 19 Gauge 1 inch Power Port non coring needle using sterile technique. Blood return obtained: Yes  Flushed with 10 ml Normal Saline using push-pause method. Port flushes without resistance. Response to treatment:  Well tolerated by patient. Zemaira Visit     Verification of patient education of Zemaira actions and potential side effects:  Yes     Is this the patient's first Zemaira Infusion? No   Did the patient experience any adverse reactions to previous Zemaira Infusion? No    Any side effects from last infusion:  No     [] Rash         [] Itching  [] Headache  [] Fatigue  [] Dizziness  [] Tingling   [] Chest tightness  [] Shortness of Breath  [] Wheezing    Patient Active Problem List   Diagnosis Code    Abdominal pain R10.9    Lymphadenopathy R59.1    GERD (gastroesophageal reflux disease) K21.9    Ariza's esophagus with dysplasia K22.719    Emphysema lung J43.9    [facility]-antitry[facility] deficiency ([facility] E88.01    Essential hypertension, benign I10    Advance care planning Z71.89    Type 2 diabetes mellitus without complication, without long-term current use of insulin ([facility]) E11.9    Colon polyp K63.5       Pre infusion Vital Signs       Temp: 97.6 °F (36.4 °C)     Pulse: 79     Resp: 17     BP: 123/81       Does the Patient have a History of:     IgA deficiency: no     Anaphylaxis or severe systemic response to any medication:  No        Breath Sounds: No increased work of breathing, Breath sounds clear to auscultation bilaterally, Good air exchange and No crackles  Pulse Oximetry: 93 %    Any recent activity tolerance changes: No      Any increased SOB or dyspnea:  No     Presence of cough or sputum:  Yes - mostly in the morning - thin, pale yellow     Premedicated: None ordered  [] Benadryl 25 mg IV  [] Benadryl 50 mg IV  [] Benadryl 25 mg oral   [] Benadryl 50 mg oral  [] Other    Epinephrine at bedside:  No - readily available in the Pyxis    IV placed and documented prior to drug preparation:  Zemaira must be administered within 3 hours of drug reconstitution as it contains no preservatives. Monitoring of infusion documented in doc flowsheets. Zemaire administered: Yes     /71   Pulse 72   Temp 97.6 °F (36.4 °C) (Oral)   Resp 17   Wt 188 lb (85.3 kg)   SpO2 94%   BMI 26.98 kg/m²     Zemaire dosage: 60 mg/kg +/- 10% (total dose for today was 4,820 mg) was administered slowly IV  Dose verified by:  Solo Barron RN    Post treatment Vital Signs  Temp: 97.6 °F (36.4 °C)  Pulse: 72  Resp: 17  BP: 123/71    Response to treatment:  Well tolerated by patient. Scheduled to return for next dose of Zemaire on August 13, 2018.      Electronically signed by Surjit Lin RN on 8/6/2018 at 9:53 AM

## 2018-08-13 ENCOUNTER — OFFICE VISIT (OUTPATIENT)
Dept: PULMONOLOGY | Age: 43
End: 2018-08-13

## 2018-08-13 ENCOUNTER — HOSPITAL ENCOUNTER (OUTPATIENT)
Dept: INFUSION THERAPY | Age: 43
Discharge: HOME | End: 2018-08-14
Admitting: INTERNAL MEDICINE

## 2018-08-13 VITALS
HEART RATE: 69 BPM | TEMPERATURE: 97.7 F | SYSTOLIC BLOOD PRESSURE: 128 MMHG | RESPIRATION RATE: 18 BRPM | OXYGEN SATURATION: 96 % | WEIGHT: 191 LBS | DIASTOLIC BLOOD PRESSURE: 81 MMHG | BODY MASS INDEX: 27.41 KG/M2

## 2018-08-13 VITALS
HEART RATE: 69 BPM | TEMPERATURE: 97.6 F | DIASTOLIC BLOOD PRESSURE: 85 MMHG | HEIGHT: 70 IN | WEIGHT: 202 LBS | SYSTOLIC BLOOD PRESSURE: 137 MMHG | BODY MASS INDEX: 28.92 KG/M2 | OXYGEN SATURATION: 96 % | RESPIRATION RATE: 16 BRPM

## 2018-08-13 DIAGNOSIS — E88.01 ALPHA-1-ANTITRYPSIN DEFICIENCY (HCC): Primary | ICD-10-CM

## 2018-08-13 DIAGNOSIS — E88.01 ALPHA-1-ANTITRYPSIN DEFICIENCY (HCC): ICD-10-CM

## 2018-08-13 DIAGNOSIS — J96.11 CHRONIC HYPOXEMIC RESPIRATORY FAILURE (HCC): ICD-10-CM

## 2018-08-13 PROCEDURE — 99213 OFFICE O/P EST LOW 20 MIN: CPT | Performed by: INTERNAL MEDICINE

## 2018-08-13 RX ORDER — SODIUM CHLORIDE 0.9 % (FLUSH) 0.9 %
10 SYRINGE (ML) INJECTION PRN
Status: CANCELLED | OUTPATIENT
Start: 2018-08-13

## 2018-08-13 RX ORDER — SODIUM CHLORIDE 0.9 % (FLUSH) 0.9 %
20 SYRINGE (ML) INJECTION PRN
Status: CANCELLED | OUTPATIENT
Start: 2018-08-13

## 2018-08-13 RX ORDER — HEPARIN SODIUM (PORCINE) LOCK FLUSH IV SOLN 100 UNIT/ML 100 UNIT/ML
500 SOLUTION INTRAVENOUS PRN
Status: CANCELLED | OUTPATIENT
Start: 2018-08-13

## 2018-08-13 RX ORDER — SODIUM CHLORIDE 9 MG/ML
INJECTION, SOLUTION INTRAVENOUS ONCE
Status: CANCELLED | OUTPATIENT
Start: 2018-08-13

## 2018-08-13 RX ORDER — SODIUM CHLORIDE 9 MG/ML
INJECTION, SOLUTION INTRAVENOUS ONCE
Status: DISCONTINUED | OUTPATIENT
Start: 2018-08-13 | End: 2018-09-20

## 2018-08-13 RX ORDER — SODIUM CHLORIDE 0.9 % (FLUSH) 0.9 %
10 SYRINGE (ML) INJECTION PRN
Status: ACTIVE | OUTPATIENT
Start: 2018-08-13 | End: 2018-08-14

## 2018-08-13 NOTE — PROGRESS NOTES
Outpatient [address] Access     NAME:  Greg Soares  YOB: 1975  MEDICAL RECORD NUMBER:  [id number]  DATE:  8/13/2018    Patient arrived to [facility]   [] per wheelchair   [x] ambulatory     Port Site Location:  right chest    Port Site:  Redness: No  Bruising: No   Edema: No  Pain: No     Port Site cleansed with:  Chloroprep Scrub for 30 seconds and air dried? Yes    Port Accessed with: 20 Gauge 1 inch Power Port needle using sterile technique. Blood return obtained: Yes    Port flushes without resistance. Response to treatment:  Well tolerated by patient.     Education:    Indicates understanding    Electronically signed by Nolvia Marcum RN on 8/13/2018 at 8:28 AM

## 2018-08-13 NOTE — PROGRESS NOTES
reviewed and imaging was reviewed by me and showed clearance on pneumonia       Overall Interpretation  Moderate obstuctive defect with moderate decrease in DLCO. The   patient has had three PFT's in the past 1 year. See table below. Worsening of FEV1 from first spirometry to second was 0.5L. The   patient was then started on alpha 1 replacement therapy however   still proceeded to loose 0.35L in 6 months. 1/14 6/14 7/14   FVC % 100    99    115   FEV1 % 62    72      84   FEV1/FVC 49 57       59   TLC%            101    105   DLCO % 52    66      70     Outside records requested and reviewed:       FEV1 Feb: 79%    Assessment:     ·  Alpha one antitrypsin deficiency, PiZZ < 30  · Obstructive airways disease,    ·  Chronic hypoxemia , 2L NC  · gerd      Plan:         Problem List Items Addressed This Visit     Chronic hypoxemic respiratory failure ([address]     Tata Mathew continues to need and benefit from supplemental oxygen. he is using oxygen continuously at 2 L NC using as needed. .          Alpha-1-antitrypsin deficiency ([address] - Primary     He is doing well with weekly infusions, Spiriva and Breo. It was changed from [facility] secondary to formulary change. No significant change     Repeat PFT after ~ 1 year, December 2018.            Relevant Orders    FULL PFT STUDY WITH PRE AND POST

## 2018-08-23 ENCOUNTER — HOSPITAL ENCOUNTER (OUTPATIENT)
Dept: INFUSION THERAPY | Age: 43
Discharge: HOME | End: 2018-08-24
Admitting: INTERNAL MEDICINE

## 2018-08-23 VITALS
HEIGHT: 70 IN | TEMPERATURE: 97.7 F | WEIGHT: 191 LBS | RESPIRATION RATE: 18 BRPM | HEART RATE: 74 BPM | BODY MASS INDEX: 27.35 KG/M2 | OXYGEN SATURATION: 95 % | DIASTOLIC BLOOD PRESSURE: 82 MMHG | SYSTOLIC BLOOD PRESSURE: 124 MMHG

## 2018-08-23 DIAGNOSIS — E88.01 ALPHA-1-ANTITRYPSIN DEFICIENCY (HCC): ICD-10-CM

## 2018-08-23 RX ORDER — SODIUM CHLORIDE 9 MG/ML
INJECTION, SOLUTION INTRAVENOUS ONCE
Status: CANCELLED | OUTPATIENT
Start: 2018-08-23

## 2018-08-23 RX ORDER — SODIUM CHLORIDE 9 MG/ML
INJECTION, SOLUTION INTRAVENOUS ONCE
Status: DISCONTINUED | OUTPATIENT
Start: 2018-08-23 | End: 2018-09-20

## 2018-08-23 RX ORDER — SODIUM CHLORIDE 0.9 % (FLUSH) 0.9 %
10 SYRINGE (ML) INJECTION PRN
Status: ACTIVE | OUTPATIENT
Start: 2018-08-23 | End: 2018-08-24

## 2018-08-23 RX ORDER — SODIUM CHLORIDE 0.9 % (FLUSH) 0.9 %
10 SYRINGE (ML) INJECTION PRN
Status: CANCELLED | OUTPATIENT
Start: 2018-08-23

## 2018-08-23 RX ORDER — SODIUM CHLORIDE 0.9 % (FLUSH) 0.9 %
20 SYRINGE (ML) INJECTION PRN
Status: CANCELLED | OUTPATIENT
Start: 2018-08-23

## 2018-08-23 RX ORDER — HEPARIN SODIUM (PORCINE) LOCK FLUSH IV SOLN 100 UNIT/ML 100 UNIT/ML
500 SOLUTION INTRAVENOUS PRN
Status: CANCELLED | OUTPATIENT
Start: 2018-08-23

## 2018-08-23 RX ADMIN — Medication 20 ML: at 08:10

## 2018-08-23 RX ADMIN — Medication 20 ML: at 09:34

## 2018-08-23 NOTE — PROGRESS NOTES
IV  [] Benadryl 25 mg oral   [] Benadryl 50 mg oral  [] Other    Epinephrine at bedside:  No: , in pyxsis    IV placed and documented prior to drug preparation:  Zemaira must be administered within 3 hours of drug reconstitution as it contains no preservatives. Monitoring of infusion documented in doc flowsheets. Zemaire administered: Yes     /82   Pulse 74   Temp 97.7 °F (36.5 °C) (Oral)   Resp 18   Ht 5' 10\" (1.778 m)   Wt 191 lb (86.6 kg)   SpO2 95%   BMI 27.41 kg/m²     Zemaire dosage: 4,820 mm IVPB was administered slowly IV      Post treatment Vital Signs  BP: 128/82  Pulse: 74  Resp: 18      Response to treatment:  Well tolerated by patient. Scheduled to return for next dose of Zemaire on August 30, 2018.      Electronically signed by Efren Birmingham RN on 8/23/2018 at 8:30 AM

## 2018-08-30 ENCOUNTER — HOSPITAL ENCOUNTER (OUTPATIENT)
Dept: INFUSION THERAPY | Age: 43
Discharge: HOME | End: 2018-08-31
Admitting: INTERNAL MEDICINE

## 2018-08-30 VITALS
DIASTOLIC BLOOD PRESSURE: 84 MMHG | TEMPERATURE: 97.8 F | OXYGEN SATURATION: 95 % | WEIGHT: 192 LBS | HEIGHT: 70 IN | SYSTOLIC BLOOD PRESSURE: 145 MMHG | RESPIRATION RATE: 18 BRPM | BODY MASS INDEX: 27.49 KG/M2 | HEART RATE: 60 BPM

## 2018-08-30 DIAGNOSIS — E88.01 ALPHA-1-ANTITRYPSIN DEFICIENCY (HCC): ICD-10-CM

## 2018-08-30 RX ORDER — SODIUM CHLORIDE 0.9 % (FLUSH) 0.9 %
20 SYRINGE (ML) INJECTION PRN
Status: CANCELLED | OUTPATIENT
Start: 2018-08-30

## 2018-08-30 RX ORDER — SODIUM CHLORIDE 0.9 % (FLUSH) 0.9 %
10 SYRINGE (ML) INJECTION PRN
Status: CANCELLED | OUTPATIENT
Start: 2018-08-30

## 2018-08-30 RX ORDER — SODIUM CHLORIDE 0.9 % (FLUSH) 0.9 %
10 SYRINGE (ML) INJECTION PRN
Status: DISPENSED | OUTPATIENT
Start: 2018-08-30 | End: 2018-08-31

## 2018-08-30 RX ORDER — SODIUM CHLORIDE 9 MG/ML
INJECTION, SOLUTION INTRAVENOUS ONCE
Status: CANCELLED | OUTPATIENT
Start: 2018-08-30

## 2018-08-30 RX ORDER — SODIUM CHLORIDE 9 MG/ML
INJECTION, SOLUTION INTRAVENOUS ONCE
Status: COMPLETED | OUTPATIENT
Start: 2018-08-30 | End: 2018-08-30

## 2018-08-30 RX ORDER — HEPARIN SODIUM (PORCINE) LOCK FLUSH IV SOLN 100 UNIT/ML 100 UNIT/ML
500 SOLUTION INTRAVENOUS PRN
Status: CANCELLED | OUTPATIENT
Start: 2018-08-30

## 2018-08-30 RX ADMIN — Medication 10 ML: at 09:07

## 2018-08-30 RX ADMIN — SODIUM CHLORIDE: 9 INJECTION, SOLUTION INTRAVENOUS at 09:07

## 2018-08-30 NOTE — PROGRESS NOTES
Outpatient [address] [facility]     Zemaire Visit    NAME:  Meenakshi Dudley  YOB: 1975  MEDICAL RECORD NUMBER:  [id number]  Episode Date:  8/30/2018    Patient arrived to [facility] 58    [] per wheelchair   [x] ambulatory     Verification of patient education of Zemaira actions and potential side effects:  Yes     Is this the patient's first Zemaire Injection? No   Did the patient experience any adverse reactions to previous Zemaire Injection? No    Any side effects from last infusion:  No     [] Rash         [] Itching  [] Headache  [] Fatigue  [] Dizziness  [] Tingling   [] Chest tightness  [] Shortness of Breath  [] Wheezing    Patient Active Problem List   Diagnosis Code    Abdominal pain R10.9    Lymphadenopathy R59.1    GERD (gastroesophageal reflux disease) K21.9    Ariza's esophagus with dysplasia K22.719    Emphysema lung J43.9    Alpha-1-antitrypsin deficiency ([facility] E88.01    Essential hypertension, benign I10    Advance care planning Z71.89    Type 2 diabetes mellitus without complication, without long-term current use of insulin ([facility]) E11.9    Colon polyp K63.5    Chronic hypoxemic respiratory failure ([facility]) J96.11       Pre infusion Vital Signs       Temp: 97.8 °F (36.6 °C)     Pulse: 82     Resp: 17     BP: 134/83       Does the Patient have a History of: IgA deficiency: no     Anaphylaxis or severe systemic response to any medication:  No        Breath Sounds: No increased work of breathing, Breath sounds clear to auscultation bilaterally and Good air exchange  Pulse Oximetry: 95 %    Any recent activity tolerance changes: No      Any increased SOB or dyspnea:  No     Presence of cough or sputum:  Yes in the mornings, clear to yellow.      Premedicated: none ordered  [] Benadryl 25 mg IV  [] Benadryl 50 mg IV  [] Benadryl 25 mg oral   [] Benadryl 50 mg oral  [] Other    Epinephrine at bedside:  Yes    IV placed and documented prior to

## 2018-09-01 ENCOUNTER — HOSPITAL ENCOUNTER (OUTPATIENT)
Dept: INFUSION THERAPY | Age: 43
Discharge: HOME | End: 2018-09-01
Attending: INTERNAL MEDICINE | Admitting: INTERNAL MEDICINE

## 2018-09-06 ENCOUNTER — HOSPITAL ENCOUNTER (OUTPATIENT)
Dept: INFUSION THERAPY | Age: 43
Discharge: HOME | End: 2018-09-07
Admitting: INTERNAL MEDICINE

## 2018-09-06 VITALS
HEART RATE: 66 BPM | SYSTOLIC BLOOD PRESSURE: 136 MMHG | BODY MASS INDEX: 27.55 KG/M2 | DIASTOLIC BLOOD PRESSURE: 85 MMHG | TEMPERATURE: 97.7 F | OXYGEN SATURATION: 97 % | WEIGHT: 192 LBS | RESPIRATION RATE: 17 BRPM

## 2018-09-06 DIAGNOSIS — E88.01 ALPHA-1-ANTITRYPSIN DEFICIENCY (HCC): ICD-10-CM

## 2018-09-06 RX ORDER — SODIUM CHLORIDE 0.9 % (FLUSH) 0.9 %
20 SYRINGE (ML) INJECTION PRN
Status: CANCELLED | OUTPATIENT
Start: 2018-09-06

## 2018-09-06 RX ORDER — SODIUM CHLORIDE 0.9 % (FLUSH) 0.9 %
10 SYRINGE (ML) INJECTION PRN
Status: DISPENSED | OUTPATIENT
Start: 2018-09-06 | End: 2018-09-07

## 2018-09-06 RX ORDER — SODIUM CHLORIDE 9 MG/ML
INJECTION, SOLUTION INTRAVENOUS ONCE
Status: COMPLETED | OUTPATIENT
Start: 2018-09-06 | End: 2018-09-06

## 2018-09-06 RX ORDER — SODIUM CHLORIDE 9 MG/ML
INJECTION, SOLUTION INTRAVENOUS ONCE
Status: CANCELLED | OUTPATIENT
Start: 2018-09-06

## 2018-09-06 RX ORDER — SODIUM CHLORIDE 0.9 % (FLUSH) 0.9 %
10 SYRINGE (ML) INJECTION PRN
Status: CANCELLED | OUTPATIENT
Start: 2018-09-06

## 2018-09-06 RX ORDER — HEPARIN SODIUM (PORCINE) LOCK FLUSH IV SOLN 100 UNIT/ML 100 UNIT/ML
500 SOLUTION INTRAVENOUS PRN
Status: CANCELLED | OUTPATIENT
Start: 2018-09-06

## 2018-09-06 RX ADMIN — SODIUM CHLORIDE: 9 INJECTION, SOLUTION INTRAVENOUS at 08:40

## 2018-09-06 RX ADMIN — Medication 10 ML: at 09:04

## 2018-09-06 RX ADMIN — Medication 30 ML: at 10:06

## 2018-09-06 RX ADMIN — Medication 20 ML: at 08:40

## 2018-09-06 RX ADMIN — Medication 10 ML: at 09:44

## 2018-09-06 ASSESSMENT — PAIN SCALES - GENERAL
PAINLEVEL_OUTOF10: 0

## 2018-09-06 NOTE — PROGRESS NOTES
Outpatient [address] [facility] Infusion via Port-A-Cath access    NAME:  Eligio Hleler  YOB: 1975  MEDICAL RECORD NUMBER:  [id number]  Episode Date:  9/6/2018    Patient arrived to [facility] 58     [] per wheelchair   [x] ambulatory with O2 2L per [facility]    Port [place] Site Location:  right chest  Port Site:  Redness: No  Bruising: No   Edema: No  Pain: No     Port Site cleansed with:  Chloroprep Scrub for 30 seconds and air dried? Yes     Port Accessed with: 19 Gauge 3/4 inch Power Port non coring needle using sterile technique. Blood return obtained: Yes  Flushed with 10 ml Normal Saline using push-pause method. Port flushes without resistance. Zemaira Infusion    Verification of patient education of Zemaira actions and potential side effects:  Yes     Is this the patient's first Zemaira Infusion? No   Did the patient experience any adverse reactions to previous Zemaira Infusion? No    Any side effects from last infusion:  No     [] Rash         [] Itching  [] Headache  [] Fatigue  [] Dizziness  [] Tingling   [] Chest tightness  [] Shortness of Breath  [] Wheezing    Patient Active Problem List   Diagnosis Code    Abdominal pain R10.9    Lymphadenopathy R59.1    GERD (gastroesophageal reflux disease) K21.9    Ariza's esophagus with dysplasia K22.719    Emphysema lung J43.9    [facility] E88.01    Essential hypertension, benign I10    Advance care planning Z71.89    Type 2 diabetes mellitus without complication, without long-term current use of insulin ([facility]) E11.9    Colon polyp K63.5    Chronic hypoxemic respiratory failure ([facility]) J96.11       Pre infusion Vital Signs       Temp: 98 °F (36.7 °C)     Pulse: 81     Resp: 18     BP: 128/81       Does the Patient have a History of:     IgA deficiency: no     Anaphylaxis or severe systemic response to any medication:  No        Breath Sounds: No increased

## 2018-09-13 ENCOUNTER — HOSPITAL ENCOUNTER (OUTPATIENT)
Dept: INFUSION THERAPY | Age: 43
Discharge: HOME | End: 2018-09-14
Admitting: INTERNAL MEDICINE

## 2018-09-13 VITALS
RESPIRATION RATE: 17 BRPM | SYSTOLIC BLOOD PRESSURE: 129 MMHG | TEMPERATURE: 97.8 F | HEIGHT: 70 IN | BODY MASS INDEX: 27.2 KG/M2 | WEIGHT: 190 LBS | DIASTOLIC BLOOD PRESSURE: 77 MMHG | OXYGEN SATURATION: 98 % | HEART RATE: 62 BPM

## 2018-09-13 DIAGNOSIS — E88.01 ALPHA-1-ANTITRYPSIN DEFICIENCY (HCC): ICD-10-CM

## 2018-09-13 RX ORDER — NAPROXEN SODIUM 220 MG
220 TABLET ORAL PRN
COMMUNITY

## 2018-09-13 RX ORDER — SODIUM CHLORIDE 9 MG/ML
INJECTION, SOLUTION INTRAVENOUS ONCE
Status: CANCELLED | OUTPATIENT
Start: 2018-09-13

## 2018-09-13 RX ORDER — SODIUM CHLORIDE 0.9 % (FLUSH) 0.9 %
10 SYRINGE (ML) INJECTION PRN
Status: CANCELLED | OUTPATIENT
Start: 2018-09-13

## 2018-09-13 RX ORDER — HEPARIN SODIUM (PORCINE) LOCK FLUSH IV SOLN 100 UNIT/ML 100 UNIT/ML
500 SOLUTION INTRAVENOUS PRN
Status: CANCELLED | OUTPATIENT
Start: 2018-09-13

## 2018-09-13 RX ORDER — SODIUM CHLORIDE 0.9 % (FLUSH) 0.9 %
10 SYRINGE (ML) INJECTION PRN
Status: DISPENSED | OUTPATIENT
Start: 2018-09-13 | End: 2018-09-14

## 2018-09-13 RX ORDER — SODIUM CHLORIDE 9 MG/ML
INJECTION, SOLUTION INTRAVENOUS ONCE
Status: COMPLETED | OUTPATIENT
Start: 2018-09-13 | End: 2018-09-13

## 2018-09-13 RX ORDER — SODIUM CHLORIDE 0.9 % (FLUSH) 0.9 %
20 SYRINGE (ML) INJECTION PRN
Status: CANCELLED | OUTPATIENT
Start: 2018-09-13

## 2018-09-13 RX ADMIN — Medication 10 ML: at 08:55

## 2018-09-13 RX ADMIN — Medication 30 ML: at 08:38

## 2018-09-13 RX ADMIN — Medication 10 ML: at 09:38

## 2018-09-13 RX ADMIN — SODIUM CHLORIDE: 9 INJECTION, SOLUTION INTRAVENOUS at 08:55

## 2018-09-13 RX ADMIN — Medication 10 ML: at 09:05

## 2018-09-20 ENCOUNTER — HOSPITAL ENCOUNTER (OUTPATIENT)
Dept: INFUSION THERAPY | Age: 43
Discharge: HOME | End: 2018-09-21
Admitting: INTERNAL MEDICINE

## 2018-09-20 VITALS
WEIGHT: 198 LBS | OXYGEN SATURATION: 95 % | BODY MASS INDEX: 28.41 KG/M2 | TEMPERATURE: 97.6 F | RESPIRATION RATE: 20 BRPM | DIASTOLIC BLOOD PRESSURE: 80 MMHG | SYSTOLIC BLOOD PRESSURE: 125 MMHG | HEART RATE: 62 BPM

## 2018-09-20 DIAGNOSIS — E88.01 ALPHA-1-ANTITRYPSIN DEFICIENCY (HCC): ICD-10-CM

## 2018-09-20 RX ORDER — SODIUM CHLORIDE 9 MG/ML
INJECTION, SOLUTION INTRAVENOUS ONCE
Status: CANCELLED | OUTPATIENT
Start: 2018-09-20

## 2018-09-20 RX ORDER — SODIUM CHLORIDE 0.9 % (FLUSH) 0.9 %
20 SYRINGE (ML) INJECTION PRN
Status: CANCELLED | OUTPATIENT
Start: 2018-09-20

## 2018-09-20 RX ORDER — HEPARIN SODIUM (PORCINE) LOCK FLUSH IV SOLN 100 UNIT/ML 100 UNIT/ML
500 SOLUTION INTRAVENOUS PRN
Status: CANCELLED | OUTPATIENT
Start: 2018-09-20

## 2018-09-20 RX ORDER — 0.9 % SODIUM CHLORIDE 0.9 %
250 INTRAVENOUS SOLUTION INTRAVENOUS ONCE
Status: COMPLETED | OUTPATIENT
Start: 2018-09-20 | End: 2018-09-20

## 2018-09-20 RX ORDER — SODIUM CHLORIDE 0.9 % (FLUSH) 0.9 %
10 SYRINGE (ML) INJECTION PRN
Status: CANCELLED | OUTPATIENT
Start: 2018-09-20

## 2018-09-20 RX ORDER — SODIUM CHLORIDE 0.9 % (FLUSH) 0.9 %
10 SYRINGE (ML) INJECTION PRN
Status: DISPENSED | OUTPATIENT
Start: 2018-09-20 | End: 2018-09-21

## 2018-09-20 RX ORDER — SODIUM CHLORIDE 9 MG/ML
INJECTION, SOLUTION INTRAVENOUS ONCE
Status: DISCONTINUED | OUTPATIENT
Start: 2018-09-20 | End: 2018-09-20

## 2018-09-20 RX ADMIN — Medication 10 ML: at 10:06

## 2018-09-20 RX ADMIN — Medication 250 ML: at 09:25

## 2018-09-20 RX ADMIN — Medication 10 ML: at 08:45

## 2018-09-20 NOTE — PROGRESS NOTES
Outpatient [facility]     Zemaire Visit    NAME:  Gail Willams  YOB: 1975  MEDICAL RECORD NUMBER:  [id number]  Episode Date:  9/20/2018    Patient arrived to [facility] 58  [] per wheelchair   [x] ambulatory   Color good, pt denies any new medical issues or concerns , Pt states he will get a flu shot today at work . Pt states he has no MD appnts in near future. No ankle edema. Pt denies any issues or problems with his Port. Verification of patient education of Zemaira actions and potential side effects:  Yes     Is this the patient's first Zemaire Injection? No   Did the patient experience any adverse reactions to previous Zemaire Injection? No    Any side effects from last infusion:  No     [] Rash         [] Itching  [] Headache  [] Fatigue  [] Dizziness  [] Tingling   [] Chest tightness  [] Shortness of Breath  [] Wheezing    Patient Active Problem List   Diagnosis Code    Abdominal pain R10.9    Lymphadenopathy R59.1    GERD (gastroesophageal reflux disease) K21.9    Ariza's esophagus with dysplasia K22.719    Emphysema lung J43.9    Alpha-[facility]-antitrypsin deficiency ([facility] E88.01    Essential hypertension, benign I10    Advance care planning Z71.89    Type 2 diabetes mellitus without complication, without long-term current use of insulin ([facility]) E11.9    Colon polyp K63.5    Chronic hypoxemic respiratory failure ([facility]) J96.11       Pre infusion Vital Signs  See flow sheet                                Does the Patient have a History of:     IgA deficiency: no     Anaphylaxis or severe systemic response to any medication:  No        Breath Sounds: No increased work of breathing, Breath sounds clear to auscultation bilaterally and Good air exchange      Any recent activity tolerance changes: No      Any increased SOB or dyspnea:  No     Presence of cough or sputum:  Yes, no increase in amt of sputum or changes in color     Premedicated: NONE  [] Benadryl 25 mg IV  [] Benadryl 50 mg IV  [] Benadryl 25 mg oral   [] Benadryl 50 mg oral  [] Other    Epinephrine at bedside:  Yes    IV placed and documented prior to drug preparation:  Zemaira must be administered within 3 hours of drug reconstitution as it contains no preservatives. Monitoring of infusion documented in doc flowsheets. Zemaire administered: Yes     Wt 198 lb (89.8 kg)   BMI 28.41 kg/m²     Zemaire dosage: 60 mg/kg was administered slowly IVPB--total dose is 5784mg IVPB  Dose verified by:   Mayra Lamas RN    Post treatment Vital Signs=see flow sheets                 Response to treatment:  Well tolerated by patient. Scheduled to return for next dose of Zemaire on September 27, 2018.      Electronically signed by Valerie Arenas RN on 9/20/2018 at 1010am

## 2018-09-20 NOTE — PROGRESS NOTES
Outpatient [address] Access    NAME:  Miquel Fuentes  YOB: 1975  MEDICAL RECORD NUMBER:  [id number]  DATE:  9/20/2018    Patient arrived to [facility] 58   [] per wheelchair   [x] ambulatory     Port Site Location:  anterior chest, right chest wall   Port Site:  Redness: No  Bruising: No   Edema: No  Pain: No     Port Site cleansed with:  Chloroprep Scrub x 2 for 90 seconds and air dried x 2 mis ? Yes     Port Accessed with: 20 Gauge  One inch Power Port non coring needle using sterile technique. Blood return obtained: Yes  Flushed with 20 ml Normal Saline using push-pause method. Biopatch to site, and covered with Tegaderm HP drsng . Port flushes without resistance. Response to treatment:  Well tolerated by patient.     Electronically signed by Mark Finn RN on 9/20/2018 at 0930am

## 2018-09-25 DIAGNOSIS — K22.719 BARRETT'S ESOPHAGUS WITH DYSPLASIA: ICD-10-CM

## 2018-09-25 DIAGNOSIS — J43.9 PULMONARY EMPHYSEMA, UNSPECIFIED EMPHYSEMA TYPE (HCC): ICD-10-CM

## 2018-09-25 RX ORDER — ESOMEPRAZOLE MAGNESIUM 40 MG/1
40 CAPSULE, DELAYED RELEASE ORAL 2 TIMES DAILY
Qty: 180 CAPSULE | Refills: 3 | Status: SHIPPED | OUTPATIENT
Start: 2018-09-25 | End: 2021-06-17 | Stop reason: SDUPTHER

## 2018-09-27 ENCOUNTER — HOSPITAL ENCOUNTER (OUTPATIENT)
Dept: INFUSION THERAPY | Age: 43
Setting detail: INFUSION SERIES
Discharge: HOME | End: 2018-09-27
Payer: COMMERCIAL

## 2018-09-27 VITALS
BODY MASS INDEX: 27.55 KG/M2 | DIASTOLIC BLOOD PRESSURE: 82 MMHG | TEMPERATURE: 97.8 F | SYSTOLIC BLOOD PRESSURE: 125 MMHG | OXYGEN SATURATION: 96 % | WEIGHT: 192 LBS | HEART RATE: 72 BPM | RESPIRATION RATE: 18 BRPM

## 2018-09-27 DIAGNOSIS — E88.01 ALPHA-1-ANTITRYPSIN DEFICIENCY (HCC): ICD-10-CM

## 2018-09-27 PROCEDURE — 96365 THER/PROPH/DIAG IV INF INIT: CPT

## 2018-09-27 PROCEDURE — 2580000003 HC RX 258: Performed by: INTERNAL MEDICINE

## 2018-09-27 RX ORDER — SODIUM CHLORIDE 9 MG/ML
INJECTION, SOLUTION INTRAVENOUS ONCE
Status: COMPLETED | OUTPATIENT
Start: 2018-09-27 | End: 2018-09-27

## 2018-09-27 RX ORDER — SODIUM CHLORIDE 0.9 % (FLUSH) 0.9 %
10 SYRINGE (ML) INJECTION PRN
Status: CANCELLED | OUTPATIENT
Start: 2018-09-27

## 2018-09-27 RX ORDER — SODIUM CHLORIDE 9 MG/ML
INJECTION, SOLUTION INTRAVENOUS ONCE
Status: CANCELLED | OUTPATIENT
Start: 2018-09-27

## 2018-09-27 RX ORDER — HEPARIN SODIUM (PORCINE) LOCK FLUSH IV SOLN 100 UNIT/ML 100 UNIT/ML
500 SOLUTION INTRAVENOUS PRN
Status: CANCELLED | OUTPATIENT
Start: 2018-09-27

## 2018-09-27 RX ORDER — SODIUM CHLORIDE 0.9 % (FLUSH) 0.9 %
20 SYRINGE (ML) INJECTION PRN
Status: CANCELLED | OUTPATIENT
Start: 2018-09-27

## 2018-09-27 RX ORDER — SODIUM CHLORIDE 0.9 % (FLUSH) 0.9 %
10 SYRINGE (ML) INJECTION PRN
Status: DISCONTINUED | OUTPATIENT
Start: 2018-09-27 | End: 2018-09-28 | Stop reason: HOSPADM

## 2018-09-27 RX ADMIN — Medication 10 ML: at 08:41

## 2018-09-27 RX ADMIN — SODIUM CHLORIDE: 9 INJECTION, SOLUTION INTRAVENOUS at 08:42

## 2018-09-27 NOTE — PROGRESS NOTES
Outpatient [address] [facility]     Zemaire Visit    NAME:  Patricia Byrnes  YOB: 1975  MEDICAL RECORD NUMBER:  [id number]  Episode Date:  9/27/2018    Patient arrived to [facility] 58     [] per wheelchair   [x] ambulatory     Verification of patient education of Zemaira actions and potential side effects:  Yes     Is this the patient's first Zemaire Injection? No   Did the patient experience any adverse reactions to previous Zemaire Injection? No    Any side effects from last infusion:  No     [] Rash         [] Itching  [] Headache  [] Fatigue  [] Dizziness  [] Tingling   [] Chest tightness  [] Shortness of Breath  [] Wheezing    Patient Active Problem List   Diagnosis Code    Abdominal pain R10.9    Lymphadenopathy R59.1    GERD (gastroesophageal reflux disease) K21.9    Ariza's esophagus with dysplasia K22.719    Emphysema lung J43.9    Alpha-1-antitrypsin deficiency ([facility] E88.01    Essential hypertension, benign I10    Advance care planning Z71.89    Type 2 diabetes mellitus without complication, without long-term current use of insulin ([facility]) E11.9    Colon polyp K63.5    Chronic hypoxemic respiratory failure ([facility]) J96.11       Pre infusion Vital Signs       Temp: 97.6 °F (36.4 °C)     Pulse: 75     Resp: 20     BP: 129/85       Does the Patient have a History of: IgA deficiency: no     Anaphylaxis or severe systemic response to any medication:  No        Breath Sounds: No increased work of breathing, Good air exchange and end-expiratory wheezing in middle lobe. Pulse Oximetry: 97 % on 2 L    Any recent activity tolerance changes: No      Any increased SOB or dyspnea:  No     Presence of cough or sputum:  Yes, in the mornings it's clear to pale yellow. Premedicated: none ordered  [] Benadryl 25 mg IV  [] Benadryl 50 mg IV  [] Benadryl 25 mg oral   [] Benadryl 50 mg oral  [] Other    Epinephrine at bedside: No,  But is in pyxis if needed.   IV placed and documented prior to drug preparation:  Zemaira must be administered within 3 hours of drug reconstitution as it contains no preservatives. Monitoring of infusion documented in doc flowsheets. Zemaire administered: Yes     /82   Pulse 72   Temp 97.8 °F (36.6 °C) (Oral)   Resp 18   Wt 192 lb (87.1 kg)   SpO2 96%   BMI 27.55 kg/m²     Zemaire dosage: 60 mg/kg was administered slowly IV  Dose verified by:  Corinne Spear RN    Post treatment Vital Signs  Temp: 97.8 °F (36.6 °C)  Pulse: 72  Resp: 18  BP: 125/82    Response to treatment:  Well tolerated by patient. Scheduled to return for next dose of Zemaire on October 4, 2018. Electronically signed by Jannette El RN on 9/27/2018 at 9:40 AM                          [address] Access    NAME:  Vanessa Portillo  YOB: 1975  MEDICAL RECORD NUMBER:  [id number]  DATE:  9/27/2018    Patient arrived to [place] [address]   [] per wheelchair   [x] ambulatory     Port Site Location:  right chest  Port Site:  Redness: No  Bruising: No   Edema: No  Pain: No     Port Site cleansed with:  Chloroprep Scrub for 30 seconds and air dried? Yes     Port Accessed with: [address] non coring needle using sterile technique. Blood return obtained: Yes  Flushed with 10 ml Normal Saline using push-pause method. Port flushes without resistance. Response to treatment:  Well tolerated by patient.     Electronically signed by Jannette El RN on 9/27/2018 at 9:40 AM

## 2018-10-04 ENCOUNTER — HOSPITAL ENCOUNTER (OUTPATIENT)
Dept: INFUSION THERAPY | Age: 43
Setting detail: INFUSION SERIES
Discharge: HOME | End: 2018-10-04
Payer: COMMERCIAL

## 2018-10-04 VITALS
OXYGEN SATURATION: 94 % | TEMPERATURE: 98.5 F | SYSTOLIC BLOOD PRESSURE: 136 MMHG | RESPIRATION RATE: 18 BRPM | WEIGHT: 192 LBS | BODY MASS INDEX: 27.55 KG/M2 | HEART RATE: 73 BPM | DIASTOLIC BLOOD PRESSURE: 84 MMHG

## 2018-10-04 DIAGNOSIS — E88.01 ALPHA-1-ANTITRYPSIN DEFICIENCY (HCC): ICD-10-CM

## 2018-10-04 PROCEDURE — 2580000003 HC RX 258: Performed by: INTERNAL MEDICINE

## 2018-10-04 PROCEDURE — 96365 THER/PROPH/DIAG IV INF INIT: CPT

## 2018-10-04 RX ORDER — SODIUM CHLORIDE 0.9 % (FLUSH) 0.9 %
10 SYRINGE (ML) INJECTION PRN
Status: CANCELLED | OUTPATIENT
Start: 2018-10-04

## 2018-10-04 RX ORDER — SODIUM CHLORIDE 0.9 % (FLUSH) 0.9 %
10 SYRINGE (ML) INJECTION PRN
Status: DISCONTINUED | OUTPATIENT
Start: 2018-10-04 | End: 2018-10-05 | Stop reason: HOSPADM

## 2018-10-04 RX ORDER — HEPARIN SODIUM (PORCINE) LOCK FLUSH IV SOLN 100 UNIT/ML 100 UNIT/ML
500 SOLUTION INTRAVENOUS PRN
Status: CANCELLED | OUTPATIENT
Start: 2018-10-04

## 2018-10-04 RX ORDER — SODIUM CHLORIDE 9 MG/ML
INJECTION, SOLUTION INTRAVENOUS ONCE
Status: COMPLETED | OUTPATIENT
Start: 2018-10-04 | End: 2018-10-04

## 2018-10-04 RX ORDER — SODIUM CHLORIDE 9 MG/ML
INJECTION, SOLUTION INTRAVENOUS ONCE
Status: CANCELLED | OUTPATIENT
Start: 2018-10-04

## 2018-10-04 RX ORDER — SODIUM CHLORIDE 0.9 % (FLUSH) 0.9 %
20 SYRINGE (ML) INJECTION PRN
Status: CANCELLED | OUTPATIENT
Start: 2018-10-04

## 2018-10-04 RX ADMIN — Medication 10 ML: at 09:39

## 2018-10-04 RX ADMIN — SODIUM CHLORIDE 1000 ML: 9 INJECTION, SOLUTION INTRAVENOUS at 08:55

## 2018-10-04 RX ADMIN — Medication 10 ML: at 08:35

## 2018-10-04 NOTE — PROGRESS NOTES
Outpatient [address] [facility]     Zemaire Visit    NAME:  Vanessa Portillo  YOB: 1975  MEDICAL RECORD NUMBER:  [id number]  Episode Date:  10/4/2018    Patient arrived to [facility] 58     [] per wheelchair   [x] ambulatory   Color good, no ankle edema , pt has continual oxygen on at 2 Liters/min. Pt denies any change in medical hx or any new issues since last week. Pt denies any issues or pain with Port . Verification of patient education of Zemaira actions and potential side effects:  Yes     Is this the patient's first Zemaire Injection? No   Did the patient experience any adverse reactions to previous Zemaire Injection? No    Any side effects from last infusion:  No     [] Rash         [] Itching  [] Headache  [] Fatigue  [] Dizziness  [] Tingling   [] Chest tightness  [] Shortness of Breath  [] Wheezing    Patient Active Problem List   Diagnosis Code    Abdominal pain R10.9    Lymphadenopathy R59.1    GERD (gastroesophageal reflux disease) K21.9    Ariza's esophagus with dysplasia K22.719    Emphysema lung J43.9    [facility]it[facility] E88.01    Essential hypertension, benign I10    Advance care planning Z71.89    Type 2 diabetes mellitus without complication, without long-term current use of insulin ([facility]) E11.9    Colon polyp K63.5    Chronic hypoxemic respiratory failure ([facility]) J96.11       Pre infusion Vital Signs --see flow sheets                                Does the Patient have a History of:     IgA deficiency: no     Anaphylaxis or severe systemic response to any medication:  No        Breath Sounds: No increased work of breathing, Breath sounds clear to auscultation bilaterally and Good air exchange, no wheezing       Any recent activity tolerance changes: No      Any increased SOB or dyspnea:  No     Presence of cough or sputum:  Yes ,light  mucus is yellow , no increase or change in color     Premedicated:  NONE  [] Benadryl 25 mg IV  [] Benadryl 50 mg IV  [] Benadryl 25 mg oral   [] Benadryl 50 mg oral  [] Other    Epinephrine at bedside:  Yes    IV placed and documented prior to drug preparation:  Zemaira must be administered within 3 hours of drug reconstitution as it contains no preservatives. Monitoring of infusion documented in doc flowsheets. Zemaire administered: Yes     Wt 192 lb (87.1 kg)   BMI 27.55 kg/m²     Zemaire dosage: 60 mg/kg was administered slowly IVPB---- total dose 4785mg IVPB ---filter used, 0.2 um supor membrane filter   Dose verified by: Latoya Soares RN    Post treatment Vital Signs--see flow sheets                Response to treatment:  Well tolerated by patient. Scheduled to return for next dose of Zemaire on October 11, 2018.      Electronically signed by Maylin Preston RN on 10/4/2018 at 10:01 AM

## 2018-10-11 ENCOUNTER — HOSPITAL ENCOUNTER (OUTPATIENT)
Dept: INFUSION THERAPY | Age: 43
Setting detail: INFUSION SERIES
Discharge: HOME | End: 2018-10-11
Payer: COMMERCIAL

## 2018-10-11 VITALS
OXYGEN SATURATION: 93 % | RESPIRATION RATE: 18 BRPM | WEIGHT: 192 LBS | HEART RATE: 78 BPM | BODY MASS INDEX: 27.55 KG/M2 | SYSTOLIC BLOOD PRESSURE: 133 MMHG | DIASTOLIC BLOOD PRESSURE: 84 MMHG | TEMPERATURE: 98 F

## 2018-10-11 DIAGNOSIS — E88.01 ALPHA-1-ANTITRYPSIN DEFICIENCY (HCC): ICD-10-CM

## 2018-10-11 PROCEDURE — 96365 THER/PROPH/DIAG IV INF INIT: CPT

## 2018-10-11 PROCEDURE — 2580000003 HC RX 258: Performed by: INTERNAL MEDICINE

## 2018-10-11 RX ORDER — SODIUM CHLORIDE 0.9 % (FLUSH) 0.9 %
20 SYRINGE (ML) INJECTION PRN
Status: DISCONTINUED | OUTPATIENT
Start: 2018-10-11 | End: 2018-10-12 | Stop reason: HOSPADM

## 2018-10-11 RX ORDER — SODIUM CHLORIDE 0.9 % (FLUSH) 0.9 %
20 SYRINGE (ML) INJECTION PRN
Status: CANCELLED | OUTPATIENT
Start: 2018-10-11

## 2018-10-11 RX ORDER — HEPARIN SODIUM (PORCINE) LOCK FLUSH IV SOLN 100 UNIT/ML 100 UNIT/ML
500 SOLUTION INTRAVENOUS PRN
Status: CANCELLED | OUTPATIENT
Start: 2018-10-11

## 2018-10-11 RX ORDER — SODIUM CHLORIDE 9 MG/ML
INJECTION, SOLUTION INTRAVENOUS ONCE
Status: COMPLETED | OUTPATIENT
Start: 2018-10-11 | End: 2018-10-11

## 2018-10-11 RX ORDER — SODIUM CHLORIDE 0.9 % (FLUSH) 0.9 %
10 SYRINGE (ML) INJECTION PRN
Status: CANCELLED | OUTPATIENT
Start: 2018-10-11

## 2018-10-11 RX ORDER — SODIUM CHLORIDE 9 MG/ML
INJECTION, SOLUTION INTRAVENOUS ONCE
Status: CANCELLED | OUTPATIENT
Start: 2018-10-11

## 2018-10-11 RX ORDER — SODIUM CHLORIDE 0.9 % (FLUSH) 0.9 %
10 SYRINGE (ML) INJECTION PRN
Status: DISCONTINUED | OUTPATIENT
Start: 2018-10-11 | End: 2018-10-12 | Stop reason: HOSPADM

## 2018-10-11 RX ADMIN — Medication 10 ML: at 08:45

## 2018-10-11 RX ADMIN — SODIUM CHLORIDE 1000 ML: 9 INJECTION, SOLUTION INTRAVENOUS at 08:59

## 2018-10-11 NOTE — PROGRESS NOTES
Outpatient [address] [facility]     Zemaire Visit    NAME:  Zakia Culver  YOB: 1975  MEDICAL RECORD NUMBER:  [id number]  Episode Date:  10/11/2018    Patient arrived to [facility] 58    [] per wheelchair   [x] ambulatory   Color good, no ankle edema. Pt in good spirits Pt denies any new c/o's or change in medical hx. Pt denies any issues or pain with port a cath . Pt states he is to see Dr. Amparo Watersing his PCP in Dec 2018 and is due for a Pulmonary fx test in Dec. 2018. Verification of patient education of Zemaira actions and potential side effects:  Yes     Is this the patient's first Zemaire Injection? No   Did the patient experience any adverse reactions to previous Zemaire Injection? No    Any side effects from last infusion:  No     [] Rash         [] Itching  [] Headache  [] Fatigue  [] Dizziness  [] Tingling   [] Chest tightness  [] Shortness of Breath  [] Wheezing    Patient Active Problem List   Diagnosis Code    Abdominal pain R10.9    Lymphadenopathy R59.1    GERD (gastroesophageal reflux disease) K21.9    Ariza's esophagus with dysplasia K22.719    Emphysema lung J43.9    Alpha-1-antitrypsin deficiency ([facility] E88.01    Essential hypertension, benign I10    Advance care planning Z71.89    Type 2 diabetes mellitus without complication, without long-term current use of insulin ([facility]) E11.9    Colon polyp K63.5    Chronic hypoxemic respiratory failure ([facility]) J96.11       Pre infusion Vital Signs --see flow sheets                                Does the Patient have a History of: IgA deficiency: no     Anaphylaxis or severe systemic response to any medication:  No        Breath Sounds: No increased work of breathing, Breath sounds clear to auscultation bilaterally and Good air exchange      Any recent activity tolerance changes: No      Any increased SOB or dyspnea:  No     Presence of cough or sputum:  Yes , has a prod.  Cough of yellow sputum  , no increase or change in color     Premedicated:  NONE                                     [] Benadryl 25 mg IV  [] Benadryl 50 mg IV  [] Benadryl 25 mg oral   [] Benadryl 50 mg oral  [] Other    Epinephrine at bedside:  Yes    IV placed and documented prior to drug preparation:  Zemaira must be administered within 3 hours of drug reconstitution as it contains no preservatives. Monitoring of infusion documented in doc flowsheets. Zemaire administered: Yes with use of inline . 2 micron filter per order      Wt 192 lb (87.1 kg)   BMI 27.55 kg/m²     Zemaire dosage: 60 mg/kg was administered slowly IVPB --total dose given today is    5742 mg in IVPB   Dose verified by:   Alexis Thomas RN    Post treatment Vital Signs==see flow sheet                 Response to treatment:  Well tolerated by patient. Scheduled to return for next dose of Zemaire on October 18, 2018.      Electronically signed by Rufino Vernon RN on 10/11/2018 at 1030am

## 2018-10-11 NOTE — PROGRESS NOTES
Outpatient [address] Access    NAME:  Kavon Sanchez  YOB: 1975  MEDICAL RECORD NUMBER:  [id number]  DATE:  10/11/2018    Patient arrived to [facility] 58   [] per wheelchair   [x] ambulatory     Port Site Location:  right chest, anterior  Port Site:  Redness: No  Bruising: No   Edema: No  Pain: No     Port Site cleansed with:  Chloroprep Scrub x 2  for 60 seconds and air dried x 3 mis ? Yes     Port Accessed with: 20 Gauge  One inch Power Port non coring needle using sterile technique. Blood return obtained: Yes  Flushed with 20 ml Normal Saline using push-pause method. Port flushes without resistance. Tegaderm HP to site , double T connector to site   Response to treatment:  Well tolerated by patient.     Electronically signed by Eliane Brown RN on 10/11/2018 at 1030am

## 2018-10-18 ENCOUNTER — HOSPITAL ENCOUNTER (OUTPATIENT)
Dept: INFUSION THERAPY | Age: 43
Setting detail: INFUSION SERIES
Discharge: HOME | End: 2018-10-18
Payer: COMMERCIAL

## 2018-10-18 VITALS
DIASTOLIC BLOOD PRESSURE: 80 MMHG | TEMPERATURE: 98 F | WEIGHT: 192 LBS | RESPIRATION RATE: 17 BRPM | SYSTOLIC BLOOD PRESSURE: 124 MMHG | OXYGEN SATURATION: 96 % | BODY MASS INDEX: 27.55 KG/M2 | HEART RATE: 66 BPM

## 2018-10-18 DIAGNOSIS — E88.01 ALPHA-1-ANTITRYPSIN DEFICIENCY (HCC): ICD-10-CM

## 2018-10-18 PROCEDURE — 2580000003 HC RX 258: Performed by: INTERNAL MEDICINE

## 2018-10-18 PROCEDURE — 96365 THER/PROPH/DIAG IV INF INIT: CPT

## 2018-10-18 RX ORDER — HEPARIN SODIUM (PORCINE) LOCK FLUSH IV SOLN 100 UNIT/ML 100 UNIT/ML
500 SOLUTION INTRAVENOUS PRN
Status: CANCELLED | OUTPATIENT
Start: 2018-10-18

## 2018-10-18 RX ORDER — SODIUM CHLORIDE 9 MG/ML
INJECTION, SOLUTION INTRAVENOUS ONCE
Status: CANCELLED | OUTPATIENT
Start: 2018-10-18

## 2018-10-18 RX ORDER — SODIUM CHLORIDE 0.9 % (FLUSH) 0.9 %
10 SYRINGE (ML) INJECTION PRN
Status: CANCELLED | OUTPATIENT
Start: 2018-10-18

## 2018-10-18 RX ORDER — SODIUM CHLORIDE 9 MG/ML
INJECTION, SOLUTION INTRAVENOUS ONCE
Status: COMPLETED | OUTPATIENT
Start: 2018-10-18 | End: 2018-10-18

## 2018-10-18 RX ORDER — SODIUM CHLORIDE 0.9 % (FLUSH) 0.9 %
20 SYRINGE (ML) INJECTION PRN
Status: CANCELLED | OUTPATIENT
Start: 2018-10-18

## 2018-10-18 RX ORDER — SODIUM CHLORIDE 0.9 % (FLUSH) 0.9 %
10 SYRINGE (ML) INJECTION PRN
Status: DISCONTINUED | OUTPATIENT
Start: 2018-10-18 | End: 2018-10-19 | Stop reason: HOSPADM

## 2018-10-18 RX ADMIN — Medication 10 ML: at 09:14

## 2018-10-18 RX ADMIN — Medication 30 ML: at 10:02

## 2018-10-18 RX ADMIN — SODIUM CHLORIDE: 9 INJECTION, SOLUTION INTRAVENOUS at 09:08

## 2018-10-18 RX ADMIN — Medication 10 ML: at 09:46

## 2018-10-18 RX ADMIN — Medication 10 ML: at 09:08

## 2018-10-18 RX ADMIN — Medication 20 ML: at 08:37

## 2018-10-18 NOTE — PROGRESS NOTES
Outpatient [address] [facility]     Zemaire Visit    NAME:  Noah Dang  YOB: 1975  MEDICAL RECORD NUMBER:  [id number]  Episode Date:  10/18/2018    Patient arrived to [facility] 58    [] per wheelchair   [x] ambulatory     Verification of patient education of Zemaira actions and potential side effects:  Yes     Is this the patient's first Zemaire Injection? No   Did the patient experience any adverse reactions to previous Zemaire Injection? No    Any side effects from last infusion:  No     [] Rash         [] Itching  [] Headache  [] Fatigue  [] Dizziness  [] Tingling   [] Chest tightness  [] Shortness of Breath  [] Wheezing    Patient Active Problem List   Diagnosis Code    Abdominal pain R10.9    Lymphadenopathy R59.1    GERD (gastroesophageal reflux disease) K21.9    Ariza's esophagus with dysplasia K22.719    Emphysema lung J43.9    [facility]-antitry[facility] deficiency ([facility] E88.01    Essential hypertension, benign I10    Advance care planning Z71.89    Type 2 diabetes mellitus without complication, without long-term current use of insulin ([facility]) E11.9    Colon polyp K63.5    Chronic hypoxemic respiratory failure ([facility]) J96.11       Does the Patient have a History of: IgA deficiency: no     Anaphylaxis or severe systemic response to any medication:  No        Breath Sounds: No increased work of breathing, Breath sounds clear to auscultation bilaterally, Good air exchange and No crackles  Pulse Oximetry: 96 %    Any recent activity tolerance changes: No      Any increased SOB or dyspnea:  No     Presence of cough or sputum:  Yes , mostly in mornings.      Premedicated:  [] Benadryl 25 mg IV  [] Benadryl 50 mg IV  [] Benadryl 25 mg oral   [] Benadryl 50 mg oral  [] Other    Epinephrine at bedside:  No: no in pyxsis    IV placed and documented prior to drug preparation:  Zemaira must be administered within 3 hours of drug reconstitution as it contains no preservatives. Monitoring of infusion documented in doc flowsheets. Zemaire administered: Yes     Wt 192 lb (87.1 kg)   BMI 27.55 kg/m²     Zemaire dosage: 60 mg/kg for total dose of 4,813 mg was administered slowly IV  Response to treatment:  Well tolerated by patient. Scheduled to return for next dose of Zemaire on October 25, 2018.      Electronically signed by Juancarlos Booker RN on 10/18/2018 at 5:00 pm.

## 2018-10-25 ENCOUNTER — HOSPITAL ENCOUNTER (OUTPATIENT)
Dept: INFUSION THERAPY | Age: 43
Setting detail: INFUSION SERIES
Discharge: HOME | End: 2018-10-25
Payer: COMMERCIAL

## 2018-10-25 VITALS
SYSTOLIC BLOOD PRESSURE: 124 MMHG | WEIGHT: 193 LBS | BODY MASS INDEX: 27.69 KG/M2 | OXYGEN SATURATION: 96 % | DIASTOLIC BLOOD PRESSURE: 84 MMHG | TEMPERATURE: 97.7 F | HEART RATE: 88 BPM | RESPIRATION RATE: 18 BRPM

## 2018-10-25 DIAGNOSIS — E88.01 ALPHA-1-ANTITRYPSIN DEFICIENCY (HCC): ICD-10-CM

## 2018-10-25 PROCEDURE — 96365 THER/PROPH/DIAG IV INF INIT: CPT

## 2018-10-25 PROCEDURE — 2580000003 HC RX 258: Performed by: INTERNAL MEDICINE

## 2018-10-25 RX ORDER — SODIUM CHLORIDE 0.9 % (FLUSH) 0.9 %
10 SYRINGE (ML) INJECTION PRN
Status: CANCELLED | OUTPATIENT
Start: 2018-10-25

## 2018-10-25 RX ORDER — SODIUM CHLORIDE 0.9 % (FLUSH) 0.9 %
10 SYRINGE (ML) INJECTION PRN
Status: DISCONTINUED | OUTPATIENT
Start: 2018-10-25 | End: 2018-10-26 | Stop reason: HOSPADM

## 2018-10-25 RX ORDER — SODIUM CHLORIDE 0.9 % (FLUSH) 0.9 %
20 SYRINGE (ML) INJECTION PRN
Status: CANCELLED | OUTPATIENT
Start: 2018-10-25

## 2018-10-25 RX ORDER — SODIUM CHLORIDE 9 MG/ML
INJECTION, SOLUTION INTRAVENOUS ONCE
Status: COMPLETED | OUTPATIENT
Start: 2018-10-25 | End: 2018-10-25

## 2018-10-25 RX ORDER — HEPARIN SODIUM (PORCINE) LOCK FLUSH IV SOLN 100 UNIT/ML 100 UNIT/ML
500 SOLUTION INTRAVENOUS PRN
Status: CANCELLED | OUTPATIENT
Start: 2018-10-25

## 2018-10-25 RX ORDER — SODIUM CHLORIDE 9 MG/ML
INJECTION, SOLUTION INTRAVENOUS ONCE
Status: CANCELLED | OUTPATIENT
Start: 2018-10-25

## 2018-10-25 RX ADMIN — Medication 10 ML: at 10:01

## 2018-10-25 RX ADMIN — SODIUM CHLORIDE 1000 ML: 9 INJECTION, SOLUTION INTRAVENOUS at 09:15

## 2018-10-25 RX ADMIN — Medication 10 ML: at 08:40

## 2018-10-25 NOTE — PROGRESS NOTES
Outpatient [address] [facility]    Port Access    NAME:  Yinka Solis  YOB: 1975  MEDICAL RECORD NUMBER:  [id number]  DATE:  10/25/2018    Patient arrived to [facility] 58   [] per wheelchair   [x] ambulatory     Port Site Location:  right chest,anterior chest wall   Port Site:  Redness: No  Bruising: no  Edema: No  Pain: No     Port Site cleansed with:  Chloroprep Scrub x 2  for 60 seconds and air dried x 3 mis ? Yes     Port Accessed with: 20 Gauge one inch  Power Port non coring needle using sterile technique. Blood return obtained: Yes  Flushed with 20 ml Normal Saline using push-pause method. Port flushes without resistance. Biopatch to site , and Tegaderm HP to site   Response to treatment:  Well tolerated by patient.     Electronically signed by Chiqui Juan RN on 10/25/2018 at  1399TK

## 2018-10-25 NOTE — PROGRESS NOTES
Outpatient [address] [facility]     Zemaire Visit    NAME:  Jordy Jackson  YOB: 1975  MEDICAL RECORD NUMBER:  [id number]  Episode Date:  10/25/2018    Patient arrived to [facility]     [] per wheelchair   [x] ambulatory with portable oxygen on   Color pink. , face is flushed . Pt denies any new medical issues or other changes. Denies any change in his meds. Verification of patient education of Zemaira actions and potential side effects:  Yes     Is this the patient's first Zemaire Injection? No   Did the patient experience any adverse reactions to previous Zemaire Injection? No    Any side effects from last infusion:  No     [] Rash         [] Itching  [] Headache  [] Fatigue  [] Dizziness  [] Tingling   [] Chest tightness  [] Shortness of Breath  [] Wheezing    Patient Active Problem List   Diagnosis Code    Abdominal pain R10.9    Lymphadenopathy R59.1    GERD (gastroesophageal reflux disease) K21.9    Ariza's esophagus with dysplasia K22.719    Emphysema lung J43.9    Alpha-1-antitrypsin deficiency ([facility] E88.01    Essential hypertension, benign I10    Advance care planning Z71.89    Type 2 diabetes mellitus without complication, without long-term current use of insulin ([facility]) E11.9    Colon polyp K63.5    Chronic hypoxemic respiratory failure ([facility]) J96.11       Pre infusion Vital Signs --see flow sheet                                 Does the Patient have a History of: IgA deficiency: no     Anaphylaxis or severe systemic response to any medication:  No        Breath Sounds: No increased work of breathing, Breath sounds clear to auscultation bilaterally and Good air exchange. No wheezing noted     Any recent activity tolerance changes: No      Any increased SOB or dyspnea:  No     Presence of cough or sputum:  Yes , has white and yellow mucus .  No increase in amount     Premedicated: NONE  [] Benadryl 25 mg IV  [] Benadryl 50 mg IV  [] Benadryl 25 mg oral   [] Benadryl 50 mg oral  [] Other    Epinephrine at bedside:  Yes    IV placed and documented prior to drug preparation:  Zemaira must be administered within 3 hours of drug reconstitution as it contains no preservatives. Monitoring of infusion documented in doc flowsheets. Zemaire administered: Yes     Wt 193 lb (87.5 kg)   BMI 27.69 kg/m²     Zemaire dosage: 60 mg/kg was administered slowly IVPB ---total dose is  IVPB 4925mg   Dose verified by:  Brian Lezama RN    Post treatment Vital Signs==see flow sheet                 Response to treatment:  Well tolerated by patient. No c/o's voiced. Scheduled to return for next dose of Zemaire on October 29, 2018. Pt to see his MD's in Dec 2018 and to have a Pulmonary fx.  Test in Dec. 2018  Electronically signed by Keysha Cardozo RN on 10/25/2018 at 1030am

## 2018-11-01 ENCOUNTER — HOSPITAL ENCOUNTER (OUTPATIENT)
Dept: INFUSION THERAPY | Age: 43
Setting detail: INFUSION SERIES
Discharge: HOME | End: 2018-11-01
Payer: COMMERCIAL

## 2018-11-01 VITALS
HEART RATE: 84 BPM | TEMPERATURE: 98 F | SYSTOLIC BLOOD PRESSURE: 127 MMHG | WEIGHT: 193 LBS | RESPIRATION RATE: 19 BRPM | OXYGEN SATURATION: 96 % | BODY MASS INDEX: 27.69 KG/M2 | DIASTOLIC BLOOD PRESSURE: 87 MMHG

## 2018-11-01 DIAGNOSIS — E88.01 ALPHA-1-ANTITRYPSIN DEFICIENCY (HCC): ICD-10-CM

## 2018-11-01 PROCEDURE — 2580000003 HC RX 258: Performed by: INTERNAL MEDICINE

## 2018-11-01 PROCEDURE — 96365 THER/PROPH/DIAG IV INF INIT: CPT

## 2018-11-01 RX ORDER — SODIUM CHLORIDE 0.9 % (FLUSH) 0.9 %
20 SYRINGE (ML) INJECTION PRN
Status: CANCELLED | OUTPATIENT
Start: 2018-11-01

## 2018-11-01 RX ORDER — HEPARIN SODIUM (PORCINE) LOCK FLUSH IV SOLN 100 UNIT/ML 100 UNIT/ML
500 SOLUTION INTRAVENOUS PRN
Status: CANCELLED | OUTPATIENT
Start: 2018-11-01

## 2018-11-01 RX ORDER — SODIUM CHLORIDE 0.9 % (FLUSH) 0.9 %
10 SYRINGE (ML) INJECTION PRN
Status: CANCELLED | OUTPATIENT
Start: 2018-11-01

## 2018-11-01 RX ORDER — SODIUM CHLORIDE 9 MG/ML
INJECTION, SOLUTION INTRAVENOUS ONCE
Status: CANCELLED | OUTPATIENT
Start: 2018-11-01

## 2018-11-01 RX ORDER — SODIUM CHLORIDE 9 MG/ML
INJECTION, SOLUTION INTRAVENOUS ONCE
Status: COMPLETED | OUTPATIENT
Start: 2018-11-01 | End: 2018-11-01

## 2018-11-01 RX ORDER — SODIUM CHLORIDE 0.9 % (FLUSH) 0.9 %
10 SYRINGE (ML) INJECTION PRN
Status: DISCONTINUED | OUTPATIENT
Start: 2018-11-01 | End: 2018-11-02 | Stop reason: HOSPADM

## 2018-11-01 RX ADMIN — Medication 10 ML: at 08:40

## 2018-11-01 RX ADMIN — SODIUM CHLORIDE 1000 ML: 9 INJECTION, SOLUTION INTRAVENOUS at 08:50

## 2018-11-01 RX ADMIN — Medication 10 ML: at 09:31

## 2018-11-01 NOTE — PROGRESS NOTES
Outpatient [address] [facility]     Zemaire Visit    NAME:  Kavon Sanchez  YOB: 1975  MEDICAL RECORD NUMBER:  [id number]  Episode Date:  11/1/2018    Patient arrived to [facility] 58    [] per wheelchair   [x] ambulatory with portable oxygen on  Color good, pt has no c/o's , denies any new medical issues or medical problems . Verification of patient education of Zemaira actions and potential side effects:  Yes     Is this the patient's first Zemaire Injection? No   Did the patient experience any adverse reactions to previous Zemaire Injection? No    Any side effects from last infusion:  No     [] Rash         [] Itching  [] Headache  [] Fatigue  [] Dizziness  [] Tingling   [] Chest tightness  [] Shortness of Breath  [] Wheezing    Patient Active Problem List   Diagnosis Code    Abdominal pain R10.9    Lymphadenopathy R59.1    GERD (gastroesophageal reflux disease) K21.9    Ariza's esophagus with dysplasia K22.719    Emphysema lung J43.9    Alpha-1-antitrypsin deficiency [facility] E88.01    Essential hypertension, benign I10    Advance care planning Z71.89    Type 2 diabetes mellitus without complication, without long-term current use of insulin ([facility]) E11.9    Colon polyp K63.5    Chronic hypoxemic respiratory failure ([facility]) J96.11       Pre infusion Vital Signs       Temp: 98 °F (36.7 °C)     Pulse: 72     Resp: 19     BP: 131/84       Does the Patient have a History of:     IgA deficiency: no     Anaphylaxis or severe systemic response to any medication:  No        Breath Sounds:  Crackles noted in upper posterior lobes,no wheezing or congestion noted in lower lobes or upper anterior lobes       Any recent activity tolerance changes: No      Any increased SOB or dyspnea:  No     Presence of cough or sputum:  Yes, has white to yellow mucus , no increase in amount  , no change in color     Premedicated:  NONE  [] Benadryl 25 mg IV  [] Benadryl 50 mg IV  [] Benadryl 25 mg oral   [] Benadryl 50 mg oral  [] Other    Epinephrine at bedside:  Yes    IV placed and documented prior to drug preparation:  Zemaira must be administered within 3 hours of drug reconstitution as it contains no preservatives. Monitoring of infusion documented in doc flowsheets. Zemaire administered: Yes     /84   Pulse 72   Temp 98 °F (36.7 °C) (Oral)   Resp 19   Wt 193 lb (87.5 kg)   SpO2 97%   BMI 27.69 kg/m²     Zemaire dosage:  60 mg/kg was administered slowly IVPB----- total dose is 4,897       Mg in IVPB , used . 2 micron filter with infusion  Dose verified by:  Ismael Najera RN    Post treatment Vital Signs--see flow sheet      Response to treatment:  Well tolerated by patient. Pt to see his PCP in Dec. 2018 and get a Pulmonary Fx test done in [place]. , and to see his Pulmonary MD in Jan 2019. Scheduled to return for next dose of Zemaire on November 8, 2018.      Electronically signed by Mayo Kendrick RN on 11/1/2018 at 1697

## 2018-11-01 NOTE — PROGRESS NOTES
Outpatient [address]    NAME:  Kaycee Eric  YOB: 1975  MEDICAL RECORD NUMBER:  [id number]  DATE:  11/1/2018    Patient arrived to [facility] 58   [] per wheelchair   [x] ambulatory     Port Site Location:  right chest, anterior  Port Site:  Redness: No  Bruising: No   Edema: No  Pain: No     Port Site cleansed with:  Chloroprep Scrub x 2  for 60 seconds and air dried x 2 mis ? Yes     Port Accessed with: 20 Gauge one inch  Power Port non coring needle using sterile technique. Blood return obtained: Yes  Flushed with 20 ml Normal Saline using push-pause method. Port flushes without resistance. Biopatch to site , and covered with Tegaderm HP drsng . Response to treatment:  Well tolerated by patient.     Electronically signed by Steven Trinidad RN on 11/1/2018 at 0900am

## 2018-11-08 ENCOUNTER — HOSPITAL ENCOUNTER (OUTPATIENT)
Dept: INFUSION THERAPY | Age: 43
Setting detail: INFUSION SERIES
Discharge: HOME | End: 2018-11-08
Payer: COMMERCIAL

## 2018-11-08 VITALS
HEIGHT: 70 IN | TEMPERATURE: 97.7 F | DIASTOLIC BLOOD PRESSURE: 85 MMHG | SYSTOLIC BLOOD PRESSURE: 127 MMHG | HEART RATE: 85 BPM | RESPIRATION RATE: 18 BRPM | WEIGHT: 193 LBS | BODY MASS INDEX: 27.63 KG/M2 | OXYGEN SATURATION: 96 %

## 2018-11-08 DIAGNOSIS — E88.01 ALPHA-1-ANTITRYPSIN DEFICIENCY (HCC): ICD-10-CM

## 2018-11-08 PROCEDURE — 2580000003 HC RX 258: Performed by: INTERNAL MEDICINE

## 2018-11-08 PROCEDURE — 96365 THER/PROPH/DIAG IV INF INIT: CPT

## 2018-11-08 RX ORDER — SODIUM CHLORIDE 0.9 % (FLUSH) 0.9 %
10 SYRINGE (ML) INJECTION PRN
Status: CANCELLED | OUTPATIENT
Start: 2018-11-08

## 2018-11-08 RX ORDER — HEPARIN SODIUM (PORCINE) LOCK FLUSH IV SOLN 100 UNIT/ML 100 UNIT/ML
500 SOLUTION INTRAVENOUS PRN
Status: CANCELLED | OUTPATIENT
Start: 2018-11-08

## 2018-11-08 RX ORDER — SODIUM CHLORIDE 0.9 % (FLUSH) 0.9 %
10 SYRINGE (ML) INJECTION PRN
Status: DISCONTINUED | OUTPATIENT
Start: 2018-11-08 | End: 2018-11-09 | Stop reason: HOSPADM

## 2018-11-08 RX ORDER — SODIUM CHLORIDE 0.9 % (FLUSH) 0.9 %
20 SYRINGE (ML) INJECTION PRN
Status: CANCELLED | OUTPATIENT
Start: 2018-11-08

## 2018-11-08 RX ORDER — SODIUM CHLORIDE 9 MG/ML
INJECTION, SOLUTION INTRAVENOUS ONCE
Status: DISCONTINUED | OUTPATIENT
Start: 2018-11-08 | End: 2018-11-09 | Stop reason: HOSPADM

## 2018-11-08 RX ORDER — SODIUM CHLORIDE 9 MG/ML
INJECTION, SOLUTION INTRAVENOUS ONCE
Status: CANCELLED | OUTPATIENT
Start: 2018-11-08

## 2018-11-08 RX ADMIN — Medication 20 ML: at 08:33

## 2018-11-08 RX ADMIN — Medication 20 ML: at 09:12

## 2018-11-08 NOTE — PROGRESS NOTES
Temp: 97.7 °F (36.5 °C)     Pulse: 93     Resp: 18     BP: 129/85       Does the Patient have a History of: IgA deficiency: no     Anaphylaxis or severe systemic response to any medication:  No        Breath Sounds: No increased work of breathing, Breath sounds clear to auscultation bilaterally, Good air exchange and end-expiratory wheezing  Pulse Oximetry: 97 %    Any recent activity tolerance changes: No      Any increased SOB or dyspnea:  No     Presence of cough or sputum:  Yes clear     Premedicated:  [] Benadryl 25 mg IV  [] Benadryl 50 mg IV  [] Benadryl 25 mg oral   [] Benadryl 50 mg oral  [] Other    Epinephrine at bedside:  No:     IV placed and documented prior to drug preparation:  Zemaira must be administered within 3 hours of drug reconstitution as it contains no preservatives. Monitoring of infusion documented in doc flowsheets. Zemaire administered: Yes     /85   Pulse 93   Temp 97.7 °F (36.5 °C)   Resp 18   Ht 5' 10\" (1.778 m)   Wt 193 lb (87.5 kg)   SpO2 96%   BMI 27.69 kg/m²     Zemaire dosage: 4925 mg/kg was administered slowly IV  Dose verified by:  Abner Rodriguez RN    Post treatment Vital Signs  Temp: 97.7 °F (36.5 °C)  Pulse: 93  Resp: 18  BP: 129/85    Response to treatment:  Well tolerated by patient. Scheduled to return for next dose of Zemaire on November 15, 2018.      Electronically signed by Jane Ramos RN on 11/8/2018 at 8:24 AM                              Electronically signed by Jane Ramos RN on 11/8/2018 at 8:23 AM

## 2018-11-15 ENCOUNTER — HOSPITAL ENCOUNTER (OUTPATIENT)
Dept: INFUSION THERAPY | Age: 43
Setting detail: INFUSION SERIES
Discharge: HOME | End: 2018-11-15
Payer: COMMERCIAL

## 2018-11-15 VITALS
HEART RATE: 78 BPM | DIASTOLIC BLOOD PRESSURE: 78 MMHG | OXYGEN SATURATION: 94 % | BODY MASS INDEX: 27.69 KG/M2 | WEIGHT: 193 LBS | RESPIRATION RATE: 18 BRPM | SYSTOLIC BLOOD PRESSURE: 132 MMHG | TEMPERATURE: 97.8 F

## 2018-11-15 DIAGNOSIS — E88.01 ALPHA-1-ANTITRYPSIN DEFICIENCY (HCC): ICD-10-CM

## 2018-11-15 PROCEDURE — 96365 THER/PROPH/DIAG IV INF INIT: CPT

## 2018-11-15 PROCEDURE — 2580000003 HC RX 258: Performed by: INTERNAL MEDICINE

## 2018-11-15 RX ORDER — SODIUM CHLORIDE 0.9 % (FLUSH) 0.9 %
10 SYRINGE (ML) INJECTION PRN
Status: CANCELLED | OUTPATIENT
Start: 2018-11-15

## 2018-11-15 RX ORDER — HEPARIN SODIUM (PORCINE) LOCK FLUSH IV SOLN 100 UNIT/ML 100 UNIT/ML
500 SOLUTION INTRAVENOUS PRN
Status: CANCELLED | OUTPATIENT
Start: 2018-11-15

## 2018-11-15 RX ORDER — SODIUM CHLORIDE 9 MG/ML
INJECTION, SOLUTION INTRAVENOUS ONCE
Status: CANCELLED | OUTPATIENT
Start: 2018-11-15

## 2018-11-15 RX ORDER — SODIUM CHLORIDE 0.9 % (FLUSH) 0.9 %
20 SYRINGE (ML) INJECTION PRN
Status: CANCELLED | OUTPATIENT
Start: 2018-11-15

## 2018-11-15 RX ORDER — SODIUM CHLORIDE 0.9 % (FLUSH) 0.9 %
10 SYRINGE (ML) INJECTION PRN
Status: DISCONTINUED | OUTPATIENT
Start: 2018-11-15 | End: 2018-11-16 | Stop reason: HOSPADM

## 2018-11-15 RX ORDER — SODIUM CHLORIDE 9 MG/ML
INJECTION, SOLUTION INTRAVENOUS ONCE
Status: DISCONTINUED | OUTPATIENT
Start: 2018-11-15 | End: 2018-11-16 | Stop reason: HOSPADM

## 2018-11-15 RX ADMIN — Medication 20 ML: at 08:39

## 2018-11-15 RX ADMIN — Medication 30 ML: at 09:10

## 2018-11-15 NOTE — PROGRESS NOTES
Outpatient [address] [facility]     Zemaire Visit    NAME:  Gagandeep Garibay  YOB: 1975  MEDICAL RECORD NUMBER:  [id number]  Episode Date:  11/15/2018    Patient arrived to [facility] 58    [] per wheelchair   [x] ambulatory     Verification of patient education of Zemaira actions and potential side effects:  Yes     Is this the patient's first Zemaire Injection? No   Did the patient experience any adverse reactions to previous Zemaire Injection? No    Any side effects from last infusion:  No     [] Rash         [] Itching  [] Headache  [] Fatigue  [] Dizziness  [] Tingling   [] Chest tightness  [] Shortness of Breath  [] Wheezing    Patient Active Problem List   Diagnosis Code    Abdominal pain R10.9    Lymphadenopathy R59.1    GERD (gastroesophageal reflux disease) K21.9    Ariza's esophagus with dysplasia K22.719    Emphysema lung J43.9    Alpha-1-antitrypsin deficiency ([facility] E88.01    Essential hypertension, benign I10    Advance care planning Z71.89    Type 2 diabetes mellitus without complication, without long-term current use of insulin ([facility]) E11.9    Colon polyp K63.5    Chronic hypoxemic respiratory failure ([facility]) J96.11       Pre infusion Vital Signs                                 Does the Patient have a History of:     IgA deficiency: no     Anaphylaxis or severe systemic response to any medication:  No        Breath Sounds: No increased work of breathing, Breath sounds clear to auscultation bilaterally, Good air exchange and No crackles  Pulse Oximetry: 94 %    Any recent activity tolerance changes: No      Any increased SOB or dyspnea:  No     Presence of cough or sputum:  Yes , at times in the mornings     Premedicated:  No premeds ordered  [] Benadryl 25 mg IV  [] Benadryl 50 mg IV  [] Benadryl 25 mg oral   [] Benadryl 50 mg oral  [] Other    Epinephrine at bedside:  No: but in pyxsis    IV placed and documented prior to drug preparation:  Yelena Jolley must be administered within 3 hours of drug reconstitution as it contains no preservatives. Monitoring of infusion documented in doc flowsheets. Zemaire administered: Yes     Wt 193 lb (87.5 kg)   BMI 27.69 kg/m²     Zemaire dosage: 60 mg/kg for a total dose of 4,925 mg (which is within 10% of calculated dose) was administered slowly IV. Response to treatment:  Well tolerated by patient. Scheduled to return for next dose of Zemaire on November 21, 2018.      Electronically signed by Adriana Friedman RN on 11/15/2018 at 9:35 am.

## 2018-11-21 ENCOUNTER — HOSPITAL ENCOUNTER (OUTPATIENT)
Dept: INFUSION THERAPY | Age: 43
Setting detail: INFUSION SERIES
Discharge: HOME | End: 2018-11-21
Payer: COMMERCIAL

## 2018-11-21 VITALS
HEART RATE: 66 BPM | SYSTOLIC BLOOD PRESSURE: 134 MMHG | BODY MASS INDEX: 27.69 KG/M2 | TEMPERATURE: 97.6 F | DIASTOLIC BLOOD PRESSURE: 79 MMHG | OXYGEN SATURATION: 96 % | WEIGHT: 193 LBS | RESPIRATION RATE: 18 BRPM

## 2018-11-21 DIAGNOSIS — E88.01 ALPHA-1-ANTITRYPSIN DEFICIENCY (HCC): ICD-10-CM

## 2018-11-21 PROCEDURE — 96365 THER/PROPH/DIAG IV INF INIT: CPT

## 2018-11-21 PROCEDURE — 96523 IRRIG DRUG DELIVERY DEVICE: CPT

## 2018-11-21 RX ORDER — SODIUM CHLORIDE 9 MG/ML
INJECTION, SOLUTION INTRAVENOUS ONCE
Status: CANCELLED | OUTPATIENT
Start: 2018-11-21

## 2018-11-21 RX ORDER — HEPARIN SODIUM (PORCINE) LOCK FLUSH IV SOLN 100 UNIT/ML 100 UNIT/ML
500 SOLUTION INTRAVENOUS PRN
Status: CANCELLED | OUTPATIENT
Start: 2018-11-21

## 2018-11-21 RX ORDER — SODIUM CHLORIDE 0.9 % (FLUSH) 0.9 %
20 SYRINGE (ML) INJECTION PRN
Status: CANCELLED | OUTPATIENT
Start: 2018-11-21

## 2018-11-21 RX ORDER — SODIUM CHLORIDE 0.9 % (FLUSH) 0.9 %
10 SYRINGE (ML) INJECTION PRN
Status: CANCELLED | OUTPATIENT
Start: 2018-11-21

## 2018-11-21 RX ORDER — SODIUM CHLORIDE 9 MG/ML
INJECTION, SOLUTION INTRAVENOUS ONCE
Status: DISCONTINUED | OUTPATIENT
Start: 2018-11-21 | End: 2018-11-22 | Stop reason: HOSPADM

## 2018-11-21 ASSESSMENT — PAIN SCALES - GENERAL
PAINLEVEL_OUTOF10: 0

## 2018-11-21 NOTE — PROGRESS NOTES
Outpatient [address] [facility]     Zemaire Visit    NAME:  Mallory Newby  YOB: 1975  MEDICAL RECORD NUMBER:  [id number]  Episode Date:  11/21/2018    Patient arrived to [facility] 58     [] per wheelchair   [x] ambulatory     Verification of patient education of Zemaira actions and potential side effects:  Yes     Is this the patient's first Zemaire Injection? No   Did the patient experience any adverse reactions to previous Zemaire Injection? No    Any side effects from last infusion:  No     [] Rash         [] Itching  [] Headache  [] Fatigue  [] Dizziness  [] Tingling   [] Chest tightness  [] Shortness of Breath  [] Wheezing    Patient Active Problem List   Diagnosis Code    Abdominal pain R10.9    Lymphadenopathy R59.1    GERD (gastroesophageal reflux disease) K21.9    Ariza's esophagus with dysplasia K22.719    Emphysema lung J43.9    Alpha-1-antitrypsin deficiency ([facility] E88.01    Essential hypertension, benign I10    Advance care planning Z71.89    Type 2 diabetes mellitus without complication, without long-term current use of insulin ([facility]) E11.9    Colon polyp K63.5    Chronic hypoxemic respiratory failure ([facility]) J96.11       Pre infusion Vital Signs       Temp 97.6 orally     Pulse 76     Resp: 18     B/P 133/90   Sat 95 % on nasal cannula. Does the Patient have a History of: alpha / anti-tryspin deficiency. Anaphylaxis or severe systemic response to any medication:  No  See allergy listed. Breath Sounds: No increased work of breathing, Breath sounds clear to auscultation bilaterally, Good air exchange and No crackles, basically clear but diminished in the upper lobes. Pulse Oximetry: 95-96 % on nasal cannula. Any recent activity tolerance changes: No      Any increased SOB or dyspnea:  No     Presence of cough or sputum:  No     Premedicated: none ordered.   [] Benadryl 25 mg IV  [] Benadryl 50 mg IV  [] Benadryl 25 mg oral   [] Benadryl 50 mg oral  [] Other    Epinephrine at bedside: no, in pyxis. Port accessed. IV placed and documented prior to drug preparation:  Zemaira must be administered within 3 hours of drug reconstitution as it contains no preservatives. yes    Monitoring of infusion documented in doc flowsheets. Zemaire administered:yes      BP (!) 133/90   Pulse 76   Resp 18   Wt 193 lb (87.5 kg)   SpO2 95%   BMI 27.69 kg/m²     Zemaire dosage:    5,538 mgs was administered slowly IV. Dose verified by:  Desirae Suarez RN    Post treatment Vital Signs  134/79  Pulse 76  Resp. 18  O2 SAT 96 ON nasal cannuls. Response to treatment:  Well tolerated by patient. Scheduled to return for next dose of Zemaire in 1 week for repeated infusion. Discharged without complications with home O2.   Electronically signed by Niko Thomas RN on 11/21/2018 at 10:58 AM

## 2018-11-28 NOTE — TELEPHONE ENCOUNTER
Diego Petersen is on back order and [facility] mail order is wanting to know if patient can be switched to [address]  or [facility].  Please advise

## 2018-11-29 ENCOUNTER — HOSPITAL ENCOUNTER (OUTPATIENT)
Dept: INFUSION THERAPY | Age: 43
Setting detail: INFUSION SERIES
Discharge: HOME | End: 2018-11-29
Payer: COMMERCIAL

## 2018-11-29 ENCOUNTER — OFFICE VISIT (OUTPATIENT)
Dept: FAMILY MEDICINE CLINIC | Age: 43
End: 2018-11-29
Payer: COMMERCIAL

## 2018-11-29 ENCOUNTER — OFFICE VISIT (OUTPATIENT)
Dept: PULMONOLOGY | Age: 43
End: 2018-11-29
Payer: COMMERCIAL

## 2018-11-29 ENCOUNTER — HOSPITAL ENCOUNTER (OUTPATIENT)
Dept: PULMONOLOGY | Age: 43
Discharge: HOME | End: 2018-11-29
Payer: COMMERCIAL

## 2018-11-29 VITALS
WEIGHT: 193 LBS | DIASTOLIC BLOOD PRESSURE: 87 MMHG | TEMPERATURE: 97.8 F | HEART RATE: 83 BPM | RESPIRATION RATE: 18 BRPM | SYSTOLIC BLOOD PRESSURE: 132 MMHG | BODY MASS INDEX: 27.69 KG/M2 | OXYGEN SATURATION: 96 %

## 2018-11-29 VITALS
SYSTOLIC BLOOD PRESSURE: 126 MMHG | BODY MASS INDEX: 28.35 KG/M2 | TEMPERATURE: 97.6 F | OXYGEN SATURATION: 94 % | HEART RATE: 78 BPM | WEIGHT: 198 LBS | RESPIRATION RATE: 16 BRPM | DIASTOLIC BLOOD PRESSURE: 78 MMHG | HEIGHT: 70 IN

## 2018-11-29 VITALS
HEIGHT: 70 IN | HEART RATE: 85 BPM | WEIGHT: 198 LBS | DIASTOLIC BLOOD PRESSURE: 88 MMHG | SYSTOLIC BLOOD PRESSURE: 129 MMHG | BODY MASS INDEX: 28.35 KG/M2

## 2018-11-29 DIAGNOSIS — E88.01 ALPHA-1-ANTITRYPSIN DEFICIENCY (HCC): ICD-10-CM

## 2018-11-29 DIAGNOSIS — E11.9 TYPE 2 DIABETES MELLITUS WITHOUT COMPLICATION, WITHOUT LONG-TERM CURRENT USE OF INSULIN (HCC): Primary | ICD-10-CM

## 2018-11-29 DIAGNOSIS — E88.01 ALPHA-1-ANTITRYPSIN DEFICIENCY (HCC): Primary | ICD-10-CM

## 2018-11-29 DIAGNOSIS — J96.11 CHRONIC HYPOXEMIC RESPIRATORY FAILURE (HCC): ICD-10-CM

## 2018-11-29 DIAGNOSIS — K22.719 BARRETT'S ESOPHAGUS WITH DYSPLASIA: ICD-10-CM

## 2018-11-29 DIAGNOSIS — K21.00 GASTROESOPHAGEAL REFLUX DISEASE WITH ESOPHAGITIS: ICD-10-CM

## 2018-11-29 LAB
A/G RATIO: 1.5 (ref 1.1–2.2)
ALBUMIN SERPL-MCNC: 4.4 G/DL (ref 3.4–5)
ALP BLD-CCNC: 80 U/L (ref 40–129)
ALT SERPL-CCNC: 62 U/L (ref 10–40)
ANION GAP SERPL CALCULATED.3IONS-SCNC: 16 MMOL/L (ref 3–16)
AST SERPL-CCNC: 41 U/L (ref 15–37)
BILIRUB SERPL-MCNC: 0.8 MG/DL (ref 0–1)
BUN BLDV-MCNC: 8 MG/DL (ref 7–20)
CALCIUM SERPL-MCNC: 9.6 MG/DL (ref 8.3–10.6)
CHLORIDE BLD-SCNC: 100 MMOL/L (ref 99–110)
CO2: 25 MMOL/L (ref 21–32)
CREAT SERPL-MCNC: 0.7 MG/DL (ref 0.9–1.3)
CREATININE URINE: 66.6 MG/DL (ref 39–259)
GFR AFRICAN AMERICAN: >60
GFR NON-AFRICAN AMERICAN: >60
GLOBULIN: 2.9 G/DL
GLUCOSE BLD-MCNC: 72 MG/DL (ref 70–99)
HBA1C MFR BLD: 6.3 %
MICROALBUMIN UR-MCNC: <1.2 MG/DL
MICROALBUMIN/CREAT UR-RTO: NORMAL MG/G (ref 0–30)
POTASSIUM SERPL-SCNC: 4 MMOL/L (ref 3.5–5.1)
SODIUM BLD-SCNC: 141 MMOL/L (ref 136–145)
TOTAL PROTEIN: 7.3 G/DL (ref 6.4–8.2)

## 2018-11-29 PROCEDURE — 94726 PLETHYSMOGRAPHY LUNG VOLUMES: CPT | Performed by: INTERNAL MEDICINE

## 2018-11-29 PROCEDURE — 96365 THER/PROPH/DIAG IV INF INIT: CPT

## 2018-11-29 PROCEDURE — 99213 OFFICE O/P EST LOW 20 MIN: CPT | Performed by: INTERNAL MEDICINE

## 2018-11-29 PROCEDURE — 94664 DEMO&/EVAL PT USE INHALER: CPT

## 2018-11-29 PROCEDURE — 2580000003 HC RX 258: Performed by: INTERNAL MEDICINE

## 2018-11-29 PROCEDURE — 94640 AIRWAY INHALATION TREATMENT: CPT

## 2018-11-29 PROCEDURE — 94729 DIFFUSING CAPACITY: CPT

## 2018-11-29 PROCEDURE — 94060 EVALUATION OF WHEEZING: CPT

## 2018-11-29 PROCEDURE — 94729 DIFFUSING CAPACITY: CPT | Performed by: INTERNAL MEDICINE

## 2018-11-29 PROCEDURE — 6370000000 HC RX 637 (ALT 250 FOR IP): Performed by: INTERNAL MEDICINE

## 2018-11-29 PROCEDURE — 94726 PLETHYSMOGRAPHY LUNG VOLUMES: CPT

## 2018-11-29 PROCEDURE — 94060 EVALUATION OF WHEEZING: CPT | Performed by: INTERNAL MEDICINE

## 2018-11-29 PROCEDURE — 83036 HEMOGLOBIN GLYCOSYLATED A1C: CPT | Performed by: FAMILY MEDICINE

## 2018-11-29 PROCEDURE — 99214 OFFICE O/P EST MOD 30 MIN: CPT | Performed by: FAMILY MEDICINE

## 2018-11-29 RX ORDER — SODIUM CHLORIDE 9 MG/ML
INJECTION, SOLUTION INTRAVENOUS
Status: DISPENSED
Start: 2018-11-29 | End: 2018-11-29

## 2018-11-29 RX ORDER — SODIUM CHLORIDE 0.9 % (FLUSH) 0.9 %
10 SYRINGE (ML) INJECTION PRN
Status: DISCONTINUED | OUTPATIENT
Start: 2018-11-29 | End: 2018-11-30 | Stop reason: HOSPADM

## 2018-11-29 RX ORDER — SODIUM CHLORIDE 0.9 % (FLUSH) 0.9 %
10 SYRINGE (ML) INJECTION PRN
Status: CANCELLED | OUTPATIENT
Start: 2018-11-29

## 2018-11-29 RX ORDER — SODIUM CHLORIDE 9 MG/ML
INJECTION, SOLUTION INTRAVENOUS ONCE
Status: COMPLETED | OUTPATIENT
Start: 2018-11-29 | End: 2018-11-29

## 2018-11-29 RX ORDER — ALBUTEROL SULFATE 90 UG/1
4 AEROSOL, METERED RESPIRATORY (INHALATION) ONCE
Status: COMPLETED | OUTPATIENT
Start: 2018-11-29 | End: 2018-11-29

## 2018-11-29 RX ORDER — HEPARIN SODIUM (PORCINE) LOCK FLUSH IV SOLN 100 UNIT/ML 100 UNIT/ML
500 SOLUTION INTRAVENOUS PRN
Status: CANCELLED | OUTPATIENT
Start: 2018-11-29

## 2018-11-29 RX ORDER — SODIUM CHLORIDE 9 MG/ML
INJECTION, SOLUTION INTRAVENOUS ONCE
Status: CANCELLED | OUTPATIENT
Start: 2018-11-29

## 2018-11-29 RX ORDER — SODIUM CHLORIDE 0.9 % (FLUSH) 0.9 %
20 SYRINGE (ML) INJECTION PRN
Status: CANCELLED | OUTPATIENT
Start: 2018-11-29

## 2018-11-29 RX ADMIN — Medication 20 ML: at 09:40

## 2018-11-29 RX ADMIN — ALBUTEROL SULFATE 4 PUFF: 90 AEROSOL, METERED RESPIRATORY (INHALATION) at 10:28

## 2018-11-29 RX ADMIN — Medication 20 ML: at 08:35

## 2018-11-29 RX ADMIN — SODIUM CHLORIDE: 9 INJECTION, SOLUTION INTRAVENOUS at 08:45

## 2018-11-29 NOTE — PROGRESS NOTES
Outpatient [address] [facility]     Zemaire Visit    NAME:  Angie Edouard  YOB: 1975  MEDICAL RECORD NUMBER:  [id number]  Episode Date:  11/29/2018    Patient arrived to [facility] 58    [] per wheelchair   [x] ambulatory     Verification of patient education of Zemaira actions and potential side effects:  Yes     Is this the patient's first Zemaire Injection? No   Did the patient experience any adverse reactions to previous Zemaire Injection? No    Any side effects from last infusion:  No     [] Rash         [] Itching  [] Headache  [] Fatigue  [] Dizziness  [] Tingling   [] Chest tightness  [] Shortness of Breath  [] Wheezing    Patient Active Problem List   Diagnosis Code    Abdominal pain R10.9    Lymphadenopathy R59.1    GERD (gastroesophageal reflux disease) K21.9    Ariza's esophagus with dysplasia K22.719    Emphysema lung J43.9    Alpha-1-antitrypsin deficiency ([facility] E88.01    Essential hypertension, benign I10    Advance care planning Z71.89    Type 2 diabetes mellitus without complication, without long-term current use of insulin ([facility]) E11.9    Colon polyp K63.5    Chronic hypoxemic respiratory failure ([facility]) J96.11       Pre infusion Vital Signs       Temp: 97.8 °F (36.6 °C)     Pulse: 88     Resp: 18     BP: 122/81       Does the Patient have a History of:     IgA deficiency: no     Anaphylaxis or severe systemic response to any medication:  No        Breath Sounds: No increased work of breathing, Breath sounds clear to auscultation bilaterally, Good air exchange and No crackles  Pulse Oximetry: 96 %    Any recent activity tolerance changes: No      Any increased SOB or dyspnea:  No     Presence of cough or sputum:  No     Premedicated:  [] Benadryl 25 mg IV  [] Benadryl 50 mg IV  [] Benadryl 25 mg oral   [] Benadryl 50 mg oral  [] Other    Epinephrine at bedside:  No: ,but in pyxsis    IV placed and documented prior to drug preparation:  Pérez Camp must be administered within 3 hours of drug reconstitution as it contains no preservatives. Monitoring of infusion documented in doc flowsheets. Zemaire administered: Yes     /81   Pulse 88   Temp 97.8 °F (36.6 °C) (Oral)   Resp 18   Wt 193 lb (87.5 kg)   SpO2 96%   BMI 27.69 kg/m²     Zemaire dosage: 60 mg/kg was administered slowly IV    Post treatment Vital Signs  Temp: 97.8 °F (36.6 °C)  Pulse: 83  Resp: 18  CHAO 132/87    Response to treatment:  Well tolerated by patient. Scheduled to return for next dose of Zemaire on December 6, 2018.      Electronically signed by Willow Cabello RN on 11/29/2018 at 9:49 AM

## 2018-11-29 NOTE — PROGRESS NOTES
Former Smoker     Packs/day: 1.60     Years: 30.00     Types: Cigarettes     Start date: 1/1/1986     Quit date: 10/5/2011    Smokeless tobacco: Never Used    Alcohol use No    Drug use: No    Sexual activity: Yes     Partners: Female     Other Topics Concern    Not on file     Social History Narrative    No narrative on file       New Prescriptions    No medications on file         Meds Prior to visit:  Prior to Visit Medications    Medication Sig Taking? Authorizing Provider   tiotropium (SPIRIVA RESPIMAT) 2.5 MCG/ACT AERS inhaler INHALE 2 PUFFS BY MOUTH INTO THE LUNGS DAILY  Ami Rubio MD   esomeprazole (NEXIUM) 40 MG delayed release capsule Take 1 capsule by mouth 2 times daily  Ami Rubio MD   naproxen sodium (ALEVE) 220 MG tablet Take 220 mg by mouth as needed   Historical Provider, MD   alpha1-proteinase Inhibitor, Human, (ZEMAIRA) 1000 MG SOLR Infuse 5,118 mg intravenously once a week  Cheryl Hinojosa, APRN - CNP   Fluticasone Furoate-Vilanterol (BREO ELLIPTA) 200-25 MCG/INH AEPB Inhale 1 puff into the lungs daily  Historical Provider, MD   albuterol sulfate HFA (PROAIR HFA) 108 (90 Base) MCG/ACT inhaler Inhale 2 puffs into the lungs every 6 hours as needed for Wheezing or Shortness of Breath  Vida Peña MD   acetaminophen (TYLENOL) 325 MG tablet Take 650 mg by mouth every 6 hours as needed for Pain. Historical Provider, MD     Allergies   Allergen Reactions    Ibuprofen Other (See Comments)     Pt states ibuprofen gave him cramps in his stomach.        OBJECTIVE:    Ht 5' 10\" (1.778 m)   BMI 28.41 kg/m²   BP Readings from Last 2 Encounters:   11/29/18 126/78   11/29/18 132/87     Wt Readings from Last 3 Encounters:   11/29/18 198 lb (89.8 kg)   11/29/18 193 lb (87.5 kg)   11/21/18 193 lb (87.5 kg)       Physical Exam        Hemoglobin A1C (%)   Date Value   06/19/2018 5.9     Microalbumin, Random Urine (mg/dL)   Date Value   07/18/2017 <1.20     LDL Calculated (mg/dL)   Date

## 2018-11-29 NOTE — PROGRESS NOTES
REASON FOR CONSULTATION/CC: Alpha 1 antitrypsin deficiency      CONSULTING PHYSICIAN: Dr. Alicia Vasquez  PCP: Yen Lam MD    HISTORY OF PRESENT ILLNESS: Chicho Tao is a 39y.o. year old male with a history of smoking who presents to establish care and follow-up for Alpha 1 antitrypsin Deficiency. Copd with Alpha 1  He continues to get medication from Breo and Spiriva     chronic  Hypoxemia   Continues to need and benefit form o2. PAST MEDICAL HISTORY:  Past Medical History:   Diagnosis Date    Advance care planning     LIving will on chart    Alpha-1-antitrypsin deficiency ([address] 11/15/2013    PFT's FEV1 72%    Ariza's esophagus with dysplasia 04/2012    Had checked 9/18/17 recheck in 4 years    CAP (community acquired pneumonia) 11/4/14    Colon polyp 09/25/2013    Had checked 9/18/17 recheck in 4 years    Elevated liver enzymes     Emphysema lung ([address] Oct 2013    on oxygen at night. and prn during the day     Essential hypertension, benign 12/9/2014    GERD (gastroesophageal reflux disease) 11/10/2011    EGD 4/3/11 with Ariza's Esophagus    Kidney stone     Kidney stone     Pleural effusion     at age 15    Type 2 diabetes mellitus without complication, without long-term current use of insulin ([address] 7/18/2017    Wears glasses        PAST SURGICAL HISTORY:  Past Surgical History:   Procedure Laterality Date    CHOLECYSTECTOMY, LAPAROSCOPIC  2005    COLONOSCOPY  2017    every 4 years     ENDOSCOPY, COLON, DIAGNOSTIC      EGD August 2015,pt states BX. negative    LYMPH NODE DISSECTION  2011    groin    TUNNELED VENOUS PORT PLACEMENT Right 11/22/2017    Smart port per Dr. Courtney Apple , right subclavian placement , right chest wall        FAMILY HISTORY:  family history includes Cancer in his maternal grandfather; Cancer (age of onset: 61) in his father. SOCIAL HISTORY:   reports that he quit smoking about 7 years ago.  His smoking use included as needed for Pain. Current Facility-Administered Medications on File Prior to Visit   Medication Dose Route Frequency Provider Last Rate Last Dose    sodium chloride flush 0.9 % injection 10 mL  10 mL Intravenous PRN Albertina Stovall MD   20 mL at 11/29/18 0940    sodium chloride 0.9 % infusion              Data Reviewed:   CBC and Renal reviewed  Last CBC  Lab Results   Component Value Date    WBC 15.4 01/04/2018    RBC 5.54 01/04/2018    HGB 16.1 01/04/2018    MCV 88.7 01/04/2018     01/04/2018     Last Renal  Lab Results   Component Value Date     06/19/2018    K 4.6 06/19/2018     06/19/2018    CO2 24 06/19/2018    CO2 24 01/04/2018    CO2 22 11/22/2017    BUN 11 06/19/2018    CREATININE 0.9 06/19/2018    GLUCOSE 108 06/19/2018    CALCIUM 9.4 06/19/2018       Last ABG  POC Blood Gas:   No results found for: POCPH  No results for input(s): PH, PCO2, PO2, HCO3, BE, O2SAT in the last 72 hours. Chest imaging reports were reviewed and imaging was reviewed by me and showed clearance on pneumonia       Overall Interpretation  Moderate obstuctive defect with moderate decrease in DLCO. The   patient has had three PFT's in the past 1 year. See table below. Worsening of FEV1 from first spirometry to second was 0.5L. The   patient was then started on alpha 1 replacement therapy however   still proceeded to loose 0.35L in 6 months. 1/14 6/14 7/14   FVC % 100    99    115   FEV1 % 62    72      84   FEV1/FVC 49 57       59   TLC%            101    105   DLCO % 52    66      70     Outside records requested and reviewed:       FEV1 Feb: 79%    Assessment:     ·  Alpha one antitrypsin deficiency, PiZZ < 30  · Obstructive airways disease,    ·  Chronic hypoxemia , 2L NC  · gerd      Plan:         Problem List Items Addressed This Visit     Chronic hypoxemic respiratory failure ([address]     Abby Mendoza continues to need and benefit from supplemental oxygen.  he is using oxygen

## 2018-11-29 NOTE — ASSESSMENT & PLAN NOTE
He has been benefiting from supplemental therapy. Started in 2013. FEV1 has decreased over that time and is currently 68%. Continue on Breo and Spiriva.

## 2018-12-06 ENCOUNTER — HOSPITAL ENCOUNTER (OUTPATIENT)
Dept: INFUSION THERAPY | Age: 43
Setting detail: INFUSION SERIES
Discharge: HOME | End: 2018-12-06
Payer: COMMERCIAL

## 2018-12-06 VITALS
OXYGEN SATURATION: 98 % | TEMPERATURE: 98 F | DIASTOLIC BLOOD PRESSURE: 82 MMHG | BODY MASS INDEX: 27.55 KG/M2 | HEART RATE: 90 BPM | WEIGHT: 192 LBS | SYSTOLIC BLOOD PRESSURE: 120 MMHG | RESPIRATION RATE: 18 BRPM

## 2018-12-06 DIAGNOSIS — E88.01 ALPHA-1-ANTITRYPSIN DEFICIENCY (HCC): ICD-10-CM

## 2018-12-06 PROCEDURE — 2580000003 HC RX 258: Performed by: INTERNAL MEDICINE

## 2018-12-06 PROCEDURE — 96365 THER/PROPH/DIAG IV INF INIT: CPT

## 2018-12-06 RX ORDER — SODIUM CHLORIDE 9 MG/ML
INJECTION, SOLUTION INTRAVENOUS ONCE
Status: CANCELLED | OUTPATIENT
Start: 2018-12-06

## 2018-12-06 RX ORDER — HEPARIN SODIUM (PORCINE) LOCK FLUSH IV SOLN 100 UNIT/ML 100 UNIT/ML
500 SOLUTION INTRAVENOUS PRN
Status: CANCELLED | OUTPATIENT
Start: 2018-12-06

## 2018-12-06 RX ORDER — SODIUM CHLORIDE 0.9 % (FLUSH) 0.9 %
20 SYRINGE (ML) INJECTION PRN
Status: CANCELLED | OUTPATIENT
Start: 2018-12-06

## 2018-12-06 RX ORDER — SODIUM CHLORIDE 9 MG/ML
INJECTION, SOLUTION INTRAVENOUS ONCE
Status: COMPLETED | OUTPATIENT
Start: 2018-12-06 | End: 2018-12-06

## 2018-12-06 RX ORDER — SODIUM CHLORIDE 0.9 % (FLUSH) 0.9 %
10 SYRINGE (ML) INJECTION PRN
Status: CANCELLED | OUTPATIENT
Start: 2018-12-06

## 2018-12-06 RX ORDER — SODIUM CHLORIDE 0.9 % (FLUSH) 0.9 %
10 SYRINGE (ML) INJECTION PRN
Status: DISCONTINUED | OUTPATIENT
Start: 2018-12-06 | End: 2018-12-07 | Stop reason: HOSPADM

## 2018-12-06 RX ADMIN — Medication 10 ML: at 09:46

## 2018-12-06 RX ADMIN — SODIUM CHLORIDE 500 ML: 9 INJECTION, SOLUTION INTRAVENOUS at 08:58

## 2018-12-06 RX ADMIN — Medication 10 ML: at 08:40

## 2018-12-06 NOTE — PROGRESS NOTES
Outpatient [address]    NAME:  Angie Edouard  YOB: 1975  MEDICAL RECORD NUMBER:  [id number]  DATE:  12/6/2018    Patient arrived to [facility]   [] per wheelchair   [x] ambulatory     Port Site Location:  right chest, anterior  Port Site:  Redness: No  Bruising: No   Edema: No  Pain: No     Port Site cleansed with:  Chloroprep Scrub for 60 seconds x 2 and air dried? Yes     Port Accessed with: 20 Gauge  One inch Power Port non coring needle using sterile technique. Blood return obtained: Yes  Flushed with 10 ml Normal Saline using push-pause method. Port flushes without resistance. Biopatch and Tegaderm HP to port site   Response to treatment:  Well tolerated by patient.     Electronically signed by Jarred Gaxiola RN on 12/6/2018 at 0900am

## 2018-12-06 NOTE — PROGRESS NOTES
Outpatient [address] [facility]     Zemaire Visit    NAME:  Lino Ramos  YOB: 1975  MEDICAL RECORD NUMBER:  [id number]  Episode Date:  12/6/2018    Patient arrived to [facility] 58    [] per wheelchair   [x] ambulatory with portable oxygen on  Color good, denies any issues with his port . No ankle edema. Pt has seen both his PCP and Pulmonary MD recently. Also had a Pulmonary fx. Test done recently, he was told results were a little worse . Pt was laid off from his job and is presently on [address] [place] [address] till the end of 2018. Verification of patient education of Zemaira actions and potential side effects:  Yes     Is this the patient's first Zemaire Injection? No   Did the patient experience any adverse reactions to previous Zemaire Injection? No    Any side effects from last infusion:  No     [] Rash         [] Itching  [] Headache  [] Fatigue  [] Dizziness  [] Tingling   [] Chest tightness  [] Shortness of Breath  [] Wheezing    Patient Active Problem List   Diagnosis Code    Abdominal pain R10.9    Lymphadenopathy R59.1    GERD (gastroesophageal reflux disease) K21.9    Ariza's esophagus with dysplasia K22.719    Emphysema lung J43.9    [facility] E88.01    Essential hypertension, benign I10    Advance care planning Z71.89    Type 2 diabetes mellitus without complication, without long-term current use of insulin ([facility]) E11.9    Colon polyp K63.5    Chronic hypoxemic respiratory failure ([facility]) J96.11       Pre infusion Vital Signs ==see flow sheet                                Does the Patient have a History of:     IgA deficiency: no     Anaphylaxis or severe systemic response to any medication:  No        Breath Sounds: No increased work of breathing, Breath sounds clear to auscultation bilaterally and Good air exchange      Any recent activity tolerance changes: No      Any increased SOB or dyspnea:  No     Presence of cough or sputum:  Yes , sputum is white, denies increase in sputum production    Premedicated:    none ordered  [] Benadryl 25 mg IV  [] Benadryl 50 mg IV  [] Benadryl 25 mg oral   [] Benadryl 50 mg oral  [] Other    Epinephrine at bedside:  Yes    IV placed and documented prior to drug preparation:  Zemaira must be administered within 3 hours of drug reconstitution as it contains no preservatives. Monitoring of infusion documented in doc flowsheets. Zemaire administered: Yes     Wt 192 lb (87.1 kg)   BMI 27.55 kg/m²     Zemaire dosage:  60 mg/kg was administered slowly IV---  Filter used   . 2 micron filter  Dose verified by:  Hilario Hearn RN    Post treatment Vital Signs--see flow sheet                 Response to treatment:  Well tolerated by patient. Scheduled to return for next dose of Zemaire on December 13, 2018.      Electronically signed by Gilford Dub, RN on 12/6/2018 at 1010am

## 2018-12-13 ENCOUNTER — HOSPITAL ENCOUNTER (OUTPATIENT)
Dept: INFUSION THERAPY | Age: 43
Setting detail: INFUSION SERIES
Discharge: HOME | End: 2018-12-13
Payer: COMMERCIAL

## 2018-12-13 VITALS
OXYGEN SATURATION: 95 % | SYSTOLIC BLOOD PRESSURE: 134 MMHG | HEART RATE: 88 BPM | RESPIRATION RATE: 18 BRPM | TEMPERATURE: 97.7 F | BODY MASS INDEX: 27.55 KG/M2 | WEIGHT: 192 LBS | DIASTOLIC BLOOD PRESSURE: 79 MMHG

## 2018-12-13 DIAGNOSIS — E88.01 ALPHA-1-ANTITRYPSIN DEFICIENCY (HCC): ICD-10-CM

## 2018-12-13 PROCEDURE — 2580000003 HC RX 258: Performed by: INTERNAL MEDICINE

## 2018-12-13 PROCEDURE — 2580000003 HC RX 258

## 2018-12-13 PROCEDURE — 96365 THER/PROPH/DIAG IV INF INIT: CPT

## 2018-12-13 RX ORDER — SODIUM CHLORIDE 0.9 % (FLUSH) 0.9 %
10 SYRINGE (ML) INJECTION PRN
Status: CANCELLED | OUTPATIENT
Start: 2018-12-13

## 2018-12-13 RX ORDER — SODIUM CHLORIDE 9 MG/ML
INJECTION, SOLUTION INTRAVENOUS
Status: COMPLETED
Start: 2018-12-13 | End: 2018-12-13

## 2018-12-13 RX ORDER — SODIUM CHLORIDE 9 MG/ML
INJECTION, SOLUTION INTRAVENOUS ONCE
Status: COMPLETED | OUTPATIENT
Start: 2018-12-13 | End: 2018-12-13

## 2018-12-13 RX ORDER — SODIUM CHLORIDE 0.9 % (FLUSH) 0.9 %
20 SYRINGE (ML) INJECTION PRN
Status: CANCELLED | OUTPATIENT
Start: 2018-12-13

## 2018-12-13 RX ORDER — SODIUM CHLORIDE 0.9 % (FLUSH) 0.9 %
10 SYRINGE (ML) INJECTION PRN
Status: DISCONTINUED | OUTPATIENT
Start: 2018-12-13 | End: 2018-12-14 | Stop reason: HOSPADM

## 2018-12-13 RX ORDER — SODIUM CHLORIDE 9 MG/ML
INJECTION, SOLUTION INTRAVENOUS ONCE
Status: CANCELLED | OUTPATIENT
Start: 2018-12-13

## 2018-12-13 RX ORDER — HEPARIN SODIUM (PORCINE) LOCK FLUSH IV SOLN 100 UNIT/ML 100 UNIT/ML
500 SOLUTION INTRAVENOUS PRN
Status: CANCELLED | OUTPATIENT
Start: 2018-12-13

## 2018-12-13 RX ADMIN — SODIUM CHLORIDE 500 ML: 9 INJECTION, SOLUTION INTRAVENOUS at 08:50

## 2018-12-13 RX ADMIN — Medication 10 ML: at 08:30

## 2018-12-13 ASSESSMENT — PAIN SCALES - GENERAL
PAINLEVEL_OUTOF10: 0
PAINLEVEL_OUTOF10: 0

## 2018-12-13 NOTE — PROGRESS NOTES
Outpatient [facility] [address] Visit    NAME:  Veronica Yung  YOB: 1975  MEDICAL RECORD NUMBER:  [id number]  Outpatient [address] [facility]     Zemaire Visit    NAME:  Veronica Yung  YOB: 1975  MEDICAL RECORD NUMBER:  [id number]  Episode Date:  12/13/2018    Patient arrived to [facility] 58   [] per wheelchair   [x] ambulatory with portable oxygen on   Color good, face sl. Flushed . No ankle edema . Pt denies any new medical issues. Denies any pain or discomfort from his Port a cath . Verification of patient education of Zemaira actions and potential side effects:  Yes     Is this the patient's first Zemaire Injection? No   Did the patient experience any adverse reactions to previous Zemaire Injection? No    Any side effects from last infusion:  No     [] Rash         [] Itching  [] Headache  [] Fatigue  [] Dizziness  [] Tingling   [] Chest tightness  [] Shortness of Breath  [] Wheezing    Patient Active Problem List   Diagnosis Code    Abdominal pain R10.9    Lymphadenopathy R59.1    GERD (gastroesophageal reflux disease) K21.9    Ariza's esophagus with dysplasia K22.719    Emphysema lung J43.9    [facility]antitry[facility] E88.01    Essential hypertension, benign I10    Advance care planning Z71.89    Type 2 diabetes mellitus without complication, without long-term current use of insulin ([facility]) E11.9    Colon polyp K63.5    Chronic hypoxemic respiratory failure ([facility]) J96.11       Pre infusion Vital Signs --see flow sheet                                 Does the Patient have a History of:     IgA deficiency: no     Anaphylaxis or severe systemic response to any medication:  No        Breath Sounds: no wheezing , lower posterior bases have a few scattered rales     Any recent activity tolerance changes: No      Any increased SOB or dyspnea:  No     Presence of cough or sputum:  Yes , mucus is yellow , no increase in amount or any change in color      Premedicated:  No premeds ordered  [] Benadryl 25 mg IV  [] Benadryl 50 mg IV  [] Benadryl 25 mg oral   [] Benadryl 50 mg oral  [] Other    Epinephrine at bedside:  Yes    IV placed and documented prior to drug preparation:  Zemaira must be administered within 3 hours of drug reconstitution as it contains no preservatives. Monitoring of infusion documented in doc flowsheets. Zemaire administered: Yes     Wt 192 lb (87.1 kg)   BMI 27.55 kg/m²     Zemaire dosage: 60  mg/kg was administered slowly IV  Dose verified by:  Umu Butts RN    Post treatment Vital Signs--see flow sheet                 Response to treatment:  Well tolerated by patient. Scheduled to return for next dose of Zemaire on December 20, 2018.      Electronically signed by Solo Felix RN on 12/13/2018 at 1130am

## 2018-12-13 NOTE — PROGRESS NOTES
Outpatient [address] Access    NAME:  Lino Ramos  YOB: 1975  MEDICAL RECORD NUMBER:  [id number]  DATE:  12/13/2018    Patient arrived to [facility] 58   [] per wheelchair   [x] ambulatory with p[ortable oxygen   Port Site Location:  right chest, anterior  Port Site:  Redness: No  Bruising: No   Edema: No  Pain: No     Port Site cleansed with:  Chloroprep Scrub x 3  for 60 seconds and air dried x 2 mis? Yes     Port Accessed with: 19 Gauge  One inch Power Port non coring needle using sterile technique. Blood return obtained: scant, port flushes easily , pt did coughing and deep breathing and repositioned self , and laid back flat in chair , no blood return , pt denies pain or burning with port flush   Flushed with 20 ml Normal Saline using push-pause method. Port flushes without resistance. Biopatch to port site, covered with Tegaderm HP drsng   At end of infusion able to get blood return from [facility] when pt held up his right arm , obtained excellent blood return . Response to treatment:  Well tolerated by patient.     Electronically signed by Hellen Nicholson RN on 12/13/2018 at 1100am

## 2018-12-20 ENCOUNTER — HOSPITAL ENCOUNTER (OUTPATIENT)
Dept: INFUSION THERAPY | Age: 43
Setting detail: INFUSION SERIES
Discharge: HOME | End: 2018-12-20
Payer: COMMERCIAL

## 2018-12-20 VITALS
OXYGEN SATURATION: 97 % | DIASTOLIC BLOOD PRESSURE: 81 MMHG | TEMPERATURE: 97.6 F | HEART RATE: 66 BPM | SYSTOLIC BLOOD PRESSURE: 126 MMHG | RESPIRATION RATE: 18 BRPM

## 2018-12-20 DIAGNOSIS — E88.01 ALPHA-1-ANTITRYPSIN DEFICIENCY (HCC): ICD-10-CM

## 2018-12-20 PROCEDURE — 2580000003 HC RX 258

## 2018-12-20 PROCEDURE — 2580000003 HC RX 258: Performed by: INTERNAL MEDICINE

## 2018-12-20 PROCEDURE — 96365 THER/PROPH/DIAG IV INF INIT: CPT

## 2018-12-20 RX ORDER — HEPARIN SODIUM (PORCINE) LOCK FLUSH IV SOLN 100 UNIT/ML 100 UNIT/ML
500 SOLUTION INTRAVENOUS PRN
Status: CANCELLED | OUTPATIENT
Start: 2018-12-20

## 2018-12-20 RX ORDER — SODIUM CHLORIDE 0.9 % (FLUSH) 0.9 %
20 SYRINGE (ML) INJECTION PRN
Status: CANCELLED | OUTPATIENT
Start: 2018-12-20

## 2018-12-20 RX ORDER — SODIUM CHLORIDE 9 MG/ML
INJECTION, SOLUTION INTRAVENOUS ONCE
Status: COMPLETED | OUTPATIENT
Start: 2018-12-20 | End: 2018-12-20

## 2018-12-20 RX ORDER — SODIUM CHLORIDE 9 MG/ML
INJECTION, SOLUTION INTRAVENOUS ONCE
Status: CANCELLED | OUTPATIENT
Start: 2018-12-20

## 2018-12-20 RX ORDER — SODIUM CHLORIDE 0.9 % (FLUSH) 0.9 %
10 SYRINGE (ML) INJECTION PRN
Status: CANCELLED | OUTPATIENT
Start: 2018-12-20

## 2018-12-20 RX ORDER — SODIUM CHLORIDE 0.9 % (FLUSH) 0.9 %
10 SYRINGE (ML) INJECTION PRN
Status: DISCONTINUED | OUTPATIENT
Start: 2018-12-20 | End: 2018-12-21 | Stop reason: HOSPADM

## 2018-12-20 RX ORDER — SODIUM CHLORIDE 9 MG/ML
INJECTION, SOLUTION INTRAVENOUS
Status: COMPLETED
Start: 2018-12-20 | End: 2018-12-20

## 2018-12-20 RX ADMIN — Medication 10 ML: at 10:56

## 2018-12-20 RX ADMIN — SODIUM CHLORIDE 500 ML: 9 INJECTION, SOLUTION INTRAVENOUS at 09:19

## 2018-12-20 RX ADMIN — Medication 10 ML: at 09:45

## 2018-12-20 ASSESSMENT — PAIN SCALES - GENERAL: PAINLEVEL_OUTOF10: 0

## 2018-12-20 NOTE — PROGRESS NOTES
Outpatient [address] Visit    NAME:  Kavon Sanchez  YOB: 1975  MEDICAL RECORD NUMBER:  [id number]  Episode Date:  12/20/2018    Patient arrived to [facility] 58       [] per wheelchair   [x] ambulatory   Has portable oxygen on   Color good, no ankle edema. Pt has no new c/o's . Pt to get Tyler Sane today for first time . Verification of patient education of Tyler Sane actions and potential side effects:  Yes     Is this the patient's first Glassia infusion --yes, has had Tanisha Renetta in past   Did the patient experience any adverse reactions to previous  Injection? No     Any side effects from last infusion:  No     [] Rash         [] Itching  [] Headache  [] Fatigue  [] Dizziness  [] Tingling   [] Chest tightness  [] Shortness of Breath  [] Wheezing    Patient Active Problem List   Diagnosis Code    Abdominal pain R10.9    Lymphadenopathy R59.1    GERD (gastroesophageal reflux disease) K21.9    Ariza's esophagus with dysplasia K22.719    Emphysema lung J43.9    Alpha-1-antitrypsin deficiency ([facility] E88.01    Essential hypertension, benign I10    Advance care planning Z71.89    Type 2 diabetes mellitus without complication, without long-term current use of insulin ([facility]) E11.9    Colon polyp K63.5    Chronic hypoxemic respiratory failure ([facility]) J96.11       Pre infusion Vital Signs       Temp: 97.5 °F (36.4 °C)     Pulse: 79     Resp: 17     BP: 126/82       Does the Patient have a History of:     IgA deficiency: no     Anaphylaxis or severe systemic response to any medication:  No        Breath Sounds: No increased work of breathing, Breath sounds clear to auscultation bilaterally and Good air exchange    Any recent activity tolerance changes: No      Any increased SOB or dyspnea:  No     Presence of cough or sputum:  Yes , has yellow mucus , denies change in color or increase in sputum     Premedicated: No premeds ordered   [] Benadryl 50 mg IV  [] Benadryl 25 mg oral   [] Benadryl 50 mg oral  [] Other    Epinephrine at bedside:  Yes    IV placed and documented prior to drug preparation:      Monitoring of infusion documented in doc flowsheets. Glassia administered: Yes     /82   Pulse 79   Temp 97.5 °F (36.4 °C) (Oral)   Resp 17   SpO2 95%     Glassia dosage:  60 mg/kg was administered per pump in  IVpb--270ml--rate is 0.2 ml/kg/per minute ----  Rate is 1044 ml/hr. ---micron filter in use   Glassia infusion at half way point slowed, due to alarming of IV pump --- Pt's port was flushed, excellent blood return , pt's IV tubing had to be flushed with NSS several times above micron filter site due to occlusion alarm on pump that was alarming . Port site is clear, dry, with no edema, excellent blood return per [address] . Dose verified by:  Maximus Herrera RN    Post treatment Vital Signs--see flow sheet     Response to treatment:  Well tolerated by patient. Scheduled to return for next dose of Glassia December 27, 2018. Pt aware to call MD for any s.e.  From [facility]      Electronically signed by Eliane Brown RN on 12/20/2018 at 1130am

## 2018-12-20 NOTE — PROGRESS NOTES
Outpatient [address] Access    NAME:  Dmitri Kellogg  YOB: 1975  MEDICAL RECORD NUMBER:  [id number]  DATE:  12/20/2018    Patient arrived to [facility] 58   [] per wheelchair   [x] ambulatory with portable oxygen on  Port Site Location:  right chest, anterior chest wall                                                                                                                                                                                                                                                                                                                                      Port Site:  Redness: No  Bruising: No   Edema: No  Pain: No     Port Site cleansed with:  Chloroprep Scrub x 2  for 60 seconds and air dried x 3 mis ? Yes     Port Accessed with: 20 Gauge one inch  Power Port non coring needle using sterile technique. Blood return obtained: Yes  Flushed with 20 ml Normal Saline using push-pause method. Port flushes without resistance. Excellent blood return. Biopatch to site, and Tegaderm HP to site   Response to treatment:  Well tolerated by patient.     Electronically signed by Benjie Gill RN on 12/20/2018 at [id number]

## 2018-12-27 ENCOUNTER — HOSPITAL ENCOUNTER (OUTPATIENT)
Dept: INFUSION THERAPY | Age: 43
Setting detail: INFUSION SERIES
Discharge: HOME | End: 2018-12-27
Payer: COMMERCIAL

## 2018-12-27 ENCOUNTER — TELEPHONE (OUTPATIENT)
Dept: PULMONOLOGY | Age: 43
End: 2018-12-27

## 2018-12-27 VITALS
WEIGHT: 192 LBS | DIASTOLIC BLOOD PRESSURE: 82 MMHG | HEART RATE: 86 BPM | RESPIRATION RATE: 18 BRPM | TEMPERATURE: 98 F | BODY MASS INDEX: 27.55 KG/M2 | SYSTOLIC BLOOD PRESSURE: 126 MMHG | OXYGEN SATURATION: 96 %

## 2018-12-27 DIAGNOSIS — E88.01 ALPHA-1-ANTITRYPSIN DEFICIENCY (HCC): ICD-10-CM

## 2018-12-27 PROCEDURE — 96365 THER/PROPH/DIAG IV INF INIT: CPT

## 2018-12-27 PROCEDURE — 2580000003 HC RX 258: Performed by: INTERNAL MEDICINE

## 2018-12-27 RX ORDER — SODIUM CHLORIDE 0.9 % (FLUSH) 0.9 %
10 SYRINGE (ML) INJECTION PRN
Status: DISCONTINUED | OUTPATIENT
Start: 2018-12-27 | End: 2018-12-28 | Stop reason: HOSPADM

## 2018-12-27 RX ORDER — SODIUM CHLORIDE 0.9 % (FLUSH) 0.9 %
10 SYRINGE (ML) INJECTION PRN
Status: CANCELLED | OUTPATIENT
Start: 2018-12-27

## 2018-12-27 RX ORDER — HEPARIN SODIUM (PORCINE) LOCK FLUSH IV SOLN 100 UNIT/ML 100 UNIT/ML
500 SOLUTION INTRAVENOUS PRN
Status: CANCELLED | OUTPATIENT
Start: 2018-12-27

## 2018-12-27 RX ORDER — SODIUM CHLORIDE 9 MG/ML
INJECTION, SOLUTION INTRAVENOUS ONCE
Status: CANCELLED | OUTPATIENT
Start: 2018-12-27

## 2018-12-27 RX ORDER — SODIUM CHLORIDE 0.9 % (FLUSH) 0.9 %
20 SYRINGE (ML) INJECTION PRN
Status: CANCELLED | OUTPATIENT
Start: 2018-12-27

## 2018-12-27 RX ORDER — SODIUM CHLORIDE 9 MG/ML
INJECTION, SOLUTION INTRAVENOUS ONCE
Status: COMPLETED | OUTPATIENT
Start: 2018-12-27 | End: 2018-12-27

## 2018-12-27 RX ADMIN — SODIUM CHLORIDE 250 ML: 9 INJECTION, SOLUTION INTRAVENOUS at 09:00

## 2018-12-27 RX ADMIN — Medication 10 ML: at 08:40

## 2018-12-27 ASSESSMENT — PAIN SCALES - GENERAL: PAINLEVEL_OUTOF10: 0

## 2018-12-27 NOTE — PROGRESS NOTES
Outpatient [address] Access    NAME:  José Cardoso  YOB: 1975  MEDICAL RECORD NUMBER:  [id number]  DATE:  12/27/2018    Patient arrived to [facility] 58   [] per wheelchair   [x] ambulatory     Port Site Location:  right chest, anterior                                                                                                                                                                                                                                                                                                                                                                                           Port Site:  Redness: No  Bruising: No   Edema: No  Pain: No   Pt denies any pain or issues with port . Port Site cleansed with:  Chloroprep Scrub  X 2 for 30 seconds and air dried x 3 mis ? Yes     Port Accessed with: [facility] one inch  non coring needle using sterile technique. Blood return obtained: Yes  Flushed with 20 ml Normal Saline using push-pause method. Port flushes without resistance. Biopatch and tegaderm Hp to site . Response to treatment:  Well tolerated by patient.     Electronically signed by Casper Scott RN on 12/27/2018 at 0930am

## 2018-12-27 NOTE — PROGRESS NOTES
Outpatient [address]  Visit    NAME:  Kamila Alvarado  YOB: 1975  MEDICAL RECORD NUMBER:  [id number]  Episode Date:  12/27/2018    Patient arrived to [facility] 58    [] per wheelchair   [x] ambulatory with portable oxygen on at 2 L/min  Color good, face sl. Flushed . In good spirits, denies any change in his medical hx. Or any new issues. Verification of patient education of  [facility]  actions and potential side effects:  Yes     Is this the patient's first Glassia  Injection? No   Did the patient experience any adverse reactions to previous Glassia infusion? No    Any side effects from last infusion:  No     [] Rash         [] Itching  [] Headache  [] Fatigue  [] Dizziness  [] Tingling   [] Chest tightness  [] Shortness of Breath  [] Wheezing    Patient Active Problem List   Diagnosis Code    Abdominal pain R10.9    Lymphadenopathy R59.1    GERD (gastroesophageal reflux disease) K21.9    Ariza's esophagus with dysplasia K22.719    Emphysema lung J43.9    Alpha-1-antitrypsin deficiency ([facility] E88.01    Essential hypertension, benign I10    Advance care planning Z71.89    Type 2 diabetes mellitus without complication, without long-term current use of insulin ([facility]) E11.9    Colon polyp K63.5    Chronic hypoxemic respiratory failure ([facility]) J96.11       Pre infusion Vital Signs  See flow sheets                                Does the Patient have a History of:     IgA deficiency: no     Anaphylaxis or severe systemic response to any medication:  No        Breath Sounds: No increased work of breathing, Breath sounds clear to auscultation bilaterally and Good air exchange      Any recent activity tolerance changes: No      Any increased SOB or dyspnea:  No     Presence of cough or sputum:  Yes , no change in color or amt of sputum      Premedicated:No premeds   [] Benadryl 25 mg IV  [] Benadryl 50 mg IV  [] Benadryl 25 mg oral   [] Benadryl 50 mg oral  [] Other    Epinephrine at bedside:  Yes    IV placed and documented prior to drug preparation:  Sabina Gu must be administered within 3 hours of drug reconstitution as it contains no preservatives. Monitoring of infusion documented in doc flowsheets. Glassia administered: Yes     Wt 192 lb (87.1 kg)   BMI 27.55 kg/m²     Glassia dosage:  60 mg/kg was administered slowly IVPB, total dose is 5400mg in 270 ml IVPB. A 5 micron filter was used for infusion . Dose verified by:  Michael De Jesus RN    Post treatment Vital Signs==see flow sheet                 Response to treatment:  Well tolerated by patient. Pt states he is going to be on [employer] insurance starting Jan 1st , 2019. [place] in outpt infusion dept has a copy of his new insurance information. Scheduled to return for next dose of  Glassia on January 10, 2019.      Electronically signed by Jeannette Seymour RN on 12/27/2018 at 1230pm

## 2019-01-03 ENCOUNTER — TELEPHONE (OUTPATIENT)
Dept: PULMONOLOGY | Age: 44
End: 2019-01-03

## 2019-01-03 ENCOUNTER — HOSPITAL ENCOUNTER (OUTPATIENT)
Dept: INFUSION THERAPY | Age: 44
Setting detail: INFUSION SERIES
Discharge: HOME | End: 2019-01-03
Payer: COMMERCIAL

## 2019-01-03 VITALS
TEMPERATURE: 97.6 F | BODY MASS INDEX: 27.69 KG/M2 | HEART RATE: 66 BPM | DIASTOLIC BLOOD PRESSURE: 66 MMHG | RESPIRATION RATE: 18 BRPM | SYSTOLIC BLOOD PRESSURE: 129 MMHG | WEIGHT: 193 LBS | OXYGEN SATURATION: 97 %

## 2019-01-03 DIAGNOSIS — E88.01 ALPHA-1-ANTITRYPSIN DEFICIENCY (HCC): ICD-10-CM

## 2019-01-03 PROCEDURE — 2580000003 HC RX 258: Performed by: INTERNAL MEDICINE

## 2019-01-03 PROCEDURE — 96365 THER/PROPH/DIAG IV INF INIT: CPT

## 2019-01-03 RX ORDER — SODIUM CHLORIDE 9 MG/ML
INJECTION, SOLUTION INTRAVENOUS
Status: DISPENSED
Start: 2019-01-03 | End: 2019-01-03

## 2019-01-03 RX ORDER — SODIUM CHLORIDE 0.9 % (FLUSH) 0.9 %
20 SYRINGE (ML) INJECTION PRN
Status: CANCELLED | OUTPATIENT
Start: 2019-01-03

## 2019-01-03 RX ORDER — SODIUM CHLORIDE 9 MG/ML
INJECTION, SOLUTION INTRAVENOUS ONCE
Status: COMPLETED | OUTPATIENT
Start: 2019-01-03 | End: 2019-01-03

## 2019-01-03 RX ORDER — SODIUM CHLORIDE 0.9 % (FLUSH) 0.9 %
10 SYRINGE (ML) INJECTION PRN
Status: CANCELLED | OUTPATIENT
Start: 2019-01-03

## 2019-01-03 RX ORDER — SODIUM CHLORIDE 9 MG/ML
INJECTION, SOLUTION INTRAVENOUS ONCE
Status: CANCELLED | OUTPATIENT
Start: 2019-01-03

## 2019-01-03 RX ORDER — SODIUM CHLORIDE 0.9 % (FLUSH) 0.9 %
10 SYRINGE (ML) INJECTION PRN
Status: DISCONTINUED | OUTPATIENT
Start: 2019-01-03 | End: 2019-01-04 | Stop reason: HOSPADM

## 2019-01-03 RX ORDER — HEPARIN SODIUM (PORCINE) LOCK FLUSH IV SOLN 100 UNIT/ML 100 UNIT/ML
500 SOLUTION INTRAVENOUS PRN
Status: CANCELLED | OUTPATIENT
Start: 2019-01-03

## 2019-01-03 RX ADMIN — SODIUM CHLORIDE 1000 ML: 9 INJECTION, SOLUTION INTRAVENOUS at 08:50

## 2019-01-03 RX ADMIN — Medication 10 ML: at 08:40

## 2019-01-03 ASSESSMENT — PAIN SCALES - GENERAL: PAINLEVEL_OUTOF10: 0

## 2019-01-03 NOTE — PROGRESS NOTES
Outpatient [address]    NAME:  Lino Ramos  YOB: 1975  MEDICAL RECORD NUMBER:  [id number]  DATE:  1/3/2019    Patient arrived to [facility] 58   [] per wheelchair   [x] ambulatory     Port Site Location:  right chest, anterior chest wall                                                                                                                                                                                                                                                                                                                                         Port Site:  Redness: No  Bruising: No   Edema: No  Pain: No     Port Site cleansed with:  Chloroprep Scrub x 2  for 60 seconds and air dried x 3 mis ? Yes     Port Accessed with: 20 Gauge one inch  Power Port non coring needle using sterile technique. Blood return obtained: Yes  Flushed with 20 ml Normal Saline using push-pause method. Port flushes without resistance. Biopatch to site , and Tegaderm HP to site                             Response to treatment:  Well tolerated by patient.     Electronically signed by Hellen Nicholson RN on 1/3/2019 at 1100am

## 2019-01-03 NOTE — PROGRESS NOTES
Outpatient [address] [facility]  Visit    NAME:  Mejia Prasad  YOB: 1975  MEDICAL RECORD NUMBER:  [id number]  Episode Date:  1/3/2019    Patient arrived to [facility] 58    [] per wheelchair   [x] ambulatory with  Portable oxygen on-- pt wears oxygen at all times . Color good,  No ankle edema. Pt denies c.p. , has a sl head cold . Denies fever or chest congestion. Denies wheezing . Pt in good spirits, very talkative. Verification of patient education of  Tempie Songster actions and potential side effects:  Yes     Is this the patient's first Glassia Injection? No   Did the patient experience any adverse reactions to previous  Tempie Songster  Injection? Yes    Any side effects from last infusion:  No     [] Rash         [] Itching  [] Headache  [] Fatigue  [] Dizziness  [] Tingling   [] Chest tightness  [] Shortness of Breath  [] Wheezing    Patient Active Problem List   Diagnosis Code    Abdominal pain R10.9    Lymphadenopathy R59.1    GERD (gastroesophageal reflux disease) K21.9    Ariza's esophagus with dysplasia K22.719    Emphysema lung J43.9    Alpha-1-antitrypsin deficiency ([facility] E88.01    Essential hypertension, benign I10    Advance care planning Z71.89    Type 2 diabetes mellitus without complication, without long-term current use of insulin ([facility]) E11.9    Colon polyp K63.5    Chronic hypoxemic respiratory failure ([facility]) J96.11       Pre infusion Vital Signs --see flow sheets                                 Does the Patient have a History of:     IgA deficiency: no     Anaphylaxis or severe systemic response to any medication:  No        Breath Sounds: No increased work of breathing, Breath sounds clear to auscultation bilaterally and Good air exchange--no wheezing     Any recent activity tolerance changes: No      Any increased SOB or dyspnea:  No     Presence of cough or sputum:  Yes, sl. yellow and lt green sputum  , no increase Premedicated:  no premeds ordered   [] Benadryl 25 mg IV  [] Benadryl 50 mg IV  [] Benadryl 25 mg oral   [] Benadryl 50 mg oral  [] Other    Epinephrine at bedside:  Yes    IV placed  In Port and documented prior to drug preparation:    [place]  must be administered within 3 hours of drug reconstitution as it contains no preservatives. Monitoring of infusion documented in doc flowsheets. Glassia administered: Yes     Wt 193 lb (87.5 kg)   BMI 27.69 kg/m²      Glassia  dosage: 60 mg/kg  -- total dose is 5400mg  was administered slowly IVPB --used 5 micron filter   Dose verified by:   Sam Glass RN    Post treatment Vital Signs--see flow sheets                 Response to treatment:  Well tolerated by patient. Scheduled to return for next dose of  Glassia  on January 10, 2019. at 0815am    Pt to see Dr. Radha Lujan on 3/4/19. for rt. Office appnt.    Electronically signed by Casper Scott RN on 1/3/2019 at  1035am

## 2019-01-10 ENCOUNTER — HOSPITAL ENCOUNTER (OUTPATIENT)
Dept: INFUSION THERAPY | Age: 44
Setting detail: INFUSION SERIES
Discharge: HOME | End: 2019-01-10
Payer: COMMERCIAL

## 2019-01-10 VITALS
HEART RATE: 69 BPM | DIASTOLIC BLOOD PRESSURE: 81 MMHG | TEMPERATURE: 98 F | BODY MASS INDEX: 27.84 KG/M2 | RESPIRATION RATE: 18 BRPM | OXYGEN SATURATION: 96 % | WEIGHT: 194 LBS | SYSTOLIC BLOOD PRESSURE: 136 MMHG

## 2019-01-10 DIAGNOSIS — E88.01 ALPHA-1-ANTITRYPSIN DEFICIENCY (HCC): ICD-10-CM

## 2019-01-10 PROCEDURE — 2580000003 HC RX 258: Performed by: INTERNAL MEDICINE

## 2019-01-10 PROCEDURE — 96365 THER/PROPH/DIAG IV INF INIT: CPT

## 2019-01-10 RX ORDER — HEPARIN SODIUM (PORCINE) LOCK FLUSH IV SOLN 100 UNIT/ML 100 UNIT/ML
500 SOLUTION INTRAVENOUS PRN
Status: CANCELLED | OUTPATIENT
Start: 2019-01-10

## 2019-01-10 RX ORDER — SODIUM CHLORIDE 9 MG/ML
INJECTION, SOLUTION INTRAVENOUS ONCE
Status: CANCELLED | OUTPATIENT
Start: 2019-01-10

## 2019-01-10 RX ORDER — SODIUM CHLORIDE 0.9 % (FLUSH) 0.9 %
10 SYRINGE (ML) INJECTION PRN
Status: CANCELLED | OUTPATIENT
Start: 2019-01-10

## 2019-01-10 RX ORDER — SODIUM CHLORIDE 0.9 % (FLUSH) 0.9 %
20 SYRINGE (ML) INJECTION PRN
Status: CANCELLED | OUTPATIENT
Start: 2019-01-10

## 2019-01-10 RX ORDER — SODIUM CHLORIDE 0.9 % (FLUSH) 0.9 %
10 SYRINGE (ML) INJECTION PRN
Status: DISCONTINUED | OUTPATIENT
Start: 2019-01-10 | End: 2019-01-11 | Stop reason: HOSPADM

## 2019-01-10 RX ORDER — SODIUM CHLORIDE 9 MG/ML
INJECTION, SOLUTION INTRAVENOUS ONCE
Status: COMPLETED | OUTPATIENT
Start: 2019-01-10 | End: 2019-01-10

## 2019-01-10 RX ORDER — SODIUM CHLORIDE 9 MG/ML
INJECTION, SOLUTION INTRAVENOUS
Status: DISPENSED
Start: 2019-01-10 | End: 2019-01-10

## 2019-01-10 RX ADMIN — Medication 10 ML: at 08:45

## 2019-01-10 RX ADMIN — SODIUM CHLORIDE 1000 ML: 9 INJECTION, SOLUTION INTRAVENOUS at 09:10

## 2019-01-10 NOTE — PROGRESS NOTES
Outpatient [address]    NAME:  Yinka Solis  YOB: 1975  MEDICAL RECORD NUMBER:  [id number]  DATE:  1/10/2019    Patient arrived to [facility] 58   [] per wheelchair   [x] ambulatory     Port Site Location:  right  Anterior chest  Port Site:  Redness: No  Bruising: No   Edema: No  Pain: No   Pt denies apin or discomfort at port site . Port Site cleansed with:  Chloroprep Scrub  X 2 for 30 seconds and air dried x 3 mis ? Yes     Port Accessed with: [facility]  One inch non coring needle using sterile technique. Blood return obtained: Yes  Flushed with 10 ml Normal Saline using push-pause method. Port flushes without resistance. Bioptach to site , Tegaderm HP to site   Response to treatment:  Well tolerated by patient.     Electronically signed by Chiqui Juan RN on 1/10/2019 at 0930am

## 2019-01-10 NOTE — PROGRESS NOTES
Outpatient [address] [facility] Visit    NAME:  Noah Dang  YOB: 1975  MEDICAL RECORD NUMBER:  [id number]  Episode Date:  1/10/2019    Patient arrived to [facility] 58    [] per wheelchair   [x] ambulatory with portable oxygen on   Color good, pt in good spirits, no ankle edema. Pt is hoarse, denies fever, or cold or cough , denies ear pain or sore throat . Denies c.p.. States throat is sl. Scratchy but not painful. Pt denies any pain or problems with his Port. Verification of patient education of  Jordyn Weber actions and potential side effects:  Yes     Is this the patient's first Glassia  Injection? No   Did the patient experience any adverse reactions to previous  Jordyn Weber  Injection? No    Any side effects from last infusion:  No     [] Rash         [] Itching  [] Headache  [] Fatigue  [] Dizziness  [] Tingling   [] Chest tightness  [] Shortness of Breath  [] Wheezing    Patient Active Problem List   Diagnosis Code    Abdominal pain R10.9    Lymphadenopathy R59.1    GERD (gastroesophageal reflux disease) K21.9    Ariza's esophagus with dysplasia K22.719    Emphysema lung J43.9    Alpha-1-antitrypsin deficiency ([facility] E88.01    Essential hypertension, benign I10    Advance care planning Z71.89    Type 2 diabetes mellitus without complication, without long-term current use of insulin ([facility]) E11.9    Colon polyp K63.5    Chronic hypoxemic respiratory failure ([facility]) J96.11       Pre infusion Vital Signs --see flow sheet                                 Does the Patient have a History of: IgA deficiency: no     Anaphylaxis or severe systemic response to any medication:  No         Breath Sounds: Crackles noted in right base, rest of lung fields clear, no wheezing noted     Any recent activity tolerance changes: No      Any increased SOB or dyspnea:  Yes , has sl. Increase      Presence of cough or sputum:  Yes , has sl.  Increase in sputum , color of mucus is clear     Premedicated:    no premeds ordered                       [] Benadryl 25 mg IV  [] Benadryl 50 mg IV  [] Benadryl 25 mg oral   [] Benadryl 50 mg oral  [] Other    Epinephrine at bedside:  Yes    IV placed and documented prior to drug preparation:   Fran Erb  must be administered within 3 hours of drug reconstitution as it contains no preservatives. Monitoring of infusion documented in doc flowsheets. Glassia  administered: Yes     Wt 194 lb (88 kg)   BMI 27.84 kg/m²     Zemaire dosage: 60 mg/kg was administered slowly IVPB with use of 5 micron filter   Dose verified by:   Kristyn Velasquez RN    Post treatment Vital Signs--see flow sheet                 Response to treatment:  Well tolerated by patient. Pt instructed to call his MD for fever or worsening hoarseness or sore throat , or change in color of sputum, Pt verbalizes understanding. Scheduled to return for next dose of Glassia  on January 17, 2019.      Electronically signed by Jackie Mckeon RN on 1/10/2019 at 1100am

## 2019-01-18 ENCOUNTER — TELEPHONE (OUTPATIENT)
Dept: PULMONOLOGY | Age: 44
End: 2019-01-18

## 2019-01-24 ENCOUNTER — HOSPITAL ENCOUNTER (OUTPATIENT)
Dept: INFUSION THERAPY | Age: 44
Setting detail: INFUSION SERIES
End: 2019-01-24
Payer: COMMERCIAL

## 2019-01-31 ENCOUNTER — HOSPITAL ENCOUNTER (OUTPATIENT)
Dept: INFUSION THERAPY | Age: 44
Setting detail: INFUSION SERIES
End: 2019-01-31
Payer: COMMERCIAL

## 2019-02-14 ENCOUNTER — TELEPHONE (OUTPATIENT)
Dept: PULMONOLOGY | Age: 44
End: 2019-02-14

## 2019-03-01 ENCOUNTER — HOSPITAL ENCOUNTER (OUTPATIENT)
Dept: INFUSION THERAPY | Age: 44
Setting detail: INFUSION SERIES
Discharge: HOME | End: 2019-03-01
Payer: COMMERCIAL

## 2019-03-01 VITALS
OXYGEN SATURATION: 96 % | WEIGHT: 182.76 LBS | HEART RATE: 56 BPM | RESPIRATION RATE: 18 BRPM | BODY MASS INDEX: 26.22 KG/M2 | SYSTOLIC BLOOD PRESSURE: 143 MMHG | DIASTOLIC BLOOD PRESSURE: 69 MMHG | TEMPERATURE: 98 F

## 2019-03-01 DIAGNOSIS — E88.01 ALPHA-1-ANTITRYPSIN DEFICIENCY (HCC): ICD-10-CM

## 2019-03-01 PROCEDURE — 96365 THER/PROPH/DIAG IV INF INIT: CPT

## 2019-03-01 PROCEDURE — 2580000003 HC RX 258: Performed by: INTERNAL MEDICINE

## 2019-03-01 PROCEDURE — 6360000002 HC RX W HCPCS: Performed by: INTERNAL MEDICINE

## 2019-03-01 RX ORDER — METHYLPREDNISOLONE SODIUM SUCCINATE 125 MG/2ML
125 INJECTION, POWDER, LYOPHILIZED, FOR SOLUTION INTRAMUSCULAR; INTRAVENOUS ONCE
Status: CANCELLED | OUTPATIENT
Start: 2019-03-01 | End: 2019-03-01

## 2019-03-01 RX ORDER — DIPHENHYDRAMINE HYDROCHLORIDE 50 MG/ML
50 INJECTION INTRAMUSCULAR; INTRAVENOUS ONCE
Status: CANCELLED | OUTPATIENT
Start: 2019-03-01 | End: 2019-03-01

## 2019-03-01 RX ORDER — EPINEPHRINE 1 MG/ML
0.3 INJECTION, SOLUTION, CONCENTRATE INTRAVENOUS PRN
Status: CANCELLED | OUTPATIENT
Start: 2019-03-01

## 2019-03-01 RX ORDER — SODIUM CHLORIDE 0.9 % (FLUSH) 0.9 %
10 SYRINGE (ML) INJECTION PRN
Status: CANCELLED | OUTPATIENT
Start: 2019-03-01

## 2019-03-01 RX ORDER — SODIUM CHLORIDE 9 MG/ML
INJECTION, SOLUTION INTRAVENOUS CONTINUOUS
Status: CANCELLED | OUTPATIENT
Start: 2019-03-01

## 2019-03-01 RX ORDER — 0.9 % SODIUM CHLORIDE 0.9 %
10 VIAL (ML) INJECTION ONCE
Status: CANCELLED | OUTPATIENT
Start: 2019-03-01 | End: 2019-03-01

## 2019-03-01 RX ORDER — SODIUM CHLORIDE 9 MG/ML
INJECTION, SOLUTION INTRAVENOUS
Status: DISPENSED
Start: 2019-03-01 | End: 2019-03-01

## 2019-03-01 RX ORDER — SODIUM CHLORIDE 0.9 % (FLUSH) 0.9 %
10 SYRINGE (ML) INJECTION PRN
Status: DISCONTINUED | OUTPATIENT
Start: 2019-03-01 | End: 2019-03-02 | Stop reason: HOSPADM

## 2019-03-01 RX ADMIN — Medication 10 ML: at 11:11

## 2019-03-01 RX ADMIN — .ALPHA.1-PROTEINASE INHIBITOR HUMAN 5000 MG: 1 INJECTION, SOLUTION INTRAVENOUS at 10:02

## 2019-03-01 RX ADMIN — Medication 10 ML: at 08:55

## 2019-03-01 NOTE — PROGRESS NOTES
Outpatient [address] Access    NAME:  Oscar Rowell  YOB: 1975  MEDICAL RECORD NUMBER:  [id number]  DATE:  3/1/2019    Patient arrived to [facility] 58   [] per wheelchair   [x] ambulatory     Port Site Location:  right chest, anterior     Port Site:  Redness: No  Bruising: No   Edema: No  Pain: No     Port Site cleansed with:  Chloroprep Scrub x 2  for 60 seconds and air dried x 3 mis ? Yes     Port Accessed with: 20 Gauge one inch  Power Port non coring needle using sterile technique. Blood return obtained: Yes  Flushed with 10 ml Normal Saline using push-pause method. Port flushes without resistance. Biopatch and Tegaderm HP to site . Double lumen  Extension set to port line. Response to treatment:  Well tolerated by patient.     Electronically signed by Dutch Gill RN on 3/1/2019 at 11:31 AM
activity tolerance changes: yes, pt is now working 6 days a week, and works long night shift  hrs without his oxygen use---- pt is fatigued and tired     Any increased SOB or dyspnea:  Yes , is working long hrs and does not use his oxygen tank at work. Presence of cough or sputum:  Yes , no increase, color is clear to yellow  no change in color     Premedicated: No premeds                      [] Benadryl 50 mg IV  [] Benadryl 25 mg oral   [] Benadryl 50 mg oral  [] Other    Epinephrine at bedside:  Yes    IV placed and documented prior to drug preparation:  Zemaira must be administered within 3 hours of drug reconstitution as it contains no preservatives. Monitoring of infusion documented in doc flowsheets. Glassia  administered: Yes     Wt 182 lb 12.2 oz (82.9 kg)   BMI 26.22 kg/m²      Glassia  dosage:  60  mg/kg was administered slowly IVPB------ 5 micron filter in use---total dose given is 5000 mg IVPB    Dose verified by:   Shellie Locke RN    Post treatment Vital Signs--see flow sheet                 Response to treatment:  Well tolerated by patient. Pt slept for long intervals this am with infusion and waiting for infusion to arrive from Pharmacy. Scheduled to return for next dose of  Glassia  on March 8, 2019 at 0815am .   Pt is to see Dr. Dania Farfan on 3/4/19.    Electronically signed by Sridevi Lane RN on 3/1/2019 at 1130am

## 2019-03-04 ENCOUNTER — OFFICE VISIT (OUTPATIENT)
Dept: PULMONOLOGY | Age: 44
End: 2019-03-04
Payer: COMMERCIAL

## 2019-03-04 VITALS
OXYGEN SATURATION: 96 % | HEIGHT: 70 IN | RESPIRATION RATE: 16 BRPM | HEART RATE: 76 BPM | DIASTOLIC BLOOD PRESSURE: 76 MMHG | WEIGHT: 183 LBS | SYSTOLIC BLOOD PRESSURE: 106 MMHG | TEMPERATURE: 97.6 F | BODY MASS INDEX: 26.2 KG/M2

## 2019-03-04 DIAGNOSIS — J96.11 CHRONIC HYPOXEMIC RESPIRATORY FAILURE (HCC): ICD-10-CM

## 2019-03-04 DIAGNOSIS — E88.01 ALPHA-1-ANTITRYPSIN DEFICIENCY (HCC): Primary | ICD-10-CM

## 2019-03-04 PROCEDURE — 99213 OFFICE O/P EST LOW 20 MIN: CPT | Performed by: INTERNAL MEDICINE

## 2019-03-05 ENCOUNTER — TELEPHONE (OUTPATIENT)
Dept: PULMONOLOGY | Age: 44
End: 2019-03-05

## 2019-03-08 ENCOUNTER — HOSPITAL ENCOUNTER (OUTPATIENT)
Dept: INFUSION THERAPY | Age: 44
Setting detail: INFUSION SERIES
Discharge: HOME | End: 2019-03-08
Payer: COMMERCIAL

## 2019-03-08 VITALS
RESPIRATION RATE: 17 BRPM | HEIGHT: 70 IN | WEIGHT: 178 LBS | DIASTOLIC BLOOD PRESSURE: 73 MMHG | OXYGEN SATURATION: 94 % | HEART RATE: 67 BPM | TEMPERATURE: 97.5 F | SYSTOLIC BLOOD PRESSURE: 113 MMHG | BODY MASS INDEX: 25.48 KG/M2

## 2019-03-08 DIAGNOSIS — E88.01 ALPHA-1-ANTITRYPSIN DEFICIENCY (HCC): Primary | ICD-10-CM

## 2019-03-08 PROCEDURE — 96365 THER/PROPH/DIAG IV INF INIT: CPT

## 2019-03-08 PROCEDURE — 2580000003 HC RX 258: Performed by: INTERNAL MEDICINE

## 2019-03-08 RX ORDER — METHYLPREDNISOLONE SODIUM SUCCINATE 125 MG/2ML
125 INJECTION, POWDER, LYOPHILIZED, FOR SOLUTION INTRAMUSCULAR; INTRAVENOUS ONCE
Status: CANCELLED | OUTPATIENT
Start: 2019-03-08 | End: 2019-03-08

## 2019-03-08 RX ORDER — 0.9 % SODIUM CHLORIDE 0.9 %
10 VIAL (ML) INJECTION ONCE
Status: CANCELLED | OUTPATIENT
Start: 2019-03-08 | End: 2019-03-08

## 2019-03-08 RX ORDER — SODIUM CHLORIDE 9 MG/ML
INJECTION, SOLUTION INTRAVENOUS CONTINUOUS
Status: CANCELLED | OUTPATIENT
Start: 2019-03-08

## 2019-03-08 RX ORDER — EPINEPHRINE 1 MG/ML
0.3 INJECTION, SOLUTION, CONCENTRATE INTRAVENOUS PRN
Status: CANCELLED | OUTPATIENT
Start: 2019-03-08

## 2019-03-08 RX ORDER — SODIUM CHLORIDE 0.9 % (FLUSH) 0.9 %
10 SYRINGE (ML) INJECTION PRN
Status: CANCELLED | OUTPATIENT
Start: 2019-03-08

## 2019-03-08 RX ORDER — DIPHENHYDRAMINE HYDROCHLORIDE 50 MG/ML
50 INJECTION INTRAMUSCULAR; INTRAVENOUS ONCE
Status: CANCELLED | OUTPATIENT
Start: 2019-03-08 | End: 2019-03-08

## 2019-03-08 RX ORDER — SODIUM CHLORIDE 0.9 % (FLUSH) 0.9 %
10 SYRINGE (ML) INJECTION PRN
Status: DISCONTINUED | OUTPATIENT
Start: 2019-03-08 | End: 2019-03-09 | Stop reason: HOSPADM

## 2019-03-08 RX ADMIN — Medication 10 ML: at 08:30

## 2019-03-08 NOTE — PROGRESS NOTES
Outpatient [address]: Alpha 1 Proteinase Inhibitor Infusion     NAME:  Mallory Newby  YOB: 1975  MEDICAL RECORD NUMBER:  [id number]  Episode Date:  3/8/2019    Patient arrived to [facility] 58     [] per wheelchair   [x] ambulatory with his portable oxygen  Verification of patient education of Mika Petey actions and potential side effects:  Yes     Is this the patient's first Mika Petey Injection? No   Did the patient experience any adverse reactions to previous Mika Petey Injection? No    Any side effects from last infusion:  No     [] Rash         [] Itching  [] Headache  [] Fatigue  [] Dizziness  [] Tingling   [] Chest tightness  [] Shortness of Breath  [] Wheezing    Patient Active Problem List   Diagnosis Code    Abdominal pain R10.9    Lymphadenopathy R59.1    GERD (gastroesophageal reflux disease) K21.9    Ariza's esophagus with dysplasia K22.719    Emphysema lung J43.9    [facility] E88.01    Essential hypertension, benign I10    Advance care planning Z71.89    Type 2 diabetes mellitus without complication, without long-term current use of insulin ([facility]) E11.9    Colon polyp K63.5    Chronic hypoxemic respiratory failure ([facility]) J96.11       Pre infusion Vital Signs       Temp: 97.5 °F (36.4 °C)     Pulse: 72     Resp: 17     BP: 118/67       Does the Patient have a History of: IgA deficiency: no     Anaphylaxis or severe systemic response to any medication:  No        Breath Sounds: No increased work of breathing, Breath sounds clear to auscultation bilaterally and Good air exchange with some diminished breath sounds in bases. Pulse Oximetry: 94 % on o2 2L continuous  Any recent activity tolerance changes: No      Any increased SOB or dyspnea:  No     Presence of cough or sputum:  Yes in the mornings mostly. Clear to pale yellow. No increases.     Premedicated:none ordered  [] Benadryl 25 mg IV  [] Benadryl 50 mg IV  [] Benadryl 25 mg oral   [] Benadryl 50 mg oral  [] Other    Epinephrine at bedside:  Yes in our pyxixs  IV placed and documented prior to drug preparation:  Zada [place] must be administered within 3 hours of drug reconstitution as it contains no preservatives. Monitoring of infusion documented in doc flowsheets. Glassia administered: Yes     /73   Pulse 67   Temp 97.5 °F (36.4 °C) (Oral)   Resp 17   Ht 5' 10\" (1.778 m)   Wt 178 lb (80.7 kg)   SpO2 94%   BMI 25.54 kg/m²      Glassia dosage: 60 mg/kg ( 4842 mg ) was administered slowly IV  Dose verified by:  Wanda Colbert RN    Post treatment Vital Signs  Temp: 97.5 °F (36.4 °C)  Pulse: 67  Resp: 17  BP: 113/73    Response to treatment:  Well tolerated by patient. Scheduled to return for next dose of Zemaire on March 14, 2019. Electronically signed by Chesley Fabry, RN on 3/8/2019 at 9:53 AM                          Outpatient [address] [place] [address] Access    NAME:  Angie Edouard  YOB: 1975  MEDICAL RECORD NUMBER:  [id number]  DATE:  3/8/2019    Patient arrived to [place] [address]   [] per wheelchair   [x] ambulatory     Port Site Location:  right chest  Port Site:  Redness: No  Bruising: No   Edema: No  Pain: No     Port Site cleansed with:  Chloroprep Scrub for 30 seconds and air dried? Yes     Port Accessed with: [address] non coring needle using sterile technique. Blood return obtained: Yes  Flushed with 10 ml Normal Saline using push-pause method. Port flushes without resistance. Response to treatment:  Well tolerated by patient.     Electronically signed by Chesley Fabry, RN on 3/8/2019 at 9:53 AM

## 2019-03-14 ENCOUNTER — HOSPITAL ENCOUNTER (OUTPATIENT)
Dept: INFUSION THERAPY | Age: 44
Setting detail: INFUSION SERIES
Discharge: HOME | End: 2019-03-14
Payer: COMMERCIAL

## 2019-03-14 VITALS
WEIGHT: 179.7 LBS | HEART RATE: 62 BPM | SYSTOLIC BLOOD PRESSURE: 129 MMHG | BODY MASS INDEX: 25.78 KG/M2 | TEMPERATURE: 97.5 F | DIASTOLIC BLOOD PRESSURE: 79 MMHG | RESPIRATION RATE: 18 BRPM | OXYGEN SATURATION: 96 %

## 2019-03-14 DIAGNOSIS — E88.01 ALPHA-1-ANTITRYPSIN DEFICIENCY (HCC): Primary | ICD-10-CM

## 2019-03-14 PROCEDURE — 96365 THER/PROPH/DIAG IV INF INIT: CPT

## 2019-03-14 PROCEDURE — 2580000003 HC RX 258: Performed by: INTERNAL MEDICINE

## 2019-03-14 PROCEDURE — 2580000003 HC RX 258

## 2019-03-14 PROCEDURE — 6360000002 HC RX W HCPCS: Performed by: INTERNAL MEDICINE

## 2019-03-14 RX ORDER — DIPHENHYDRAMINE HYDROCHLORIDE 50 MG/ML
50 INJECTION INTRAMUSCULAR; INTRAVENOUS ONCE
Status: CANCELLED | OUTPATIENT
Start: 2019-03-15 | End: 2019-03-15

## 2019-03-14 RX ORDER — SODIUM CHLORIDE 0.9 % (FLUSH) 0.9 %
10 SYRINGE (ML) INJECTION PRN
Status: CANCELLED | OUTPATIENT
Start: 2019-03-15

## 2019-03-14 RX ORDER — SODIUM CHLORIDE 9 MG/ML
INJECTION, SOLUTION INTRAVENOUS
Status: COMPLETED
Start: 2019-03-14 | End: 2019-03-14

## 2019-03-14 RX ORDER — EPINEPHRINE 1 MG/ML
0.3 INJECTION, SOLUTION, CONCENTRATE INTRAVENOUS PRN
Status: CANCELLED | OUTPATIENT
Start: 2019-03-15

## 2019-03-14 RX ORDER — 0.9 % SODIUM CHLORIDE 0.9 %
10 VIAL (ML) INJECTION ONCE
Status: CANCELLED | OUTPATIENT
Start: 2019-03-15 | End: 2019-03-15

## 2019-03-14 RX ORDER — SODIUM CHLORIDE 9 MG/ML
INJECTION, SOLUTION INTRAVENOUS CONTINUOUS
Status: CANCELLED | OUTPATIENT
Start: 2019-03-15

## 2019-03-14 RX ORDER — METHYLPREDNISOLONE SODIUM SUCCINATE 125 MG/2ML
125 INJECTION, POWDER, LYOPHILIZED, FOR SOLUTION INTRAMUSCULAR; INTRAVENOUS ONCE
Status: CANCELLED | OUTPATIENT
Start: 2019-03-15 | End: 2019-03-15

## 2019-03-14 RX ORDER — SODIUM CHLORIDE 0.9 % (FLUSH) 0.9 %
10 SYRINGE (ML) INJECTION PRN
Status: DISCONTINUED | OUTPATIENT
Start: 2019-03-14 | End: 2019-03-15 | Stop reason: HOSPADM

## 2019-03-14 RX ADMIN — .ALPHA.1-PROTEINASE INHIBITOR HUMAN 4890 MG: 1 INJECTION, SOLUTION INTRAVENOUS at 10:15

## 2019-03-14 RX ADMIN — Medication 10 ML: at 11:31

## 2019-03-14 RX ADMIN — SODIUM CHLORIDE 1000 ML: 9 INJECTION, SOLUTION INTRAVENOUS at 10:05

## 2019-03-14 RX ADMIN — Medication 10 ML: at 10:00

## 2019-03-14 ASSESSMENT — PAIN SCALES - GENERAL: PAINLEVEL_OUTOF10: 0

## 2019-03-14 NOTE — PROGRESS NOTES
Outpatient [place] [address]   Visit    NAME:  José Cardoso  YOB: 1975  MEDICAL RECORD NUMBER:  [id number]  Episode Date:  3/14/2019    Patient arrived to [facility] 58    [] per wheelchair   [x] ambulatory with portable oxygen on   Color good, skin warm and dry, no ankle edema. 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 Pt saw Dr. Radha Lujan recently and MD is aware that pt is not using his oxygen at work . Pt has no med changes per MD, pt to see MD again in 6 mos, and to have another Pulmonary Fx test in 6 mos. Pt thinks his sob is better. Verification of patient education of [place] actions and potential side effects:  Yes     Is this the patient's first  Glassia Injection? No   Did the patient experience any adverse reactions to previous [place] Injection? No    Any side effects from last infusion:  No     [] Rash         [] Itching  [] Headache  [] Fatigue  [] Dizziness  [] Tingling   [] Chest tightness  [] Shortness of Breath  [] Wheezing    Patient Active Problem List   Diagnosis Code    Abdominal pain R10.9    Lymphadenopathy R59.1    GERD (gastroesophageal reflux disease) K21.9    Ariza's esophagus with dysplasia K22.719    Emphysema lung J43.9    [facility]-antitry[facility] E88.01    Essential hypertension, benign I10    Advance care planning Z71.89    Type 2 diabetes mellitus without complication, without long-term current use of insulin ([facility]) E11.9    Colon polyp K63.5    Chronic hypoxemic respiratory failure ([facility]) J96.11       Pre infusion Vital Signs --see  flow sheet                                 Does the Patient have a History of:     IgA deficiency: no    Anaphylaxis or severe systemic response to any medication:  No        Breath Sounds: No increased work of breathing at rest  , Breath sounds clear to auscultation bilaterally and Good air exchange      Any recent activity tolerance changes: No      Any increased SOB or dyspnea:  No, pt thinks his sob is better      Presence of cough or sputum:  Yes ,clear sputum, no increase    Premedicated:  No premeds ordered   [] Benadryl 25 mg IV  [] Benadryl 50 mg IV  [] Benadryl 25 mg oral   [] Benadryl 50 mg oral  [] Other    Epinephrine at bedside:  Yes    IV placed and documented prior to drug preparation: Za Flatter  must be administered within 3 hours of drug reconstitution as it contains no preservatives. Monitoring of infusion documented in doc flowsheets. Glassia  administered: Yes     Wt 179 lb 11.2 oz (81.5 kg)   BMI 25.78 kg/m²     Glassia  dosage:  60 mg/kg was administered slowly Ivpb--- TOTAL DOSE IS 4890mg   Dose verified by: Erica Brennan RN    Post treatment Vital Signs==see flow sheet                 Response to treatment:  Well tolerated by patient. Scheduled to return for next dose of  Glassia on March 21, 2019.    Pt to return on March 21 at 0815am   Electronically signed by Rufino Vernon RN on 3/14/2019 at 1150am

## 2019-03-21 ENCOUNTER — HOSPITAL ENCOUNTER (OUTPATIENT)
Dept: INFUSION THERAPY | Age: 44
Setting detail: INFUSION SERIES
Discharge: HOME | End: 2019-03-21
Payer: COMMERCIAL

## 2019-03-21 ENCOUNTER — TELEPHONE (OUTPATIENT)
Dept: PRIMARY CARE CLINIC | Age: 44
End: 2019-03-21

## 2019-03-21 VITALS
BODY MASS INDEX: 25.4 KG/M2 | SYSTOLIC BLOOD PRESSURE: 143 MMHG | TEMPERATURE: 98 F | WEIGHT: 177 LBS | OXYGEN SATURATION: 95 % | DIASTOLIC BLOOD PRESSURE: 78 MMHG | RESPIRATION RATE: 18 BRPM | HEART RATE: 66 BPM

## 2019-03-21 DIAGNOSIS — E88.01 ALPHA-1-ANTITRYPSIN DEFICIENCY (HCC): Primary | ICD-10-CM

## 2019-03-21 PROCEDURE — 6360000002 HC RX W HCPCS

## 2019-03-21 PROCEDURE — 2580000003 HC RX 258: Performed by: INTERNAL MEDICINE

## 2019-03-21 PROCEDURE — 96365 THER/PROPH/DIAG IV INF INIT: CPT

## 2019-03-21 RX ORDER — SODIUM CHLORIDE 9 MG/ML
INJECTION, SOLUTION INTRAVENOUS CONTINUOUS
Status: DISCONTINUED | OUTPATIENT
Start: 2019-03-21 | End: 2019-03-22 | Stop reason: HOSPADM

## 2019-03-21 RX ORDER — DIPHENHYDRAMINE HYDROCHLORIDE 50 MG/ML
50 INJECTION INTRAMUSCULAR; INTRAVENOUS ONCE
Status: CANCELLED | OUTPATIENT
Start: 2019-03-22 | End: 2019-03-22

## 2019-03-21 RX ORDER — METHYLPREDNISOLONE SODIUM SUCCINATE 125 MG/2ML
125 INJECTION, POWDER, LYOPHILIZED, FOR SOLUTION INTRAMUSCULAR; INTRAVENOUS ONCE
Status: CANCELLED | OUTPATIENT
Start: 2019-03-22 | End: 2019-03-22

## 2019-03-21 RX ORDER — SODIUM CHLORIDE 0.9 % (FLUSH) 0.9 %
10 SYRINGE (ML) INJECTION PRN
Status: DISCONTINUED | OUTPATIENT
Start: 2019-03-21 | End: 2019-03-22 | Stop reason: HOSPADM

## 2019-03-21 RX ORDER — SODIUM CHLORIDE 9 MG/ML
INJECTION, SOLUTION INTRAVENOUS
Status: DISPENSED
Start: 2019-03-21 | End: 2019-03-21

## 2019-03-21 RX ORDER — EPINEPHRINE 1 MG/ML
0.3 INJECTION, SOLUTION, CONCENTRATE INTRAVENOUS PRN
Status: CANCELLED | OUTPATIENT
Start: 2019-03-22

## 2019-03-21 RX ORDER — SODIUM CHLORIDE 0.9 % (FLUSH) 0.9 %
10 SYRINGE (ML) INJECTION PRN
Status: CANCELLED | OUTPATIENT
Start: 2019-03-22

## 2019-03-21 RX ORDER — 0.9 % SODIUM CHLORIDE 0.9 %
10 VIAL (ML) INJECTION ONCE
Status: CANCELLED | OUTPATIENT
Start: 2019-03-22 | End: 2019-03-22

## 2019-03-21 RX ORDER — SODIUM CHLORIDE 9 MG/ML
INJECTION, SOLUTION INTRAVENOUS CONTINUOUS
Status: CANCELLED | OUTPATIENT
Start: 2019-03-22

## 2019-03-21 RX ADMIN — Medication 10 ML: at 10:20

## 2019-03-21 RX ADMIN — Medication 10 ML: at 11:46

## 2019-03-21 RX ADMIN — SODIUM CHLORIDE 1000 ML: 9 INJECTION, SOLUTION INTRAVENOUS at 10:30

## 2019-03-21 NOTE — PROGRESS NOTES
Outpatient [address] Visit    NAME:  Gagandeep Garibay  YOB: 1975  MEDICAL RECORD NUMBER:  [id number]  Episode Date:  3/21/2019    Patient arrived to [facility] 58    [] per wheelchair   [x] ambulatory with portable oxygen on  Color good, face sl. Flushed, eyes are reddened and irritated. Pt in good spirits. Pt is trying to get an appnt with his PCP--Dr. Kimani Webb to have his left foot checked , due to pain in his left foot espec. After working long hours on his feet. Pt has no redness or edema of left foot at present. No ankle edema. Pt has c/o of fatigue after working night shift  long hours                                 Verification of patient education of Zemaira actions and potential side effects:  Yes     Is this the patient's first  [place] Injection? No   Did the patient experience any adverse reactions to previous Zemaire Injection? No    Any side effects from last infusion:  No     [] Rash         [] Itching  [] Headache  [] Fatigue  [] Dizziness  [] Tingling   [] Chest tightness  [] Shortness of Breath  [] Wheezing    Patient Active Problem List   Diagnosis Code    Abdominal pain R10.9    Lymphadenopathy R59.1    GERD (gastroesophageal reflux disease) K21.9    Ariza's esophagus with dysplasia K22.719    Emphysema lung J43.9    Alpha-1-antitrypsin deficiency ([facility] E88.01    Essential hypertension, benign I10    Advance care planning Z71.89    Type 2 diabetes mellitus without complication, without long-term current use of insulin ([facility]) E11.9    Colon polyp K63.5    Chronic hypoxemic respiratory failure ([facility]) J96.11       Pre infusion Vital Signs --see flow sheet                                 Does the Patient have a History of:     IgA deficiency: no     Anaphylaxis or severe systemic response to any medication:  No        Breath Sounds: No increased work of breathing, Breath sounds clear to auscultation bilaterally and Good air exchange        Any recent activity tolerance changes: No      Any increased SOB or dyspnea:  No     Presence of cough or sputum:  Yes, cleaR and sl. . Yellow, no increase in amount      Premedicated: No premeds ordered   [] Benadryl 25 mg IV  [] Benadryl 50 mg IV  [] Benadryl 25 mg oral   [] Benadryl 50 mg oral  [] Other    Epinephrine at bedside:  Yes    IV placed and documented prior to drug preparation:   Cathlean Pink    must be administered within 3 hours of drug reconstitution as it contains no preservatives. Monitoring of infusion documented in doc flowsheets. Glassia administered: Yes     Wt 177 lb (80.3 kg)   BMI 25.40 kg/m²     Glassia dosage: 60 mg/kg was administered slowly IVPB with use of 5 micron filter --total dose is 4818mg IVPB  Dose verified by:  Saray Hanson RN    Post treatment Vital Signs--see flow sheet                 Response to treatment:  Well tolerated by patient. Scheduled to return for next dose of Glassia on March 28, 2019.      Electronically signed by Miguel Hillman RN on 3/21/2019 at 1215pm

## 2019-03-22 ENCOUNTER — HOSPITAL ENCOUNTER (OUTPATIENT)
Dept: GENERAL RADIOLOGY | Age: 44
Discharge: HOME | End: 2019-03-22
Payer: COMMERCIAL

## 2019-03-22 ENCOUNTER — HOSPITAL ENCOUNTER (OUTPATIENT)
Age: 44
Discharge: HOME | End: 2019-03-22
Payer: COMMERCIAL

## 2019-03-22 ENCOUNTER — OFFICE VISIT (OUTPATIENT)
Dept: PRIMARY CARE CLINIC | Age: 44
End: 2019-03-22
Payer: COMMERCIAL

## 2019-03-22 VITALS
WEIGHT: 181 LBS | SYSTOLIC BLOOD PRESSURE: 113 MMHG | HEART RATE: 62 BPM | HEIGHT: 70 IN | DIASTOLIC BLOOD PRESSURE: 80 MMHG | BODY MASS INDEX: 25.91 KG/M2

## 2019-03-22 DIAGNOSIS — M25.572 ACUTE LEFT ANKLE PAIN: Primary | ICD-10-CM

## 2019-03-22 DIAGNOSIS — M54.12 CERVICAL RADICULITIS: ICD-10-CM

## 2019-03-22 DIAGNOSIS — M25.572 ACUTE LEFT ANKLE PAIN: ICD-10-CM

## 2019-03-22 PROCEDURE — 99214 OFFICE O/P EST MOD 30 MIN: CPT | Performed by: FAMILY MEDICINE

## 2019-03-22 PROCEDURE — 73610 X-RAY EXAM OF ANKLE: CPT

## 2019-03-22 RX ORDER — METHYLPREDNISOLONE 4 MG/1
TABLET ORAL
Qty: 1 KIT | Refills: 0 | Status: SHIPPED | OUTPATIENT
Start: 2019-03-22 | End: 2019-03-28 | Stop reason: ALTCHOICE

## 2019-03-22 RX ORDER — CYCLOBENZAPRINE HCL 10 MG
10 TABLET ORAL 3 TIMES DAILY PRN
Qty: 20 TABLET | Refills: 0 | Status: SHIPPED | OUTPATIENT
Start: 2019-03-22 | End: 2019-04-01

## 2019-03-22 ASSESSMENT — ENCOUNTER SYMPTOMS
COUGH: 0
ABDOMINAL PAIN: 0
SHORTNESS OF BREATH: 0
DIARRHEA: 0
VOMITING: 0
CONSTIPATION: 0
NAUSEA: 0
BACK PAIN: 0

## 2019-03-28 ENCOUNTER — HOSPITAL ENCOUNTER (OUTPATIENT)
Dept: INFUSION THERAPY | Age: 44
Setting detail: INFUSION SERIES
Discharge: HOME | End: 2019-03-28
Payer: COMMERCIAL

## 2019-03-28 VITALS
WEIGHT: 177 LBS | HEIGHT: 70 IN | OXYGEN SATURATION: 96 % | TEMPERATURE: 97.5 F | BODY MASS INDEX: 25.34 KG/M2 | DIASTOLIC BLOOD PRESSURE: 76 MMHG | RESPIRATION RATE: 17 BRPM | SYSTOLIC BLOOD PRESSURE: 121 MMHG | HEART RATE: 69 BPM

## 2019-03-28 DIAGNOSIS — E88.01 ALPHA-1-ANTITRYPSIN DEFICIENCY (HCC): Primary | ICD-10-CM

## 2019-03-28 PROCEDURE — 2580000003 HC RX 258

## 2019-03-28 PROCEDURE — 6360000002 HC RX W HCPCS: Performed by: INTERNAL MEDICINE

## 2019-03-28 PROCEDURE — 96365 THER/PROPH/DIAG IV INF INIT: CPT

## 2019-03-28 RX ORDER — METHYLPREDNISOLONE SODIUM SUCCINATE 125 MG/2ML
125 INJECTION, POWDER, LYOPHILIZED, FOR SOLUTION INTRAMUSCULAR; INTRAVENOUS ONCE
Status: CANCELLED | OUTPATIENT
Start: 2019-03-28 | End: 2019-03-28

## 2019-03-28 RX ORDER — EPINEPHRINE 1 MG/ML
0.3 INJECTION, SOLUTION, CONCENTRATE INTRAVENOUS PRN
Status: CANCELLED | OUTPATIENT
Start: 2019-03-28

## 2019-03-28 RX ORDER — SODIUM CHLORIDE 9 MG/ML
INJECTION, SOLUTION INTRAVENOUS
Status: COMPLETED
Start: 2019-03-28 | End: 2019-03-28

## 2019-03-28 RX ORDER — 0.9 % SODIUM CHLORIDE 0.9 %
10 VIAL (ML) INJECTION ONCE
Status: CANCELLED | OUTPATIENT
Start: 2019-03-28 | End: 2019-03-28

## 2019-03-28 RX ORDER — SODIUM CHLORIDE 0.9 % (FLUSH) 0.9 %
10 SYRINGE (ML) INJECTION PRN
Status: CANCELLED | OUTPATIENT
Start: 2019-03-28

## 2019-03-28 RX ORDER — SODIUM CHLORIDE 9 MG/ML
INJECTION, SOLUTION INTRAVENOUS CONTINUOUS
Status: CANCELLED | OUTPATIENT
Start: 2019-03-28

## 2019-03-28 RX ORDER — DIPHENHYDRAMINE HYDROCHLORIDE 50 MG/ML
50 INJECTION INTRAMUSCULAR; INTRAVENOUS ONCE
Status: CANCELLED | OUTPATIENT
Start: 2019-03-28 | End: 2019-03-28

## 2019-03-28 RX ADMIN — .ALPHA.1-PROTEINASE INHIBITOR HUMAN 4000 MG: 1 INJECTION, SOLUTION INTRAVENOUS at 10:13

## 2019-03-28 RX ADMIN — SODIUM CHLORIDE 250 ML: 9 INJECTION, SOLUTION INTRAVENOUS at 10:13

## 2019-03-28 NOTE — PROGRESS NOTES
Outpatient [address] Visit    NAME:  Kamila Alvarado  YOB: 1975  MEDICAL RECORD NUMBER:  [id number]  Episode Date:  3/28/2019    Patient arrived to [facility] 58     [] per wheelchair   [x] ambulatory     Verification of patient education of Za Flatter actions and potential side effects:  Yes     Is this the patient's first [address]? No    Did the patient experience any adverse reactions to previous Za Flatter Injection? No    Any side effects from last infusion:  No     [] Rash         [] Itching  [] Headache  [] Fatigue  [] Dizziness  [] Tingling   [] Chest tightness  [] Shortness of Breath  [] Wheezing    Patient Active Problem List   Diagnosis Code    Abdominal pain R10.9    Lymphadenopathy R59.1    GERD (gastroesophageal reflux disease) K21.9    Ariza's esophagus with dysplasia K22.719    Emphysema lung J43.9    Alpha-1-antitrypsin deficiency ([facility] E88.01    Essential hypertension, benign I10    Advance care planning Z71.89    Type 2 diabetes mellitus without complication, without long-term current use of insulin ([facility]) E11.9    Colon polyp K63.5    Chronic hypoxemic respiratory failure (HCC) J96.11       Pre infusion Vital Signs       Temp: 97.5 °F (36.4 °C)     Pulse: 69     Resp: 17     BP: 121/76       Does the Patient have a History of: IgA deficiency: no     Anaphylaxis or severe systemic response to any medication:  No        Breath Sounds: No increased work of breathing, Breath sounds clear to auscultation bilaterally and Good air exchange  Pulse Oximetry: 96 %    Any recent activity tolerance changes: No      Any increased SOB or dyspnea:  No but wears con't O2 at 2L    Presence of cough or sputum:  Yes, usually in the AM and clear to pale yellow.       Premedicated:none ordered  [] Benadryl 25 mg IV  [] Benadryl 50 mg IV  [] Benadryl 25 mg oral   [] Benadryl 50 mg oral  [] Other    Epinephrine at bedside:  Yes in the pyxis    IV placed and documented prior to drug preparation:  Zemaira must be administered within 3 hours of drug reconstitution as it contains no preservatives. Monitoring of infusion documented in doc flowsheets. Glassia administered: Yes     /76   Pulse 69   Temp 97.5 °F (36.4 °C) (Oral)   Resp 17   Ht 5' 10\" (1.778 m)   Wt 177 lb (80.3 kg)   SpO2 96%   BMI 25.40 kg/m²     Glassia dosage: 4000 mg was administered slowly IV  Dose verified by:  Wanda Colbert RN    Post treatment Vital Signs  Temp: 97.5 °F (36.4 °C)  Pulse: 69  Resp: 17  BP: 121/76    Response to treatment:  Well tolerated by patient. Scheduled to return for next dose of Glassia on April 4, 2019.      Electronically signed by Chesley Fabry, RN on 3/28/2019 at 9:55 AM

## 2019-04-04 ENCOUNTER — HOSPITAL ENCOUNTER (OUTPATIENT)
Dept: INFUSION THERAPY | Age: 44
Setting detail: INFUSION SERIES
Discharge: HOME | End: 2019-04-04
Payer: COMMERCIAL

## 2019-04-04 VITALS
WEIGHT: 177 LBS | DIASTOLIC BLOOD PRESSURE: 78 MMHG | HEART RATE: 74 BPM | SYSTOLIC BLOOD PRESSURE: 122 MMHG | OXYGEN SATURATION: 95 % | RESPIRATION RATE: 18 BRPM | BODY MASS INDEX: 25.4 KG/M2 | TEMPERATURE: 98 F

## 2019-04-04 DIAGNOSIS — E88.01 ALPHA-1-ANTITRYPSIN DEFICIENCY (HCC): Primary | ICD-10-CM

## 2019-04-04 PROCEDURE — 6360000002 HC RX W HCPCS: Performed by: INTERNAL MEDICINE

## 2019-04-04 PROCEDURE — 2580000003 HC RX 258

## 2019-04-04 PROCEDURE — 2580000003 HC RX 258: Performed by: INTERNAL MEDICINE

## 2019-04-04 PROCEDURE — 96365 THER/PROPH/DIAG IV INF INIT: CPT

## 2019-04-04 RX ORDER — DIPHENHYDRAMINE HYDROCHLORIDE 50 MG/ML
50 INJECTION INTRAMUSCULAR; INTRAVENOUS ONCE
Status: CANCELLED | OUTPATIENT
Start: 2019-04-11

## 2019-04-04 RX ORDER — SODIUM CHLORIDE 9 MG/ML
INJECTION, SOLUTION INTRAVENOUS CONTINUOUS
Status: CANCELLED | OUTPATIENT
Start: 2019-04-11

## 2019-04-04 RX ORDER — SODIUM CHLORIDE 0.9 % (FLUSH) 0.9 %
10 SYRINGE (ML) INJECTION PRN
Status: CANCELLED | OUTPATIENT
Start: 2019-04-11

## 2019-04-04 RX ORDER — SODIUM CHLORIDE 9 MG/ML
INJECTION, SOLUTION INTRAVENOUS
Status: COMPLETED
Start: 2019-04-04 | End: 2019-04-04

## 2019-04-04 RX ORDER — SODIUM CHLORIDE 0.9 % (FLUSH) 0.9 %
10 SYRINGE (ML) INJECTION PRN
Status: DISCONTINUED | OUTPATIENT
Start: 2019-04-04 | End: 2019-04-05 | Stop reason: HOSPADM

## 2019-04-04 RX ORDER — METHYLPREDNISOLONE SODIUM SUCCINATE 125 MG/2ML
125 INJECTION, POWDER, LYOPHILIZED, FOR SOLUTION INTRAMUSCULAR; INTRAVENOUS ONCE
Status: CANCELLED | OUTPATIENT
Start: 2019-04-11

## 2019-04-04 RX ORDER — EPINEPHRINE 1 MG/ML
0.3 INJECTION, SOLUTION, CONCENTRATE INTRAVENOUS PRN
Status: CANCELLED | OUTPATIENT
Start: 2019-04-11

## 2019-04-04 RX ORDER — 0.9 % SODIUM CHLORIDE 0.9 %
10 VIAL (ML) INJECTION ONCE
Status: CANCELLED | OUTPATIENT
Start: 2019-04-11

## 2019-04-04 RX ADMIN — Medication 10 ML: at 10:20

## 2019-04-04 RX ADMIN — SODIUM CHLORIDE 1000 ML: 9 INJECTION, SOLUTION INTRAVENOUS at 10:30

## 2019-04-04 RX ADMIN — .ALPHA.1-PROTEINASE INHIBITOR HUMAN 4818 MG: 1 INJECTION, SOLUTION INTRAVENOUS at 10:37

## 2019-04-04 NOTE — PROGRESS NOTES
Outpatient [facility]    Visit    NAME:  Mallory Newby  YOB: 1975  MEDICAL RECORD NUMBER:  [id number]  Episode Date:  4/4/2019    Patient arrived to [facility] 58    [] per wheelchair   [x] ambulatory with oxygen on   Color good, no ankle edema. Pt denies c.p. Or other c/o's . 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 and has c/o of fatigue . Pt had Xray of left foot due to pain in left foot, is wearing an ace wrap to foot and ankle  when he works . Pt saw his MD due to his foot pain. Verification of patient education of Zemaira actions and potential side effects:  Yes     Is this the patient's first  Glassia Injection? No   Did the patient experience any adverse reactions to previous [place] Injection? No    Any side effects from last infusion:  No     [] Rash         [] Itching  [] Headache  [] Fatigue  [] Dizziness  [] Tingling   [] Chest tightness  [] Shortness of Breath  [] Wheezing    Patient Active Problem List   Diagnosis Code    Abdominal pain R10.9    Lymphadenopathy R59.1    GERD (gastroesophageal reflux disease) K21.9    Ariza's esophagus with dysplasia K22.719    Emphysema lung J43.9    Alpha-1-antitrypsin deficiency ([facility] E88.01    Essential hypertension, benign I10    Advance care planning Z71.89    Type 2 diabetes mellitus without complication, without long-term current use of insulin (HCC) E11.9    Colon polyp K63.5    Chronic hypoxemic respiratory failure (HCC) J96.11       Pre infusion Vital Signs --see flow sheet                                 Does the Patient have a History of: IgA deficiency: no     Anaphylaxis or severe systemic response to any medication:  No        Breath Sounds: No increased work of breathing, Breath sounds clear to auscultation bilaterally , breath sounds sl.  Decreased in bases       Any recent activity tolerance changes: No      Any increased SOB or dyspnea:  No     Presence of cough or sputum:  Yes , clear to white mucus in am --no increase      Premedicated:--no premeds ordered   [] Benadryl 25 mg IV  [] Benadryl 50 mg IV  [] Benadryl 25 mg oral   [] Benadryl 50 mg oral  [] Other    Epinephrine at bedside:  Yes    IV placed and documented prior to drug preparation:  Mika BlancPetey  must be administered within 3 hours of drug reconstitution as it contains no preservatives. Monitoring of infusion documented in doc flowsheets. Glassia administered: Yes     Wt 177 lb (80.3 kg)   BMI 25.40 kg/m²     Glassia dosage:  60  mg/kg was administered slowly IVPB---total dose is 4818mg   Dose verified by:   Wilma Bee RN    Post treatment Vital Signs--see flow sheet                 Response to treatment:  Well tolerated by patient. Port needle removed intact, DSD to site . Scheduled to return for next dose of Glassia on April 11, 2019.      Electronically signed by Magda Michele RN on 4/4/2019 at 1330pm

## 2019-04-04 NOTE — PROGRESS NOTES
Outpatient [address] Access    NAME:  Ivis Dunlap  YOB: 1975  MEDICAL RECORD NUMBER:  [id number]  DATE:  4/4/2019        Port Site Location:  right chest, anterior   Port Site:  Redness: No  Bruising: No   Edema: No  Pain: No     Port Site cleansed with:  Chloroprep Scrub  X 2 for 30 seconds and air driedx 3 mis? Yes     Port Accessed with: 20 Gauge one inch  Power Port non coring needle using sterile technique. Blood return obtained: Yes  Flushed with 10 ml Normal Saline using push-pause method. Port flushes without resistance. Biopatch and Tegaderm HP to site   Response to treatment:  Well tolerated by patient.     Electronically signed by Mayo Kendrick RN on 4/4/2019 1130am

## 2019-04-04 NOTE — PROGRESS NOTES
Lino Ramos is a 39 y.o. male patient. Current Outpatient Medications   Medication Sig Dispense Refill    alpha1-proteinase inhibitor (GLASSIA) 1000 MG/50ML SOLN Infuse 248.7 mLs intravenously once for 1 dose 248.7 mL 0    tiotropium (SPIRIVA RESPIMAT) 2.5 MCG/ACT AERS inhaler INHALE 2 PUFFS BY MOUTH INTO THE LUNGS DAILY 3 Inhaler 3    esomeprazole (NEXIUM) 40 MG delayed release capsule Take 1 capsule by mouth 2 times daily 180 capsule 3    naproxen sodium (ALEVE) 220 MG tablet Take 220 mg by mouth as needed       Fluticasone Furoate-Vilanterol (BREO ELLIPTA) 200-25 MCG/INH AEPB Inhale 1 puff into the lungs daily      albuterol sulfate HFA (PROAIR HFA) 108 (90 Base) MCG/ACT inhaler Inhale 2 puffs into the lungs every 6 hours as needed for Wheezing or Shortness of Breath 1 Inhaler 3    acetaminophen (TYLENOL) 325 MG tablet Take 650 mg by mouth every 6 hours as needed for Pain. Current Facility-Administered Medications   Medication Dose Route Frequency Provider Last Rate Last Dose    sodium chloride flush 0.9 % injection 10 mL  10 mL Intravenous PRN Jorge Feliciano MD   10 mL at 04/04/19 1020     Allergies   Allergen Reactions    Ibuprofen Other (See Comments)     Pt states ibuprofen gave him cramps in his stomach. Active Problems:    * No active hospital problems. *  Resolved Problems:    * No resolved hospital problems. *    Weight 177 lb (80.3 kg).     Subjective  Objective  Assessment & Plan    Hellen Nicholson, MIRIAN  4/4/2019

## 2019-04-11 ENCOUNTER — HOSPITAL ENCOUNTER (OUTPATIENT)
Dept: INFUSION THERAPY | Age: 44
Setting detail: INFUSION SERIES
Discharge: HOME | End: 2019-04-11
Payer: COMMERCIAL

## 2019-04-11 VITALS
SYSTOLIC BLOOD PRESSURE: 131 MMHG | OXYGEN SATURATION: 95 % | DIASTOLIC BLOOD PRESSURE: 63 MMHG | BODY MASS INDEX: 25.54 KG/M2 | WEIGHT: 178 LBS | RESPIRATION RATE: 18 BRPM | HEART RATE: 77 BPM | TEMPERATURE: 98 F

## 2019-04-11 DIAGNOSIS — E88.01 ALPHA-1-ANTITRYPSIN DEFICIENCY (HCC): Primary | ICD-10-CM

## 2019-04-11 PROCEDURE — 6360000002 HC RX W HCPCS: Performed by: INTERNAL MEDICINE

## 2019-04-11 PROCEDURE — 96365 THER/PROPH/DIAG IV INF INIT: CPT

## 2019-04-11 PROCEDURE — 2580000003 HC RX 258

## 2019-04-11 PROCEDURE — 2580000003 HC RX 258: Performed by: INTERNAL MEDICINE

## 2019-04-11 RX ORDER — SODIUM CHLORIDE 0.9 % (FLUSH) 0.9 %
10 SYRINGE (ML) INJECTION PRN
Status: DISCONTINUED | OUTPATIENT
Start: 2019-04-11 | End: 2019-04-12 | Stop reason: HOSPADM

## 2019-04-11 RX ORDER — METHYLPREDNISOLONE SODIUM SUCCINATE 125 MG/2ML
125 INJECTION, POWDER, LYOPHILIZED, FOR SOLUTION INTRAMUSCULAR; INTRAVENOUS ONCE
Status: CANCELLED | OUTPATIENT
Start: 2019-04-18

## 2019-04-11 RX ORDER — 0.9 % SODIUM CHLORIDE 0.9 %
10 VIAL (ML) INJECTION ONCE
Status: CANCELLED | OUTPATIENT
Start: 2019-04-18

## 2019-04-11 RX ORDER — DIPHENHYDRAMINE HYDROCHLORIDE 50 MG/ML
50 INJECTION INTRAMUSCULAR; INTRAVENOUS ONCE
Status: CANCELLED | OUTPATIENT
Start: 2019-04-18

## 2019-04-11 RX ORDER — SODIUM CHLORIDE 0.9 % (FLUSH) 0.9 %
10 SYRINGE (ML) INJECTION PRN
Status: CANCELLED | OUTPATIENT
Start: 2019-04-18

## 2019-04-11 RX ORDER — SODIUM CHLORIDE 9 MG/ML
INJECTION, SOLUTION INTRAVENOUS
Status: COMPLETED
Start: 2019-04-11 | End: 2019-04-11

## 2019-04-11 RX ORDER — EPINEPHRINE 1 MG/ML
0.3 INJECTION, SOLUTION, CONCENTRATE INTRAVENOUS PRN
Status: CANCELLED | OUTPATIENT
Start: 2019-04-18

## 2019-04-11 RX ORDER — SODIUM CHLORIDE 9 MG/ML
INJECTION, SOLUTION INTRAVENOUS CONTINUOUS
Status: CANCELLED | OUTPATIENT
Start: 2019-04-18

## 2019-04-11 RX ADMIN — Medication 10 ML: at 10:19

## 2019-04-11 RX ADMIN — SODIUM CHLORIDE 500 ML: 9 INJECTION, SOLUTION INTRAVENOUS at 10:20

## 2019-04-11 RX ADMIN — .ALPHA.1-PROTEINASE INHIBITOR HUMAN 4842 MG: 1 INJECTION, SOLUTION INTRAVENOUS at 10:31

## 2019-04-11 NOTE — PROGRESS NOTES
Outpatient [address] Access    NAME:  Shayna Sanchez  YOB: 1975  MEDICAL RECORD NUMBER:  [id number]  DATE:  4/11/2019    Patient arrived to [facility] 58   [] per wheelchair   [x] ambulatory   Pt denies any pain or discomfort from RUSTY ALMEIDA [facility] Location:  right chest  Port Site:  Redness: No  Bruising: No   Edema: No  Pain: No     Port Site cleansed with:  Chloroprep Scrub x 2  for 60 seconds and air dried x 3 mis ? Yes     Port Accessed with: 20 Gauge  One inch Power Port non coring needle using sterile technique. Blood return obtained: Yes  Flushed with 20 ml Normal Saline using push-pause method. Port flushes without resistance. Port secured with biopatch and tegader  drs . Response to treatment:  Well tolerated by patient.     Electronically signed by Aniyah Daniels RN on 4/11/2019 at 1300pm

## 2019-04-11 NOTE — PROGRESS NOTES
Outpatient [address]  Infusion Visit    NAME:  hSayna Sanchez  YOB: 1975  MEDICAL RECORD NUMBER:  [id number]  Episode Date:  4/11/2019    Patient arrived to [facility] 58      [] per wheelchair   [x] ambulatory with portable oxygen on   Color good, pt very tired this am and sleepy, due to working long hours on night shift. the patient denies pain or c.p. . Has sl. Left ankle edema due to left foot injury a few weeks ago. No redness noted of foot, pt uses Alieve and an ace wrap to left foot  at work   Pt denies any new issues or problems   Verification of patient education of [place] actions and potential side effects:  Yes     Is this the patient's first Coy[place] Infusion? No   Did the patient experience any adverse reactions to previous [place] Infusion? No    Any side effects from last Glassia infusion:  No     [] Rash         [] Itching  [] Headache  [] Fatigue  [] Dizziness  [] Tingling   [] Chest tightness  [] Shortness of Breath  [] Wheezing    Patient Active Problem List   Diagnosis Code    Abdominal pain R10.9    Lymphadenopathy R59.1    GERD (gastroesophageal reflux disease) K21.9    Ariza's esophagus with dysplasia K22.719    Emphysema lung J43.9    [facility]-antitry[facility] deficiency [facility] E88.01    Essential hypertension, benign I10    Advance care planning Z71.89    Type 2 diabetes mellitus without complication, without long-term current use of insulin (HCC) E11.9    Colon polyp K63.5    Chronic hypoxemic respiratory failure ([facility]) J96.11       Pre infusion Vital Signs - SEE flow sheet                                 Does the Patient have a History of: IgA deficiency: no     Anaphylaxis or severe systemic response to any medication:  No        Breath Sounds: No increased work of breathing, Breath sounds clear to auscultation bilaterally and Good air exchange--no wheezing       Any recent activity tolerance changes:  No Any increased SOB or dyspnea:  No     Presence of cough or sputum:  Yes , is white or yellow  , no increase     Premedicated: no premeds ordered   [] Benadryl 25 mg IV  [] Benadryl 50 mg IV  [] Benadryl 25 mg oral   [] Benadryl 50 mg oral  [] Other    Epinephrine at bedside:  Yes    IV placed and documented prior to drug preparation:  Sabina Gu must be administered within 3 hours of drug reconstitution as it contains no preservatives. Monitoring of infusion documented in doc flowsheets. Glassia administered: Yes     Wt 178 lb (80.7 kg)   BMI 25.54 kg/m²     Glassia dosage: 60 mg/kg was administered slowly IVPB --total dose is 4842mg iv approx 300 ml IVPB   Dose verified by:   Lela Salgado RN    Post treatment Vital Signs--SEE FLOW SHEET                 Response to treatment:  Well tolerated by patient. Scheduled to return for next dose of Glassia on April 18, 2019.      Electronically signed by Jeannette Seymour RN on 4/11/2019 at 1155am

## 2019-04-18 ENCOUNTER — HOSPITAL ENCOUNTER (OUTPATIENT)
Dept: INFUSION THERAPY | Age: 44
Setting detail: INFUSION SERIES
Discharge: HOME | End: 2019-04-18
Payer: COMMERCIAL

## 2019-04-18 VITALS
OXYGEN SATURATION: 96 % | RESPIRATION RATE: 18 BRPM | SYSTOLIC BLOOD PRESSURE: 136 MMHG | HEART RATE: 64 BPM | TEMPERATURE: 98.1 F | BODY MASS INDEX: 25.25 KG/M2 | DIASTOLIC BLOOD PRESSURE: 78 MMHG | WEIGHT: 176 LBS

## 2019-04-18 DIAGNOSIS — E88.01 ALPHA-1-ANTITRYPSIN DEFICIENCY (HCC): Primary | ICD-10-CM

## 2019-04-18 PROCEDURE — 96365 THER/PROPH/DIAG IV INF INIT: CPT

## 2019-04-18 PROCEDURE — 2580000003 HC RX 258: Performed by: INTERNAL MEDICINE

## 2019-04-18 PROCEDURE — 6360000002 HC RX W HCPCS: Performed by: INTERNAL MEDICINE

## 2019-04-18 RX ORDER — DIPHENHYDRAMINE HYDROCHLORIDE 50 MG/ML
50 INJECTION INTRAMUSCULAR; INTRAVENOUS ONCE
Status: CANCELLED | OUTPATIENT
Start: 2019-04-25

## 2019-04-18 RX ORDER — METHYLPREDNISOLONE SODIUM SUCCINATE 125 MG/2ML
125 INJECTION, POWDER, LYOPHILIZED, FOR SOLUTION INTRAMUSCULAR; INTRAVENOUS ONCE
Status: CANCELLED | OUTPATIENT
Start: 2019-04-25

## 2019-04-18 RX ORDER — SODIUM CHLORIDE 9 MG/ML
INJECTION, SOLUTION INTRAVENOUS CONTINUOUS
Status: CANCELLED | OUTPATIENT
Start: 2019-04-25

## 2019-04-18 RX ORDER — SODIUM CHLORIDE 0.9 % (FLUSH) 0.9 %
10 SYRINGE (ML) INJECTION PRN
Status: CANCELLED | OUTPATIENT
Start: 2019-04-25

## 2019-04-18 RX ORDER — EPINEPHRINE 1 MG/ML
0.3 INJECTION, SOLUTION, CONCENTRATE INTRAVENOUS PRN
Status: CANCELLED | OUTPATIENT
Start: 2019-04-25

## 2019-04-18 RX ORDER — 0.9 % SODIUM CHLORIDE 0.9 %
10 VIAL (ML) INJECTION ONCE
Status: CANCELLED | OUTPATIENT
Start: 2019-04-25

## 2019-04-18 RX ORDER — SODIUM CHLORIDE 9 MG/ML
INJECTION, SOLUTION INTRAVENOUS CONTINUOUS
Status: DISCONTINUED | OUTPATIENT
Start: 2019-04-18 | End: 2019-04-19 | Stop reason: HOSPADM

## 2019-04-18 RX ORDER — SODIUM CHLORIDE 9 MG/ML
INJECTION, SOLUTION INTRAVENOUS
Status: DISPENSED
Start: 2019-04-18 | End: 2019-04-18

## 2019-04-18 RX ORDER — SODIUM CHLORIDE 0.9 % (FLUSH) 0.9 %
10 SYRINGE (ML) INJECTION PRN
Status: DISCONTINUED | OUTPATIENT
Start: 2019-04-18 | End: 2019-04-19 | Stop reason: HOSPADM

## 2019-04-18 RX ADMIN — Medication 10 ML: at 11:31

## 2019-04-18 RX ADMIN — Medication 10 ML: at 10:05

## 2019-04-18 RX ADMIN — .ALPHA.1-PROTEINASE INHIBITOR HUMAN 4788 MG: 1 INJECTION, SOLUTION INTRAVENOUS at 10:22

## 2019-04-18 RX ADMIN — SODIUM CHLORIDE 500 ML: 9 INJECTION, SOLUTION INTRAVENOUS at 10:10

## 2019-04-18 NOTE — PROGRESS NOTES
Outpatient [address] Infusion Visit    NAME:  Dana Reyna  YOB: 1975  MEDICAL RECORD NUMBER:  [id number]  Episode Date:  4/18/2019    Patient arrived to [facility]   [] per wheelchair   [x] ambulatory   Color good. Pt denies any problems with Port a cath. No ankle edema . Pt denies any changes in medical hx since last week. Denies c.p. Or increase in sob. . Pt states his left foot has less pain and he has not had to take Alieve as much . Has had none in several days . Pt denies any problems or pain with port a cath . Verification of patient education of Rose Luther actions and potential side effects:  Yes     Is this the patient's first Glassia Infusion? no  Did the patient experience any adverse reactions to previous Rose Luther Infusion? No    Any side effects from last Glassia infusion:  No     [] Rash         [] Itching  [] Headache  [] Fatigue  [] Dizziness  [] Tingling   [] Chest tightness  [] Shortness of Breath  [] Wheezing    Patient Active Problem List   Diagnosis Code    Abdominal pain R10.9    Lymphadenopathy R59.1    GERD (gastroesophageal reflux disease) K21.9    Ariza's esophagus with dysplasia K22.719    Emphysema lung J43.9    Alpha-1-antitrypsin deficiency ([facility] E88.01    Essential hypertension, benign I10    Advance care planning Z71.89    Type 2 diabetes mellitus without complication, without long-term current use of insulin ([facility]) E11.9    Colon polyp K63.5    Chronic hypoxemic respiratory failure ([facility]) J96.11       Pre infusion Vital Signs --see flow sheet                                 Does the Patient have a History of:     IgA deficiency: no     Anaphylaxis or severe systemic response to any medication:  No        Breath Sounds: No increased work of breathing, Breath sounds clear to auscultation bilaterally and Good air exchange      Any recent activity tolerance changes: No      Any increased SOB or dyspnea:  No     Presence of cough or sputum:  Yes . No increase in amt , no change in color  --sputum is white to clear     Premedicated: no premeds ordered [] Benadryl 25 mg IV  [] Benadryl 50 mg IV  [] Benadryl 25 mg oral   [] Benadryl 50 mg oral  [] Other    Epinephrine at bedside:  Yes    IV placed and documented prior to drug preparation:  Cathlean Pink must be administered within 3 hours of drug reconstitution as it contains no preservatives. Monitoring of infusion documented in doc flowsheets. Glassia administered: Yes     Wt 176 lb (79.8 kg)   BMI 25.25 kg/m²     Glassia dosage: 60  mg/kg was administered slowly IVPB--total dose is 4788mg --- administered with 5 micron filter   Dose verified by:  Saray Hanson RN    Post treatment Vital Signs--see flow sheet                 Response to treatment:  Well tolerated by patient. Scheduled to return for next dose of Glassia on April 25, 2019.      Electronically signed by Miguel Hillman RN on 4/18/2019 at 1155am

## 2019-04-18 NOTE — PROGRESS NOTES
Outpatient [facility] Access    NAME:  Gagandeep Garibay  YOB: 1975  MEDICAL RECORD NUMBER:  [id number]  DATE:  4/18/2019        Port Site Location:  right chest  Port Site:  Redness: No  Bruising: No   Edema: No  Pain: No     Port Site cleansed with:  Chloroprep Scrub  X 3 for 30 seconds and air dried x 3 mis ? Yes     Port Accessed with: 20 Gauge one inch  Power Port non coring needle using sterile technique. Blood return obtained: Yes  Flushed with 10 ml Normal Saline using push-pause method. Port flushes without resistance. Biopatch to site, and covered with Tegaderm HP drsng   Response to treatment:  Well tolerated by patient.     Electronically signed by Gilford Dub, RN on 4/18/2019 at 1150am

## 2019-04-25 ENCOUNTER — HOSPITAL ENCOUNTER (OUTPATIENT)
Dept: INFUSION THERAPY | Age: 44
Setting detail: INFUSION SERIES
Discharge: HOME | End: 2019-04-25
Payer: COMMERCIAL

## 2019-04-25 VITALS
SYSTOLIC BLOOD PRESSURE: 134 MMHG | HEART RATE: 70 BPM | DIASTOLIC BLOOD PRESSURE: 85 MMHG | OXYGEN SATURATION: 94 % | BODY MASS INDEX: 25.54 KG/M2 | WEIGHT: 178 LBS | TEMPERATURE: 98.1 F | RESPIRATION RATE: 17 BRPM

## 2019-04-25 DIAGNOSIS — E88.01 ALPHA-1-ANTITRYPSIN DEFICIENCY (HCC): Primary | ICD-10-CM

## 2019-04-25 PROCEDURE — 2580000003 HC RX 258

## 2019-04-25 PROCEDURE — 96365 THER/PROPH/DIAG IV INF INIT: CPT

## 2019-04-25 PROCEDURE — 2580000003 HC RX 258: Performed by: INTERNAL MEDICINE

## 2019-04-25 PROCEDURE — 6360000002 HC RX W HCPCS: Performed by: INTERNAL MEDICINE

## 2019-04-25 RX ORDER — SODIUM CHLORIDE 9 MG/ML
INJECTION, SOLUTION INTRAVENOUS CONTINUOUS
Status: CANCELLED | OUTPATIENT
Start: 2019-05-02

## 2019-04-25 RX ORDER — EPINEPHRINE 1 MG/ML
0.3 INJECTION, SOLUTION, CONCENTRATE INTRAVENOUS PRN
Status: CANCELLED | OUTPATIENT
Start: 2019-05-02

## 2019-04-25 RX ORDER — DIPHENHYDRAMINE HYDROCHLORIDE 50 MG/ML
50 INJECTION INTRAMUSCULAR; INTRAVENOUS ONCE
Status: CANCELLED | OUTPATIENT
Start: 2019-05-02

## 2019-04-25 RX ORDER — SODIUM CHLORIDE 0.9 % (FLUSH) 0.9 %
10 SYRINGE (ML) INJECTION PRN
Status: DISCONTINUED | OUTPATIENT
Start: 2019-04-25 | End: 2019-04-26 | Stop reason: HOSPADM

## 2019-04-25 RX ORDER — 0.9 % SODIUM CHLORIDE 0.9 %
10 VIAL (ML) INJECTION ONCE
Status: CANCELLED | OUTPATIENT
Start: 2019-05-02

## 2019-04-25 RX ORDER — METHYLPREDNISOLONE SODIUM SUCCINATE 125 MG/2ML
125 INJECTION, POWDER, LYOPHILIZED, FOR SOLUTION INTRAMUSCULAR; INTRAVENOUS ONCE
Status: CANCELLED | OUTPATIENT
Start: 2019-05-02

## 2019-04-25 RX ORDER — SODIUM CHLORIDE 0.9 % (FLUSH) 0.9 %
10 SYRINGE (ML) INJECTION PRN
Status: CANCELLED | OUTPATIENT
Start: 2019-05-02

## 2019-04-25 RX ORDER — SODIUM CHLORIDE 9 MG/ML
INJECTION, SOLUTION INTRAVENOUS
Status: COMPLETED
Start: 2019-04-25 | End: 2019-04-25

## 2019-04-25 RX ADMIN — Medication 10 ML: at 10:10

## 2019-04-25 RX ADMIN — .ALPHA.1-PROTEINASE INHIBITOR HUMAN 4842 MG: 1 INJECTION, SOLUTION INTRAVENOUS at 10:26

## 2019-04-25 RX ADMIN — SODIUM CHLORIDE 1000 ML: 9 INJECTION, SOLUTION INTRAVENOUS at 10:15

## 2019-04-25 RX ADMIN — Medication 10 ML: at 11:26

## 2019-04-25 NOTE — PROGRESS NOTES
Outpatient [address] Access    NAME:  Michelle King  YOB: 1975  MEDICAL RECORD NUMBER:  [id number]  DATE:  4/25/2019    Patient arrived to [facility]   [] per wheelchair   [x] ambulatory     Port Site Location:  right chest, anterior                                     Port Site:  Redness: No  Bruising: No   Edema: No  Pain: No     Port Site cleansed with:  Chloroprep Scrub x 2 for 60 seconds and air dried x 3 mis ? Yes     Port Accessed with: 20 Gauge  One inch Power Port non coring needle using sterile technique. Blood return obtained: Yes  Flushed with 20 ml Normal Saline using push-pause method. Port flushes without resistance. Biopatch to site, tegaderm to site   Response to treatment:  Well tolerated by patient.     Electronically signed by Lina Painting RN on 4/25/2019 at 1045am

## 2019-04-25 NOTE — PROGRESS NOTES
Outpatient [address] Infusion Visit    NAME:  Vanessa Portillo  YOB: 1975  MEDICAL RECORD NUMBER:  [id number]  Episode Date:  4/25/2019    Patient arrived to [facility]    [] per wheelchair   [x] ambulatory --pt has no portable oxygen in use today   Color good, no c/o's voiced. Pt in good spirits. . Pt has no oxygen on this am , does not want to be connected to our hospital oxygen supply. Pt states his breathing has not worsened when he is at rest with no oxygen in use. Pt thinks that is wt loss has helped his breathing . Pt still has left foot pain when he is working, has none at present . No edema of left foot or ankle noted , Pt has not had to use Alieve for foot pain. Pt states he is still fatigued due to working long hours on night shift and having a very active job. .  Pt denies any pain or discomfort from his Port ,                                                                                                                                                      Verification of patient education of Graylon Guiles actions and potential side effects:  Yes     Is this the patient's first Graylon Guiles Infusion? No   Did the patient experience any adverse reactions to previous Graylon Guiles Infusion?  No    Any side effects from last Glassia infusion:  No     [] Rash         [] Itching  [] Headache  [] Fatigue  [] Dizziness  [] Tingling   [] Chest tightness  [] Shortness of Breath  [] Wheezing    Patient Active Problem List   Diagnosis Code    Abdominal pain R10.9    Lymphadenopathy R59.1    GERD (gastroesophageal reflux disease) K21.9    Ariza's esophagus with dysplasia K22.719    Emphysema lung J43.9    [facility]itry[facility] E88.01    Essential hypertension, benign I10    Advance care planning Z71.89    Type 2 diabetes mellitus without complication, without long-term current use of insulin (HCC) E11.9    Colon polyp K63.5    Chronic hypoxemic respiratory failure (HCC) J96.11       Pre infusion Vital Signs--see flow sheet                   Does the Patient have a History of: IgA deficiency: no     Anaphylaxis or severe systemic response to any medication:  No        Breath Sounds: No increased work of breathing, Breath sounds clear to auscultation bilaterally and Good air exchange--no wheezing noted       Any recent activity tolerance changes: No      Any increased SOB or dyspnea:  No, denies wheezing      Presence of cough or sputum:  Yes , color is white to yellow, no increase in amt      Premedicated:--no premeds ordered                     [] Benadryl 25 mg IV  [] Benadryl 50 mg IV  [] Benadryl 25 mg oral   [] Benadryl 50 mg oral  [] Other    Epinephrine at bedside:  Yes    IV placed and documented prior to drug preparation:  [place] Ao must be administered within 3 hours of drug reconstitution as it contains no preservatives. Monitoring of infusion documented in doc flowsheets. Glassia administered: Yes     Wt 178 lb (80.7 kg)   BMI 25.54 kg/m²     Glassia dosage: 60   mg/kg was administered slowly IVPB with use of a 5 micron  filter --total dose given 4842 mg   Dose verified by:  Marry Waldron RN    Post treatment Vital Signs--see flow sheet        Response to treatment:  Well tolerated by patient.  Pt slept for short intervals while IV infusing ,     Scheduled to return for next dose of Glassia on May 2, 2019 at 1000am.     Electronically signed by Surinder Nolasco RN on 4/25/2019 at 1145am

## 2019-05-09 ENCOUNTER — HOSPITAL ENCOUNTER (OUTPATIENT)
Dept: INFUSION THERAPY | Age: 44
Setting detail: INFUSION SERIES
Discharge: HOME | End: 2019-05-09
Payer: COMMERCIAL

## 2019-05-09 VITALS
HEART RATE: 68 BPM | WEIGHT: 176 LBS | BODY MASS INDEX: 25.25 KG/M2 | DIASTOLIC BLOOD PRESSURE: 82 MMHG | OXYGEN SATURATION: 97 % | RESPIRATION RATE: 18 BRPM | TEMPERATURE: 98.5 F | SYSTOLIC BLOOD PRESSURE: 137 MMHG

## 2019-05-09 DIAGNOSIS — E88.01 ALPHA-1-ANTITRYPSIN DEFICIENCY (HCC): Primary | ICD-10-CM

## 2019-05-09 PROCEDURE — 96365 THER/PROPH/DIAG IV INF INIT: CPT

## 2019-05-09 PROCEDURE — 6360000002 HC RX W HCPCS: Performed by: INTERNAL MEDICINE

## 2019-05-09 PROCEDURE — 2580000003 HC RX 258: Performed by: INTERNAL MEDICINE

## 2019-05-09 RX ORDER — EPINEPHRINE 1 MG/ML
0.3 INJECTION, SOLUTION, CONCENTRATE INTRAVENOUS PRN
Status: CANCELLED | OUTPATIENT
Start: 2019-05-16

## 2019-05-09 RX ORDER — METHYLPREDNISOLONE SODIUM SUCCINATE 125 MG/2ML
125 INJECTION, POWDER, LYOPHILIZED, FOR SOLUTION INTRAMUSCULAR; INTRAVENOUS ONCE
Status: CANCELLED | OUTPATIENT
Start: 2019-05-16

## 2019-05-09 RX ORDER — SODIUM CHLORIDE 9 MG/ML
INJECTION, SOLUTION INTRAVENOUS CONTINUOUS
Status: DISCONTINUED | OUTPATIENT
Start: 2019-05-09 | End: 2019-05-10 | Stop reason: HOSPADM

## 2019-05-09 RX ORDER — SODIUM CHLORIDE 9 MG/ML
INJECTION, SOLUTION INTRAVENOUS
Status: DISPENSED
Start: 2019-05-09 | End: 2019-05-09

## 2019-05-09 RX ORDER — SODIUM CHLORIDE 0.9 % (FLUSH) 0.9 %
10 SYRINGE (ML) INJECTION PRN
Status: CANCELLED | OUTPATIENT
Start: 2019-05-16

## 2019-05-09 RX ORDER — SODIUM CHLORIDE 0.9 % (FLUSH) 0.9 %
10 SYRINGE (ML) INJECTION PRN
Status: DISCONTINUED | OUTPATIENT
Start: 2019-05-09 | End: 2019-05-10 | Stop reason: HOSPADM

## 2019-05-09 RX ORDER — DIPHENHYDRAMINE HYDROCHLORIDE 50 MG/ML
50 INJECTION INTRAMUSCULAR; INTRAVENOUS ONCE
Status: CANCELLED | OUTPATIENT
Start: 2019-05-16

## 2019-05-09 RX ORDER — SODIUM CHLORIDE 9 MG/ML
INJECTION, SOLUTION INTRAVENOUS CONTINUOUS
Status: CANCELLED | OUTPATIENT
Start: 2019-05-16

## 2019-05-09 RX ORDER — 0.9 % SODIUM CHLORIDE 0.9 %
10 VIAL (ML) INJECTION ONCE
Status: CANCELLED | OUTPATIENT
Start: 2019-05-16

## 2019-05-09 RX ADMIN — SODIUM CHLORIDE 1000 ML: 9 INJECTION, SOLUTION INTRAVENOUS at 10:30

## 2019-05-09 RX ADMIN — Medication 10 ML: at 11:56

## 2019-05-09 RX ADMIN — Medication 10 ML: at 10:20

## 2019-05-09 RX ADMIN — .ALPHA.1-PROTEINASE INHIBITOR HUMAN 4788 MG: 1 INJECTION, SOLUTION INTRAVENOUS at 10:49

## 2019-05-09 NOTE — PROGRESS NOTES
Outpatient [address] Access    NAME:  Asiya Benton  YOB: 1975  MEDICAL RECORD NUMBER:  [id number]  DATE:  5/9/2019    Patient arrived to [facility]   [] per wheelchair   [x] ambulatory with portable oxygen on   Port Site Location:  right chest, anterior  Port Site:  Redness: No  Bruising: No   Edema: No  Pain: No     Port Site cleansed with:  Chloroprep Scrub x 2  for 60 seconds and air dried x 3 mis ? Yes     Port Accessed with: 20 Gauge . 75 inch  Power Port non coring needle using sterile technique. Blood return obtained: Yes  Flushed with 20 ml Normal Saline using push-pause method. Port flushes without resistance. Biopatch to site , covered with Tegaderm drsng   Response to treatment:  Well tolerated by patient.     Electronically signed by Surinder Nolasco RN on 5/9/2019 at 1100am

## 2019-05-09 NOTE — PROGRESS NOTES
[address] Infusion Visit    NAME:  Dana Reyna  YOB: 1975  MEDICAL RECORD NUMBER:  [id number]  Episode Date:  5/9/2019    Patient arrived to [facility]    [] per wheelchair   [x] ambulatory with portable oxygen on   Color good, pt is well rested today, states he is still working nights but slept this am before he came in today. Still has co's of fatigue due to working night shift. Pt will be changing jobs soon , but will still be working night shift, but less hours . Pt still has c/o's of left foot pain when working, but no edema or redness noted of left foot. Verification of patient education of Rose Luther actions and potential side effects:  Yes     Is this the patient's first Glassia Infusion? No   Did the patient experience any adverse reactions to previous Rose Luther Infusion? No    Any side effects from last Glassia infusion:  No     [] Rash         [] Itching  [] Headache  [] Fatigue  [] Dizziness  [] Tingling   [] Chest tightness  [] Shortness of Breath  [] Wheezing    Patient Active Problem List   Diagnosis Code    Abdominal pain R10.9    Lymphadenopathy R59.1    GERD (gastroesophageal reflux disease) K21.9    Ariza's esophagus with dysplasia K22.719    Emphysema lung J43.9    [facility] E88.01    Essential hypertension, benign I10    Advance care planning Z71.89    Type 2 diabetes mellitus without complication, without long-term current use of insulin (HCC) E11.9    Colon polyp K63.5    Chronic hypoxemic respiratory failure ([facility]) J96.11       Pre infusion Vital Signs       Temp: 98.5 °F (36.9 °C)     Pulse: 84     Resp: 18     BP: 138/83       Does the Patient have a History of: IgA deficiency: no     Anaphylaxis or severe systemic response to any medication:  No        Breath Sounds:  No increased work of breathing, Breath sounds clear to auscultation bilaterally and Good air exchange      Any recent activity tolerance changes: No      Any increased SOB or dyspnea:  No     Presence of cough or sputum:  Yes, no increase in mucus, no change in color     Premedicated: no premeds ordered   [] Benadryl 25 mg IV  [] Benadryl 50 mg IV  [] Benadryl 25 mg oral   [] Benadryl 50 mg oral  [] Other    Epinephrine at bedside:  Yes    IV placed and documented prior to drug preparation:  Corene Idler must be administered within 3 hours of drug reconstitution as it contains no preservatives. Monitoring of infusion documented in doc flowsheets. Glassia administered: Yes     /83   Pulse 84   Temp 98.5 °F (36.9 °C) (Oral)   Resp 18   Wt 176 lb (79.8 kg)   SpO2 96%   BMI 25.25 kg/m²     Glassia dosage: 60 mg/kg was administered slowly IVPB--total dose 4788 mg  IVPB, administered with a 5 micron inline filter   Dose verified by:   Geo Cook RN      Response to treatment:  Well tolerated by patient. VSS. Scheduled to return for next dose of Glassia in 2 weeks  on May 23, 2019 . Pt. cannot come in on May 82ZJ due to conflict in work schedule.  .     Electronically signed by Jakub Ye RN on 5/9/2019 at [phone]

## 2019-05-16 ENCOUNTER — APPOINTMENT (OUTPATIENT)
Dept: INFUSION THERAPY | Age: 44
End: 2019-05-16
Payer: COMMERCIAL

## 2019-05-16 ENCOUNTER — TELEPHONE (OUTPATIENT)
Dept: PRIMARY CARE CLINIC | Age: 44
End: 2019-05-16

## 2019-05-16 DIAGNOSIS — J44.9 CHRONIC OBSTRUCTIVE PULMONARY DISEASE, UNSPECIFIED COPD TYPE (HCC): ICD-10-CM

## 2019-05-16 DIAGNOSIS — E11.9 TYPE 2 DIABETES MELLITUS WITHOUT COMPLICATION, WITHOUT LONG-TERM CURRENT USE OF INSULIN (HCC): ICD-10-CM

## 2019-05-16 NOTE — TELEPHONE ENCOUNTER
Attempted to contact patient on 5/16/2019. Result: left message on the patient's voicemail asking patient to return my call. Pre-Visit planning not completed.            Bobby Rodriguez  Patient Services Specialist  [phone]

## 2019-05-22 NOTE — TELEPHONE ENCOUNTER
Dr. Mya Dean was 05/30, but changed to 06/06 :    Notes: pt due for CBC, BMP, lipid & A1C. He has an infusion the morning of 06/06 & will have them drawn your orders. This patient has an upcoming appointment with you for Diabetes and Hypertension. In planning for that visit I have completed the following pre-visit planning:     Pre-Visit Planning Checklist:  Patient contacted: yes  Verified patient by name and date of birth: yes    Health Maintenance items reviewed:    No pre-visit planning health maintenance topics to review at this time    Labs and procedures pended: Fasting Hemoglobin A1C , Lipid Panel  and BMP, CBC   Labs and procedures discussed with patient: yes  Reminded patient to check with their insurance company about coverage for lab tests and lab location: no    Preliminary Medication Reconciliation: was performed. Reminded patient to arrive early: yes    Please complete the med-reconciliation and sign the appropriate labs as soon as possible.       Shiloh Ruiz  Patient Services Specialist  [phone]

## 2019-05-30 ENCOUNTER — HOSPITAL ENCOUNTER (OUTPATIENT)
Dept: INFUSION THERAPY | Age: 44
Setting detail: INFUSION SERIES
Discharge: HOME | End: 2019-05-30
Payer: COMMERCIAL

## 2019-05-30 VITALS
TEMPERATURE: 97.9 F | OXYGEN SATURATION: 97 % | BODY MASS INDEX: 25.97 KG/M2 | WEIGHT: 181 LBS | DIASTOLIC BLOOD PRESSURE: 77 MMHG | SYSTOLIC BLOOD PRESSURE: 123 MMHG | HEART RATE: 73 BPM | RESPIRATION RATE: 18 BRPM

## 2019-05-30 DIAGNOSIS — E88.01 ALPHA-1-ANTITRYPSIN DEFICIENCY (HCC): Primary | ICD-10-CM

## 2019-05-30 LAB
ANION GAP SERPL CALCULATED.3IONS-SCNC: 12 MMOL/L (ref 3–16)
BUN BLDV-MCNC: 10 MG/DL (ref 7–20)
CALCIUM SERPL-MCNC: 9.2 MG/DL (ref 8.3–10.6)
CHLORIDE BLD-SCNC: 102 MMOL/L (ref 99–110)
CHOLESTEROL, TOTAL: 198 MG/DL (ref 0–199)
CO2: 25 MMOL/L (ref 21–32)
CREAT SERPL-MCNC: 0.7 MG/DL (ref 0.9–1.3)
ESTIMATED AVERAGE GLUCOSE: 114 MG/DL
GFR AFRICAN AMERICAN: >60
GFR NON-AFRICAN AMERICAN: >60
GLUCOSE BLD-MCNC: 111 MG/DL (ref 70–99)
HBA1C MFR BLD: 5.6 %
HCT VFR BLD CALC: 45.8 % (ref 40.5–52.5)
HDLC SERPL-MCNC: 38 MG/DL (ref 40–60)
HEMOGLOBIN: 15.6 G/DL (ref 13.5–17.5)
LDL CHOLESTEROL CALCULATED: 135 MG/DL
MCH RBC QN AUTO: 29.5 PG (ref 26–34)
MCHC RBC AUTO-ENTMCNC: 34 G/DL (ref 31–36)
MCV RBC AUTO: 86.6 FL (ref 80–100)
PDW BLD-RTO: 12.5 % (ref 12.4–15.4)
PLATELET # BLD: 140 K/UL (ref 135–450)
PMV BLD AUTO: 11.8 FL (ref 5–10.5)
POTASSIUM SERPL-SCNC: 3.8 MMOL/L (ref 3.5–5.1)
RBC # BLD: 5.29 M/UL (ref 4.2–5.9)
SODIUM BLD-SCNC: 139 MMOL/L (ref 136–145)
TRIGL SERPL-MCNC: 124 MG/DL (ref 0–150)
VLDLC SERPL CALC-MCNC: 25 MG/DL
WBC # BLD: 7.7 K/UL (ref 4–11)

## 2019-05-30 PROCEDURE — 80048 BASIC METABOLIC PNL TOTAL CA: CPT

## 2019-05-30 PROCEDURE — 96365 THER/PROPH/DIAG IV INF INIT: CPT

## 2019-05-30 PROCEDURE — 2580000003 HC RX 258: Performed by: INTERNAL MEDICINE

## 2019-05-30 PROCEDURE — 85027 COMPLETE CBC AUTOMATED: CPT

## 2019-05-30 PROCEDURE — 6360000002 HC RX W HCPCS: Performed by: INTERNAL MEDICINE

## 2019-05-30 PROCEDURE — 83036 HEMOGLOBIN GLYCOSYLATED A1C: CPT

## 2019-05-30 PROCEDURE — 80061 LIPID PANEL: CPT

## 2019-05-30 RX ORDER — SODIUM CHLORIDE 9 MG/ML
INJECTION, SOLUTION INTRAVENOUS CONTINUOUS
Status: CANCELLED | OUTPATIENT
Start: 2019-06-06

## 2019-05-30 RX ORDER — SODIUM CHLORIDE 0.9 % (FLUSH) 0.9 %
10 SYRINGE (ML) INJECTION PRN
Status: CANCELLED | OUTPATIENT
Start: 2019-06-06

## 2019-05-30 RX ORDER — EPINEPHRINE 1 MG/ML
0.3 INJECTION, SOLUTION, CONCENTRATE INTRAVENOUS PRN
Status: CANCELLED | OUTPATIENT
Start: 2019-06-06

## 2019-05-30 RX ORDER — SODIUM CHLORIDE 0.9 % (FLUSH) 0.9 %
10 SYRINGE (ML) INJECTION PRN
Status: DISCONTINUED | OUTPATIENT
Start: 2019-05-30 | End: 2019-05-31 | Stop reason: HOSPADM

## 2019-05-30 RX ORDER — 0.9 % SODIUM CHLORIDE 0.9 %
10 VIAL (ML) INJECTION ONCE
Status: CANCELLED | OUTPATIENT
Start: 2019-06-06

## 2019-05-30 RX ORDER — METHYLPREDNISOLONE SODIUM SUCCINATE 125 MG/2ML
125 INJECTION, POWDER, LYOPHILIZED, FOR SOLUTION INTRAMUSCULAR; INTRAVENOUS ONCE
Status: CANCELLED | OUTPATIENT
Start: 2019-06-06

## 2019-05-30 RX ORDER — DIPHENHYDRAMINE HYDROCHLORIDE 50 MG/ML
50 INJECTION INTRAMUSCULAR; INTRAVENOUS ONCE
Status: CANCELLED | OUTPATIENT
Start: 2019-06-06

## 2019-05-30 RX ADMIN — .ALPHA.1-PROTEINASE INHIBITOR HUMAN 4926 MG: 1 INJECTION, SOLUTION INTRAVENOUS at 10:55

## 2019-05-30 RX ADMIN — Medication 20 ML: at 10:50

## 2019-05-30 RX ADMIN — Medication 10 ML: at 10:45

## 2019-05-30 NOTE — PROGRESS NOTES
[address] [facility] Access for Labs    NAME:  Jordy Jackson  YOB: 1975  MEDICAL RECORD NUMBER:  [id number]  DATE:  5/30/2019    Patient arrived to [place] [address]   [] per wheelchair   [x] ambulatory     Port Site Location:  right chest    Port Site:  Redness: No  Bruising: No   Edema: No  Pain: No     Port Site cleansed with:  Chloroprep Scrub for 30 seconds and air dried? Yes    Port Accessed with: 20 Gauge 1 inch Power Port needle using sterile technique. Blood return obtained: Yes    Labs:  Blood wasted: 15 ml  Labs drawn using a Vacutainer: Yes CBC, HGB A1C, BMP, Lipid  Flushed with 20 ml Normal Saline using push-pause method. Port flushes without resistance. Port Deaccessed:  No: due for glassia IVPB    Response to treatment:  Well tolerated by patient. Education:    Verbalized understanding    Electronically signed by Robb Pelayo RN on 5/30/2019 at 11:08 AM            Outpatient [address] Infusion Visit    NAME:  Jordy Jackson  YOB: 1975  MEDICAL RECORD NUMBER:  [id number]  Episode Date:  5/30/2019    Patient arrived to [place] [address]    [] per wheelchair   [x] ambulatory      Alert and oriented X4, continuing night shift but not as tired as he has been, states this job is less stressful, less physical labor. Denies any side effects of infusion and no new symptoms    Verification of patient education of Corene Idler actions and potential side effects:  Yes     Is this the patient's first Glassia Infusion? No   Did the patient experience any adverse reactions to previous Corene Idler Infusion?  No    Any side effects from last Glassia infusion:  No     [] Rash         [] Itching  [] Headache  [] Fatigue  [] Dizziness  [] Tingling   [] Chest tightness  [] Shortness of Breath  [] Wheezing    Patient Active Problem List   Diagnosis Code    Abdominal pain R10.9    Lymphadenopathy R59.1    GERD (gastroesophageal reflux disease) K21.9    Ariza's esophagus with dysplasia K22.719    Emphysema lung J43.9    Alpha-1-antitrypsin deficiency ([facility]) E88.01    Essential hypertension, benign I10    Advance care planning Z71.89    Type 2 diabetes mellitus without complication, without long-term current use of insulin ([facility]) E11.9    Colon polyp K63.5    Chronic hypoxemic respiratory failure ([facility]) J96.11       Pre infusion Vital Signs       Temp: 97.9 °F (36.6 °C)     Pulse: 73     Resp: 18     BP: 123/77       Does the Patient have a History of: IgA deficiency: no     Anaphylaxis or severe systemic response to any medication:  No        Breath Sounds: No increased work of breathing, Breath sounds clear to auscultation bilaterally and Good air exchange  Pulse Oximetry: 97 %    Any recent activity tolerance changes: No      Any increased SOB or dyspnea:  No     Presence of cough or sputum:  No     Premedicated: None  [] Benadryl 25 mg IV  [] Benadryl 50 mg IV  [] Benadryl 25 mg oral   [] Benadryl 50 mg oral  [] Other    Epinephrine at bedside:  No: available in pyxis    IV placed and documented prior to drug preparation:  Corene Idler must be administered within 3 hours of drug reconstitution as it contains no preservatives. Monitoring of infusion documented in doc flowsheets. Glassia administered: Yes     /77   Pulse 73   Temp 97.9 °F (36.6 °C) (Oral)   Resp 18   Wt 181 lb (82.1 kg)   SpO2 97%   BMI 25.97 kg/m²     Glassia dosage: 60 mg/kg was administered IV over 30 minutes (slower than the recommended 2ml/kg)  Dose verified by:  Michael Jeffries RN    Post treatment Vital Signs  Temp: 97.9 °F (36.6 °C)  Pulse: 73  Resp: 18  BP: 123/77    Response to treatment:  Well tolerated by patient. Scheduled to return for next dose of Glassia on June 6, 2019.      Electronically signed by Robb Pelayo RN on 5/30/2019 at 11:08 AM    Port flushed with 20ml NS using push-pause method per protocol. Port deaccessed. Bandaid to site. Patient tolerated well.

## 2019-06-05 PROBLEM — E11.9 TYPE 2 DIABETES MELLITUS WITHOUT COMPLICATION, WITHOUT LONG-TERM CURRENT USE OF INSULIN (HCC): Status: RESOLVED | Noted: 2017-07-18 | Resolved: 2019-06-05

## 2019-06-06 ENCOUNTER — HOSPITAL ENCOUNTER (OUTPATIENT)
Dept: INFUSION THERAPY | Age: 44
Setting detail: INFUSION SERIES
Discharge: HOME | End: 2019-06-06
Payer: COMMERCIAL

## 2019-06-06 ENCOUNTER — OFFICE VISIT (OUTPATIENT)
Dept: PRIMARY CARE CLINIC | Age: 44
End: 2019-06-06
Payer: COMMERCIAL

## 2019-06-06 VITALS
SYSTOLIC BLOOD PRESSURE: 129 MMHG | TEMPERATURE: 98.4 F | RESPIRATION RATE: 19 BRPM | DIASTOLIC BLOOD PRESSURE: 92 MMHG | HEART RATE: 72 BPM | BODY MASS INDEX: 25.83 KG/M2 | OXYGEN SATURATION: 96 % | WEIGHT: 180 LBS

## 2019-06-06 VITALS
DIASTOLIC BLOOD PRESSURE: 86 MMHG | HEART RATE: 82 BPM | BODY MASS INDEX: 26.97 KG/M2 | HEIGHT: 70 IN | SYSTOLIC BLOOD PRESSURE: 132 MMHG | WEIGHT: 188.4 LBS

## 2019-06-06 DIAGNOSIS — E88.01 ALPHA-1-ANTITRYPSIN DEFICIENCY (HCC): Primary | ICD-10-CM

## 2019-06-06 DIAGNOSIS — K21.00 GASTROESOPHAGEAL REFLUX DISEASE WITH ESOPHAGITIS: ICD-10-CM

## 2019-06-06 DIAGNOSIS — J43.9 PULMONARY EMPHYSEMA, UNSPECIFIED EMPHYSEMA TYPE (HCC): ICD-10-CM

## 2019-06-06 PROCEDURE — 6360000002 HC RX W HCPCS: Performed by: INTERNAL MEDICINE

## 2019-06-06 PROCEDURE — 96365 THER/PROPH/DIAG IV INF INIT: CPT

## 2019-06-06 PROCEDURE — 99214 OFFICE O/P EST MOD 30 MIN: CPT | Performed by: FAMILY MEDICINE

## 2019-06-06 PROCEDURE — 2580000003 HC RX 258

## 2019-06-06 PROCEDURE — 2580000003 HC RX 258: Performed by: INTERNAL MEDICINE

## 2019-06-06 RX ORDER — 0.9 % SODIUM CHLORIDE 0.9 %
10 VIAL (ML) INJECTION ONCE
Status: CANCELLED | OUTPATIENT
Start: 2019-06-13

## 2019-06-06 RX ORDER — SODIUM CHLORIDE 9 MG/ML
INJECTION, SOLUTION INTRAVENOUS CONTINUOUS
Status: CANCELLED | OUTPATIENT
Start: 2019-06-13

## 2019-06-06 RX ORDER — EPINEPHRINE 1 MG/ML
0.3 INJECTION, SOLUTION, CONCENTRATE INTRAVENOUS PRN
Status: CANCELLED | OUTPATIENT
Start: 2019-06-13

## 2019-06-06 RX ORDER — SODIUM CHLORIDE 9 MG/ML
INJECTION, SOLUTION INTRAVENOUS
Status: COMPLETED
Start: 2019-06-06 | End: 2019-06-06

## 2019-06-06 RX ORDER — DIPHENHYDRAMINE HYDROCHLORIDE 50 MG/ML
50 INJECTION INTRAMUSCULAR; INTRAVENOUS ONCE
Status: CANCELLED | OUTPATIENT
Start: 2019-06-13

## 2019-06-06 RX ORDER — METHYLPREDNISOLONE SODIUM SUCCINATE 125 MG/2ML
125 INJECTION, POWDER, LYOPHILIZED, FOR SOLUTION INTRAMUSCULAR; INTRAVENOUS ONCE
Status: CANCELLED | OUTPATIENT
Start: 2019-06-13

## 2019-06-06 RX ORDER — SODIUM CHLORIDE 0.9 % (FLUSH) 0.9 %
10 SYRINGE (ML) INJECTION PRN
Status: CANCELLED | OUTPATIENT
Start: 2019-06-13

## 2019-06-06 RX ORDER — SODIUM CHLORIDE 0.9 % (FLUSH) 0.9 %
10 SYRINGE (ML) INJECTION PRN
Status: DISCONTINUED | OUTPATIENT
Start: 2019-06-06 | End: 2019-06-07 | Stop reason: HOSPADM

## 2019-06-06 RX ADMIN — SODIUM CHLORIDE 1000 ML: 9 INJECTION, SOLUTION INTRAVENOUS at 10:23

## 2019-06-06 RX ADMIN — Medication 10 ML: at 10:20

## 2019-06-06 RX ADMIN — Medication 10 ML: at 11:31

## 2019-06-06 RX ADMIN — .ALPHA.1-PROTEINASE INHIBITOR HUMAN 4896 MG: 1 INJECTION, SOLUTION INTRAVENOUS at 10:26

## 2019-06-06 ASSESSMENT — ENCOUNTER SYMPTOMS
SHORTNESS OF BREATH: 0
VOMITING: 0
NAUSEA: 0
COUGH: 0
DIARRHEA: 0
ABDOMINAL PAIN: 0
CONSTIPATION: 0

## 2019-06-06 NOTE — PROGRESS NOTES
Outpatient [address] Infusion Visit    NAME:  Megan Sandoval  YOB: 1975  MEDICAL RECORD NUMBER:  [id number]  Episode Date:  6/6/2019    Patient arrived to [facility]    [] per wheelchair   [x] ambulatory --has no oxygen in use --pt sob with walking this am, states he was walking fast and had to return to his car to get something  . Pt recovered from his sob after approx 5 mis. Of rest .   Color good, in good spirits. Pt has wt. Gain, no ankle edema, states his appetite is excellent . Pt has been working night shift, but is not working as many hrs and has a different job that is less strenuous and he can pace himself. Pt is not wearing portable oxygen at work. Pt has occasional left foot pain , but it has not worsened . Verification of patient education of [place] Ishihara actions and potential side effects:  Yes     Is this the patient's first Glassia Infusion? No   Did the patient experience any adverse reactions to previous [place] Ishihara Infusion? No    Any side effects from last Glassia infusion:  No     [] Rash         [] Itching  [] Headache  [] Fatigue  [] Dizziness  [] Tingling   [] Chest tightness  [] Shortness of Breath  [] Wheezing    Patient Active Problem List   Diagnosis Code    Abdominal pain R10.9    Lymphadenopathy R59.1    GERD (gastroesophageal reflux disease) K21.9    Ariza's esophagus with dysplasia K22.719    Emphysema lung J43.9    Alpha-[facility]-antitrypsin deficiency [facility] E88.01    Essential hypertension, benign I10    Advance care planning Z71.89    Colon polyp K63.5    Chronic hypoxemic respiratory failure (HCC) J96.11       Pre infusion Vital Signs       Temp: 98.4 °F (36.9 °C)     Pulse: 88     Resp: 19     BP: 125/82       Does the Patient have a History of:     IgA deficiency: no     Anaphylaxis or severe systemic response to any medication:  No        Breath Sounds: No increased work of breathing, Breath sounds clear to auscultation bilaterally and Good air exchange, no wheezing noted       Any recent activity tolerance changes: No      Any increased SOB or dyspnea:  No     Presence of cough or sputum:  Yes , no increase in cough , sputum remains white to yellow, no change in color. Premedicated: no premeds ordered                [] Benadryl 25 mg IV  [] Benadryl 50 mg IV  [] Benadryl 25 mg oral   [] Benadryl 50 mg oral  [] Other    Epinephrine at bedside:  Yes    IV placed and documented prior to drug preparation:  [place] Ishihara must be administered within 3 hours of drug reconstitution as it contains no preservatives. Monitoring of infusion documented in doc flowsheets. Glassia administered: Yes     BP (!) 129/92   Pulse 72   Temp 98.4 °F (36.9 °C) (Oral)   Resp 19   Wt 180 lb (81.6 kg)   SpO2 96%   BMI 25.83 kg/m²     Glassia dosage: 60  mg/kg was administered slowly IVPB in 250 ml of fluid ---total dose ---4896mg --- an in line  5 micron filter was used for infusion   Dose verified by:  Florian Leong RN    Post treatment Vital Signs  Temp: 98.4 °F (36.9 °C)  Pulse: 72  Resp: 19  BP: (!) 129/92    Response to treatment:  Well tolerated by patient. Scheduled to return for next dose of Glassia on June 13, 2019. at 1000am .    Pt to see Dr. Geovanni Rodriguez today for rt. appnt . Pt had lab work last week per Dr. Geovanni Rodriguez, states he had his cholesterol checked.    Electronically signed by Diego Valerio RN on 6/6/2019 at 11:47 AM

## 2019-06-06 NOTE — PROGRESS NOTES
Chief Complaint   Patient presents with    Other     Alpha-1-antitrypsin deficiency ([address]    Other     Pulmonary emphysema, unspecified emphysema type ([address]    Gastroesophageal Reflux       HPI:  Mily Kong is a 39 y.o. (:1975) here today for multiple medical problems. Pt notes that he is still getting his infusions for his Alpha 1. His last infusion was this morning. Mily Kong is taking medications as instructed, no medication side effects noted, no significant ongoing wheezing or shortness of breath, using bronchodilator MDI less than twice a week. He is not having acute dyspnea, chest tightness, wheezing and fever  He is on oxygen at night , 2 L/min per nasal cannula. He is compliant with his reflux medications and notes no heartburn or food sticking in their throat, heartburn,  regurgitation, bitter taste, and food sticking in their throat. Review of Systems   Constitutional: Negative for chills and fever. Respiratory: Negative for cough and shortness of breath. Cardiovascular: Negative for chest pain and palpitations. Gastrointestinal: Negative for abdominal pain, constipation, diarrhea, nausea and vomiting. Endocrine: Negative for polyuria. Genitourinary: Negative for dysuria. Past Medical History:   Diagnosis Date    Advance care planning     LIving will on chart    Alpha-1-antitrypsin deficiency ([address] 11/15/2013    PFT's FEV1 72%    Ariza's esophagus with dysplasia 2012    Had checked 17 recheck in 4 years    CAP (community acquired pneumonia) 14    Colon polyp 2013    Had checked 17 recheck in 4 years    Elevated liver enzymes     Emphysema lung ([address] Oct 2013    on oxygen at night.  and prn during the day     Essential hypertension, benign 2014    GERD (gastroesophageal reflux disease) 11/10/2011    EGD 4/3/11 with Ariza's Esophagus    Kidney stone     Kidney stone     Pleural effusion     at age 15    Type 2 diabetes mellitus without complication, without long-term current use of insulin ([facility] 2017    Wears glasses      Family History   Problem Relation Age of Onset    Cancer Father 61        colon    Cancer Maternal Grandfather         Lung     Social History     Socioeconomic History    Marital status:      Spouse name: Joselito Vuong Number of children: 2    Years of education: Not on file    Highest education level: Not on file   Occupational History    Occupation: works at [employer]: 2019   Social Needs    Financial resource strain: Not on file    Food insecurity:     Worry: Not on file     Inability: Not on file   [employer] needs:     Medical: Not on file     Non-medical: Not on file   Tobacco Use    Smoking status: Former Smoker     Packs/day: 1.60     Years: 30.00     Pack years: 48.00     Types: Cigarettes     Start date: 1986     Last attempt to quit: 10/5/2011     Years since quittin.6    Smokeless tobacco: Never Used   Substance and Sexual Activity    Alcohol use: No    Drug use: No    Sexual activity: Yes     Partners: Female   Lifestyle    Physical activity:     Days per week: Not on file     Minutes per session: Not on file    Stress: Not on file   Relationships    Social connections:     Talks on phone: Not on file     Gets together: Not on file     Attends [employer] service: Not on file     Active member of club or organization: Not on file     Attends meetings of clubs or organizations: Not on file     Relationship status: Not on file    Intimate partner violence:     Fear of current or ex partner: Not on file     Emotionally abused: Not on file     Physically abused: Not on file     Forced sexual activity: Not on file   Other Topics Concern    Not on file   Social History Narrative    Not on file       New Prescriptions    No medications on file         Meds Prior to visit:  Prior to Visit Medications    Medication Sig Taking? Authorizing Provider   alpha1-proteinase inhibitor (GLASSIA) 1000 MG/50ML SOLN Infuse 248.7 mLs intravenously once for 1 dose Yes Kiley Cochran MD   tiotropium (SPIRIVA RESPIMAT) 2.5 MCG/ACT AERS inhaler INHALE 2 PUFFS BY MOUTH INTO THE LUNGS DAILY Yes Antoine Bazan MD   esomeprazole (NEXIUM) 40 MG delayed release capsule Take 1 capsule by mouth 2 times daily Yes Antoine Bazan MD   naproxen sodium (ALEVE) 220 MG tablet Take 220 mg by mouth as needed  Yes Historical Provider, MD   Fluticasone Furoate-Vilanterol (BREO ELLIPTA) 200-25 MCG/INH AEPB Inhale 1 puff into the lungs daily Yes Historical Provider, MD   albuterol sulfate HFA (PROAIR HFA) 108 (90 Base) MCG/ACT inhaler Inhale 2 puffs into the lungs every 6 hours as needed for Wheezing or Shortness of Breath Yes Kiley Cochran MD   acetaminophen (TYLENOL) 325 MG tablet Take 650 mg by mouth every 6 hours as needed for Pain. Yes Historical Provider, MD     Allergies   Allergen Reactions    Ibuprofen Other (See Comments)     Pt states ibuprofen gave him cramps in his stomach. OBJECTIVE:    /86   Pulse 82   Ht 5' 10\" (1.778 m)   Wt 188 lb 6.4 oz (85.5 kg)   BMI 27.03 kg/m²   BP Readings from Last 2 Encounters:   06/06/19 132/86   06/06/19 (!) 129/92     Wt Readings from Last 3 Encounters:   06/06/19 188 lb 6.4 oz (85.5 kg)   06/06/19 180 lb (81.6 kg)   05/30/19 181 lb (82.1 kg)       Physical Exam   Constitutional: He appears well-developed and well-nourished. Cardiovascular: Normal rate and regular rhythm. Exam reveals no gallop and no friction rub. No murmur heard. Pulmonary/Chest: Effort normal and breath sounds normal. He has no wheezes. He has no rales. Abdominal: Soft. Bowel sounds are normal. He exhibits no distension and no mass. There is no tenderness. Skin: Skin is warm and dry. No rash noted.            Hemoglobin A1C (%)   Date Value   05/30/2019 5.6     Microalbumin, Random Urine (mg/dL)   Date Value 11/29/2018 <1.20     LDL Calculated (mg/dL)   Date Value   05/30/2019 135 (H)       ASSESSMENT/PLAN:    1. Alpha-1-antitrypsin deficiency [facility])  Patient continues his infusions which though quite expensive he remains compliant with. We will monitor his liver enzymes at follow-up in 6 months    2. Pulmonary emphysema, unspecified emphysema type ([address]  Chronic and severe: Continue treatment for underlying disease and recheck in 6 months keep follow-up with his pulmonologist    3. Gastroesophageal reflux disease with esophagitis  Controlled: Appears stable. We will continue current management and monitor for adverse reaction and disease progression. Follow-up as noted below        RTC 6 months and as needed    Scribe attestation:  I, Michael Palafox, am scribing for and in the presence of Arianna Child MD. Electronically signed by Michael Palafox on 6/6/2019 at 2:39 PM            Provider attestation: Luan Burris MD, personally performed the services scribed by the user listed above in my presence, and it is both accurate and complete. I agree with the ROS and Past Histories independently gathered by the clinical support staff and the remaining scribed note accurately describes my personal service to the patient.     [unfilled]    6/6/2019  2:50 PM

## 2019-06-13 ENCOUNTER — HOSPITAL ENCOUNTER (OUTPATIENT)
Dept: INFUSION THERAPY | Age: 44
Setting detail: INFUSION SERIES
Discharge: HOME | End: 2019-06-13
Payer: COMMERCIAL

## 2019-06-13 VITALS
RESPIRATION RATE: 18 BRPM | TEMPERATURE: 98.6 F | DIASTOLIC BLOOD PRESSURE: 72 MMHG | OXYGEN SATURATION: 95 % | WEIGHT: 180 LBS | SYSTOLIC BLOOD PRESSURE: 131 MMHG | HEART RATE: 72 BPM | BODY MASS INDEX: 25.83 KG/M2

## 2019-06-13 DIAGNOSIS — E88.01 ALPHA-1-ANTITRYPSIN DEFICIENCY (HCC): Primary | ICD-10-CM

## 2019-06-13 PROCEDURE — 2580000003 HC RX 258: Performed by: INTERNAL MEDICINE

## 2019-06-13 PROCEDURE — 6360000002 HC RX W HCPCS: Performed by: INTERNAL MEDICINE

## 2019-06-13 PROCEDURE — 96365 THER/PROPH/DIAG IV INF INIT: CPT

## 2019-06-13 PROCEDURE — 2580000003 HC RX 258

## 2019-06-13 RX ORDER — EPINEPHRINE 1 MG/ML
0.3 INJECTION, SOLUTION, CONCENTRATE INTRAVENOUS PRN
Status: CANCELLED | OUTPATIENT
Start: 2019-06-20

## 2019-06-13 RX ORDER — SODIUM CHLORIDE 0.9 % (FLUSH) 0.9 %
10 SYRINGE (ML) INJECTION PRN
Status: DISCONTINUED | OUTPATIENT
Start: 2019-06-13 | End: 2019-06-14 | Stop reason: HOSPADM

## 2019-06-13 RX ORDER — METHYLPREDNISOLONE SODIUM SUCCINATE 125 MG/2ML
125 INJECTION, POWDER, LYOPHILIZED, FOR SOLUTION INTRAMUSCULAR; INTRAVENOUS ONCE
Status: CANCELLED | OUTPATIENT
Start: 2019-06-20

## 2019-06-13 RX ORDER — SODIUM CHLORIDE 9 MG/ML
INJECTION, SOLUTION INTRAVENOUS CONTINUOUS
Status: CANCELLED | OUTPATIENT
Start: 2019-06-20

## 2019-06-13 RX ORDER — SODIUM CHLORIDE 0.9 % (FLUSH) 0.9 %
10 SYRINGE (ML) INJECTION PRN
Status: CANCELLED | OUTPATIENT
Start: 2019-06-20

## 2019-06-13 RX ORDER — 0.9 % SODIUM CHLORIDE 0.9 %
10 VIAL (ML) INJECTION ONCE
Status: CANCELLED | OUTPATIENT
Start: 2019-06-20

## 2019-06-13 RX ORDER — SODIUM CHLORIDE 9 MG/ML
INJECTION, SOLUTION INTRAVENOUS
Status: COMPLETED
Start: 2019-06-13 | End: 2019-06-13

## 2019-06-13 RX ORDER — DIPHENHYDRAMINE HYDROCHLORIDE 50 MG/ML
50 INJECTION INTRAMUSCULAR; INTRAVENOUS ONCE
Status: CANCELLED | OUTPATIENT
Start: 2019-06-20

## 2019-06-13 RX ADMIN — .ALPHA.1-PROTEINASE INHIBITOR HUMAN 4896 MG: 1 INJECTION, SOLUTION INTRAVENOUS at 10:56

## 2019-06-13 RX ADMIN — SODIUM CHLORIDE 1000 ML: 9 INJECTION, SOLUTION INTRAVENOUS at 10:30

## 2019-06-13 RX ADMIN — Medication 10 ML: at 11:51

## 2019-06-13 RX ADMIN — Medication 10 ML: at 10:20

## 2019-06-13 ASSESSMENT — PAIN SCALES - GENERAL: PAINLEVEL_OUTOF10: 0

## 2019-06-13 NOTE — PROGRESS NOTES
Outpatient [address]  Infusion Visit    NAME:  Alicia Villanueva  YOB: 1975  MEDICAL RECORD NUMBER:  [id number]  Episode Date:  6/13/2019    Patient arrived to [facility]     [] per wheelchair   [x] ambulatory with portable oxygen on  Color good, face sl. Flushed , no ankle edema. In good spirits, denies c,p, Has sl. Left foot pain when he works but it is greatly improved . Pt saw Dr. Josiane Lamar in office last week, denies any changes in meds the patient denies any new c/o's. Has a good appetite. Pt continues to work night shift, is working 5 days a week, Pt not using his oxygen when he works . Pt denies any pain or discomfort from [facility]. Verification of patient education of Marlyse Creamer actions and potential side effects:  Yes     Is this the patient's first Glassia Infusion? No   Did the patient experience any adverse reactions to previous Marlyse Creamer Infusion? No    Any side effects from last Glassia infusion:  No     [] Rash         [] Itching  [] Headache  [] Fatigue  [] Dizziness  [] Tingling   [] Chest tightness  [] Shortness of Breath  [] Wheezing    Patient Active Problem List   Diagnosis Code    Abdominal pain R10.9    Lymphadenopathy R59.1    GERD (gastroesophageal reflux disease) K21.9    Ariza's esophagus with dysplasia K22.719    Emphysema lung J43.9    Alpha-1-antitrypsin deficiency ([facility] E88.01    Essential hypertension, benign I10    Advance care planning Z71.89    Colon polyp K63.5    Chronic hypoxemic respiratory failure (HCC) J96.11       Pre infusion Vital Signs --see flow sheets                                 Does the Patient have a History of:     IgA deficiency: no     Anaphylaxis or severe systemic response to any medication:  No        Breath Sounds: No increased work of breathing, Breath sounds clear to auscultation bilaterally and Good air exchange      Any recent activity tolerance changes: No      Any increased SOB or dyspnea:  No , pt denies wheezing    Presence of cough or sputum:  Yes , no increase in sputum production, color of mucus is white or yellow     Premedicated:--no premeds ordered                            [] Benadryl 25 mg IV  [] Benadryl 50 mg IV  [] Benadryl 25 mg oral   [] Benadryl 50 mg oral  [] Other    Epinephrine at bedside:  Yes    IV placed and documented prior to drug preparation:  Mika BlancPetey must be administered within 3 hours of drug reconstitution as it contains no preservatives. Monitoring of infusion documented in doc flowsheets. Glassia administered: Yes     Wt 180 lb (81.6 kg)   BMI 25.83 kg/m²     Glassia dosage: 60 mg/kg was administered slowly over one hour  IVPB --- used a 5 micron filter for infusion -- total dose is 4896 mg IVPB   Dose and flow rate  verified by:   David Rivero RN    Post treatment Vital Signs--see flow sheet                 Response to treatment:  Well tolerated by patient. Scheduled to return for next dose of Glassia on June 20, 2019. Pt to see Dr. Carson Chavez on 9/3/19.    Electronically signed by Magda Michele RN on 6/13/2019 at 1300pm

## 2019-06-20 ENCOUNTER — HOSPITAL ENCOUNTER (OUTPATIENT)
Dept: INFUSION THERAPY | Age: 44
Setting detail: INFUSION SERIES
End: 2019-06-20
Payer: COMMERCIAL

## 2019-06-21 ENCOUNTER — HOSPITAL ENCOUNTER (OUTPATIENT)
Dept: INFUSION THERAPY | Age: 44
Setting detail: INFUSION SERIES
Discharge: HOME | End: 2019-06-21
Payer: COMMERCIAL

## 2019-06-21 VITALS
HEART RATE: 69 BPM | SYSTOLIC BLOOD PRESSURE: 132 MMHG | DIASTOLIC BLOOD PRESSURE: 86 MMHG | BODY MASS INDEX: 25.83 KG/M2 | TEMPERATURE: 98.1 F | WEIGHT: 180 LBS | RESPIRATION RATE: 18 BRPM | OXYGEN SATURATION: 95 %

## 2019-06-21 DIAGNOSIS — E88.01 ALPHA-1-ANTITRYPSIN DEFICIENCY (HCC): Primary | ICD-10-CM

## 2019-06-21 PROCEDURE — 96523 IRRIG DRUG DELIVERY DEVICE: CPT

## 2019-06-21 PROCEDURE — 2580000003 HC RX 258: Performed by: INTERNAL MEDICINE

## 2019-06-21 PROCEDURE — 96365 THER/PROPH/DIAG IV INF INIT: CPT

## 2019-06-21 PROCEDURE — 6360000002 HC RX W HCPCS: Performed by: INTERNAL MEDICINE

## 2019-06-21 RX ORDER — 0.9 % SODIUM CHLORIDE 0.9 %
10 VIAL (ML) INJECTION ONCE
Status: CANCELLED | OUTPATIENT
Start: 2019-06-27

## 2019-06-21 RX ORDER — SODIUM CHLORIDE 9 MG/ML
INJECTION, SOLUTION INTRAVENOUS CONTINUOUS
Status: CANCELLED | OUTPATIENT
Start: 2019-06-27

## 2019-06-21 RX ORDER — DIPHENHYDRAMINE HYDROCHLORIDE 50 MG/ML
50 INJECTION INTRAMUSCULAR; INTRAVENOUS ONCE
Status: CANCELLED | OUTPATIENT
Start: 2019-06-27

## 2019-06-21 RX ORDER — EPINEPHRINE 1 MG/ML
0.3 INJECTION, SOLUTION, CONCENTRATE INTRAVENOUS PRN
Status: CANCELLED | OUTPATIENT
Start: 2019-06-27

## 2019-06-21 RX ORDER — SODIUM CHLORIDE 0.9 % (FLUSH) 0.9 %
10 SYRINGE (ML) INJECTION PRN
Status: DISCONTINUED | OUTPATIENT
Start: 2019-06-21 | End: 2019-06-22 | Stop reason: HOSPADM

## 2019-06-21 RX ORDER — METHYLPREDNISOLONE SODIUM SUCCINATE 125 MG/2ML
125 INJECTION, POWDER, LYOPHILIZED, FOR SOLUTION INTRAMUSCULAR; INTRAVENOUS ONCE
Status: CANCELLED | OUTPATIENT
Start: 2019-06-27

## 2019-06-21 RX ORDER — SODIUM CHLORIDE 0.9 % (FLUSH) 0.9 %
10 SYRINGE (ML) INJECTION PRN
Status: CANCELLED | OUTPATIENT
Start: 2019-06-27

## 2019-06-21 RX ADMIN — Medication 10 ML: at 09:20

## 2019-06-21 RX ADMIN — .ALPHA.1-PROTEINASE INHIBITOR HUMAN 5000 MG: 1 INJECTION, SOLUTION INTRAVENOUS at 09:40

## 2019-06-21 ASSESSMENT — PAIN SCALES - GENERAL
PAINLEVEL_OUTOF10: 0
PAINLEVEL_OUTOF10: 0

## 2019-06-21 NOTE — PROGRESS NOTES
Outpatient [address] [place] [address]  Visit    NAME:  Heather Barillas  YOB: 1975  MEDICAL RECORD NUMBER:  [id number]  Episode Date:  6/21/2019    Patient arrived to [place] [address]     [] per wheelchair   [x] ambulatory WITH PORTABLE OXYGEN. Verification of patient education of glassia actions and potential side effects:  Yes. Is this the patient's first Glassia Injection? NO. Did the patient experience any adverse reactions to previous [place] Injection? NO. Any side effects from last infusion:  NO  [] Rash         [] Itching  [] Headache  [] Fatigue  [] Dizziness  [] Tingling   [] Chest tightness  [] Shortness of Breath  [] Wheezing    Patient Active Problem List   Diagnosis Code    Abdominal pain R10.9    Lymphadenopathy R59.1    GERD (gastroesophageal reflux disease) K21.9    Ariza's esophagus with dysplasia K22.719    Emphysema lung J43.9    Alpha-1-antitrypsin deficiency ([facility] E88.01    Essential hypertension, benign I10    Advance care planning Z71.89    Colon polyp K63.5    Chronic hypoxemic respiratory failure (HCC) J96.11     Pre infusion Vital Signs       Temp: 98.1 °F (36.7 °C)     Pulse: 58     Resp: 18     BP: 129/79       Does the Patient have a History of: IgA deficiency: NO   Anaphylaxis or severe systemic response to any medication: NO.      Breath Sounds: ,\"Breath sounds clear to auscultation bilaterally\",\"Good air exchange\". Pt states that he expectorates a lot of mucous early every am.  No other mucous the rest of the day. Pulse Oximetry:  94-95 %    Any recent activity tolerance changes: YES  fatigue from lack of sleep due to working night shift. Any increased SOB or dyspnea:  NO.    Presence of cough or sputum:  Early mourning.      Premedicated:  [] Benadryl 25 mg IV  [] Benadryl 50 mg IV  [] Benadryl 25 mg oral   [] Benadryl 50 mg oral  [] Other    Epinephrine at bedside: no in [place]. IV placed and documented prior to drug preparation:  Monitoring of infusion documented in doc flowsheets. Glassia administered: yes. /79   Pulse 58   Temp 98.1 °F (36.7 °C) (Oral)   Resp 18   Wt 180 lb (81.6 kg)   SpO2 94%   BMI 25.83 kg/m²     Glassia dosage: 5,000  mgs was administered slowly IV over 1 hour. Dose verified by:   Cindy Garcia RN    Post treatment Vital Signs  Temp: 98.1 °F (36.7 °C)  Pulse: 58  Resp: 18  BP: 129/79    Response to treatment:  Well tolerated by patient. Scheduled to return for next dose of Glassia on July 3, 2019. Port Access    NAME:  Ro Briggs  YOB: 1975  MEDICAL RECORD NUMBER:  [id number]  DATE:  6/21/2019    Patient arrived to [facility]   [] per wheelchair   [x] ambulatory     Port Site Location:  right chest  Port Site:  Redness: No  Bruising: No   Edema: No  Pain: No     Port Site cleansed with:  Chloroprep Scrub for 30 seconds and air dried? Yes     Port Accessed with: [address] [place] non coring needle using sterile technique. Blood return obtained: Yes  Flushed with 10 ml Normal Saline using push-pause method. Port flushes without resistance. Response to treatment:  Well tolerated by patient. Was able to nap for 1 hr while here. Tolerated port de-accessed without c/o's offered.   Electronically signed by Kimberly Torres RN on 6/21/2019 at 4:19 PM

## 2019-06-24 ENCOUNTER — TELEPHONE (OUTPATIENT)
Dept: PULMONOLOGY | Age: 44
End: 2019-06-24

## 2019-06-24 NOTE — TELEPHONE ENCOUNTER
Spoke to Dary Turner from the infusion lab and she is looking into this and will call back  Typically this is [facility] and the infusion lab gets this from [facility]

## 2019-06-24 NOTE — TELEPHONE ENCOUNTER
Domitila Apodaca from [facility] called stating they received a refill request for Sherrilyn Srini which was last prescribed in March. I left [facility] for patient to call back to see if he still needs this medication- will have to check with Dr. Kasandra Lui as well.

## 2019-06-24 NOTE — TELEPHONE ENCOUNTER
Viktor Valadez called back and said that Carlito Ann gets his medication from . [address]). I called [facility] specialty and spoke to [facility] and explained that in March the Rx had been sent to the local [address] in error and this should be disregarded. Said she would make a note on his account so Russell Perez would be aware of this error.

## 2019-06-27 ENCOUNTER — HOSPITAL ENCOUNTER (OUTPATIENT)
Dept: INFUSION THERAPY | Age: 44
Setting detail: INFUSION SERIES
Discharge: HOME | End: 2019-06-27
Payer: COMMERCIAL

## 2019-06-27 VITALS
BODY MASS INDEX: 25.97 KG/M2 | HEART RATE: 68 BPM | TEMPERATURE: 97.6 F | DIASTOLIC BLOOD PRESSURE: 79 MMHG | SYSTOLIC BLOOD PRESSURE: 123 MMHG | WEIGHT: 181 LBS | RESPIRATION RATE: 17 BRPM | OXYGEN SATURATION: 97 %

## 2019-06-27 DIAGNOSIS — E88.01 ALPHA-1-ANTITRYPSIN DEFICIENCY (HCC): Primary | ICD-10-CM

## 2019-06-27 PROCEDURE — 2580000003 HC RX 258

## 2019-06-27 PROCEDURE — 96365 THER/PROPH/DIAG IV INF INIT: CPT

## 2019-06-27 PROCEDURE — 6360000002 HC RX W HCPCS: Performed by: INTERNAL MEDICINE

## 2019-06-27 PROCEDURE — 2580000003 HC RX 258: Performed by: INTERNAL MEDICINE

## 2019-06-27 RX ORDER — SODIUM CHLORIDE 9 MG/ML
INJECTION, SOLUTION INTRAVENOUS CONTINUOUS
Status: CANCELLED | OUTPATIENT
Start: 2019-07-04

## 2019-06-27 RX ORDER — 0.9 % SODIUM CHLORIDE 0.9 %
10 VIAL (ML) INJECTION ONCE
Status: CANCELLED | OUTPATIENT
Start: 2019-07-04

## 2019-06-27 RX ORDER — EPINEPHRINE 1 MG/ML
0.3 INJECTION, SOLUTION, CONCENTRATE INTRAVENOUS PRN
Status: CANCELLED | OUTPATIENT
Start: 2019-07-04

## 2019-06-27 RX ORDER — SODIUM CHLORIDE 9 MG/ML
INJECTION, SOLUTION INTRAVENOUS
Status: COMPLETED
Start: 2019-06-27 | End: 2019-06-27

## 2019-06-27 RX ORDER — DIPHENHYDRAMINE HYDROCHLORIDE 50 MG/ML
50 INJECTION INTRAMUSCULAR; INTRAVENOUS ONCE
Status: CANCELLED | OUTPATIENT
Start: 2019-07-04

## 2019-06-27 RX ORDER — SODIUM CHLORIDE 0.9 % (FLUSH) 0.9 %
10 SYRINGE (ML) INJECTION PRN
Status: DISCONTINUED | OUTPATIENT
Start: 2019-06-27 | End: 2019-06-28 | Stop reason: HOSPADM

## 2019-06-27 RX ORDER — METHYLPREDNISOLONE SODIUM SUCCINATE 125 MG/2ML
125 INJECTION, POWDER, LYOPHILIZED, FOR SOLUTION INTRAMUSCULAR; INTRAVENOUS ONCE
Status: CANCELLED | OUTPATIENT
Start: 2019-07-04

## 2019-06-27 RX ORDER — SODIUM CHLORIDE 0.9 % (FLUSH) 0.9 %
10 SYRINGE (ML) INJECTION PRN
Status: CANCELLED | OUTPATIENT
Start: 2019-07-04

## 2019-06-27 RX ADMIN — SODIUM CHLORIDE 250 ML: 9 INJECTION, SOLUTION INTRAVENOUS at 10:26

## 2019-06-27 RX ADMIN — Medication 10 ML: at 11:31

## 2019-06-27 RX ADMIN — .ALPHA.1-PROTEINASE INHIBITOR HUMAN 4926 MG: 1 INJECTION, SOLUTION INTRAVENOUS at 10:31

## 2019-06-27 NOTE — PROGRESS NOTES
Outpatient [address] Visit    NAME:  Alicia Villanueva  YOB: 1975  MEDICAL RECORD NUMBER:  [id number]  Episode Date:  6/27/2019    Patient arrived to [facility]     [] per wheelchair   [x] ambulatory     Verification of patient education of Marlyse Creamer actions and potential side effects:  Yes     Is this the patient's first Marlyse Creamer Injection? No   Did the patient experience any adverse reactions to previous Marlyse Creamer Injection? No    Any side effects from last infusion:  No     [] Rash         [] Itching  [] Headache  [] Fatigue  [] Dizziness  [] Tingling   [] Chest tightness  [] Shortness of Breath  [] Wheezing    Patient Active Problem List   Diagnosis Code    Abdominal pain R10.9    Lymphadenopathy R59.1    GERD (gastroesophageal reflux disease) K21.9    Ariza's esophagus with dysplasia K22.719    Emphysema lung J43.9    Alpha-1-antitrypsin deficiency ([facility] E88.01    Essential hypertension, benign I10    Advance care planning Z71.89    Colon polyp K63.5    Chronic hypoxemic respiratory failure (HCC) J96.11       Pre infusion Vital Signs       Temp: 97.6 °F (36.4 °C)     Pulse: 78     Resp: 17     BP: 117/75       Does the Patient have a History of: IgA deficiency: no     Anaphylaxis or severe systemic response to any medication:  No        Breath Sounds: No increased work of breathing, Breath sounds clear to auscultation bilaterally and Good air exchange  Pulse Oximetry: 97 %    Any recent activity tolerance changes: No      Any increased SOB or dyspnea:  No     Presence of cough or sputum:  Yes, usually in the mornings. Clear to pale yellow sputum. Premedicated: none ordered  [] Benadryl 25 mg IV  [] Benadryl 50 mg IV  [] Benadryl 25 mg oral   [] Benadryl 50 mg oral  [] Other    Epinephrine at bedside:   In pyxis if needed    IV placed and documented prior to drug preparation:  Marlyse Creamer must be administered within 3 hours of drug reconstitution as it contains no preservatives. Monitoring of infusion documented in doc flowsheets. Glassia administered: Yes     /79   Pulse 68   Temp 97.6 °F (36.4 °C) (Oral)   Resp 17   Wt 181 lb (82.1 kg)   SpO2 97%   BMI 25.97 kg/m²     Glassia dosage: 60 mg/kg was administered slowly IV over 50 minutes  Dose verified by:  Lisa Bonilla RN    Post treatment Vital Signs  Temp: 97.6 °F (36.4 °C)  Pulse: 68  Resp: 17  BP: 123/79    Response to treatment:  Well tolerated by patient. Scheduled to return for next dose of Glassia on July 3, 2019.      Electronically signed by Vivek Morse RN on 6/27/2019 at 11:23 AM

## 2019-07-03 ENCOUNTER — HOSPITAL ENCOUNTER (OUTPATIENT)
Dept: INFUSION THERAPY | Age: 44
Setting detail: INFUSION SERIES
Discharge: HOME | End: 2019-07-03
Payer: COMMERCIAL

## 2019-07-03 VITALS
WEIGHT: 182 LBS | TEMPERATURE: 97.8 F | OXYGEN SATURATION: 96 % | HEIGHT: 70 IN | DIASTOLIC BLOOD PRESSURE: 78 MMHG | SYSTOLIC BLOOD PRESSURE: 113 MMHG | RESPIRATION RATE: 17 BRPM | BODY MASS INDEX: 26.05 KG/M2 | HEART RATE: 83 BPM

## 2019-07-03 DIAGNOSIS — E88.01 ALPHA-1-ANTITRYPSIN DEFICIENCY (HCC): Primary | ICD-10-CM

## 2019-07-03 PROCEDURE — 96365 THER/PROPH/DIAG IV INF INIT: CPT

## 2019-07-03 PROCEDURE — 6360000002 HC RX W HCPCS: Performed by: INTERNAL MEDICINE

## 2019-07-03 PROCEDURE — 2580000003 HC RX 258: Performed by: INTERNAL MEDICINE

## 2019-07-03 PROCEDURE — 99211 OFF/OP EST MAY X REQ PHY/QHP: CPT

## 2019-07-03 RX ORDER — SODIUM CHLORIDE 9 MG/ML
INJECTION, SOLUTION INTRAVENOUS CONTINUOUS
Status: CANCELLED | OUTPATIENT
Start: 2019-07-04

## 2019-07-03 RX ORDER — DIPHENHYDRAMINE HYDROCHLORIDE 50 MG/ML
50 INJECTION INTRAMUSCULAR; INTRAVENOUS ONCE
Status: CANCELLED | OUTPATIENT
Start: 2019-07-04

## 2019-07-03 RX ORDER — SODIUM CHLORIDE 0.9 % (FLUSH) 0.9 %
10 SYRINGE (ML) INJECTION PRN
Status: DISCONTINUED | OUTPATIENT
Start: 2019-07-03 | End: 2019-07-04 | Stop reason: HOSPADM

## 2019-07-03 RX ORDER — SODIUM CHLORIDE 0.9 % (FLUSH) 0.9 %
10 SYRINGE (ML) INJECTION PRN
Status: CANCELLED | OUTPATIENT
Start: 2019-07-04

## 2019-07-03 RX ORDER — METHYLPREDNISOLONE SODIUM SUCCINATE 125 MG/2ML
125 INJECTION, POWDER, LYOPHILIZED, FOR SOLUTION INTRAMUSCULAR; INTRAVENOUS ONCE
Status: CANCELLED | OUTPATIENT
Start: 2019-07-04

## 2019-07-03 RX ORDER — EPINEPHRINE 1 MG/ML
0.3 INJECTION, SOLUTION, CONCENTRATE INTRAVENOUS PRN
Status: CANCELLED | OUTPATIENT
Start: 2019-07-04

## 2019-07-03 RX ORDER — 0.9 % SODIUM CHLORIDE 0.9 %
10 VIAL (ML) INJECTION ONCE
Status: CANCELLED | OUTPATIENT
Start: 2019-07-04

## 2019-07-03 RX ADMIN — Medication 10 ML: at 10:15

## 2019-07-03 RX ADMIN — .ALPHA.1-PROTEINASE INHIBITOR HUMAN 5000 MG: 1 INJECTION, SOLUTION INTRAVENOUS at 10:25

## 2019-07-03 NOTE — PROGRESS NOTES
[address]  Antonella pt. Remains on nasal O2 per [place] No complications from this infusion. Still has a productive cough with white mucous. Using inhalers and breathing tx's. Here for [employer]. Port Access    NAME:  Ivis Dunlap  YOB: 1975  MEDICAL RECORD NUMBER:  [id number]  DATE:  7/3/2019    Patient arrived to [place] [address]   [] per wheelchair   [x] ambulatory     Port Site Location:  right chest  Port Site:  Redness: No  Bruising: No   Edema: No  Pain: No     Port Site cleansed with:  Chloroprep Scrub for 30 seconds and air dried? Yes     Port Accessed with: [address] [email or web address] non coring needle using sterile technique. Blood return obtained: Yes  Flushed with 10 ml Normal Saline using push-pause method. Port flushes without resistance. Response to treatment:  Well tolerated by patient. Outpatient [address] Visit    NAME:  Ivis Dunlap  YOB: 1975  MEDICAL RECORD NUMBER:  [id number]  Episode Date:  7/3/2019    Patient arrived to [place] [address]     [] per wheelchair   [x] ambulatory     Verification of patient education of glassia actions and potential side effects:  Yes. Is this the patient's first glassia Infusion  No     Did the patient experience any adverse reactions to previous Infusion ?  No.    Any side effects from last infusion: NO.     Patient Active Problem List   Diagnosis Code    Abdominal pain R10.9    Lymphadenopathy R59.1    GERD (gastroesophageal reflux disease) K21.9    Ariza's esophagus with dysplasia K22.719    Emphysema lung J43.9    [facility]-antitrypsin deficiency ([facility] E88.01    Essential hypertension, benign I10    Advance care planning Z71.89    Colon polyp K63.5    Chronic hypoxemic respiratory failure (HCC) J96.11     Pre infusion Vital Signs       Temp: 97.8 °F (36.6 °C)     Pulse: 83     Resp: 17     BP: 113/78       Does the Patient have a History of: IgA deficiency: NO. Anaphylaxis or severe systemic response to any medication:  NO.      Breath Sounds: \"Breath sounds clear to auscultation bilaterally\",\"Good air exchange\"}  Pulse Oximetry:  100 % on nasal cannula. Any recent activity tolerance changes: NO. Any increased SOB or dyspnea:  NO.     Presence of cough or sputum:  Yes white loida in the am.   Epinephrine at bedside:  No in [facility]. IV placed and documented prior to drug preparation:  [place] must be administered within 3 hours of drug reconstitution as it contains no preservatives. Monitoring of infusion documented in doc flowsheets. administered: yes. /78   Pulse 83   Temp 97.8 °F (36.6 °C) (Oral)   Resp 17   Ht 5' 10\" (1.778 m)   Wt 182 lb (82.6 kg)   SpO2 96%   BMI 26.11 kg/m²     Glassia dosage: 5,000-mg administered  IV. Dose verified by:  Rohan Azar RN    Post treatment Vital Signs  Temp: 97.8 °F (36.6 °C)  Pulse: 83  Resp: 17  BP: 113/78    Response to treatment:  Well tolerated by patient. Scheduled to return for next dose of Glassia on 07/18/19.      Electronically signed by Jacy Rojas RN on 7/3/2019 at 3:49 PM

## 2019-07-03 NOTE — PROGRESS NOTES
Clinic Level of Care Assessment  Infusion Center      NAME:  Yobany Salamanca  YOB: 1975 GENDER: male  MEDICAL RECORD NUMBER:  [id number]   DATE:  7/3/2019     Ambulation Status Document in Daily Care/Safety Tab   Status Definition Points   Independent Independently able to ambulate. Fully able (without any assistance) to get on/off exam table/chair. [x]   0   Minimal Physical Assistance Requires physical assistance of one person to ambulate and/or position patient to be examined. Includes necessary physical assistance to position lower extremities on/off stool. []   1   Moderate Physical Assistance Requires at least one staff member to physically assist patient in ambulating into treatment room, and/or on off chair/bed. Requires assistance to bathroom. []   2   Full Assistance Requires assistance of at least two staff members to transfer patient into treatment room and/or on/off bed/chair. \"Total Transfer\". Unable to use bathroom requires bedside commode and/or bedpan []   3       Dressing Complexity Document in LDA Navigator  Complexity Definition Points   No Dressing  []   0   Simple Minimal, simple dressing. i.e. bandaid, gauze, simple wrap. Peripheral IV dressing. []   1   Intermediate Moderately complicated PICC dressing change requiring sterile procedure and site assessment. []   2   Complex Complicated dressing change port access requiring sterile procedure and site assessment. [x]   3       Teaching Effort Document in AVS and/or Education Navigator    Effort Definition Points   No Teaching  []   0   Simple Teach two or less topics. Teaching documented in discharge instructions in AVS and copy given to patient. []   1   Intermediate Teach three to four topics. Teaching documented in Discharge Instructions in AVS using References and Attachments. Copy given to patient. []   2   Complex Teach five to six topics.  Teaching documented in Discharge Instructions in AVS using References and Attachments. May also be documentation in Education Navigator. Copy given to patient. []   3           Patient Assessment  Planning Definition Points   Simple Complete all tabs in patient assessment navigator. Review or complete patient'sTherapy Plan.      []   1   Intermediate Complete all tabs in patient assessment navigator. Review or completed patient'sTherapy Plan. Contact doctor based on nursing assessment. []   2   Complex Complete all tabs in patient assessment navigator. Completed patient's Therapy Plan. Contact doctor based on nursing assessment and write order related to needed care. []   3     Patient Planning   Planning Definition Points   Simple Discharged, instructions and After Visit Summary given to patient/caregiver and instructions completed. Simple follow-up with routine assessment and planning.      []   1   Intermediate Discharged, instructions and After Visit Summary given to patient/caregiver and instructions completed. Contact with outside resources; i.e. Telephone calls to PCP, home health, ECF and/or arranging transportation. []   2   Complex Discharged, instructions and After Visit Summary given to patient/caregiver and instructions completed. Contact with outside resources; i.e. Telephone calls to PCP, home health, ECF and/or arranging transportation. May include filling out forms and/or writing letters, communication with insurance , preauthorization for treatment.    []   3       Is this the Patient's First Visit to the [facility]  NO    Is this Patient Established @ this [address]              Clinical Level of Care      Points  0-2  Level 1 [x]     Points  3-5  Level 2 []     Points  6-9  Level 3 []     Points  10-12  Level 4 []     Points  13-15  Level 5 []       Electronically signed by Riley Ireland RN on 7/3/2019 at 5:59 PM

## 2019-07-10 ENCOUNTER — TELEPHONE (OUTPATIENT)
Dept: ORTHOPEDIC SURGERY | Age: 44
End: 2019-07-10

## 2019-07-11 ENCOUNTER — HOSPITAL ENCOUNTER (OUTPATIENT)
Dept: INFUSION THERAPY | Age: 44
Setting detail: INFUSION SERIES
Discharge: HOME | End: 2019-07-11
Payer: COMMERCIAL

## 2019-07-11 VITALS
HEART RATE: 63 BPM | RESPIRATION RATE: 17 BRPM | DIASTOLIC BLOOD PRESSURE: 89 MMHG | WEIGHT: 183 LBS | TEMPERATURE: 98 F | BODY MASS INDEX: 26.26 KG/M2 | OXYGEN SATURATION: 98 % | SYSTOLIC BLOOD PRESSURE: 135 MMHG

## 2019-07-11 DIAGNOSIS — E88.01 ALPHA-1-ANTITRYPSIN DEFICIENCY (HCC): Primary | ICD-10-CM

## 2019-07-11 PROCEDURE — 6360000002 HC RX W HCPCS: Performed by: INTERNAL MEDICINE

## 2019-07-11 PROCEDURE — 2580000003 HC RX 258

## 2019-07-11 PROCEDURE — 96365 THER/PROPH/DIAG IV INF INIT: CPT

## 2019-07-11 PROCEDURE — 2580000003 HC RX 258: Performed by: INTERNAL MEDICINE

## 2019-07-11 RX ORDER — SODIUM CHLORIDE 9 MG/ML
INJECTION, SOLUTION INTRAVENOUS
Status: COMPLETED
Start: 2019-07-11 | End: 2019-07-11

## 2019-07-11 RX ORDER — SODIUM CHLORIDE 9 MG/ML
INJECTION, SOLUTION INTRAVENOUS CONTINUOUS
Status: CANCELLED | OUTPATIENT
Start: 2019-07-18

## 2019-07-11 RX ORDER — EPINEPHRINE 1 MG/ML
0.3 INJECTION, SOLUTION, CONCENTRATE INTRAVENOUS PRN
Status: CANCELLED | OUTPATIENT
Start: 2019-07-18

## 2019-07-11 RX ORDER — METHYLPREDNISOLONE SODIUM SUCCINATE 125 MG/2ML
125 INJECTION, POWDER, LYOPHILIZED, FOR SOLUTION INTRAMUSCULAR; INTRAVENOUS ONCE
Status: CANCELLED | OUTPATIENT
Start: 2019-07-18

## 2019-07-11 RX ORDER — SODIUM CHLORIDE 0.9 % (FLUSH) 0.9 %
10 SYRINGE (ML) INJECTION PRN
Status: CANCELLED | OUTPATIENT
Start: 2019-07-18

## 2019-07-11 RX ORDER — DIPHENHYDRAMINE HYDROCHLORIDE 50 MG/ML
50 INJECTION INTRAMUSCULAR; INTRAVENOUS ONCE
Status: CANCELLED | OUTPATIENT
Start: 2019-07-18

## 2019-07-11 RX ORDER — SODIUM CHLORIDE 0.9 % (FLUSH) 0.9 %
10 SYRINGE (ML) INJECTION PRN
Status: DISCONTINUED | OUTPATIENT
Start: 2019-07-11 | End: 2019-07-12 | Stop reason: HOSPADM

## 2019-07-11 RX ORDER — 0.9 % SODIUM CHLORIDE 0.9 %
10 VIAL (ML) INJECTION ONCE
Status: CANCELLED | OUTPATIENT
Start: 2019-07-18

## 2019-07-11 RX ADMIN — Medication 10 ML: at 11:14

## 2019-07-11 RX ADMIN — SODIUM CHLORIDE 1000 ML: 9 INJECTION, SOLUTION INTRAVENOUS at 10:35

## 2019-07-11 RX ADMIN — .ALPHA.1-PROTEINASE INHIBITOR HUMAN 4980 MG: 1 INJECTION, SOLUTION INTRAVENOUS at 10:44

## 2019-07-11 RX ADMIN — Medication 10 ML: at 10:35

## 2019-07-11 ASSESSMENT — PAIN SCALES - GENERAL: PAINLEVEL_OUTOF10: 0

## 2019-07-11 NOTE — PROGRESS NOTES
Outpatient [address] [place]  Infusion Visit    NAME:  Daysi Perkins  YOB: 1975  MEDICAL RECORD NUMBER:  [id number]  Episode Date:  7/11/2019    Patient arrived to [facility]    [] per wheelchair   [] ambulatory   Color good, pt in good spirits, no ankle edema. Denies any pain, denies c.p. Denies any pain or issues with meghann a-cath . Verification of patient education of Sim Kindler actions and potential side effects:  Yes     Is this the patient's first Glassia Infusion? No   Did the patient experience any adverse reactions to previous Sim Kindler Infusion? No    Any side effects from last Glassia infusion:  No     [] Rash         [] Itching  [] Headache  [] Fatigue  [] Dizziness  [] Tingling   [] Chest tightness  [] Shortness of Breath  [] Wheezing    Patient Active Problem List   Diagnosis Code    Abdominal pain R10.9    Lymphadenopathy R59.1    GERD (gastroesophageal reflux disease) K21.9    Ariza's esophagus with dysplasia K22.719    Emphysema lung J43.9    Alpha-1-antitrypsin deficiency ([facility] E88.01    Essential hypertension, benign I10    Advance care planning Z71.89    Colon polyp K63.5    Chronic hypoxemic respiratory failure (HCC) J96.11       Pre infusion Vital Signs       Temp: 98 °F (36.7 °C)     Pulse: 72     Resp: 17     BP: 120/75       Does the Patient have a History of: IgA deficiency: no     Anaphylaxis or severe systemic response to any medication:  No        Breath Sounds: No increased work of breathing, Breath sounds clear to auscultation bilaterally and Good air exchange, no wheezing noted     Any recent activity tolerance changes: No      Any increased SOB or dyspnea:  No     Presence of cough or sputum:  Yes , yellow sputum , no increase in amt.       Premedicated: no premeds ordered   [] Benadryl 25 mg IV  [] Benadryl 50 mg IV  [] Benadryl 25 mg oral   [] Benadryl 50 mg oral  [] Other    Epinephrine at bedside: Yes    IV placed and documented prior to drug preparation:  Malick Schneiders must be administered within 3 hours of drug reconstitution as it contains no preservatives. Monitoring of infusion documented in doc flowsheets. Glassia administered: Yes     /75   Pulse 72   Temp 98 °F (36.7 °C) (Oral)   Resp 17   Wt 183 lb (83 kg)   SpO2 95%   BMI 26.26 kg/m²     Glassia dosage: 60 mg/kg( total dose is 4980mg)   was administered slowly IVPB over 20 mis , used a inline Pall supor 5 um filter   Dose verified by:   Delores Nunes RN    Post treatment Vital Signs==see flow sheet       Response to treatment:  Well tolerated by patient. Scheduled to return for next dose of Glassia on July 18, 2019. at 1000am      Electronically signed by Maylin Preston RN on 7/11/2019 at 1600pm

## 2019-07-18 ENCOUNTER — HOSPITAL ENCOUNTER (OUTPATIENT)
Dept: INFUSION THERAPY | Age: 44
Setting detail: INFUSION SERIES
Discharge: HOME | End: 2019-07-18
Payer: COMMERCIAL

## 2019-07-18 VITALS
HEART RATE: 56 BPM | RESPIRATION RATE: 17 BRPM | WEIGHT: 183 LBS | BODY MASS INDEX: 26.26 KG/M2 | OXYGEN SATURATION: 98 % | DIASTOLIC BLOOD PRESSURE: 62 MMHG | SYSTOLIC BLOOD PRESSURE: 120 MMHG | TEMPERATURE: 98 F

## 2019-07-18 DIAGNOSIS — E88.01 ALPHA-1-ANTITRYPSIN DEFICIENCY (HCC): Primary | ICD-10-CM

## 2019-07-18 PROCEDURE — 2580000003 HC RX 258: Performed by: INTERNAL MEDICINE

## 2019-07-18 PROCEDURE — 96365 THER/PROPH/DIAG IV INF INIT: CPT

## 2019-07-18 PROCEDURE — 2580000003 HC RX 258

## 2019-07-18 PROCEDURE — 6360000002 HC RX W HCPCS: Performed by: INTERNAL MEDICINE

## 2019-07-18 RX ORDER — SODIUM CHLORIDE 9 MG/ML
INJECTION, SOLUTION INTRAVENOUS CONTINUOUS
Status: CANCELLED | OUTPATIENT
Start: 2019-07-25

## 2019-07-18 RX ORDER — SODIUM CHLORIDE 9 MG/ML
INJECTION, SOLUTION INTRAVENOUS
Status: COMPLETED
Start: 2019-07-18 | End: 2019-07-18

## 2019-07-18 RX ORDER — SODIUM CHLORIDE 0.9 % (FLUSH) 0.9 %
10 SYRINGE (ML) INJECTION PRN
Status: CANCELLED | OUTPATIENT
Start: 2019-07-25

## 2019-07-18 RX ORDER — DIPHENHYDRAMINE HYDROCHLORIDE 50 MG/ML
50 INJECTION INTRAMUSCULAR; INTRAVENOUS ONCE
Status: CANCELLED | OUTPATIENT
Start: 2019-07-25

## 2019-07-18 RX ORDER — SODIUM CHLORIDE 0.9 % (FLUSH) 0.9 %
10 SYRINGE (ML) INJECTION PRN
Status: DISCONTINUED | OUTPATIENT
Start: 2019-07-18 | End: 2019-07-19 | Stop reason: HOSPADM

## 2019-07-18 RX ORDER — EPINEPHRINE 1 MG/ML
0.3 INJECTION, SOLUTION, CONCENTRATE INTRAVENOUS PRN
Status: CANCELLED | OUTPATIENT
Start: 2019-07-25

## 2019-07-18 RX ORDER — METHYLPREDNISOLONE SODIUM SUCCINATE 125 MG/2ML
125 INJECTION, POWDER, LYOPHILIZED, FOR SOLUTION INTRAMUSCULAR; INTRAVENOUS ONCE
Status: CANCELLED | OUTPATIENT
Start: 2019-07-25

## 2019-07-18 RX ORDER — 0.9 % SODIUM CHLORIDE 0.9 %
10 VIAL (ML) INJECTION ONCE
Status: CANCELLED | OUTPATIENT
Start: 2019-07-25

## 2019-07-18 RX ADMIN — Medication 10 ML: at 11:11

## 2019-07-18 RX ADMIN — SODIUM CHLORIDE 1000 ML: 9 INJECTION, SOLUTION INTRAVENOUS at 10:25

## 2019-07-18 RX ADMIN — Medication 10 ML: at 10:20

## 2019-07-18 RX ADMIN — .ALPHA.1-PROTEINASE INHIBITOR HUMAN 4980 MG: 1 INJECTION, SOLUTION INTRAVENOUS at 10:34

## 2019-07-18 NOTE — PROGRESS NOTES
Outpatient [address] [place]  Infusion Visit    NAME:  Ivis Dunlap  YOB: 1975  MEDICAL RECORD NUMBER:  [id number]  Episode Date:  7/18/2019    Patient arrived to [facility] 58     [] per wheelchair   [x] ambulatory has portable oxygen on   Color good, face pink, no ankle edema. Pt denies any new c/o's . Pt was recently on a golf trip , denies increase in sob when playing golf, used oxygen prn . 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 Verification of patient education of Patricia Crofts actions and potential side effects:  Yes     Is this the patient's first Glassia Infusion? No   Did the patient experience any adverse reactions to previous Patricia Crofts Infusion? No    Any side effects from last Glassia infusion:  No     [] Rash         [] Itching  [] Headache  [] Fatigue  [] Dizziness  [] Tingling   [] Chest tightness  [] Shortness of Breath  [] Wheezing    Patient Active Problem List   Diagnosis Code    Abdominal pain R10.9    Lymphadenopathy R59.1    GERD (gastroesophageal reflux disease) K21.9    Ariza's esophagus with dysplasia K22.719    Emphysema lung J43.9    Alpha-1-antitrypsin deficiency ([facility] E88.01    Essential hypertension, benign I10    Advance care planning Z71.89    Colon polyp K63.5    Chronic hypoxemic respiratory failure (HCC) J96.11       Pre infusion Vital Signs       Temp: 98 °F (36.7 °C)     Pulse: 78     Resp: 17     BP: 106/75       Does the Patient have a History of: IgA deficiency: no     Anaphylaxis or severe systemic response to any medication:  No        Breath Sounds: No increased work of breathing, Breath sounds clear to auscultation bilaterally and Good air . No wheezing noted     Any recent activity tolerance changes: No      Any increased SOB or dyspnea:  No     Presence of cough or sputum:  Yes , no change in color , no increase in amt.      Premedicated: no premeds ordered [] Benadryl 25 mg IV  [] Benadryl 50 mg IV  [] Benadryl 25 mg oral   [] Benadryl 50 mg oral  [] Other    Epinephrine at bedside:  Yes  Port accessed per protocol, see flow sheet , port has scant blood return today . Pt ;s port has no redness or edema, pt denies pain or burning with port flush and access. Port placed and documented prior to drug preparation:--yes  Kittredge Ishihara must be administered within 3 hours of drug reconstitution as it contains no preservatives. Monitoring of infusion documented in doc flowsheets. Glassia administered: Yes    Wt 183 lbs  Glassia dosage: 60   mg/kg was administered slowly IVPB, given over 25 mis .  ==total dose is 4980mg IVPB  Dose verified by:  Florian Leong RN    Post treatment Vital Signs--see flow sheets     Response to treatment:  Well tolerated by patient. Port needle d/c'ed , see flow sheet. Scheduled to return for next dose of Glassia on July 25, 2019.      Electronically signed by Diego Valerio RN on 7/18/2019 at 1300pm

## 2019-07-25 ENCOUNTER — HOSPITAL ENCOUNTER (OUTPATIENT)
Dept: INFUSION THERAPY | Age: 44
Setting detail: INFUSION SERIES
End: 2019-07-25
Payer: COMMERCIAL

## 2019-07-26 ENCOUNTER — HOSPITAL ENCOUNTER (OUTPATIENT)
Dept: INFUSION THERAPY | Age: 44
Setting detail: INFUSION SERIES
Discharge: HOME | End: 2019-07-26
Payer: COMMERCIAL

## 2019-07-26 VITALS
SYSTOLIC BLOOD PRESSURE: 119 MMHG | DIASTOLIC BLOOD PRESSURE: 80 MMHG | HEART RATE: 75 BPM | TEMPERATURE: 97.4 F | RESPIRATION RATE: 18 BRPM | BODY MASS INDEX: 26.4 KG/M2 | WEIGHT: 184 LBS

## 2019-07-26 DIAGNOSIS — E88.01 ALPHA-1-ANTITRYPSIN DEFICIENCY (HCC): Primary | ICD-10-CM

## 2019-07-26 PROCEDURE — 96365 THER/PROPH/DIAG IV INF INIT: CPT

## 2019-07-26 PROCEDURE — 6360000002 HC RX W HCPCS: Performed by: INTERNAL MEDICINE

## 2019-07-26 RX ORDER — SODIUM CHLORIDE 0.9 % (FLUSH) 0.9 %
10 SYRINGE (ML) INJECTION PRN
Status: CANCELLED | OUTPATIENT
Start: 2019-08-01

## 2019-07-26 RX ORDER — EPINEPHRINE 1 MG/ML
0.3 INJECTION, SOLUTION, CONCENTRATE INTRAVENOUS PRN
Status: CANCELLED | OUTPATIENT
Start: 2019-08-01

## 2019-07-26 RX ORDER — SODIUM CHLORIDE 9 MG/ML
INJECTION, SOLUTION INTRAVENOUS CONTINUOUS
Status: CANCELLED | OUTPATIENT
Start: 2019-08-01

## 2019-07-26 RX ORDER — 0.9 % SODIUM CHLORIDE 0.9 %
10 VIAL (ML) INJECTION ONCE
Status: CANCELLED | OUTPATIENT
Start: 2019-08-01

## 2019-07-26 RX ORDER — DIPHENHYDRAMINE HYDROCHLORIDE 50 MG/ML
50 INJECTION INTRAMUSCULAR; INTRAVENOUS ONCE
Status: CANCELLED | OUTPATIENT
Start: 2019-08-01

## 2019-07-26 RX ORDER — METHYLPREDNISOLONE SODIUM SUCCINATE 125 MG/2ML
125 INJECTION, POWDER, LYOPHILIZED, FOR SOLUTION INTRAMUSCULAR; INTRAVENOUS ONCE
Status: CANCELLED | OUTPATIENT
Start: 2019-08-01

## 2019-07-26 RX ADMIN — .ALPHA.1-PROTEINASE INHIBITOR HUMAN 5000 MG: 1 INJECTION, SOLUTION INTRAVENOUS at 10:26

## 2019-07-26 NOTE — PROGRESS NOTES
Outpatient [address] [place]  Infusion Visit    NAME:  Ivis Dunlap  YOB: 1975  MEDICAL RECORD NUMBER:  [id number]  Episode Date:  7/26/2019    Patient arrived to [facility] 58    [] per wheelchair   [x] ambulatory     Alert and oriented X4, denies any side effects since last infusion, no increase in symptoms. Continues working night shift, no difficulties with performing job duties. Verification of patient education of Patricia Crofts actions and potential side effects:  Yes     Is this the patient's first Glassia Infusion? No   Did the patient experience any adverse reactions to previous Patricia Crofts Infusion? No    Any side effects from last Glassia infusion:  No     [] Rash         [] Itching  [] Headache  [] Fatigue  [] Dizziness  [] Tingling   [] Chest tightness  [] Shortness of Breath  [] Wheezing    Patient Active Problem List   Diagnosis Code    Abdominal pain R10.9    Lymphadenopathy R59.1    GERD (gastroesophageal reflux disease) K21.9    Ariza's esophagus with dysplasia K22.719    Emphysema lung J43.9    Alpha-1-antitrypsin deficiency ([facility] E88.01    Essential hypertension, benign I10    Advance care planning Z71.89    Colon polyp K63.5    Chronic hypoxemic respiratory failure (HCC) J96.11       Pre infusion Vital Signs       Temp: 97.4 °F (36.3 °C)     Pulse: 75     Resp: 18     BP: 119/80       Does the Patient have a History of: IgA deficiency: No    Anaphylaxis or severe systemic response to any medication:  No        Breath Sounds: No increased work of breathing at rest, Breath sounds clear to auscultation anteriorly, bilaterally.   Inspiratory rales noted on right posteriorly, left side clear  Pulse Oximetry: 95 %    Any recent activity tolerance changes: No      Any increased SOB or dyspnea:  No     Presence of cough or sputum:  Yes, upon rising in the AM, white or yellow tinged sputum     Premedicated: None  [] Benadryl 25 mg

## 2019-08-06 ENCOUNTER — TELEPHONE (OUTPATIENT)
Dept: PULMONOLOGY | Age: 44
End: 2019-08-06

## 2019-08-06 NOTE — TELEPHONE ENCOUNTER
Spoke to Yajaira at the infusion lab. She said that the [place] order they have is current and they will be working on the PA w/ his new insurance  Spoke to Darcy at [facility]  She said Arpit Isbell has an outstanding balance that his new insurance will not be responsible for. Said they just need Daniel to call in and take care of that balance and give them his new insurance information so they can begin to bill them.    Called Daniel and let him know all this

## 2019-08-13 ENCOUNTER — TELEPHONE (OUTPATIENT)
Dept: PULMONOLOGY | Age: 44
End: 2019-08-13

## 2019-08-13 NOTE — TELEPHONE ENCOUNTER
What is a SEV1? FEV1 is with his PFT's. Last was completed in November 2018 and has another due soon. This is in the procedures section of the chart. It was 68%.

## 2019-08-15 ENCOUNTER — TELEPHONE (OUTPATIENT)
Dept: PULMONOLOGY | Age: 44
End: 2019-08-15

## 2019-08-16 ENCOUNTER — HOSPITAL ENCOUNTER (OUTPATIENT)
Dept: INFUSION THERAPY | Age: 44
Setting detail: INFUSION SERIES
Discharge: HOME | End: 2019-08-16
Payer: COMMERCIAL

## 2019-08-16 VITALS
RESPIRATION RATE: 18 BRPM | HEART RATE: 79 BPM | DIASTOLIC BLOOD PRESSURE: 76 MMHG | SYSTOLIC BLOOD PRESSURE: 116 MMHG | WEIGHT: 184 LBS | BODY MASS INDEX: 26.4 KG/M2 | OXYGEN SATURATION: 96 % | TEMPERATURE: 97.7 F

## 2019-08-16 DIAGNOSIS — E88.01 ALPHA-1-ANTITRYPSIN DEFICIENCY (HCC): Primary | ICD-10-CM

## 2019-08-16 PROCEDURE — 2580000003 HC RX 258: Performed by: INTERNAL MEDICINE

## 2019-08-16 PROCEDURE — 6360000002 HC RX W HCPCS: Performed by: INTERNAL MEDICINE

## 2019-08-16 PROCEDURE — 96365 THER/PROPH/DIAG IV INF INIT: CPT

## 2019-08-16 RX ORDER — SODIUM CHLORIDE 9 MG/ML
INJECTION, SOLUTION INTRAVENOUS CONTINUOUS
Status: CANCELLED | OUTPATIENT
Start: 2019-08-23

## 2019-08-16 RX ORDER — METHYLPREDNISOLONE SODIUM SUCCINATE 125 MG/2ML
125 INJECTION, POWDER, LYOPHILIZED, FOR SOLUTION INTRAMUSCULAR; INTRAVENOUS ONCE
Status: CANCELLED | OUTPATIENT
Start: 2019-08-23

## 2019-08-16 RX ORDER — DIPHENHYDRAMINE HYDROCHLORIDE 50 MG/ML
50 INJECTION INTRAMUSCULAR; INTRAVENOUS ONCE
Status: CANCELLED | OUTPATIENT
Start: 2019-08-23

## 2019-08-16 RX ORDER — EPINEPHRINE 1 MG/ML
0.3 INJECTION, SOLUTION, CONCENTRATE INTRAVENOUS PRN
Status: CANCELLED | OUTPATIENT
Start: 2019-08-23

## 2019-08-16 RX ORDER — SODIUM CHLORIDE 0.9 % (FLUSH) 0.9 %
10 SYRINGE (ML) INJECTION PRN
Status: CANCELLED | OUTPATIENT
Start: 2019-08-23

## 2019-08-16 RX ORDER — SODIUM CHLORIDE 0.9 % (FLUSH) 0.9 %
10 SYRINGE (ML) INJECTION PRN
Status: DISCONTINUED | OUTPATIENT
Start: 2019-08-16 | End: 2019-08-17 | Stop reason: HOSPADM

## 2019-08-16 RX ORDER — 0.9 % SODIUM CHLORIDE 0.9 %
10 VIAL (ML) INJECTION ONCE
Status: CANCELLED | OUTPATIENT
Start: 2019-08-23

## 2019-08-16 RX ADMIN — .ALPHA.1-PROTEINASE INHIBITOR HUMAN 5000 MG: 1 INJECTION, SOLUTION INTRAVENOUS at 09:36

## 2019-08-16 RX ADMIN — Medication 40 ML: at 09:14

## 2019-08-16 RX ADMIN — Medication 20 ML: at 09:55

## 2019-08-16 NOTE — PROGRESS NOTES
Outpatient [address] Access    NAME:  Alicia Villanueva  YOB: 1975  MEDICAL RECORD NUMBER:  [id number]  DATE:  8/16/2019    Patient arrived to [place] [address]   [] per wheelchair   [x] ambulatory     Port Site Location:  right chest  Port Site:  Redness: No  Bruising: No   Edema: No  Pain: No     Port Site cleansed with:  Chloroprep Scrub for 30 seconds and air dried? Yes     Port Accessed with: [address] non coring needle using sterile technique. Blood return obtained: No: no blood return for last few visits, flushes easily, states can taste/ smell the saline. No swelling noted with flushes  Flushed with 40 ml Normal Saline using push-pause method. Port flushes without resistance. Response to treatment:  Well tolerated by patient. Electronically signed by Phil Mccoy RN on 8/16/2019 at 9:34 AM    Outpatient [address] Infusion Visit    NAME:  Alicia Villanueva  YOB: 1975  MEDICAL RECORD NUMBER:  [id number]  Episode Date:  8/16/2019    Patient arrived to [place] [address]    [] per wheelchair   [x] ambulatory     Alert and oriented X 4. Denies any new s/s. No treatment x 3 weeks while awaiting insurance approval. Voice hoarse today. States started a couple of days ago. No fever. Continues with productive cough of white/ yellow sputum. No green or blood noted. States went to a concert last night so is a little worse today. Verification of patient education of Marlyse Creamer actions and potential side effects:  Yes     Is this the patient's first Glassia Infusion? No   Did the patient experience any adverse reactions to previous Marlyse Creamer Infusion?  No    Any side effects from last Glassia infusion:  No     [] Rash         [] Itching  [] Headache  [] Fatigue  [] Dizziness  [] Tingling   [] Chest tightness  [] Shortness of Breath  [] Wheezing    Patient Active Problem List Diagnosis Code    Abdominal pain R10.9    Lymphadenopathy R59.1    GERD (gastroesophageal reflux disease) K21.9    Ariza's esophagus with dysplasia K22.719    Emphysema lung J43.9    Alpha-[facility]-antitrypsin deficiency ([facility] E88.01    Essential hypertension, benign I10    Advance care planning Z71.89    Colon polyp K63.5    Chronic hypoxemic respiratory failure (HCC) J96.11       Pre infusion Vital Signs       Temp: 97.7 °F (36.5 °C)     Pulse: 79     Resp: 18     BP: 116/76       Does the Patient have a History of: IgA deficiency: no     Anaphylaxis or severe systemic response to any medication:  No        Breath Sounds: SOB on exertion,  Good air exchange through out, expiratory wheeze auscultated anteriorly on upper right and posteriorly upper left  Pulse Oximetry: 96 %    Any recent activity tolerance changes: No      Any increased SOB or dyspnea:  No     Presence of cough or sputum:  Yes yellow/ white sputum     Premedicated: None  [] Benadryl 25 mg IV  [] Benadryl 50 mg IV  [] Benadryl 25 mg oral   [] Benadryl 50 mg oral  [] Other    Epinephrine at bedside:  No: available in Pyxis    IV placed and documented prior to drug preparation:  Zada Kindred must be administered within 3 hours of drug reconstitution as it contains no preservatives. Monitoring of infusion documented in doc flowsheets. Glassia administered: Yes     /76   Pulse 79   Temp 97.7 °F (36.5 °C) (Oral)   Resp 18   Wt 184 lb (83.5 kg)   SpO2 96%   BMI 26.40 kg/m²     Glassia dosage: 60 mg/kg was administered IV calculated 4980, mg sent 5000 mg infused over 20 mins  Dose verified by:  GABRIEL Mckenzie    Post treatment Vital Signs  Temp: 97.7 °F (36.5 °C)  Pulse: 79  Resp: 18  BP: 116/76    Response to treatment:  Well tolerated by patient. Scheduled to return for next dose of Glassia on August 22, 2019.      Electronically signed by Shahab Allan RN on 8/16/2019 at 9:30 AM

## 2019-08-22 ENCOUNTER — TELEPHONE (OUTPATIENT)
Dept: PULMONOLOGY | Age: 44
End: 2019-08-22

## 2019-08-22 ENCOUNTER — HOSPITAL ENCOUNTER (OUTPATIENT)
Dept: INFUSION THERAPY | Age: 44
Setting detail: INFUSION SERIES
Discharge: HOME | End: 2019-08-22
Payer: COMMERCIAL

## 2019-08-22 VITALS
WEIGHT: 184 LBS | OXYGEN SATURATION: 94 % | TEMPERATURE: 98.1 F | RESPIRATION RATE: 17 BRPM | SYSTOLIC BLOOD PRESSURE: 129 MMHG | HEART RATE: 84 BPM | DIASTOLIC BLOOD PRESSURE: 83 MMHG | BODY MASS INDEX: 26.4 KG/M2

## 2019-08-22 DIAGNOSIS — E88.01 ALPHA-1-ANTITRYPSIN DEFICIENCY (HCC): Primary | ICD-10-CM

## 2019-08-22 PROCEDURE — 96365 THER/PROPH/DIAG IV INF INIT: CPT

## 2019-08-22 PROCEDURE — 6360000002 HC RX W HCPCS: Performed by: INTERNAL MEDICINE

## 2019-08-22 PROCEDURE — 2580000003 HC RX 258: Performed by: INTERNAL MEDICINE

## 2019-08-22 PROCEDURE — 36593 DECLOT VASCULAR DEVICE: CPT

## 2019-08-22 RX ORDER — METHYLPREDNISOLONE SODIUM SUCCINATE 125 MG/2ML
125 INJECTION, POWDER, LYOPHILIZED, FOR SOLUTION INTRAMUSCULAR; INTRAVENOUS ONCE
Status: CANCELLED | OUTPATIENT
Start: 2019-08-23

## 2019-08-22 RX ORDER — SODIUM CHLORIDE 0.9 % (FLUSH) 0.9 %
10 SYRINGE (ML) INJECTION PRN
Status: CANCELLED | OUTPATIENT
Start: 2019-08-23

## 2019-08-22 RX ORDER — DIPHENHYDRAMINE HYDROCHLORIDE 50 MG/ML
50 INJECTION INTRAMUSCULAR; INTRAVENOUS ONCE
Status: CANCELLED | OUTPATIENT
Start: 2019-08-23

## 2019-08-22 RX ORDER — SODIUM CHLORIDE 9 MG/ML
INJECTION, SOLUTION INTRAVENOUS CONTINUOUS
Status: CANCELLED | OUTPATIENT
Start: 2019-08-23

## 2019-08-22 RX ORDER — 0.9 % SODIUM CHLORIDE 0.9 %
10 VIAL (ML) INJECTION ONCE
Status: CANCELLED | OUTPATIENT
Start: 2019-08-23

## 2019-08-22 RX ORDER — EPINEPHRINE 1 MG/ML
0.3 INJECTION, SOLUTION, CONCENTRATE INTRAVENOUS PRN
Status: CANCELLED | OUTPATIENT
Start: 2019-08-23

## 2019-08-22 RX ORDER — SODIUM CHLORIDE 0.9 % (FLUSH) 0.9 %
10 SYRINGE (ML) INJECTION PRN
Status: DISCONTINUED | OUTPATIENT
Start: 2019-08-22 | End: 2019-08-23 | Stop reason: HOSPADM

## 2019-08-22 RX ADMIN — ALTEPLASE 1 MG: 2.2 INJECTION, POWDER, LYOPHILIZED, FOR SOLUTION INTRAVENOUS at 11:07

## 2019-08-22 RX ADMIN — Medication 10 ML: at 13:17

## 2019-08-22 RX ADMIN — Medication 10 ML: at 10:43

## 2019-08-22 RX ADMIN — Medication 10 ML: at 10:20

## 2019-08-22 RX ADMIN — .ALPHA.1-PROTEINASE INHIBITOR HUMAN 5000 MG: 1 INJECTION, SOLUTION INTRAVENOUS at 10:23

## 2019-08-22 ASSESSMENT — PAIN SCALES - GENERAL: PAINLEVEL_OUTOF10: 0

## 2019-08-22 NOTE — PROGRESS NOTES
[address] Access and [place] with Nikistarr Kate    NAME:  Crystal Espino  YOB: 1975  MEDICAL RECORD NUMBER:  [id number]  DATE:  8/22/2019    Patient arrived to [place] [address]   [] per wheelchair   [x] ambulatory     Port Site Location:  right chest  Port Site:  Redness: No  Bruising: No   Edema: No  Pain: No     Port Site cleansed with:  Chloroprep Scrub for 30 seconds and air dried? Yes     Port Accessed with: [address] [place] non coring needle using sterile technique. Blood return obtained: No: called for a cath evan order from Dr Oscar Nguyen. Cath evan administered at 11:07. I checked every 1/2 hour for blood return and none obtained until 13:17 then great blood return. Cath evan returned and discarded and then Port flushed with 20 ml NS and the Port de accessed. 2x2 and paper tape to site. Flushed with 20 ml Normal Saline using push-pause method. Port flushes without resistance. Response to treatment:  Well tolerated by patient. Electronically signed by Stephanie Canales RN on 8/22/2019 at [address] Visit    NAME:  Crystal Espino  YOB: 1975  MEDICAL RECORD NUMBER:  [id number]  Episode Date:  8/22/2019    Patient arrived to [place] [address]     [] per wheelchair   [x] ambulatory     Verification of patient education of Larri Flirt actions and potential side effects:  Yes     Is this the patient's first Larri Flirt Injection? No   Did the patient experience any adverse reactions to previous Larri Flirt Injection?  No    Any side effects from last infusion:  No     [] Rash         [] Itching  [] Headache  [] Fatigue  [] Dizziness  [] Tingling   [] Chest tightness  [] Shortness of Breath  [] Wheezing    Patient Active Problem List   Diagnosis Code    Abdominal pain R10.9    Lymphadenopathy R59.1    GERD (gastroesophageal reflux disease) K21.9    Ariza's esophagus with dysplasia K22.719    Emphysema lung J43.9    Alpha-1-antitrypsin deficiency (HCC) E88.01    Essential hypertension, benign I10    Advance care planning Z71.89    Colon polyp K63.5    Chronic hypoxemic respiratory failure (HCC) J96.11       Pre infusion Vital Signs       Temp: 98.1 °F (36.7 °C)     Pulse: 88     Resp: 17     BP: 124/84       Does the Patient have a History of: IgA deficiency: no     Anaphylaxis or severe systemic response to any medication:  No        Breath Sounds: No increased work of breathing and Breath sounds clear to auscultation bilaterally  Pulse Oximetry: 93 % on O2 at 2L  Any recent activity tolerance changes: No      Any increased SOB or dyspnea:  No     Presence of cough or sputum:  Yes but the same as usual. Clear to pale yellow mostly in AM. Has some hoarseness for the past week and more coughing. Has not been to the doctor. He stated he feels fine. Premedicated: none ordered  [] Benadryl 25 mg IV  [] Benadryl 50 mg IV  [] Benadryl 25 mg oral   [] Benadryl 50 mg oral  [] Other     Epinephrine at bedside: In pyxis if needed  IV placed and documented prior to drug preparation:  Patricia Crofts must be administered within 3 hours of drug reconstitution as it contains no preservatives. Monitoring of infusion documented in doc flowsheets. Glassia administered: Yes     /83   Pulse 84   Temp 98.1 °F (36.7 °C) (Oral)   Resp 17   Wt 184 lb (83.5 kg)   SpO2 94%   BMI 26.40 kg/m²     Glassia dosage: 5000 mg was administered slowly IV  Dose verified by:  Supa Armas RN    Post treatment Vital Signs  Temp: 98.1 °F (36.7 °C)  Pulse: 84  Resp: 17  BP: 129/83    Response to treatment:  Well tolerated by patient. Scheduled to return for next dose of Glassia on August 29, 2019.      Electronically signed by Kim Lackey RN on 8/22/2019 at 11:17 AM

## 2019-08-29 ENCOUNTER — HOSPITAL ENCOUNTER (OUTPATIENT)
Dept: PULMONOLOGY | Age: 44
Discharge: HOME | End: 2019-08-29
Payer: COMMERCIAL

## 2019-08-29 DIAGNOSIS — E88.01 ALPHA-1-ANTITRYPSIN DEFICIENCY (HCC): ICD-10-CM

## 2019-08-29 PROCEDURE — 94060 EVALUATION OF WHEEZING: CPT

## 2019-08-29 PROCEDURE — 94640 AIRWAY INHALATION TREATMENT: CPT

## 2019-08-29 PROCEDURE — 94729 DIFFUSING CAPACITY: CPT

## 2019-08-29 PROCEDURE — 94729 DIFFUSING CAPACITY: CPT | Performed by: INTERNAL MEDICINE

## 2019-08-29 PROCEDURE — 6370000000 HC RX 637 (ALT 250 FOR IP): Performed by: INTERNAL MEDICINE

## 2019-08-29 PROCEDURE — 94060 EVALUATION OF WHEEZING: CPT | Performed by: INTERNAL MEDICINE

## 2019-08-29 PROCEDURE — 94726 PLETHYSMOGRAPHY LUNG VOLUMES: CPT | Performed by: INTERNAL MEDICINE

## 2019-08-29 PROCEDURE — 94726 PLETHYSMOGRAPHY LUNG VOLUMES: CPT

## 2019-08-29 RX ORDER — ALBUTEROL SULFATE 90 UG/1
4 AEROSOL, METERED RESPIRATORY (INHALATION) ONCE
Status: COMPLETED | OUTPATIENT
Start: 2019-08-29 | End: 2019-08-29

## 2019-08-29 RX ADMIN — ALBUTEROL SULFATE 4 PUFF: 90 AEROSOL, METERED RESPIRATORY (INHALATION) at 12:17

## 2019-08-30 ENCOUNTER — TELEPHONE (OUTPATIENT)
Dept: PULMONOLOGY | Age: 44
End: 2019-08-30

## 2019-08-30 LAB
DLCO %PRED: 57 %
DLCO PRED: NORMAL ML/MIN/MMHG
DLCO/VA %PRED: NORMAL %
DLCO/VA PRED: NORMAL ML/MIN/MMHG
DLCO/VA: NORMAL ML/MIN/MMHG
DLCO: NORMAL ML/MIN/MMHG
EXPIRATORY TIME-POST: NORMAL SEC
EXPIRATORY TIME: NORMAL SEC
FEF 25-75% %CHNG: NORMAL
FEF 25-75% %PRED-POST: NORMAL %
FEF 25-75% %PRED-PRE: NORMAL L/SEC
FEF 25-75% PRED: NORMAL L/SEC
FEF 25-75%-POST: NORMAL L/SEC
FEF 25-75%-PRE: NORMAL L/SEC
FEV1 %PRED-POST: 71 %
FEV1 %PRED-PRE: 59 %
FEV1 PRED: NORMAL L
FEV1-POST: NORMAL L
FEV1-PRE: NORMAL L
FEV1/FVC %PRED-POST: NORMAL %
FEV1/FVC %PRED-PRE: NORMAL %
FEV1/FVC PRED: NORMAL %
FEV1/FVC-POST: 56 %
FEV1/FVC-PRE: 50 %
FVC %PRED-POST: NORMAL L
FVC %PRED-PRE: NORMAL %
FVC PRED: NORMAL L
FVC-POST: NORMAL L
FVC-PRE: NORMAL L
GAW %PRED: NORMAL %
GAW PRED: NORMAL L/S/CMH2O
GAW: NORMAL L/S/CMH2O
IC %PRED: NORMAL %
IC PRED: NORMAL L
IC: NORMAL L
MEP: NORMAL
MIP: NORMAL
MVV %PRED-PRE: NORMAL %
MVV PRED: NORMAL L/MIN
MVV-PRE: NORMAL L/MIN
PEF %PRED-POST: NORMAL %
PEF %PRED-PRE: NORMAL L/SEC
PEF PRED: NORMAL L/SEC
PEF%CHNG: NORMAL
PEF-POST: NORMAL L/SEC
PEF-PRE: NORMAL L/SEC
RAW %PRED: NORMAL %
RAW PRED: NORMAL CMH2O/L/S
RAW: NORMAL CMH2O/L/S
RV %PRED: NORMAL %
RV PRED: NORMAL L
RV: NORMAL L
SVC %PRED: NORMAL %
SVC PRED: NORMAL L
SVC: NORMAL L
TLC %PRED: 112 %
TLC PRED: NORMAL L
TLC: NORMAL L
VA %PRED: NORMAL %
VA PRED: NORMAL L
VA: NORMAL L
VTG %PRED: NORMAL %
VTG PRED: NORMAL L
VTG: NORMAL L

## 2019-08-30 ASSESSMENT — PULMONARY FUNCTION TESTS
FEV1_PERCENT_PREDICTED_PRE: 59
FEV1/FVC_PRE: 50
FEV1/FVC_POST: 56
FEV1_PERCENT_PREDICTED_POST: 71

## 2019-09-03 ENCOUNTER — OFFICE VISIT (OUTPATIENT)
Dept: PULMONOLOGY | Age: 44
End: 2019-09-03
Payer: COMMERCIAL

## 2019-09-03 VITALS
TEMPERATURE: 98.5 F | OXYGEN SATURATION: 94 % | HEIGHT: 70 IN | RESPIRATION RATE: 20 BRPM | HEART RATE: 70 BPM | WEIGHT: 192 LBS | SYSTOLIC BLOOD PRESSURE: 112 MMHG | BODY MASS INDEX: 27.49 KG/M2 | DIASTOLIC BLOOD PRESSURE: 71 MMHG

## 2019-09-03 DIAGNOSIS — E88.01 ALPHA-1-ANTITRYPSIN DEFICIENCY (HCC): ICD-10-CM

## 2019-09-03 DIAGNOSIS — J96.11 CHRONIC HYPOXEMIC RESPIRATORY FAILURE (HCC): ICD-10-CM

## 2019-09-03 PROCEDURE — G8427 DOCREV CUR MEDS BY ELIG CLIN: HCPCS | Performed by: INTERNAL MEDICINE

## 2019-09-03 PROCEDURE — 99213 OFFICE O/P EST LOW 20 MIN: CPT | Performed by: INTERNAL MEDICINE

## 2019-09-03 PROCEDURE — G8419 CALC BMI OUT NRM PARAM NOF/U: HCPCS | Performed by: INTERNAL MEDICINE

## 2019-09-03 PROCEDURE — 1036F TOBACCO NON-USER: CPT | Performed by: INTERNAL MEDICINE

## 2019-09-03 NOTE — PROGRESS NOTES
of onset: 60) in his father. SOCIAL HISTORY:   reports that he quit smoking about 7 years ago. His smoking use included cigarettes. He started smoking about 33 years ago. He has a 48.00 pack-year smoking history. He has never used smokeless tobacco.    ALLERGIES:  Patient is allergic to ibuprofen. REVIEW OF SYSTEMS:  Constitutional: Negative for fever   HENT: Negative for sore throat  Respiratory: Negative for dyspnea, cough  Cardiovascular: Negative for chest pain  Gastrointestinal: Negative for vomiting, diarrhea   Skin: Negative for rash  Psychiatric/Behavorial: Negative for anxiety     Objective:   PHYSICAL EXAM:  Blood pressure 112/71, pulse 70, temperature 98.5 °F (36.9 °C), temperature source Oral, resp. rate 20, height 5' 10\" (1.778 m), weight 192 lb (87.1 kg), SpO2 94 %.'  Gen: No distress. ENT:   Resp: No accessory muscle use. No crackles. No wheezes. No rhonchi. CV: Regular rate. Regular rhythm. No murmur or rub. No edema. Skin: Warm, dry, normal texture and turgor. No nodule on exposed extremities. M/S: No cyanosis. No clubbing. No joint deformity. Psych: Oriented x 3. No anxiety. Awake. Alert. Intact judgement and insight. Good Mood / Affect.   Memory appears in tact       Current Outpatient Medications on File Prior to Visit   Medication Sig Dispense Refill    alpha1-proteinase inhibitor (GLASSIA) 1000 MG/50ML SOLN Infuse 248.7 mLs intravenously once for 1 dose 248.7 mL 0    tiotropium (SPIRIVA RESPIMAT) 2.5 MCG/ACT AERS inhaler INHALE 2 PUFFS BY MOUTH INTO THE LUNGS DAILY 3 Inhaler 3    esomeprazole (NEXIUM) 40 MG delayed release capsule Take 1 capsule by mouth 2 times daily 180 capsule 3    Fluticasone Furoate-Vilanterol (BREO ELLIPTA) 200-25 MCG/INH AEPB Inhale 1 puff into the lungs daily      naproxen sodium (ALEVE) 220 MG tablet Take 220 mg by mouth as needed       albuterol sulfate HFA (PROAIR HFA) 108 (90 Base) MCG/ACT inhaler Inhale 2 puffs into the lungs every 6 hours as

## 2019-09-03 NOTE — ASSESSMENT & PLAN NOTE
Continue 2 L nasal cannula. He would like to get a concentrator but is having financial issues with the DME.

## 2019-09-05 DIAGNOSIS — E88.01 ALPHA-1-ANTITRYPSIN DEFICIENCY (HCC): ICD-10-CM

## 2019-09-09 ENCOUNTER — HOSPITAL ENCOUNTER (OUTPATIENT)
Dept: INFUSION THERAPY | Age: 44
Setting detail: INFUSION SERIES
Discharge: HOME | End: 2019-09-09
Payer: COMMERCIAL

## 2019-09-09 VITALS
WEIGHT: 184 LBS | HEART RATE: 69 BPM | SYSTOLIC BLOOD PRESSURE: 120 MMHG | RESPIRATION RATE: 17 BRPM | OXYGEN SATURATION: 95 % | DIASTOLIC BLOOD PRESSURE: 76 MMHG | BODY MASS INDEX: 26.34 KG/M2 | HEIGHT: 70 IN | TEMPERATURE: 98.1 F

## 2019-09-09 DIAGNOSIS — E88.01 ALPHA-1-ANTITRYPSIN DEFICIENCY (HCC): Primary | ICD-10-CM

## 2019-09-09 PROCEDURE — 2580000003 HC RX 258: Performed by: INTERNAL MEDICINE

## 2019-09-09 PROCEDURE — 6360000002 HC RX W HCPCS: Performed by: INTERNAL MEDICINE

## 2019-09-09 PROCEDURE — 96365 THER/PROPH/DIAG IV INF INIT: CPT

## 2019-09-09 RX ORDER — 0.9 % SODIUM CHLORIDE 0.9 %
10 VIAL (ML) INJECTION ONCE
Status: CANCELLED | OUTPATIENT
Start: 2019-09-16

## 2019-09-09 RX ORDER — SODIUM CHLORIDE 0.9 % (FLUSH) 0.9 %
10 SYRINGE (ML) INJECTION PRN
Status: DISCONTINUED | OUTPATIENT
Start: 2019-09-09 | End: 2019-09-10 | Stop reason: HOSPADM

## 2019-09-09 RX ORDER — SODIUM CHLORIDE 0.9 % (FLUSH) 0.9 %
10 SYRINGE (ML) INJECTION PRN
Status: CANCELLED | OUTPATIENT
Start: 2019-09-16

## 2019-09-09 RX ORDER — EPINEPHRINE 1 MG/ML
0.3 INJECTION, SOLUTION, CONCENTRATE INTRAVENOUS PRN
Status: CANCELLED | OUTPATIENT
Start: 2019-09-16

## 2019-09-09 RX ORDER — SODIUM CHLORIDE 9 MG/ML
INJECTION, SOLUTION INTRAVENOUS
Status: DISPENSED
Start: 2019-09-09 | End: 2019-09-09

## 2019-09-09 RX ORDER — DIPHENHYDRAMINE HYDROCHLORIDE 50 MG/ML
50 INJECTION INTRAMUSCULAR; INTRAVENOUS ONCE
Status: CANCELLED | OUTPATIENT
Start: 2019-09-16

## 2019-09-09 RX ORDER — SODIUM CHLORIDE 9 MG/ML
INJECTION, SOLUTION INTRAVENOUS CONTINUOUS
Status: CANCELLED | OUTPATIENT
Start: 2019-09-16

## 2019-09-09 RX ORDER — METHYLPREDNISOLONE SODIUM SUCCINATE 125 MG/2ML
125 INJECTION, POWDER, LYOPHILIZED, FOR SOLUTION INTRAMUSCULAR; INTRAVENOUS ONCE
Status: CANCELLED | OUTPATIENT
Start: 2019-09-16

## 2019-09-09 RX ADMIN — Medication: at 12:45

## 2019-09-09 RX ADMIN — Medication 30 ML: at 12:27

## 2019-09-09 RX ADMIN — .ALPHA.1-PROTEINASE INHIBITOR HUMAN 5000 MG: 1 INJECTION, SOLUTION INTRAVENOUS at 12:24

## 2019-09-09 NOTE — PROGRESS NOTES
Outpatient [address] Access    NAME:  José Cardoso  YOB: 1975  MEDICAL RECORD NUMBER:  [id number]  DATE:  9/9/2019    Patient arrived to [place] [address]   [] per wheelchair   [x] ambulatory     Port Site Location:  right chest  Port Site:  Redness: No  Bruising: No   Edema: No  Pain: No     Port Site cleansed with:  Chloroprep Scrub for 30 seconds and air dried? Yes     Port Accessed with: [address] non coring needle using sterile technique. Blood return obtained: Yes  Flushed with 10 ml Normal Saline using push-pause method. Port flushes without resistance. Response to treatment:  Well tolerated by patient. Electronically signed by Jina Haque RN on 9/9/2019 at 12:06 PM    Outpatient [address] Infusion Visit    NAME:  José Cardoso  YOB: 1975  MEDICAL RECORD NUMBER:  [id number]  Episode Date:  9/9/2019    Patient arrived to [place] [address]    [] per wheelchair   [x] ambulatory     Verification of patient education of [place] actions and potential side effects:  Yes     Is this the patient's first [place] Infusion? No   Did the patient experience any adverse reactions to previous [place] Infusion?  No    Any side effects from last Glassia infusion:  No     [] Rash         [] Itching  [] Headache  [] Fatigue  [] Dizziness  [] Tingling   [] Chest tightness  [] Shortness of Breath  [] Wheezing    Patient Active Problem List   Diagnosis Code    Abdominal pain R10.9    Lymphadenopathy R59.1    GERD (gastroesophageal reflux disease) K21.9    Ariza's esophagus with dysplasia K22.719    Emphysema lung J43.9    [facility]itry[facility] deficiency ([facility] E88.01    Essential hypertension, benign I10    Advance care planning Z71.89    Colon polyp K63.5    Chronic hypoxemic respiratory failure (HCC) J96.11       Pre infusion Vital Signs       Temp: 98.1 °F (36.7 °C)     Pulse: 69     Resp: 17     BP: 120/76       Does the Patient have a History of: IgA deficiency: no     Anaphylaxis or severe systemic response to any medication:  No        Breath Sounds: No increased work of breathing, Breath sounds clear to auscultation bilaterally and Good air exchange  Pulse Oximetry: 95    Any recent activity tolerance changes: No      Any increased SOB or dyspnea:  No     Presence of cough or sputum:  No     Premedicated:  [] Benadryl 25 mg IV  [] Benadryl 50 mg IV  [] Benadryl 25 mg oral   [] Benadryl 50 mg oral  [] Other    Epinephrine at bedside:  no  IV placed and documented prior to drug preparation:  Corene Idler must be administered within 3 hours of drug reconstitution as it contains no preservatives. Monitoring of infusion documented in doc flowsheets. Glassia administered: Yes     /76   Pulse 69   Temp 98.1 °F (36.7 °C) (Oral)   Resp 17   Ht 5' 10\" (1.778 m)   Wt 184 lb (83.5 kg)   SpO2 95%   BMI 26.40 kg/m²     Glassia dosage: 5000 mg/kg was administered slowly IV  Dose verified by:  Catracho Greenberg RN    Post treatment Vital Signs  Temp: 98.1 °F (36.7 °C)  Pulse: 69  Resp: 17  BP: 120/76    Response to treatment:  Well tolerated by patient. Scheduled to return for next dose of Glassia, patient to infuse at home.      Electronically signed by Bob Goldstein RN on 9/9/2019 at 12:03 PM

## 2019-09-11 ENCOUNTER — TELEPHONE (OUTPATIENT)
Dept: PULMONOLOGY | Age: 44
End: 2019-09-11

## 2019-09-13 ENCOUNTER — TELEPHONE (OUTPATIENT)
Dept: PULMONOLOGY | Age: 44
End: 2019-09-13

## 2019-09-24 ENCOUNTER — TELEPHONE (OUTPATIENT)
Dept: PULMONOLOGY | Age: 44
End: 2019-09-24

## 2019-09-24 DIAGNOSIS — J43.9 PULMONARY EMPHYSEMA, UNSPECIFIED EMPHYSEMA TYPE (HCC): Primary | ICD-10-CM

## 2019-09-24 PROCEDURE — 94618 PULMONARY STRESS TESTING: CPT | Performed by: INTERNAL MEDICINE

## 2019-09-26 ENCOUNTER — HOSPITAL ENCOUNTER (OUTPATIENT)
Dept: CARDIAC REHAB | Age: 44
Setting detail: THERAPIES SERIES
Discharge: HOME | End: 2019-09-26
Payer: COMMERCIAL

## 2019-09-26 PROCEDURE — 94618 PULMONARY STRESS TESTING: CPT

## 2019-09-26 PROCEDURE — 94618 PULMONARY STRESS TESTING: CPT | Performed by: INTERNAL MEDICINE

## 2019-10-14 ENCOUNTER — TELEPHONE (OUTPATIENT)
Dept: PULMONOLOGY | Age: 44
End: 2019-10-14

## 2019-10-14 NOTE — TELEPHONE ENCOUNTER
[facility] in network for his Estel Sero w/ medication but not in network with his nursing. They have called around to other nursing infusions and no one is able to take him. She ask for our infusion lab and wants to ask them if they will take the medication from them to do the infusion. The RN that called said that she would call back.

## 2019-10-14 NOTE — TELEPHONE ENCOUNTER
Alvino Johns called back stating she spoke to [facility] and let them know of the difficulty she is having finding Home Health care for home infusion. They gave her another agency-[facility]. She is waiting to hear back from them as to whether they may be able to take this case.   She said she also spoke to Sage Barney to have him call [facility] to voice his frustration with this matter as well

## 2019-10-22 NOTE — TELEPHONE ENCOUNTER
Received a fax from [facility] indicating Frank Osuna was approved for home visits. I called Marylu Baumgarten RN w/ [facility] to see if this was being set up to go forward for home infusion. She said they had to forward the RX for [address] and request for Home Nursing to [address] [place] [address], since [facility] was unable to provide Home Health. [facility] [employer] called along with  me to [facility] [address]. Spoke to [employer] and then to [employer] the pharmacist from the Pharmacy at [address] [place] [address]. Lakisha Jose a verbal Rx for the [address] and the need for home health nursing. She will forward this and run it through benefits  Also spoke to [facility] who is and advocate for Patient support. She will be following this and make us aware of status.   Marla Bailey gave a direct number to the Intake Dept to inquire about status as well  [phone]

## 2019-10-23 ENCOUNTER — TELEPHONE (OUTPATIENT)
Dept: PULMONOLOGY | Age: 44
End: 2019-10-23

## 2019-10-30 ENCOUNTER — TELEPHONE (OUTPATIENT)
Dept: PULMONOLOGY | Age: 44
End: 2019-10-30

## 2019-10-30 NOTE — TELEPHONE ENCOUNTER
See additional phone call dated 10/30/19. Baby Michelle from [facility] who said they have been unable to find adequate home nurse visits approved through [employer]. She spoke to Siddhartha Flores and he agreed to return to have infusions at the [facility] infusion lab. Kvng Arnett said she spoke to [facility] and they did agree they would cover this. Kvng Arnett spoke to [facility] infusion lab and they agree to take him back as an infusion patient.   Kvng Arnett said we need to send a new Rx for Latoya Vasquez to the infusion lab and they will do the PA for this  Will have Dr Delmar Schaefer sign in the morning

## 2019-11-08 ENCOUNTER — OFFICE VISIT (OUTPATIENT)
Dept: PULMONOLOGY | Age: 44
End: 2019-11-08
Payer: COMMERCIAL

## 2019-11-08 VITALS
WEIGHT: 194 LBS | HEART RATE: 68 BPM | OXYGEN SATURATION: 96 % | BODY MASS INDEX: 27.77 KG/M2 | HEIGHT: 70 IN | SYSTOLIC BLOOD PRESSURE: 134 MMHG | DIASTOLIC BLOOD PRESSURE: 83 MMHG

## 2019-11-08 DIAGNOSIS — E88.01 ALPHA-1-ANTITRYPSIN DEFICIENCY (HCC): ICD-10-CM

## 2019-11-08 DIAGNOSIS — J43.9 PULMONARY EMPHYSEMA, UNSPECIFIED EMPHYSEMA TYPE (HCC): ICD-10-CM

## 2019-11-08 DIAGNOSIS — J96.11 CHRONIC HYPOXEMIC RESPIRATORY FAILURE (HCC): ICD-10-CM

## 2019-11-08 PROCEDURE — 99213 OFFICE O/P EST LOW 20 MIN: CPT | Performed by: INTERNAL MEDICINE

## 2019-11-08 PROCEDURE — G8484 FLU IMMUNIZE NO ADMIN: HCPCS | Performed by: INTERNAL MEDICINE

## 2019-11-08 PROCEDURE — 1036F TOBACCO NON-USER: CPT | Performed by: INTERNAL MEDICINE

## 2019-11-08 PROCEDURE — G8419 CALC BMI OUT NRM PARAM NOF/U: HCPCS | Performed by: INTERNAL MEDICINE

## 2019-11-08 PROCEDURE — G8427 DOCREV CUR MEDS BY ELIG CLIN: HCPCS | Performed by: INTERNAL MEDICINE

## 2019-11-08 PROCEDURE — G8926 SPIRO NO PERF OR DOC: HCPCS | Performed by: INTERNAL MEDICINE

## 2019-11-08 PROCEDURE — 3023F SPIROM DOC REV: CPT | Performed by: INTERNAL MEDICINE

## 2019-11-08 RX ORDER — FLUTICASONE FUROATE AND VILANTEROL 200; 25 UG/1; UG/1
1 POWDER RESPIRATORY (INHALATION) DAILY
Qty: 1 EACH | Refills: 5 | Status: SHIPPED | OUTPATIENT
Start: 2019-11-08 | End: 2022-06-06 | Stop reason: SDUPTHER

## 2019-11-08 RX ORDER — ALBUTEROL SULFATE 90 UG/1
2 AEROSOL, METERED RESPIRATORY (INHALATION) 4 TIMES DAILY PRN
Qty: 3 INHALER | Refills: 1 | Status: SHIPPED | OUTPATIENT
Start: 2019-11-08 | End: 2019-12-06

## 2019-11-08 RX ORDER — PREDNISONE 20 MG/1
40 TABLET ORAL DAILY
Qty: 10 TABLET | Refills: 0 | Status: SHIPPED | OUTPATIENT
Start: 2019-11-08 | End: 2019-11-13

## 2019-11-23 ENCOUNTER — HOSPITAL ENCOUNTER (OUTPATIENT)
Dept: GENERAL RADIOLOGY | Age: 44
Discharge: HOME | End: 2019-11-23
Payer: COMMERCIAL

## 2019-11-23 ENCOUNTER — HOSPITAL ENCOUNTER (OUTPATIENT)
Age: 44
Discharge: HOME | End: 2019-11-23
Payer: COMMERCIAL

## 2019-11-23 DIAGNOSIS — M25.511 RIGHT SHOULDER PAIN, UNSPECIFIED CHRONICITY: ICD-10-CM

## 2019-11-23 PROCEDURE — 73030 X-RAY EXAM OF SHOULDER: CPT

## 2019-11-27 ENCOUNTER — PATIENT MESSAGE (OUTPATIENT)
Dept: PRIMARY CARE CLINIC | Age: 44
End: 2019-11-27

## 2019-11-27 ENCOUNTER — HOSPITAL ENCOUNTER (OUTPATIENT)
Dept: INFUSION THERAPY | Age: 44
Setting detail: INFUSION SERIES
Discharge: HOME | End: 2019-11-27
Payer: COMMERCIAL

## 2019-11-27 VITALS
SYSTOLIC BLOOD PRESSURE: 123 MMHG | BODY MASS INDEX: 27.41 KG/M2 | RESPIRATION RATE: 18 BRPM | TEMPERATURE: 97.8 F | OXYGEN SATURATION: 93 % | HEART RATE: 96 BPM | WEIGHT: 191 LBS | DIASTOLIC BLOOD PRESSURE: 75 MMHG

## 2019-11-27 DIAGNOSIS — E88.01 ALPHA-1-ANTITRYPSIN DEFICIENCY (HCC): Primary | ICD-10-CM

## 2019-11-27 PROCEDURE — 6360000002 HC RX W HCPCS: Performed by: INTERNAL MEDICINE

## 2019-11-27 PROCEDURE — 96365 THER/PROPH/DIAG IV INF INIT: CPT

## 2019-11-27 RX ORDER — PREDNISONE 10 MG/1
10 TABLET ORAL DAILY
COMMUNITY
End: 2019-12-06 | Stop reason: ALTCHOICE

## 2019-11-27 RX ORDER — EPINEPHRINE 1 MG/ML
0.3 INJECTION, SOLUTION, CONCENTRATE INTRAVENOUS PRN
Status: CANCELLED | OUTPATIENT
Start: 2019-12-04

## 2019-11-27 RX ORDER — SODIUM CHLORIDE 0.9 % (FLUSH) 0.9 %
10 SYRINGE (ML) INJECTION PRN
Status: CANCELLED | OUTPATIENT
Start: 2019-12-04

## 2019-11-27 RX ORDER — SODIUM CHLORIDE 9 MG/ML
INJECTION, SOLUTION INTRAVENOUS CONTINUOUS
Status: CANCELLED | OUTPATIENT
Start: 2019-12-04

## 2019-11-27 RX ORDER — METHYLPREDNISOLONE SODIUM SUCCINATE 125 MG/2ML
125 INJECTION, POWDER, LYOPHILIZED, FOR SOLUTION INTRAMUSCULAR; INTRAVENOUS ONCE
Status: CANCELLED | OUTPATIENT
Start: 2019-12-04

## 2019-11-27 RX ORDER — DIPHENHYDRAMINE HYDROCHLORIDE 50 MG/ML
50 INJECTION INTRAMUSCULAR; INTRAVENOUS ONCE
Status: CANCELLED | OUTPATIENT
Start: 2019-12-04

## 2019-11-27 RX ORDER — SODIUM CHLORIDE 0.9 % (FLUSH) 0.9 %
10 SYRINGE (ML) INJECTION PRN
Status: DISCONTINUED | OUTPATIENT
Start: 2019-11-27 | End: 2019-11-28 | Stop reason: HOSPADM

## 2019-11-27 RX ADMIN — .ALPHA.1-PROTEINASE INHIBITOR HUMAN 5000 MG: 1 INJECTION, SOLUTION INTRAVENOUS at 10:15

## 2019-11-27 NOTE — PROGRESS NOTES
Outpatient [address]  Infusion Visit    NAME:  Gagandeep Garibay  YOB: 1975  MEDICAL RECORD NUMBER:  [id number]  Episode Date:  11/27/2019    Patient arrived to [place] [address]     [] per wheelchair   [x] ambulatory     Patient here for Glassia infusion for diagnosis of Alpha 1 antitrypsin deficiency. Patient has been on this for many years. Recent change of insurance caused him to try home infusions per his insurance company but they were unable to find home nursing within their network that would do infusions for him at home so patient is back here in hospital infusion. Has been off of Alpha 1-proteinase Inhibitor  since 9/9/19. Patient has a port in right chest.  Patient states he has been having pain in his right shoulder for a couple of weeks. He had an xray done on 11/23/19 which was negative. He is also concerned about an elevated heart rate he has noticed in past few weeks, today pulse is in 90's with resting and if he stands up or walks it goes up to 104. He has an appointment with Dr. Kimani Webb next week. Port Access    NAME:  Gagandeep Garibay  YOB: 1975  MEDICAL RECORD NUMBER:  [id number]  DATE:  11/27/2019    Port Site Location:  right chest  Port Site:  Redness: No  Bruising: No   Edema: No  Pain: No     Port Site cleansed with:  Chloroprep Scrub for 30 seconds and air dried? Yes     Port Accessed with: 20 Gauge 1 inch Power Port non coring needle using sterile technique. Blood return obtained: Yes after flushing with 40 ml NS and having patient reposition. Flushed with 10 ml Normal Saline using push-pause method. Port flushes without resistance. Response to treatment:  Well tolerated by patient. Electronically signed by Rashad Samuel RN on 11/27/2019 at 9:01 PM        Verification of patient education of Genesis slade and potential side effects:  Yes     Is this the patient's first Glassia Infusion?  No   Did the patient experience any adverse reactions to previous [place] Infusion? No    Any side effects from last Glassia infusion:  No     [] Rash         [] Itching  [] Headache  [] Fatigue  [] Dizziness  [] Tingling   [] Chest tightness  [] Shortness of Breath  [] Wheezing    Patient Active Problem List   Diagnosis Code    Abdominal pain R10.9    Lymphadenopathy R59.1    GERD (gastroesophageal reflux disease) K21.9    Ariza's esophagus with dysplasia K22.719    Emphysema lung J43.9    Alpha-1-antitrypsin deficiency ([facility] E88.01    Essential hypertension, benign I10    Advance care planning Z71.89    Colon polyp K63.5    Chronic hypoxemic respiratory failure (HCC) J96.11       Pre infusion Vital Signs       Temp: 97.8 °F (36.6 °C)     Pulse: 96     Resp: 18     BP: 123/75       Does the Patient have a History of: IgA deficiency: no     Anaphylaxis or severe systemic response to any medication:  No        Breath Sounds: No increased work of breathing, Breath sounds clear to auscultation bilaterally, Good air exchange and No crackles  Pulse Oximetry: 92 %    Any recent activity tolerance changes: Yes past few weeks a little more SOB and chest tightness and using rescue inhalers      Any increased SOB or dyspnea:  Yes , for past few weeks he has noticed some increase in SOB. He did pass a test for using walking oxygen so he no longer as portable O2 for walking but he still has big tanks at home. Presence of cough or sputum:  Yes , his cough is a little more frequent and has had some chest tightness but started using rescue inhaler which has helped a lot. Just saw Dr. Heather Aguilera on 11/8/19. Had him try a sample of Trelegy inhaler for a while but not much change. He has noticed his O2 sats are a little bit lower, 87-93%. Premedicated: No premeds ordered.   [] Benadryl 25 mg IV  [] Benadryl 50 mg IV  [] Benadryl 25 mg oral   [] Benadryl 50 mg oral  [] Other    Epinephrine at bedside:  No: , but in pyxsis    Port is accessed.:  Genesis Cobb must be administered within 3 hours of drug reconstitution as it contains no preservatives. Monitoring of infusion documented in doc flowsheets. Glassia administered: Yes     /75   Pulse 96   Temp 97.8 °F (36.6 °C) (Oral)   Resp 18   Wt 191 lb (86.6 kg)   SpO2 93%   BMI 27.41 kg/m²     Glassia dosage: 5,000 mg in 250 ml IVPB was administered using an inline 5.0 UM filter per administration guidelines of drug insert. Post treatment Vital Signs  Temp: 97.8 °F (36.6 °C)  Pulse: 96  Resp: 18  BP: 123/75    Response to treatment:  Well tolerated by patient. Scheduled to return for next dose of Glassia on December 4, 2019. Appointment with Dr. Kimani Webb on 12/6/19.   Appointment with Dr. Marycruz Carpenter in 3/2020     Electronically signed by Rashad Samuel RN on 11/27/2019 at 9:01 PM

## 2019-12-04 ENCOUNTER — HOSPITAL ENCOUNTER (OUTPATIENT)
Dept: INFUSION THERAPY | Age: 44
Setting detail: INFUSION SERIES
Discharge: HOME | End: 2019-12-04
Payer: COMMERCIAL

## 2019-12-04 VITALS — BODY MASS INDEX: 27.2 KG/M2 | WEIGHT: 190 LBS | HEIGHT: 70 IN

## 2019-12-04 DIAGNOSIS — E88.01 ALPHA-1-ANTITRYPSIN DEFICIENCY (HCC): Primary | ICD-10-CM

## 2019-12-04 RX ORDER — METHYLPREDNISOLONE SODIUM SUCCINATE 125 MG/2ML
125 INJECTION, POWDER, LYOPHILIZED, FOR SOLUTION INTRAMUSCULAR; INTRAVENOUS ONCE
Status: CANCELLED | OUTPATIENT
Start: 2019-12-11

## 2019-12-04 RX ORDER — SODIUM CHLORIDE 9 MG/ML
INJECTION, SOLUTION INTRAVENOUS CONTINUOUS
Status: CANCELLED | OUTPATIENT
Start: 2019-12-11

## 2019-12-04 RX ORDER — EPINEPHRINE 1 MG/ML
0.3 INJECTION, SOLUTION, CONCENTRATE INTRAVENOUS PRN
Status: CANCELLED | OUTPATIENT
Start: 2019-12-11

## 2019-12-04 RX ORDER — DIPHENHYDRAMINE HYDROCHLORIDE 50 MG/ML
50 INJECTION INTRAMUSCULAR; INTRAVENOUS ONCE
Status: CANCELLED | OUTPATIENT
Start: 2019-12-11

## 2019-12-04 RX ORDER — SODIUM CHLORIDE 0.9 % (FLUSH) 0.9 %
10 SYRINGE (ML) INJECTION PRN
Status: CANCELLED | OUTPATIENT
Start: 2019-12-11

## 2019-12-04 RX ORDER — SODIUM CHLORIDE 0.9 % (FLUSH) 0.9 %
10 SYRINGE (ML) INJECTION PRN
Status: DISCONTINUED | OUTPATIENT
Start: 2019-12-04 | End: 2019-12-04

## 2019-12-06 ENCOUNTER — OFFICE VISIT (OUTPATIENT)
Dept: PRIMARY CARE CLINIC | Age: 44
End: 2019-12-06
Payer: COMMERCIAL

## 2019-12-06 VITALS
BODY MASS INDEX: 27.92 KG/M2 | DIASTOLIC BLOOD PRESSURE: 81 MMHG | WEIGHT: 195 LBS | HEIGHT: 70 IN | SYSTOLIC BLOOD PRESSURE: 126 MMHG | HEART RATE: 83 BPM

## 2019-12-06 DIAGNOSIS — K21.00 GASTROESOPHAGEAL REFLUX DISEASE WITH ESOPHAGITIS: ICD-10-CM

## 2019-12-06 DIAGNOSIS — Z23 NEED FOR VACCINATION: ICD-10-CM

## 2019-12-06 DIAGNOSIS — M25.572 ACUTE LEFT ANKLE PAIN: ICD-10-CM

## 2019-12-06 DIAGNOSIS — M79.601 RIGHT ARM PAIN: Primary | ICD-10-CM

## 2019-12-06 PROCEDURE — G8419 CALC BMI OUT NRM PARAM NOF/U: HCPCS | Performed by: FAMILY MEDICINE

## 2019-12-06 PROCEDURE — G8482 FLU IMMUNIZE ORDER/ADMIN: HCPCS | Performed by: FAMILY MEDICINE

## 2019-12-06 PROCEDURE — 1036F TOBACCO NON-USER: CPT | Performed by: FAMILY MEDICINE

## 2019-12-06 PROCEDURE — G8428 CUR MEDS NOT DOCUMENT: HCPCS | Performed by: FAMILY MEDICINE

## 2019-12-06 PROCEDURE — 99214 OFFICE O/P EST MOD 30 MIN: CPT | Performed by: FAMILY MEDICINE

## 2019-12-06 ASSESSMENT — PATIENT HEALTH QUESTIONNAIRE - PHQ9
SUM OF ALL RESPONSES TO PHQ QUESTIONS 1-9: 0
SUM OF ALL RESPONSES TO PHQ9 QUESTIONS 1 & 2: 0
SUM OF ALL RESPONSES TO PHQ QUESTIONS 1-9: 0
1. LITTLE INTEREST OR PLEASURE IN DOING THINGS: 0
2. FEELING DOWN, DEPRESSED OR HOPELESS: 0

## 2019-12-11 ENCOUNTER — HOSPITAL ENCOUNTER (OUTPATIENT)
Dept: INFUSION THERAPY | Age: 44
Setting detail: INFUSION SERIES
Discharge: HOME | End: 2019-12-11
Payer: COMMERCIAL

## 2019-12-11 VITALS
DIASTOLIC BLOOD PRESSURE: 78 MMHG | WEIGHT: 188 LBS | TEMPERATURE: 98.3 F | HEART RATE: 68 BPM | RESPIRATION RATE: 17 BRPM | OXYGEN SATURATION: 96 % | HEIGHT: 70 IN | SYSTOLIC BLOOD PRESSURE: 128 MMHG | BODY MASS INDEX: 26.92 KG/M2

## 2019-12-11 DIAGNOSIS — E88.01 ALPHA-1-ANTITRYPSIN DEFICIENCY (HCC): Primary | ICD-10-CM

## 2019-12-11 PROCEDURE — 6360000002 HC RX W HCPCS: Performed by: INTERNAL MEDICINE

## 2019-12-11 PROCEDURE — 96365 THER/PROPH/DIAG IV INF INIT: CPT

## 2019-12-11 RX ORDER — SODIUM CHLORIDE 0.9 % (FLUSH) 0.9 %
10 SYRINGE (ML) INJECTION PRN
Status: CANCELLED | OUTPATIENT
Start: 2019-12-18

## 2019-12-11 RX ORDER — EPINEPHRINE 1 MG/ML
0.3 INJECTION, SOLUTION, CONCENTRATE INTRAVENOUS PRN
Status: CANCELLED | OUTPATIENT
Start: 2019-12-18

## 2019-12-11 RX ORDER — METHYLPREDNISOLONE SODIUM SUCCINATE 125 MG/2ML
125 INJECTION, POWDER, LYOPHILIZED, FOR SOLUTION INTRAMUSCULAR; INTRAVENOUS ONCE
Status: CANCELLED | OUTPATIENT
Start: 2019-12-18

## 2019-12-11 RX ORDER — DIPHENHYDRAMINE HYDROCHLORIDE 50 MG/ML
50 INJECTION INTRAMUSCULAR; INTRAVENOUS ONCE
Status: CANCELLED | OUTPATIENT
Start: 2019-12-18

## 2019-12-11 RX ORDER — SODIUM CHLORIDE 9 MG/ML
INJECTION, SOLUTION INTRAVENOUS CONTINUOUS
Status: CANCELLED | OUTPATIENT
Start: 2019-12-18

## 2019-12-11 RX ORDER — SODIUM CHLORIDE 9 MG/ML
INJECTION, SOLUTION INTRAVENOUS
Status: DISPENSED
Start: 2019-12-11 | End: 2019-12-11

## 2019-12-11 RX ORDER — SODIUM CHLORIDE 0.9 % (FLUSH) 0.9 %
10 SYRINGE (ML) INJECTION PRN
Status: DISCONTINUED | OUTPATIENT
Start: 2019-12-11 | End: 2019-12-12 | Stop reason: HOSPADM

## 2019-12-11 RX ADMIN — .ALPHA.1-PROTEINASE INHIBITOR HUMAN 5000 MG: 1 INJECTION, SOLUTION INTRAVENOUS at 10:25

## 2019-12-11 NOTE — PROGRESS NOTES
[address] Infusion Visit    NAME:  Mejai Prasad  YOB: 1975  MEDICAL RECORD NUMBER:  [id number]  Episode Date:  12/11/2019    Patient arrived to [facility]     [] per wheelchair   [x] ambulatory -- has no oxygen in use   Color fair,no ankle edema. 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 Pt works nights  6 days a week. Pt using oxygen when he sleeps. Pt does not use portable oxygen . Pt having a MRI of his  Cervical spine today due to neck and right shoulder pain. the patient continues to have neck and right shoulder pain, has none at present . Pt denies any pain from [address] . Pt denies c.p. .   Verification of patient education of Tempie Songster actions and potential side effects:  Yes     Is this the patient's first Glassia Infusion? No   Did the patient experience any adverse reactions to previous Tem[place] Song[place] Infusion? No    Any side effects from last Glassia infusion:  No     [] Rash         [] Itching  [] Headache  [] Fatigue  [] Dizziness  [] Tingling   [] Chest tightness  [] Shortness of Breath  [] Wheezing    Patient Active Problem List   Diagnosis Code    Abdominal pain R10.9    Lymphadenopathy R59.1    GERD (gastroesophageal reflux disease) K21.9    Ariza's esophagus with dysplasia K22.719    Emphysema lung J43.9    Alpha-[facility]-antitrypsin deficiency ([facility] E88.01    Essential hypertension, benign I10    Advance care planning Z71.89    Colon polyp K63.5    Chronic hypoxemic respiratory failure (HCC) J96.11       Pre infusion Vital Signs  See flow sheet                                Does the Patient have a History of:     IgA deficiency: yes -     Anaphylaxis or severe systemic response to any medication:  No        Breath Sounds: No increased work of breathing, Breath sounds clear to auscultation bilaterally and Good air exchange  Pt has occ. Audible wheezing, none at present     Any recent activity tolerance changes: No      Any increased SOB or dyspnea:  No     Presence of cough or sputum:  Yes , scant amts of clear to white mucus      Premedicated:  [] Benadryl 25 mg IV  [] Benadryl 50 mg IV  [] Benadryl 25 mg oral   [] Benadryl 50 mg oral  [] Other    Epinephrine at bedside:  Yes    IV placed and documented prior to drug preparation:  Yash Brochure must be administered within 3 hours of drug reconstitution as it contains no preservatives. Monitoring of infusion documented in doc flowsheets. Glassia administered: Yes, with use of in line Pall supor 5.0 um filter      Ht 5' 10\" (1.778 m)   Wt 188 lb (85.3 kg)   BMI 26.98 kg/m²     Glassia dosage: 60  mg/kg was administered slowly IVPB --- total dose is 5000 mg   Dose verified by: Ashley Lovell RN    Post treatment Vital Signs--see flow sheet                 Response to treatment:  Well tolerated by patient. Scheduled to return for next dose of Glassia on Thursday December 19, 2019. at 0930am      Electronically signed by Ana Maria Roldan RN on 12/11/2019 at 1700pm

## 2019-12-12 ENCOUNTER — HOSPITAL ENCOUNTER (OUTPATIENT)
Dept: MRI IMAGING | Age: 44
Discharge: HOME | End: 2019-12-12
Payer: COMMERCIAL

## 2019-12-12 DIAGNOSIS — M25.572 ACUTE LEFT ANKLE PAIN: ICD-10-CM

## 2019-12-12 DIAGNOSIS — M79.601 RIGHT ARM PAIN: ICD-10-CM

## 2019-12-12 PROCEDURE — 72141 MRI NECK SPINE W/O DYE: CPT

## 2019-12-18 ENCOUNTER — OFFICE VISIT (OUTPATIENT)
Dept: ORTHOPEDIC SURGERY | Age: 44
End: 2019-12-18
Payer: COMMERCIAL

## 2019-12-18 VITALS — WEIGHT: 188 LBS | HEIGHT: 70 IN | RESPIRATION RATE: 16 BRPM | BODY MASS INDEX: 26.92 KG/M2

## 2019-12-18 DIAGNOSIS — S93.492A SPRAIN OF ANTERIOR TALOFIBULAR LIGAMENT OF LEFT ANKLE, INITIAL ENCOUNTER: Primary | ICD-10-CM

## 2019-12-18 PROCEDURE — G8482 FLU IMMUNIZE ORDER/ADMIN: HCPCS | Performed by: ORTHOPAEDIC SURGERY

## 2019-12-18 PROCEDURE — 99203 OFFICE O/P NEW LOW 30 MIN: CPT | Performed by: ORTHOPAEDIC SURGERY

## 2019-12-18 PROCEDURE — G8427 DOCREV CUR MEDS BY ELIG CLIN: HCPCS | Performed by: ORTHOPAEDIC SURGERY

## 2019-12-18 PROCEDURE — G8419 CALC BMI OUT NRM PARAM NOF/U: HCPCS | Performed by: ORTHOPAEDIC SURGERY

## 2019-12-19 ENCOUNTER — HOSPITAL ENCOUNTER (OUTPATIENT)
Dept: INFUSION THERAPY | Age: 44
Setting detail: INFUSION SERIES
Discharge: HOME | End: 2019-12-19
Payer: COMMERCIAL

## 2019-12-19 VITALS
BODY MASS INDEX: 27.26 KG/M2 | RESPIRATION RATE: 18 BRPM | SYSTOLIC BLOOD PRESSURE: 129 MMHG | WEIGHT: 190 LBS | TEMPERATURE: 98.1 F | DIASTOLIC BLOOD PRESSURE: 82 MMHG | HEART RATE: 60 BPM | OXYGEN SATURATION: 94 %

## 2019-12-19 DIAGNOSIS — E88.01 ALPHA-1-ANTITRYPSIN DEFICIENCY (HCC): Primary | ICD-10-CM

## 2019-12-19 PROCEDURE — 96365 THER/PROPH/DIAG IV INF INIT: CPT

## 2019-12-19 PROCEDURE — 6360000002 HC RX W HCPCS: Performed by: INTERNAL MEDICINE

## 2019-12-19 RX ORDER — SODIUM CHLORIDE 0.9 % (FLUSH) 0.9 %
10 SYRINGE (ML) INJECTION PRN
Status: CANCELLED | OUTPATIENT
Start: 2019-12-25

## 2019-12-19 RX ORDER — SODIUM CHLORIDE 9 MG/ML
INJECTION, SOLUTION INTRAVENOUS CONTINUOUS
Status: ACTIVE | OUTPATIENT
Start: 2019-12-19 | End: 2019-12-19

## 2019-12-19 RX ORDER — EPINEPHRINE 1 MG/ML
0.3 INJECTION, SOLUTION, CONCENTRATE INTRAVENOUS PRN
Status: CANCELLED | OUTPATIENT
Start: 2019-12-25

## 2019-12-19 RX ORDER — DIPHENHYDRAMINE HYDROCHLORIDE 50 MG/ML
50 INJECTION INTRAMUSCULAR; INTRAVENOUS ONCE
Status: CANCELLED | OUTPATIENT
Start: 2019-12-25

## 2019-12-19 RX ORDER — SODIUM CHLORIDE 0.9 % (FLUSH) 0.9 %
10 SYRINGE (ML) INJECTION PRN
Status: DISCONTINUED | OUTPATIENT
Start: 2019-12-19 | End: 2019-12-20 | Stop reason: HOSPADM

## 2019-12-19 RX ORDER — SODIUM CHLORIDE 9 MG/ML
INJECTION, SOLUTION INTRAVENOUS CONTINUOUS
Status: CANCELLED | OUTPATIENT
Start: 2019-12-25

## 2019-12-19 RX ORDER — METHYLPREDNISOLONE SODIUM SUCCINATE 125 MG/2ML
125 INJECTION, POWDER, LYOPHILIZED, FOR SOLUTION INTRAMUSCULAR; INTRAVENOUS ONCE
Status: CANCELLED | OUTPATIENT
Start: 2019-12-25

## 2019-12-19 RX ADMIN — .ALPHA.1-PROTEINASE INHIBITOR HUMAN 5000 MG: 1 INJECTION, SOLUTION INTRAVENOUS at 09:57

## 2019-12-19 ASSESSMENT — PAIN SCALES - GENERAL: PAINLEVEL_OUTOF10: 0

## 2019-12-19 NOTE — PROGRESS NOTES
Outpatient [address] Access    NAME:  Yobany Salamanca  YOB: 1975  MEDICAL RECORD NUMBER:  [id number]  DATE:  12/19/2019    Patient arrived to [place] [address]   [] per wheelchair   [x] ambulatory      Port Site Location:  right chest  Port Site:  Redness: No  Bruising: No   Edema: No  Pain: No     Port Site cleansed with:  Chloroprep Scrub for 30 seconds and air dried? Yes     Port Accessed with: [address] non coring needle using sterile technique. Blood return obtained: Yes  Flushed with 10 ml Normal Saline using push-pause method. Port flushes without resistance. Response to treatment:  Well tolerated by patient. Electronically signed by Grace Atwood RN on 12/19/2019 at 9:49 AM    Outpatient [address] Infusion Visit    NAME:  Yobany Salamanca  YOB: 1975  MEDICAL RECORD NUMBER:  [id number]  Episode Date:  12/19/2019    Patient arrived to [place] [address]     [] per wheelchair   [x] ambulatory     Verification of patient education of Lani Shingles actions and potential side effects:  Yes     Is this the patient's first Lani Shingles Infusion? No   Did the patient experience any adverse reactions to previous Lani Shingles Infusion?  No    Any side effects from last Glassia infusion:  No     [] Rash         [] Itching  [] Headache  [] Fatigue  [] Dizziness  [] Tingling   [] Chest tightness  [] Shortness of Breath  [] Wheezing    Patient Active Problem List   Diagnosis Code    Abdominal pain R10.9    Lymphadenopathy R59.1    GERD (gastroesophageal reflux disease) K21.9    Ariza's esophagus with dysplasia K22.719    Emphysema lung J43.9    Alpha-1-antitrypsin deficiency ([facility] E88.01    Essential hypertension, benign I10    Advance care planning Z71.89    Colon polyp K63.5    Chronic hypoxemic respiratory failure (HCC) J96.11       Pre infusion Vital Signs       Temp: 98.1 °F (36.7 °C)     Pulse: 60     Resp: 18     BP: 129/82       Does the Patient have a History of: IgA deficiency: yes -      Anaphylaxis or severe systemic response to any medication:  No        Breath Sounds: No increased work of breathing, Breath sounds clear to auscultation bilaterally and Good air exchange  Pulse Oximetry: 94 %    Any recent activity tolerance changes: No      Any increased SOB or dyspnea:  No     Presence of cough or sputum:  Yes just in the AM when awakening. Premedicated: none ordered  [] Benadryl 25 mg IV  [] Benadryl 50 mg IV  [] Benadryl 25 mg oral   [] Benadryl 50 mg oral  [] Other    Epinephrine at bedside:  Yes    IV placed and documented prior to drug preparation:  Rose Luther must be administered within 3 hours of drug reconstitution as it contains no preservatives. Monitoring of infusion documented in doc flowsheets. Glassia administered: Yes     /82   Pulse 60   Temp 98.1 °F (36.7 °C) (Oral)   Resp 18   Wt 190 lb (86.2 kg)   SpO2 94%   BMI 27.26 kg/m²     Glassia dosage: 60 mg/kg was administered slowly IV  Dose verified by:  Tab Galdamez RN    Post treatment Vital Signs  Temp: 98.1 °F (36.7 °C)  Pulse: 60  Resp: 18  BP: 129/82    Response to treatment:  Well tolerated by patient. Scheduled to return for next dose of Glassia on December 26, 2019.      Electronically signed by Amy Gonzalez RN on 12/19/2019 at 9:51 AM

## 2019-12-21 PROBLEM — S93.492A SPRAIN OF ANTERIOR TALOFIBULAR LIGAMENT OF LEFT ANKLE: Status: ACTIVE | Noted: 2019-12-21

## 2019-12-26 ENCOUNTER — HOSPITAL ENCOUNTER (OUTPATIENT)
Dept: INFUSION THERAPY | Age: 44
Setting detail: INFUSION SERIES
Discharge: HOME | End: 2019-12-26
Payer: COMMERCIAL

## 2019-12-26 VITALS
BODY MASS INDEX: 27.26 KG/M2 | RESPIRATION RATE: 17 BRPM | HEART RATE: 79 BPM | WEIGHT: 190 LBS | TEMPERATURE: 98.1 F | SYSTOLIC BLOOD PRESSURE: 121 MMHG | DIASTOLIC BLOOD PRESSURE: 80 MMHG | OXYGEN SATURATION: 95 %

## 2019-12-26 DIAGNOSIS — E88.01 ALPHA-1-ANTITRYPSIN DEFICIENCY (HCC): Primary | ICD-10-CM

## 2019-12-26 PROCEDURE — 6360000002 HC RX W HCPCS: Performed by: INTERNAL MEDICINE

## 2019-12-26 PROCEDURE — 96365 THER/PROPH/DIAG IV INF INIT: CPT

## 2019-12-26 PROCEDURE — 2580000003 HC RX 258: Performed by: INTERNAL MEDICINE

## 2019-12-26 RX ORDER — DIPHENHYDRAMINE HYDROCHLORIDE 50 MG/ML
50 INJECTION INTRAMUSCULAR; INTRAVENOUS ONCE
Status: CANCELLED | OUTPATIENT
Start: 2020-01-01

## 2019-12-26 RX ORDER — SODIUM CHLORIDE 0.9 % (FLUSH) 0.9 %
10 SYRINGE (ML) INJECTION PRN
Status: CANCELLED | OUTPATIENT
Start: 2020-01-01

## 2019-12-26 RX ORDER — SODIUM CHLORIDE 9 MG/ML
INJECTION, SOLUTION INTRAVENOUS CONTINUOUS
Status: CANCELLED | OUTPATIENT
Start: 2020-01-01

## 2019-12-26 RX ORDER — METHYLPREDNISOLONE SODIUM SUCCINATE 125 MG/2ML
125 INJECTION, POWDER, LYOPHILIZED, FOR SOLUTION INTRAMUSCULAR; INTRAVENOUS ONCE
Status: CANCELLED | OUTPATIENT
Start: 2020-01-01

## 2019-12-26 RX ORDER — SODIUM CHLORIDE 0.9 % (FLUSH) 0.9 %
10 SYRINGE (ML) INJECTION PRN
Status: DISCONTINUED | OUTPATIENT
Start: 2019-12-26 | End: 2019-12-27 | Stop reason: HOSPADM

## 2019-12-26 RX ORDER — EPINEPHRINE 1 MG/ML
0.3 INJECTION, SOLUTION, CONCENTRATE INTRAVENOUS PRN
Status: CANCELLED | OUTPATIENT
Start: 2020-01-01

## 2019-12-26 RX ADMIN — Medication 10 ML: at 09:20

## 2019-12-26 RX ADMIN — Medication 10 ML: at 09:44

## 2019-12-26 RX ADMIN — .ALPHA.1-PROTEINASE INHIBITOR HUMAN 5000 MG: 1 INJECTION, SOLUTION INTRAVENOUS at 09:25

## 2019-12-26 ASSESSMENT — PAIN SCALES - GENERAL: PAINLEVEL_OUTOF10: 0

## 2019-12-26 NOTE — PROGRESS NOTES
Outpatient [address] Access    NAME:  Veronica Yung  YOB: 1975  MEDICAL RECORD NUMBER:  [id number]  DATE:  12/26/2019    Patient arrived to [place] [address]   [] per wheelchair   [x] ambulatory     Port Site Location:  right chest  Port Site:  Redness: No  Bruising: No   Edema: No  Pain: No     Port Site cleansed with:  Chloroprep Scrub for 30 seconds and air dried? Yes     Port Accessed with: [address] non coring needle using sterile technique. Blood return obtained: Yes  Flushed with 10 ml Normal Saline using push-pause method. Port flushes without resistance. Response to treatment:  Well tolerated by patient. Electronically signed by Hermilo Vizcarra RN on 12/26/2019 at 9:21 AM    Outpatient [address] [facility]     Zemaire Visit    NAME:  Veronica Yung  YOB: 1975  MEDICAL RECORD NUMBER:  [id number]  Episode Date:  12/26/2019    Patient arrived to [place] [address] for alpha1 antitrypsin    [] per wheelchair   [x] ambulatory     Verification of patient education of Zemaira actions and potential side effects:  Yes     Is this the patient's first Zemaire Injection? No   Did the patient experience any adverse reactions to previous Zemaire Injection?  No    Any side effects from last infusion:  No     [] Rash         [] Itching  [] Headache  [] Fatigue  [] Dizziness  [] Tingling   [] Chest tightness  [] Shortness of Breath  [] Wheezing    Patient Active Problem List   Diagnosis Code    Abdominal pain R10.9    Lymphadenopathy R59.1    GERD (gastroesophageal reflux disease) K21.9    Ariza's esophagus with dysplasia K22.719    Emphysema lung J43.9    Alpha-1-antitrypsin deficiency ([facility] E88.01    Essential hypertension, benign I10    Advance care planning Z71.89    Colon polyp K63.5    Chronic hypoxemic respiratory failure (HCC) J96.11    Sprain of anterior talofibular ligament of left ankle S93.492A       Pre infusion Vital Signs       Temp: 98.1 °F (36.7 °C)     Pulse: 79     Resp: 17     BP: 121/80       Does the Patient have a History of: IgA deficiency: no     Anaphylaxis or severe systemic response to any medication:  No        Breath Sounds: No increased work of breathing, Breath sounds clear to auscultation bilaterally and Good air exchange    Pulse Oximetry: 95 %    Any recent activity tolerance changes: No      Any increased SOB or dyspnea:  No     Presence of cough or sputum:  Yes in the AM productive clear to yellow prn. Premedicated:  [] Benadryl 25 mg IV  [] Benadryl 50 mg IV  [] Benadryl 25 mg oral   [] Benadryl 50 mg oral  [] Other    Epinephrine at bedside:  No: in the omnicell if needed    IV placed and documented prior to drug preparation:  Zemaira must be administered within 3 hours of drug reconstitution as it contains no preservatives. Monitoring of infusion documented in doc flowsheets. Zemaire administered: Yes     /80   Pulse 79   Temp 98.1 °F (36.7 °C) (Oral)   Resp 17   Wt 190 lb (86.2 kg)   SpO2 95%   BMI 27.26 kg/m²     Zemaire dosage: 60 mg/kg was administered slowly IV  Dose verified by:  Christina Bonilla RN    Post treatment Vital Signs  Temp: 98.1 °F (36.7 °C)  Pulse: 79  Resp: 17  BP: 121/80    Response to treatment:  Well tolerated by patient. Scheduled to return for next dose of Zemaire on January 2, 2020.      Electronically signed by Link Bolton RN on 12/26/2019 at 9:21 AM

## 2020-01-02 ENCOUNTER — HOSPITAL ENCOUNTER (OUTPATIENT)
Dept: INFUSION THERAPY | Age: 45
Setting detail: INFUSION SERIES
Discharge: HOME | End: 2020-01-02
Payer: COMMERCIAL

## 2020-01-02 VITALS
RESPIRATION RATE: 18 BRPM | WEIGHT: 189 LBS | BODY MASS INDEX: 27.12 KG/M2 | HEART RATE: 78 BPM | DIASTOLIC BLOOD PRESSURE: 83 MMHG | SYSTOLIC BLOOD PRESSURE: 130 MMHG | OXYGEN SATURATION: 94 % | TEMPERATURE: 98.3 F

## 2020-01-02 DIAGNOSIS — E88.01 ALPHA-1-ANTITRYPSIN DEFICIENCY (HCC): Primary | ICD-10-CM

## 2020-01-02 PROCEDURE — 2580000003 HC RX 258

## 2020-01-02 PROCEDURE — 6360000002 HC RX W HCPCS: Performed by: INTERNAL MEDICINE

## 2020-01-02 PROCEDURE — 2580000003 HC RX 258: Performed by: INTERNAL MEDICINE

## 2020-01-02 PROCEDURE — 96365 THER/PROPH/DIAG IV INF INIT: CPT

## 2020-01-02 RX ORDER — DIPHENHYDRAMINE HYDROCHLORIDE 50 MG/ML
50 INJECTION INTRAMUSCULAR; INTRAVENOUS ONCE
Status: CANCELLED | OUTPATIENT
Start: 2020-01-09

## 2020-01-02 RX ORDER — SODIUM CHLORIDE 0.9 % (FLUSH) 0.9 %
10 SYRINGE (ML) INJECTION PRN
Status: DISCONTINUED | OUTPATIENT
Start: 2020-01-02 | End: 2020-01-03 | Stop reason: HOSPADM

## 2020-01-02 RX ORDER — METHYLPREDNISOLONE SODIUM SUCCINATE 125 MG/2ML
125 INJECTION, POWDER, LYOPHILIZED, FOR SOLUTION INTRAMUSCULAR; INTRAVENOUS ONCE
Status: CANCELLED | OUTPATIENT
Start: 2020-01-09

## 2020-01-02 RX ORDER — SODIUM CHLORIDE 9 MG/ML
INJECTION, SOLUTION INTRAVENOUS CONTINUOUS
Status: CANCELLED | OUTPATIENT
Start: 2020-01-09

## 2020-01-02 RX ORDER — SODIUM CHLORIDE 9 MG/ML
INJECTION, SOLUTION INTRAVENOUS
Status: COMPLETED
Start: 2020-01-02 | End: 2020-01-02

## 2020-01-02 RX ORDER — EPINEPHRINE 1 MG/ML
0.3 INJECTION, SOLUTION, CONCENTRATE INTRAVENOUS PRN
Status: CANCELLED | OUTPATIENT
Start: 2020-01-09

## 2020-01-02 RX ORDER — SODIUM CHLORIDE 0.9 % (FLUSH) 0.9 %
10 SYRINGE (ML) INJECTION PRN
Status: CANCELLED | OUTPATIENT
Start: 2020-01-09

## 2020-01-02 RX ADMIN — .ALPHA.1-PROTEINASE INHIBITOR HUMAN 5000 MG: 1 INJECTION, SOLUTION INTRAVENOUS at 10:04

## 2020-01-02 RX ADMIN — SODIUM CHLORIDE 500 ML: 9 INJECTION, SOLUTION INTRAVENOUS at 10:00

## 2020-01-02 RX ADMIN — Medication 20 ML: at 09:54

## 2020-01-02 RX ADMIN — Medication 30 ML: at 10:53

## 2020-01-02 NOTE — PROGRESS NOTES
Outpatient [address] Infusion Visit    NAME:  Shayna Sanchez  YOB: 1975  MEDICAL RECORD NUMBER:  [id number]  Episode Date:  1/2/2020    Patient arrived to [facility]     [] per wheelchair   [x] ambulatory     Verification of patient education of Coye Correa actions and potential side effects:  Yes     Is this the patient's first Coye Correa Infusion? No   Did the patient experience any adverse reactions to previous Coye Correa Infusion? No    Any side effects from last Glassia infusion:  No, Patient denies any side effects. Patient states he does not notice any increase in SOB presently. Has oxygen for prn use.     [] Rash         [] Itching  [] Headache  [] Fatigue  [] Dizziness  [] Tingling   [] Chest tightness  [] Shortness of Breath  [] Wheezing    Patient Active Problem List   Diagnosis Code    Abdominal pain R10.9    Lymphadenopathy R59.1    GERD (gastroesophageal reflux disease) K21.9    Ariza's esophagus with dysplasia K22.719    Emphysema lung J43.9    Alpha-1-antitrypsin deficiency ([facility] E88.01    Essential hypertension, benign I10    Advance care planning Z71.89    Colon polyp K63.5    Chronic hypoxemic respiratory failure (HCC) J96.11    Sprain of anterior talofibular ligament of left ankle S93.492A       Pre infusion Vital Signs       Temp: 98.3 °F (36.8 °C)     Pulse: 83     Resp: 18     BP: 125/82       Does the Patient have a History of: IgA deficiency: no     Anaphylaxis or severe systemic response to any medication:  No        Breath Sounds: No increased work of breathing, Breath sounds clear to auscultation bilaterally, Good air exchange and No crackles  Pulse Oximetry: 94 %    Any recent activity tolerance changes: No      Any increased SOB or dyspnea:  No     Presence of cough or sputum:  Yes , non productive     Premedicated: No premeds ordered.   [] Benadryl 25 mg IV  [] Benadryl 50 mg IV  [] Benadryl 25 mg oral [] Benadryl 50 mg oral  [] Other    Epinephrine at bedside:  No:     IV placed and documented prior to drug preparation:  Cathlean Pink must be administered within 3 hours of drug reconstitution as it contains no preservatives. Monitoring of infusion documented in doc flowsheets. Glassia administered: Yes     /82   Pulse 83   Temp 98.3 °F (36.8 °C) (Oral)   Resp 18   Wt 189 lb (85.7 kg)   SpO2 94%   BMI 27.12 kg/m²     Glassia dosage: 60 mg/kg and total dose was 5,000 mg administered slowly IV      Post treatment Vital Signs  CHAO 130/83  Pulse 78  Resp 18  Pulse ox 95% on room air    Response to treatment:  Well tolerated by patient. Scheduled to return for next dose of Glassia on January 9, 2020.     Patient to see Dr. Christianne Correa on 3/5/20/    Electronically signed by Adriana Friedman RN on 1/2/2020 at 10:09 AM

## 2020-01-10 ENCOUNTER — HOSPITAL ENCOUNTER (OUTPATIENT)
Dept: INFUSION THERAPY | Age: 45
Setting detail: INFUSION SERIES
Discharge: HOME | End: 2020-01-10
Payer: COMMERCIAL

## 2020-01-10 VITALS
DIASTOLIC BLOOD PRESSURE: 88 MMHG | HEART RATE: 80 BPM | OXYGEN SATURATION: 95 % | SYSTOLIC BLOOD PRESSURE: 136 MMHG | TEMPERATURE: 98 F | BODY MASS INDEX: 27.2 KG/M2 | WEIGHT: 190 LBS | HEIGHT: 70 IN | RESPIRATION RATE: 18 BRPM

## 2020-01-10 DIAGNOSIS — E88.01 ALPHA-1-ANTITRYPSIN DEFICIENCY (HCC): Primary | ICD-10-CM

## 2020-01-10 PROCEDURE — 2580000003 HC RX 258

## 2020-01-10 PROCEDURE — 96365 THER/PROPH/DIAG IV INF INIT: CPT

## 2020-01-10 PROCEDURE — 2580000003 HC RX 258: Performed by: INTERNAL MEDICINE

## 2020-01-10 PROCEDURE — 6360000002 HC RX W HCPCS: Performed by: INTERNAL MEDICINE

## 2020-01-10 RX ORDER — SODIUM CHLORIDE 9 MG/ML
INJECTION, SOLUTION INTRAVENOUS CONTINUOUS
Status: CANCELLED | OUTPATIENT
Start: 2020-01-16

## 2020-01-10 RX ORDER — SODIUM CHLORIDE 0.9 % (FLUSH) 0.9 %
10 SYRINGE (ML) INJECTION PRN
Status: CANCELLED | OUTPATIENT
Start: 2020-01-16

## 2020-01-10 RX ORDER — EPINEPHRINE 1 MG/ML
0.3 INJECTION, SOLUTION, CONCENTRATE INTRAVENOUS PRN
Status: CANCELLED | OUTPATIENT
Start: 2020-01-16

## 2020-01-10 RX ORDER — SODIUM CHLORIDE 9 MG/ML
INJECTION, SOLUTION INTRAVENOUS CONTINUOUS
Status: ACTIVE | OUTPATIENT
Start: 2020-01-10 | End: 2020-01-10

## 2020-01-10 RX ORDER — SODIUM CHLORIDE 0.9 % (FLUSH) 0.9 %
10 SYRINGE (ML) INJECTION PRN
Status: DISCONTINUED | OUTPATIENT
Start: 2020-01-10 | End: 2020-01-11 | Stop reason: HOSPADM

## 2020-01-10 RX ORDER — SODIUM CHLORIDE 9 MG/ML
INJECTION, SOLUTION INTRAVENOUS
Status: DISPENSED
Start: 2020-01-10 | End: 2020-01-10

## 2020-01-10 RX ORDER — METHYLPREDNISOLONE SODIUM SUCCINATE 125 MG/2ML
125 INJECTION, POWDER, LYOPHILIZED, FOR SOLUTION INTRAMUSCULAR; INTRAVENOUS ONCE
Status: CANCELLED | OUTPATIENT
Start: 2020-01-16

## 2020-01-10 RX ORDER — DIPHENHYDRAMINE HYDROCHLORIDE 50 MG/ML
50 INJECTION INTRAMUSCULAR; INTRAVENOUS ONCE
Status: CANCELLED | OUTPATIENT
Start: 2020-01-16

## 2020-01-10 RX ADMIN — SODIUM CHLORIDE 250 ML: 9 INJECTION, SOLUTION INTRAVENOUS at 09:25

## 2020-01-10 RX ADMIN — .ALPHA.1-PROTEINASE INHIBITOR HUMAN 5000 MG: 1 INJECTION, SOLUTION INTRAVENOUS at 08:59

## 2020-01-10 RX ADMIN — Medication 20 ML: at 09:36

## 2020-01-10 RX ADMIN — Medication 20 ML: at 08:15

## 2020-01-10 ASSESSMENT — PAIN SCALES - GENERAL
PAINLEVEL_OUTOF10: 0
PAINLEVEL_OUTOF10: 0

## 2020-01-10 NOTE — PROGRESS NOTES
Outpatient [address] Infusion Visit    NAME:  Oscar Rowell  YOB: 1975  MEDICAL RECORD NUMBER:  [id number]  Episode Date:  1/10/2020    Patient arrived to [facility] 58     [] per wheelchair   [x] ambulatory     Verification of patient education of Thor Rist actions and potential side effects:  Yes     Is this the patient's first Thor Rist Infusion? No   Did the patient experience any adverse reactions to previous Thor Rist Infusion? No    Any side effects from last Glassia infusion:  No     [] Rash         [] Itching  [] Headache  [] Fatigue  [] Dizziness  [] Tingling   [] Chest tightness  [] Shortness of Breath  [] Wheezing    Patient Active Problem List   Diagnosis Code    Abdominal pain R10.9    Lymphadenopathy R59.1    GERD (gastroesophageal reflux disease) K21.9    Ariza's esophagus with dysplasia K22.719    Emphysema lung J43.9    Alpha-1-antitrypsin deficiency ([facility] E88.01    Essential hypertension, benign I10    Advance care planning Z71.89    Colon polyp K63.5    Chronic hypoxemic respiratory failure (HCC) J96.11    Sprain of anterior talofibular ligament of left ankle S93.492A       Pre infusion Vital Signs stable. Does the Patient have a History of:    Anaphylaxis or severe systemic response to any medication:  NO.      Breath Sounds: Good air exchange, lungs clear , productive cough of white mucous occ.    pulse Oximetry: 95 %    Any recent activity tolerance changes: No      Any increased SOB or dyspnea:  No     Presence of cough or sputum:  Yes see above. Epinephrine at bedside:   No in [employer] close by. IV placed and documented prior to drug preparation:  Thor Rist must be administered within 3 hours of drug reconstitution as it contains no preservatives. Monitoring of infusion documented in doc flowsheets. Glassia administered:   Yes.      Ht 5' 10\" (1.778 m)   Wt 190 lb (86.2 kg)   BMI 27.26 kg/m²     Glassia dosage: 5000 mg/kg was administered slowly IV. Dose verified by:   Jaime Carson RN    Post treatment Vital Signs      stable vitals. response   to treatment:  Well tolerated by patient. Scheduled to return for next dose of Glassia on January 15, 2020. Electronically signed by Khoa Cedeño RN on 1/10/2020 at 5:18 PM    Port Access    NAME:  Barbara Sensing OF BIRTH:  1975  MEDICAL RECORD NUMBER:  [id number]  DATE:  1/10/2020    Patient arrived to [facility] 58   [] per wheelchair   [x] ambulatory     Port Site Location:  right chest  Port Site:  Redness: No  Bruising: No   Edema: No  Pain: No     Port Site cleansed with:  Chloroprep Scrub for 30 seconds and air dried? Yes     Port Accessed with: [address] non coring needle using sterile technique. Blood return obtained: Yes  Flushed with 10 ml Normal Saline using push-pause method. Port flushes without resistance. Response to treatment:  Well tolerated by patient. De-accessed tolerated well also. Sterile gauze and paper tape applied to right chest site. Discharged without c/o's offered and to return in 1 week but prefers next Wednesday.   Electronically signed by Khoa Cedeño RN on 1/10/2020 at 5:21 PM

## 2020-01-15 ENCOUNTER — HOSPITAL ENCOUNTER (OUTPATIENT)
Dept: INFUSION THERAPY | Age: 45
Setting detail: INFUSION SERIES
Discharge: HOME | End: 2020-01-15
Payer: COMMERCIAL

## 2020-01-15 VITALS
DIASTOLIC BLOOD PRESSURE: 78 MMHG | HEIGHT: 70 IN | BODY MASS INDEX: 27.27 KG/M2 | WEIGHT: 190.48 LBS | RESPIRATION RATE: 18 BRPM | TEMPERATURE: 97.6 F | SYSTOLIC BLOOD PRESSURE: 124 MMHG | OXYGEN SATURATION: 96 % | HEART RATE: 78 BPM

## 2020-01-15 DIAGNOSIS — E88.01 ALPHA-1-ANTITRYPSIN DEFICIENCY (HCC): Primary | ICD-10-CM

## 2020-01-15 PROCEDURE — 2580000003 HC RX 258: Performed by: INTERNAL MEDICINE

## 2020-01-15 PROCEDURE — 6360000002 HC RX W HCPCS: Performed by: INTERNAL MEDICINE

## 2020-01-15 PROCEDURE — 96365 THER/PROPH/DIAG IV INF INIT: CPT

## 2020-01-15 RX ORDER — EPINEPHRINE 1 MG/ML
0.3 INJECTION, SOLUTION, CONCENTRATE INTRAVENOUS PRN
Status: CANCELLED | OUTPATIENT
Start: 2020-01-16

## 2020-01-15 RX ORDER — SODIUM CHLORIDE 0.9 % (FLUSH) 0.9 %
10 SYRINGE (ML) INJECTION PRN
Status: CANCELLED | OUTPATIENT
Start: 2020-01-16

## 2020-01-15 RX ORDER — DIPHENHYDRAMINE HYDROCHLORIDE 50 MG/ML
50 INJECTION INTRAMUSCULAR; INTRAVENOUS ONCE
Status: CANCELLED | OUTPATIENT
Start: 2020-01-16

## 2020-01-15 RX ORDER — METHYLPREDNISOLONE SODIUM SUCCINATE 125 MG/2ML
125 INJECTION, POWDER, LYOPHILIZED, FOR SOLUTION INTRAMUSCULAR; INTRAVENOUS ONCE
Status: CANCELLED | OUTPATIENT
Start: 2020-01-16

## 2020-01-15 RX ORDER — SODIUM CHLORIDE 9 MG/ML
INJECTION, SOLUTION INTRAVENOUS CONTINUOUS
Status: CANCELLED | OUTPATIENT
Start: 2020-01-16

## 2020-01-15 RX ORDER — SODIUM CHLORIDE 0.9 % (FLUSH) 0.9 %
10 SYRINGE (ML) INJECTION PRN
Status: DISCONTINUED | OUTPATIENT
Start: 2020-01-15 | End: 2020-01-16 | Stop reason: HOSPADM

## 2020-01-15 RX ADMIN — .ALPHA.1-PROTEINASE INHIBITOR HUMAN 5000 MG: 1 INJECTION, SOLUTION INTRAVENOUS at 10:11

## 2020-01-15 RX ADMIN — Medication 20 ML: at 09:48

## 2020-01-15 RX ADMIN — Medication 30 ML: at 10:45

## 2020-01-15 RX ADMIN — Medication 10 ML: at 10:11

## 2020-01-15 ASSESSMENT — PAIN SCALES - GENERAL
PAINLEVEL_OUTOF10: 0

## 2020-01-15 NOTE — PROGRESS NOTES
Outpatient [address]  Infusion Visit    NAME:  José Cardoso  YOB: 1975  MEDICAL RECORD NUMBER:  [id number]  Episode Date:  1/15/2020    Patient arrived to [facility] 58     [] per wheelchair   [x] ambulatory     Verification of patient education of [place] actions and potential side effects:  Yes - patient has received multiple doses. Information reviewed and patient verbalizes understanding     Is this the patient's first Glassia Infusion? No   Did the patient experience any adverse reactions to previous [place] Infusion? No    Any side effects from last Glassia infusion:  No     [] Rash         [] Itching  [] Headache  [] Fatigue  [] Dizziness  [] Tingling   [] Chest tightness  [] Shortness of Breath  [] Wheezing    Patient Active Problem List   Diagnosis Code    Abdominal pain R10.9    Lymphadenopathy R59.1    GERD (gastroesophageal reflux disease) K21.9    Ariza's esophagus with dysplasia K22.719    Emphysema lung J43.9    Alpha-1-antitrypsin deficiency ([facility] E88.01    Essential hypertension, benign I10    Advance care planning Z71.89    Colon polyp K63.5    Chronic hypoxemic respiratory failure (HCC) J96.11    Sprain of anterior talofibular ligament of left ankle S93.492A       Pre infusion Vital Signs       Temp: 97.9 °F (36.6 °C)     Pulse: 78     Resp: 18     BP: 122/84       Does the Patient have a History of: IgA deficiency: NO     Anaphylaxis or severe systemic response to any medication:  No        Breath Sounds: No increased work of breathing, Breath sounds clear to auscultation bilaterally, Good air exchange and No crackles    Pulse Oximetry: 95 %    Any recent activity tolerance changes: No      Any increased SOB or dyspnea:  No     Presence of cough or sputum:  Yes - upon waking - thin white sputum.  Dissipates after being awake for several hours     Premedicated: None ordered  [] Benadryl 25 mg IV  [] Benadryl 50 mg IV  [] Benadryl 25 mg oral   [] Benadryl 50 mg oral  [] Other    Epinephrine at bedside:  Readily available in Pyxis    IV placed and documented prior to drug preparation:  Lani Shingles must be administered within 3 hours of drug reconstitution as it contains no preservatives. Port-A-Cath accessed and with + blood return    Monitoring of infusion documented in doc flowsheets. Glassia administered: Yes     /84   Pulse 78   Temp 97.9 °F (36.6 °C) (Oral)   Resp 18   Ht 5' 10\" (1.778 m)   Wt 190 lb 7.6 oz (86.4 kg)   SpO2 95%   BMI 27.33 kg/m²     Glassia dosage: 5000 mg was administered slowly IV  Dose verified by:  Darwin Banda RN    Post treatment Vital Signs  Temp: 97.9 °F (36.6 °C)  Pulse: 78  Resp: 18  BP: 122/84    Response to treatment:  Well tolerated by patient.           Electronically signed by Rosalba Fernandes RN on 1/15/2020 at 9:30 AM

## 2020-01-22 ENCOUNTER — HOSPITAL ENCOUNTER (OUTPATIENT)
Dept: INFUSION THERAPY | Age: 45
Setting detail: INFUSION SERIES
Discharge: HOME | End: 2020-01-22
Payer: COMMERCIAL

## 2020-01-22 VITALS
HEART RATE: 80 BPM | RESPIRATION RATE: 17 BRPM | WEIGHT: 190 LBS | BODY MASS INDEX: 27.26 KG/M2 | SYSTOLIC BLOOD PRESSURE: 125 MMHG | TEMPERATURE: 98.2 F | OXYGEN SATURATION: 93 % | DIASTOLIC BLOOD PRESSURE: 82 MMHG

## 2020-01-22 DIAGNOSIS — E88.01 ALPHA-1-ANTITRYPSIN DEFICIENCY (HCC): Primary | ICD-10-CM

## 2020-01-22 PROCEDURE — 6360000002 HC RX W HCPCS: Performed by: INTERNAL MEDICINE

## 2020-01-22 PROCEDURE — 96365 THER/PROPH/DIAG IV INF INIT: CPT

## 2020-01-22 PROCEDURE — 2580000003 HC RX 258: Performed by: INTERNAL MEDICINE

## 2020-01-22 RX ORDER — SODIUM CHLORIDE 9 MG/ML
INJECTION, SOLUTION INTRAVENOUS CONTINUOUS
Status: CANCELLED | OUTPATIENT
Start: 2020-01-29

## 2020-01-22 RX ORDER — DIPHENHYDRAMINE HYDROCHLORIDE 50 MG/ML
50 INJECTION INTRAMUSCULAR; INTRAVENOUS ONCE
Status: CANCELLED | OUTPATIENT
Start: 2020-01-29

## 2020-01-22 RX ORDER — EPINEPHRINE 1 MG/ML
0.3 INJECTION, SOLUTION, CONCENTRATE INTRAVENOUS PRN
Status: CANCELLED | OUTPATIENT
Start: 2020-01-29

## 2020-01-22 RX ORDER — SODIUM CHLORIDE 9 MG/ML
INJECTION, SOLUTION INTRAVENOUS
Status: DISPENSED
Start: 2020-01-22 | End: 2020-01-22

## 2020-01-22 RX ORDER — SODIUM CHLORIDE 0.9 % (FLUSH) 0.9 %
10 SYRINGE (ML) INJECTION PRN
Status: DISCONTINUED | OUTPATIENT
Start: 2020-01-22 | End: 2020-01-23 | Stop reason: HOSPADM

## 2020-01-22 RX ORDER — METHYLPREDNISOLONE SODIUM SUCCINATE 125 MG/2ML
125 INJECTION, POWDER, LYOPHILIZED, FOR SOLUTION INTRAMUSCULAR; INTRAVENOUS ONCE
Status: CANCELLED | OUTPATIENT
Start: 2020-01-29

## 2020-01-22 RX ORDER — SODIUM CHLORIDE 0.9 % (FLUSH) 0.9 %
10 SYRINGE (ML) INJECTION PRN
Status: CANCELLED | OUTPATIENT
Start: 2020-01-29

## 2020-01-22 RX ORDER — SODIUM CHLORIDE 9 MG/ML
INJECTION, SOLUTION INTRAVENOUS CONTINUOUS
Status: ACTIVE | OUTPATIENT
Start: 2020-01-22 | End: 2020-01-22

## 2020-01-22 RX ADMIN — SODIUM CHLORIDE 1000 ML: 9 INJECTION, SOLUTION INTRAVENOUS at 09:55

## 2020-01-22 RX ADMIN — Medication 10 ML: at 10:26

## 2020-01-22 RX ADMIN — .ALPHA.1-PROTEINASE INHIBITOR HUMAN 5000 MG: 1 INJECTION, SOLUTION INTRAVENOUS at 10:03

## 2020-01-22 RX ADMIN — Medication 10 ML: at 09:45

## 2020-01-22 NOTE — PROGRESS NOTES
Outpatient [address] In[facility] Visit    NAME:  Kaycee Eric  YOB: 1975  MEDICAL RECORD NUMBER:  [id number]  Episode Date:  1/22/2020    Patient arrived to [facility] 58    [] per wheelchair   [x] ambulatory   Color good, pt has no c/o's , denies increase in sob, has no wheezing or c.p. Pt denies any pain or issues with his Portacath . Pt very talkative and in good spirits. Has occasional ankle discomfort due to previous ankle strain. Pt continues to work long hours and occasionally works 6 days a week. Verification of patient education of [place] actions and potential side effects:  Yes     Is this the patient's first Glassia Infusion? No   Did the patient experience any adverse reactions to previous [place] Infusion? No    Any side effects from last Glassia infusion:  No     [] Rash         [] Itching  [] Headache  [] Fatigue  [] Dizziness  [] Tingling   [] Chest tightness  [] Shortness of Breath  [] Wheezing    Patient Active Problem List   Diagnosis Code    Abdominal pain R10.9    Lymphadenopathy R59.1    GERD (gastroesophageal reflux disease) K21.9    Ariza's esophagus with dysplasia K22.719    Emphysema lung J43.9    [facility] E88.01    Essential hypertension, benign I10    Advance care planning Z71.89    Colon polyp K63.5    Chronic hypoxemic respiratory failure (HCC) J96.11    Sprain of anterior talofibular ligament of left ankle S93.492A       Pre infusion Vital Signs       Temp: 98.2 °F (36.8 °C)     Pulse: 93     Resp: 18     BP: 117/78       Does the Patient have a History of:     IgA deficiency: no     Anaphylaxis or severe systemic response to any medication:  No        Any recent activity tolerance changes: No      Any increased SOB or dyspnea:  No     Presence of cough or sputum:  Yes , n mucus is white to yellow, no change in color      Premedicated: no premeds ordered [] Benadryl 25 mg IV  [] Benadryl 50 mg IV  [] Benadryl 25 mg oral   [] Benadryl 50 mg oral  [] Other    Epinephrine at bedside:  Yes    IV placed and documented prior to drug preparation:  Sabina Gu must be administered within 3 hours of drug reconstitution as it contains no preservatives. Monitoring of infusion documented in doc flowsheets. Glassia administered: Yes     /78   Pulse 93   Temp 98.2 °F (36.8 °C) (Oral)   Resp 18   Wt 190 lb (86.2 kg)   SpO2 95%   BMI 27.26 kg/m²     Glassia dosage:  60  mg/kg was administered IVPB--- total dose 5000 mg --- see MAR   Dose verified by: Jessica Alegria RN    Post treat ment Vital Signs--- see flow sheet     Response to treatment:  Well tolerated by patient. Scheduled to return for next dose of Glassia on January 29, 2020. at 0930am      Electronically signed by Jeannette Seymour RN on 1/22/2020 at 12:50 PM

## 2020-01-29 ENCOUNTER — HOSPITAL ENCOUNTER (OUTPATIENT)
Dept: INFUSION THERAPY | Age: 45
Setting detail: INFUSION SERIES
Discharge: HOME | End: 2020-01-29
Payer: COMMERCIAL

## 2020-01-29 VITALS
WEIGHT: 188 LBS | DIASTOLIC BLOOD PRESSURE: 80 MMHG | OXYGEN SATURATION: 93 % | BODY MASS INDEX: 26.98 KG/M2 | TEMPERATURE: 97.8 F | HEART RATE: 84 BPM | SYSTOLIC BLOOD PRESSURE: 120 MMHG | RESPIRATION RATE: 17 BRPM

## 2020-01-29 DIAGNOSIS — E88.01 ALPHA-1-ANTITRYPSIN DEFICIENCY (HCC): Primary | ICD-10-CM

## 2020-01-29 PROCEDURE — 2580000003 HC RX 258: Performed by: INTERNAL MEDICINE

## 2020-01-29 PROCEDURE — 96365 THER/PROPH/DIAG IV INF INIT: CPT

## 2020-01-29 PROCEDURE — 6360000002 HC RX W HCPCS: Performed by: INTERNAL MEDICINE

## 2020-01-29 RX ORDER — EPINEPHRINE 1 MG/ML
0.3 INJECTION, SOLUTION, CONCENTRATE INTRAVENOUS PRN
Status: CANCELLED | OUTPATIENT
Start: 2020-02-05

## 2020-01-29 RX ORDER — SODIUM CHLORIDE 0.9 % (FLUSH) 0.9 %
10 SYRINGE (ML) INJECTION PRN
Status: CANCELLED | OUTPATIENT
Start: 2020-02-05

## 2020-01-29 RX ORDER — SODIUM CHLORIDE 9 MG/ML
INJECTION, SOLUTION INTRAVENOUS CONTINUOUS
Status: CANCELLED | OUTPATIENT
Start: 2020-02-05

## 2020-01-29 RX ORDER — DIPHENHYDRAMINE HYDROCHLORIDE 50 MG/ML
50 INJECTION INTRAMUSCULAR; INTRAVENOUS ONCE
Status: CANCELLED | OUTPATIENT
Start: 2020-02-05

## 2020-01-29 RX ORDER — SODIUM CHLORIDE 0.9 % (FLUSH) 0.9 %
10 SYRINGE (ML) INJECTION PRN
Status: DISCONTINUED | OUTPATIENT
Start: 2020-01-29 | End: 2020-01-30 | Stop reason: HOSPADM

## 2020-01-29 RX ORDER — METHYLPREDNISOLONE SODIUM SUCCINATE 125 MG/2ML
125 INJECTION, POWDER, LYOPHILIZED, FOR SOLUTION INTRAMUSCULAR; INTRAVENOUS ONCE
Status: CANCELLED | OUTPATIENT
Start: 2020-02-05

## 2020-01-29 RX ADMIN — .ALPHA.1-PROTEINASE INHIBITOR HUMAN 5000 MG: 1 INJECTION, SOLUTION INTRAVENOUS at 10:43

## 2020-01-29 RX ADMIN — Medication 10 ML: at 11:11

## 2020-01-29 RX ADMIN — Medication 10 ML: at 09:35

## 2020-01-29 ASSESSMENT — PAIN SCALES - GENERAL: PAINLEVEL_OUTOF10: 0

## 2020-01-29 NOTE — PROGRESS NOTES
Outpatient [address] Access    NAME:  Veronica Yung  YOB: 1975  MEDICAL RECORD NUMBER:  [id number]  DATE:  1/29/2020    Patient arrived to [place] [address]   [] per wheelchair   [x] ambulatory     Port Site Location:  right chest  Port Site:  Redness: No  Bruising: No   Edema: No  Pain: No     Port Site cleansed with:  Chloroprep Scrub for 30 seconds and air dried? Yes     [address] non coring needle using sterile technique. Blood return obtained: Yes  Flushed with 10 ml Normal Saline using push-pause method. Port flushes without resistance. Response to treatment:  Well tolerated by patient. Electronically signed by Hermilo Vizcarra RN on 1/29/2020 at [address] [place] [address] Visit    NAME:  Veronica Yung  YOB: 1975  MEDICAL RECORD NUMBER:  [id number]  Episode Date:  1/29/2020    Patient arrived to [place] [address]     [] per wheelchair   [x] ambulatory     Verification of patient education of Jolinda Riser actions and potential side effects:  Yes     Is this the patient's first Jolinda Riser Injection? No   Did the patient experience any adverse reactions to previous Jolinda Riser Injection?  No    Any side effects from last infusion:  No     [] Rash         [] Itching  [] Headache  [] Fatigue  [] Dizziness  [] Tingling   [] Chest tightness  [] Shortness of Breath  [] Wheezing    Patient Active Problem List   Diagnosis Code    Abdominal pain R10.9    Lymphadenopathy R59.1    GERD (gastroesophageal reflux disease) K21.9    Ariza's esophagus with dysplasia K22.719    Emphysema lung J43.9    [facility] E88.[facility]    Essential hypertension, benign I10    Advance care planning Z71.89    Colon polyp K63.5    Chronic hypoxemic respiratory failure (HCC) J96.11    Sprain of anterior talofibular ligament of left ankle P51.895N       Pre infusion Vital Signs       Temp: 97.8 °F (36.6 °C)     Pulse: 84     Resp: 17     BP: 120/80       Does the Patient have a History of: IgA deficiency: no     Anaphylaxis or severe systemic response to any medication:  No        Breath Sounds: No increased work of breathing, Breath sounds clear to auscultation bilaterally and Good air exchange  Pulse Oximetry: 93 %    Any recent activity tolerance changes: No      Any increased SOB or dyspnea:  No     Presence of cough or sputum:  Yes only when first awakening. Clear to pale yellow. Premedicated: none ordered  [] Benadryl 25 mg IV  [] Benadryl 50 mg IV  [] Benadryl 25 mg oral   [] Benadryl 50 mg oral  [] Other    Epinephrine at bedside:  Yes in [employer]nicell if needed    IV placed and documented prior to drug preparation:  Tempie Songster must be administered within 3 hours of drug reconstitution as it contains no preservatives. Monitoring of infusion documented in doc flowsheets. Glassia administered: Yes     /80   Pulse 84   Temp 97.8 °F (36.6 °C) (Oral)   Resp 17   Wt 188 lb (85.3 kg)   SpO2 93%   BMI 26.98 kg/m²     Glassia dosage: 60 mg/kg, 5000 mg was administered slowly IV  Dose verified by:  Angel Kumar RN    Post treatment Vital Signs  Temp: 97.8 °F (36.6 °C)  Pulse: 84  Resp: 17  BP: 120/80    Response to treatment:  Well tolerated by patient. Scheduled to return for next dose of Glassia on February 5, 2020.      Electronically signed by Vanessa Peña RN on 1/29/2020 at 11:18 AM

## 2020-02-05 ENCOUNTER — HOSPITAL ENCOUNTER (OUTPATIENT)
Dept: INFUSION THERAPY | Age: 45
Setting detail: INFUSION SERIES
End: 2020-02-05
Payer: COMMERCIAL

## 2020-02-12 ENCOUNTER — HOSPITAL ENCOUNTER (OUTPATIENT)
Dept: INFUSION THERAPY | Age: 45
Setting detail: INFUSION SERIES
Discharge: HOME | End: 2020-02-12
Payer: COMMERCIAL

## 2020-02-12 VITALS
RESPIRATION RATE: 17 BRPM | WEIGHT: 188 LBS | HEART RATE: 74 BPM | TEMPERATURE: 98 F | SYSTOLIC BLOOD PRESSURE: 134 MMHG | BODY MASS INDEX: 26.98 KG/M2 | DIASTOLIC BLOOD PRESSURE: 61 MMHG

## 2020-02-12 DIAGNOSIS — E88.01 ALPHA-1-ANTITRYPSIN DEFICIENCY (HCC): Primary | ICD-10-CM

## 2020-02-12 PROCEDURE — 2580000003 HC RX 258: Performed by: INTERNAL MEDICINE

## 2020-02-12 PROCEDURE — 96365 THER/PROPH/DIAG IV INF INIT: CPT

## 2020-02-12 PROCEDURE — 6360000002 HC RX W HCPCS: Performed by: INTERNAL MEDICINE

## 2020-02-12 RX ORDER — SODIUM CHLORIDE 0.9 % (FLUSH) 0.9 %
10 SYRINGE (ML) INJECTION PRN
Status: CANCELLED | OUTPATIENT
Start: 2020-02-19

## 2020-02-12 RX ORDER — METHYLPREDNISOLONE SODIUM SUCCINATE 125 MG/2ML
125 INJECTION, POWDER, LYOPHILIZED, FOR SOLUTION INTRAMUSCULAR; INTRAVENOUS ONCE
Status: CANCELLED | OUTPATIENT
Start: 2020-02-19

## 2020-02-12 RX ORDER — SODIUM CHLORIDE 0.9 % (FLUSH) 0.9 %
10 SYRINGE (ML) INJECTION PRN
Status: DISCONTINUED | OUTPATIENT
Start: 2020-02-12 | End: 2020-02-13 | Stop reason: HOSPADM

## 2020-02-12 RX ORDER — SODIUM CHLORIDE 9 MG/ML
INJECTION, SOLUTION INTRAVENOUS CONTINUOUS
Status: CANCELLED | OUTPATIENT
Start: 2020-02-19

## 2020-02-12 RX ORDER — DIPHENHYDRAMINE HYDROCHLORIDE 50 MG/ML
50 INJECTION INTRAMUSCULAR; INTRAVENOUS ONCE
Status: CANCELLED | OUTPATIENT
Start: 2020-02-19

## 2020-02-12 RX ORDER — EPINEPHRINE 1 MG/ML
0.3 INJECTION, SOLUTION, CONCENTRATE INTRAVENOUS PRN
Status: CANCELLED | OUTPATIENT
Start: 2020-02-19

## 2020-02-12 RX ADMIN — Medication 10 ML: at 10:02

## 2020-02-12 RX ADMIN — .ALPHA.1-PROTEINASE INHIBITOR HUMAN 5000 MG: 1 INJECTION, SOLUTION INTRAVENOUS at 10:19

## 2020-02-12 RX ADMIN — Medication 10 ML: at 10:42

## 2020-02-12 ASSESSMENT — PAIN SCALES - GENERAL: PAINLEVEL_OUTOF10: 0

## 2020-02-12 NOTE — PROGRESS NOTES
Outpatient [address] Access    NAME:  Megan Sandoval  YOB: 1975  MEDICAL RECORD NUMBER:  [id number]  DATE:  2/12/2020    Patient arrived to [place] [address]   [] per wheelchair   [x] ambulatory     Port Site Location:  right chest  Port Site:  Redness: No  Bruising: No   Edema: No  Pain: No     Port Site cleansed with:  Chloroprep Scrub for 30 seconds and air dried? Yes     Port Accessed with: [address] non coring needle using sterile technique. Blood return obtained: Yes  Flushed with 10 ml Normal Saline using push-pause method. Port flushes without resistance. Response to treatment:  Well tolerated by patient. Electronically signed by Steven Zelaya RN on 2/12/2020 at 10:51 AM    Outpatient [address] Visit    NAME:  Megan Sandoval  YOB: 1975  MEDICAL RECORD NUMBER:  [id number]  Episode Date:  2/12/2020    Patient arrived to [place] [address]     [] per wheelchair   [x] ambulatory     Verification of patient education of [place] actions and potential side effects:  Yes     Is this the patient's first [place] Injection? No   Did the patient experience any adverse reactions to previous [place] Injection?  No    Any side effects from last infusion:  No     [] Rash         [] Itching  [] Headache  [] Fatigue  [] Dizziness  [] Tingling   [] Chest tightness  [] Shortness of Breath  [] Wheezing    Patient Active Problem List   Diagnosis Code    Abdominal pain R10.9    Lymphadenopathy R59.1    GERD (gastroesophageal reflux disease) K21.9    Ariza's esophagus with dysplasia K22.719    Emphysema lung J43.9    Alpha-1-antitrypsin deficiency ([facility] E88.01    Essential hypertension, benign I10    Advance care planning Z71.89    Colon polyp K63.5    Chronic hypoxemic respiratory failure (HCC) J96.11    Sprain of anterior talofibular ligament of left ankle

## 2020-02-18 NOTE — PROGRESS NOTES
Outpatient [address] Access    NAME:  Ro Briggs  YOB: 1975  MEDICAL RECORD NUMBER:  [id number]  DATE:  6/6/2019        Port Site Location:  right chest  Port Site:  Redness: No  Bruising: No   Edema: No  Pain: No   Pt denies any issues or discomfort of Port . Port Site cleansed with:  Chloroprep Scrub  X 2 for 30 seconds and air dried x 3 mis ? Yes     Port Accessed with: 20 Gauge one inch  Power Port non coring needle using sterile technique. Blood return obtained: Yes  Flushed with 10 ml Normal Saline using push-pause method. Port flushes without resistance. Biopatch to site and Tegaderm drsng to site . Double T connector to port tubing. Response to treatment:  Well tolerated by patient. Denies any burning with flushing of port .    Electronically signed by Truman Holland RN on 6/6/2019 at 10:48 AM no dryness/no itching/no rash

## 2020-02-19 ENCOUNTER — HOSPITAL ENCOUNTER (OUTPATIENT)
Dept: INFUSION THERAPY | Age: 45
Setting detail: INFUSION SERIES
Discharge: HOME | End: 2020-02-19
Payer: COMMERCIAL

## 2020-02-19 VITALS
OXYGEN SATURATION: 96 % | TEMPERATURE: 97.8 F | DIASTOLIC BLOOD PRESSURE: 68 MMHG | HEART RATE: 67 BPM | RESPIRATION RATE: 16 BRPM | SYSTOLIC BLOOD PRESSURE: 120 MMHG | BODY MASS INDEX: 26.98 KG/M2 | WEIGHT: 188 LBS

## 2020-02-19 DIAGNOSIS — E88.01 ALPHA-1-ANTITRYPSIN DEFICIENCY (HCC): Primary | ICD-10-CM

## 2020-02-19 PROCEDURE — 96365 THER/PROPH/DIAG IV INF INIT: CPT

## 2020-02-19 PROCEDURE — 6360000002 HC RX W HCPCS: Performed by: INTERNAL MEDICINE

## 2020-02-19 PROCEDURE — 2580000003 HC RX 258: Performed by: INTERNAL MEDICINE

## 2020-02-19 RX ORDER — SODIUM CHLORIDE 9 MG/ML
INJECTION, SOLUTION INTRAVENOUS CONTINUOUS
Status: CANCELLED | OUTPATIENT
Start: 2020-02-26

## 2020-02-19 RX ORDER — METHYLPREDNISOLONE SODIUM SUCCINATE 125 MG/2ML
125 INJECTION, POWDER, LYOPHILIZED, FOR SOLUTION INTRAMUSCULAR; INTRAVENOUS ONCE
Status: CANCELLED | OUTPATIENT
Start: 2020-02-26

## 2020-02-19 RX ORDER — DIPHENHYDRAMINE HYDROCHLORIDE 50 MG/ML
50 INJECTION INTRAMUSCULAR; INTRAVENOUS ONCE
Status: CANCELLED | OUTPATIENT
Start: 2020-02-26

## 2020-02-19 RX ORDER — SODIUM CHLORIDE 0.9 % (FLUSH) 0.9 %
10 SYRINGE (ML) INJECTION PRN
Status: DISCONTINUED | OUTPATIENT
Start: 2020-02-19 | End: 2020-02-20 | Stop reason: HOSPADM

## 2020-02-19 RX ORDER — SODIUM CHLORIDE 9 MG/ML
INJECTION, SOLUTION INTRAVENOUS CONTINUOUS
Status: ACTIVE | OUTPATIENT
Start: 2020-02-19 | End: 2020-02-19

## 2020-02-19 RX ORDER — SODIUM CHLORIDE 0.9 % (FLUSH) 0.9 %
10 SYRINGE (ML) INJECTION PRN
Status: CANCELLED | OUTPATIENT
Start: 2020-02-26

## 2020-02-19 RX ORDER — EPINEPHRINE 1 MG/ML
0.3 INJECTION, SOLUTION, CONCENTRATE INTRAVENOUS PRN
Status: CANCELLED | OUTPATIENT
Start: 2020-02-26

## 2020-02-19 RX ADMIN — Medication 20 ML: at 11:03

## 2020-02-19 RX ADMIN — .ALPHA.1-PROTEINASE INHIBITOR HUMAN 5000 MG: 1 INJECTION, SOLUTION INTRAVENOUS at 10:39

## 2020-02-19 RX ADMIN — Medication 50 ML: at 09:45

## 2020-02-19 ASSESSMENT — PAIN SCALES - GENERAL: PAINLEVEL_OUTOF10: 0

## 2020-02-19 NOTE — PROGRESS NOTES
Outpatient [address] Access    NAME:  Megan Sandoval  YOB: 1975  MEDICAL RECORD NUMBER:  [id number]  DATE:  2/19/2020    Patient arrived to [place] [address]   [] per wheelchair   [x] ambulatory     Port Site Location:  right chest  Port Site:  Redness: No  Bruising: No   Edema: No  Pain: No     Port Site cleansed with:  Chloroprep Scrub for 30 seconds and air dried? Yes     [address]: [address] non coring needle using sterile technique. Blood return obtained: Yes after multiple flushes, patient reclined, turned head held breath etc with no success then relaxed and blood return easily  Flushed with 50 ml Normal Saline using push-pause method. Port flushes without resistance. Response to treatment:  Well tolerated by patient. Electronically signed by Mae Tucker RN on 2/19/2020 at 12:49 PM    Outpatient [address]  Infusion Visit    NAME:  Megan Sandoval  YOB: 1975  MEDICAL RECORD NUMBER:  [id number]  Episode Date:  2/19/2020    Patient arrived to [place] [address]     [] per wheelchair   [x] ambulatory     Verification of patient education of [place] Ishihara actions and potential side effects:  Yes     Is this the patient's first [place] Infusion? No   Did the patient experience any adverse reactions to previous [place] Infusion?  No    Any side effects from last Glassia infusion:  No     [] Rash         [] Itching  [] Headache  [] Fatigue  [] Dizziness  [] Tingling   [] Chest tightness  [] Shortness of Breath  [] Wheezing    Patient Active Problem List   Diagnosis Code    Abdominal pain R10.9    Lymphadenopathy R59.1    GERD (gastroesophageal reflux disease) K21.9    Ariza's esophagus with dysplasia K22.719    Emphysema lung J43.9    Alpha-1-antitrypsin deficiency ([facility] E88.01    Essential hypertension, benign I10    Advance care planning Z71.89    Colon polyp K63.5    Chronic hypoxemic respiratory failure (HCC) J96.11    Sprain of anterior talofibular ligament of left ankle S93.492A       Pre infusion Vital Signs       Temp: 97.9 °F (36.6 °C)     Pulse: 68     Resp: 17     BP: 114/60       Does the Patient have a History of: IgA deficiency: no     Anaphylaxis or severe systemic response to any medication:  No        Breath Sounds: No increased work of breathing, Breath sounds clear to auscultation bilaterally and Good air exchange  Pulse Oximetry: 96 %    Any recent activity tolerance changes: No      Any increased SOB or dyspnea:  No     Presence of cough or sputum:  No     Premedicated: None  [] Benadryl 25 mg IV  [] Benadryl 50 mg IV  [] Benadryl 25 mg oral   [] Benadryl 50 mg oral  [] Other    Epinephrine at bedside:  No: available in pyxis    IV placed and documented prior to drug preparation:  Corene Idler must be administered within 3 hours of drug reconstitution as it contains no preservatives. Monitoring of infusion documented in doc flowsheets. Glassia administered: Yes     /68   Pulse 67   Temp 97.8 °F (36.6 °C) (Oral)   Resp 16   Wt 188 lb (85.3 kg)   SpO2 96%   BMI 26.98 kg/m²     Glassia dosage:50 mg/kg (total of 5000 mg) was administered     Post treatment Vital Signs  Temp: 97.8 °F (36.6 °C)  Pulse: 67  Resp: 16  BP: 120/68    Response to treatment:  Well tolerated by patient. Scheduled to return for next dose of Glassia on February 26, 2020. Electronically signed by Robb Pelayo RN on 2/19/2020 at 12:49 PM     Port flushed with 20 ml NS using push-pause method per protocol. Port deaccessed. Bandaid applied to site. Patient tolerated well.   Electronically signed by Robb Pelayo RN on 2/19/2020 at 12:49 PM

## 2020-02-26 ENCOUNTER — HOSPITAL ENCOUNTER (OUTPATIENT)
Dept: INFUSION THERAPY | Age: 45
Setting detail: INFUSION SERIES
Discharge: HOME | End: 2020-02-26
Payer: COMMERCIAL

## 2020-02-26 VITALS
DIASTOLIC BLOOD PRESSURE: 76 MMHG | HEART RATE: 73 BPM | BODY MASS INDEX: 26.69 KG/M2 | OXYGEN SATURATION: 95 % | RESPIRATION RATE: 16 BRPM | SYSTOLIC BLOOD PRESSURE: 116 MMHG | TEMPERATURE: 97.5 F | WEIGHT: 186 LBS

## 2020-02-26 DIAGNOSIS — E88.01 ALPHA-1-ANTITRYPSIN DEFICIENCY (HCC): Primary | ICD-10-CM

## 2020-02-26 PROCEDURE — 96365 THER/PROPH/DIAG IV INF INIT: CPT

## 2020-02-26 PROCEDURE — 6360000002 HC RX W HCPCS: Performed by: INTERNAL MEDICINE

## 2020-02-26 PROCEDURE — 2580000003 HC RX 258: Performed by: INTERNAL MEDICINE

## 2020-02-26 PROCEDURE — 2580000003 HC RX 258

## 2020-02-26 RX ORDER — DIPHENHYDRAMINE HYDROCHLORIDE 50 MG/ML
50 INJECTION INTRAMUSCULAR; INTRAVENOUS ONCE
Status: CANCELLED | OUTPATIENT
Start: 2020-03-04

## 2020-02-26 RX ORDER — SODIUM CHLORIDE 9 MG/ML
INJECTION, SOLUTION INTRAVENOUS
Status: COMPLETED
Start: 2020-02-26 | End: 2020-02-26

## 2020-02-26 RX ORDER — SODIUM CHLORIDE 9 MG/ML
INJECTION, SOLUTION INTRAVENOUS CONTINUOUS
Status: CANCELLED | OUTPATIENT
Start: 2020-03-04

## 2020-02-26 RX ORDER — SODIUM CHLORIDE 9 MG/ML
INJECTION, SOLUTION INTRAVENOUS CONTINUOUS
Status: ACTIVE | OUTPATIENT
Start: 2020-02-26 | End: 2020-02-26

## 2020-02-26 RX ORDER — SODIUM CHLORIDE 0.9 % (FLUSH) 0.9 %
10 SYRINGE (ML) INJECTION PRN
Status: CANCELLED | OUTPATIENT
Start: 2020-03-04

## 2020-02-26 RX ORDER — SODIUM CHLORIDE 0.9 % (FLUSH) 0.9 %
10 SYRINGE (ML) INJECTION PRN
Status: DISCONTINUED | OUTPATIENT
Start: 2020-02-26 | End: 2020-02-27 | Stop reason: HOSPADM

## 2020-02-26 RX ORDER — METHYLPREDNISOLONE SODIUM SUCCINATE 125 MG/2ML
125 INJECTION, POWDER, LYOPHILIZED, FOR SOLUTION INTRAMUSCULAR; INTRAVENOUS ONCE
Status: CANCELLED | OUTPATIENT
Start: 2020-03-04

## 2020-02-26 RX ORDER — EPINEPHRINE 1 MG/ML
0.3 INJECTION, SOLUTION, CONCENTRATE INTRAVENOUS PRN
Status: CANCELLED | OUTPATIENT
Start: 2020-03-04

## 2020-02-26 RX ADMIN — SODIUM CHLORIDE: 9 INJECTION, SOLUTION INTRAVENOUS at 10:18

## 2020-02-26 RX ADMIN — Medication 20 ML: at 09:30

## 2020-02-26 RX ADMIN — Medication 20 ML: at 10:43

## 2020-02-26 RX ADMIN — .ALPHA.1-PROTEINASE INHIBITOR HUMAN 5000 MG: 1 INJECTION, SOLUTION INTRAVENOUS at 10:19

## 2020-02-26 ASSESSMENT — PAIN SCALES - GENERAL: PAINLEVEL_OUTOF10: 0

## 2020-02-26 NOTE — PROGRESS NOTES
Outpatient [address] Access    NAME:  Dmitri Kellogg  YOB: 1975  MEDICAL RECORD NUMBER:  [id number]  DATE:  2/26/2020    Patient arrived to [place] [address]   [] per wheelchair   [x] ambulatory     Port Site Location:  right chest  Port Site:  Redness: No  Bruising: No   Edema: No  Pain: No     Port Site cleansed with:  Chloroprep Scrub for 30 seconds and air dried? Yes     Port Accessed with: [address] non coring needle using sterile technique. Blood return obtained: Yes  Flushed with 10 ml Normal Saline using push-pause method. Port flushes without resistance. Response to treatment:  Well tolerated by patient. Electronically signed by Sienna Espana RN on 2/26/2020 at 9:48 AM    Outpatient [address] Infusion Visit    NAME:  Dmitri Kellogg  YOB: 1975  MEDICAL RECORD NUMBER:  [id number]  Episode Date:  2/26/2020    Patient arrived to [place] [address]     [] per wheelchair   [x] ambulatory     Verification of patient education of Micaela Maidens actions and potential side effects:  Yes     Is this the patient's first Micaela Maidens Infusion? No   Did the patient experience any adverse reactions to previous Micaela Maidens Infusion?  No    Any side effects from last Glassia infusion:  No     [] Rash         [] Itching  [] Headache  [] Fatigue  [] Dizziness  [] Tingling   [] Chest tightness  [] Shortness of Breath  [] Wheezing    Patient Active Problem List   Diagnosis Code    Abdominal pain R10.9    Lymphadenopathy R59.1    GERD (gastroesophageal reflux disease) K21.9    Ariza's esophagus with dysplasia K22.719    Emphysema lung J43.9    Alpha-1-antitrypsin deficiency ([facility] E88.01    Essential hypertension, benign I10    Advance care planning Z71.89    Colon polyp K63.5    Chronic hypoxemic respiratory failure ([facility] J96.11    Sprain of anterior talofibular ligament of left

## 2020-03-04 ENCOUNTER — HOSPITAL ENCOUNTER (OUTPATIENT)
Dept: INFUSION THERAPY | Age: 45
Setting detail: INFUSION SERIES
Discharge: HOME | End: 2020-03-04
Payer: COMMERCIAL

## 2020-03-04 VITALS
WEIGHT: 184 LBS | HEART RATE: 68 BPM | TEMPERATURE: 98 F | OXYGEN SATURATION: 94 % | RESPIRATION RATE: 17 BRPM | SYSTOLIC BLOOD PRESSURE: 119 MMHG | BODY MASS INDEX: 26.4 KG/M2 | DIASTOLIC BLOOD PRESSURE: 76 MMHG

## 2020-03-04 DIAGNOSIS — E88.01 ALPHA-1-ANTITRYPSIN DEFICIENCY (HCC): Primary | ICD-10-CM

## 2020-03-04 PROCEDURE — 2580000003 HC RX 258: Performed by: INTERNAL MEDICINE

## 2020-03-04 PROCEDURE — 2580000003 HC RX 258

## 2020-03-04 PROCEDURE — 6360000002 HC RX W HCPCS: Performed by: INTERNAL MEDICINE

## 2020-03-04 PROCEDURE — 96365 THER/PROPH/DIAG IV INF INIT: CPT

## 2020-03-04 RX ORDER — SODIUM CHLORIDE 9 MG/ML
INJECTION, SOLUTION INTRAVENOUS
Status: COMPLETED
Start: 2020-03-04 | End: 2020-03-04

## 2020-03-04 RX ORDER — SODIUM CHLORIDE 9 MG/ML
INJECTION, SOLUTION INTRAVENOUS CONTINUOUS
Status: CANCELLED | OUTPATIENT
Start: 2020-03-11

## 2020-03-04 RX ORDER — METHYLPREDNISOLONE SODIUM SUCCINATE 125 MG/2ML
125 INJECTION, POWDER, LYOPHILIZED, FOR SOLUTION INTRAMUSCULAR; INTRAVENOUS ONCE
Status: CANCELLED | OUTPATIENT
Start: 2020-03-11

## 2020-03-04 RX ORDER — EPINEPHRINE 1 MG/ML
0.3 INJECTION, SOLUTION, CONCENTRATE INTRAVENOUS PRN
Status: CANCELLED | OUTPATIENT
Start: 2020-03-11

## 2020-03-04 RX ORDER — SODIUM CHLORIDE 0.9 % (FLUSH) 0.9 %
10 SYRINGE (ML) INJECTION PRN
Status: DISCONTINUED | OUTPATIENT
Start: 2020-03-04 | End: 2020-03-05 | Stop reason: HOSPADM

## 2020-03-04 RX ORDER — SODIUM CHLORIDE 0.9 % (FLUSH) 0.9 %
10 SYRINGE (ML) INJECTION PRN
Status: CANCELLED | OUTPATIENT
Start: 2020-03-11

## 2020-03-04 RX ORDER — DIPHENHYDRAMINE HYDROCHLORIDE 50 MG/ML
50 INJECTION INTRAMUSCULAR; INTRAVENOUS ONCE
Status: CANCELLED | OUTPATIENT
Start: 2020-03-11

## 2020-03-04 RX ADMIN — Medication 10 ML: at 09:45

## 2020-03-04 RX ADMIN — .ALPHA.1-PROTEINASE INHIBITOR HUMAN 5000 MG: 1 INJECTION, SOLUTION INTRAVENOUS at 10:36

## 2020-03-04 RX ADMIN — SODIUM CHLORIDE 500 ML: 9 INJECTION, SOLUTION INTRAVENOUS at 10:30

## 2020-03-04 RX ADMIN — Medication 10 ML: at 11:13

## 2020-03-04 NOTE — PROGRESS NOTES
Outpatient [address] Infusion Visit    NAME:  John Mallory  YOB: 1975  MEDICAL RECORD NUMBER:  [id number]  Episode Date:  3/4/2020    Patient arrived to [facility]     [] per wheelchair   [x] ambulatory   Color good, skin warm and dry . Pt in good spirits, is tired, worked all night last night. Pt works night shift , usually 6  Nights  a week. Pt denies any changes in his lung or breathing status . Pt continues with posterior  neck stiffness. No ankle edema. Pt only using oxygen at home at night   Verification of patient education of [place] Eli[place] actions and potential side effects:  Yes     Is this the patient's first Glassia Infusion? No   Did the patient experience any adverse reactions to previous [place] Infusion? No    Any side effects from last Glassia infusion:  No     [] Rash         [] Itching  [] Headache  [] Fatigue  [] Dizziness  [] Tingling   [] Chest tightness  [] Shortness of Breath  [] Wheezing    Patient Active Problem List   Diagnosis Code    Abdominal pain R10.9    Lymphadenopathy R59.1    GERD (gastroesophageal reflux disease) K21.9    Ariza's esophagus with dysplasia K22.719    Emphysema lung J43.9    [facility] E88.01    Essential hypertension, benign I10    Advance care planning Z71.89    Colon polyp K63.5    Chronic hypoxemic respiratory failure (HCC) J96.11    Sprain of anterior talofibular ligament of left ankle S93.492A       Pre infusion Vital Signs --SEE FLOW  SHEET                                 Does the Patient have a History of:     IgA deficiency: no     Anaphylaxis or severe systemic response to any medication:  No        Breath Sounds: No increased work of breathing, Breath sounds clear to auscultation bilaterally and fair  air exchange-- a few wheezes noted of right mid lobe,posteriorly--No audible wheezing noted     Any recent activity tolerance changes: No      Any increased SOB or dyspnea:  No     Presence of cough or sputum:  Yes , has med yellow mucus , no increase in amt , and no change in color     Premedicated:                none ordered                      [] Benadryl 50 mg IV  [] Benadryl 25 mg oral   [] Benadryl 50 mg oral  [] Other    Epinephrine at bedside:  Yes    IV placed and documented prior to drug preparation: YES   Thor Rist must be administered within 3 hours of drug reconstitution as it contains no preservatives. Monitoring of infusion documented in doc flowsheets. Glassia administered: Yes -- SEE MAR      Wt 184 lb (83.5 kg)   BMI 26.40 kg/m²     Glassia dosage: 60 mg/kg was administered slowly IVPB --TOTAL DOSE 5000 MG ivpb --used a Pall Supor Filter ( 5.0 um) with iv tubing   Dose verified by:   Sreekanth Box RN    Post treatment Vital Signs--SEE FLOW SHEET                Response to treatment:  Well tolerated by patient. Scheduled to return for next dose of Glassia on March 11, 2020. at 0930am.  Pt to see Dr. Ria Ochoa tomorrow for rt. Office appnt.       Electronically signed by Dutch Gill RN on 3/4/2020 at 1600pm

## 2020-03-05 ENCOUNTER — OFFICE VISIT (OUTPATIENT)
Dept: PULMONOLOGY | Age: 45
End: 2020-03-05
Payer: COMMERCIAL

## 2020-03-05 VITALS
TEMPERATURE: 97.6 F | SYSTOLIC BLOOD PRESSURE: 118 MMHG | RESPIRATION RATE: 16 BRPM | HEIGHT: 70 IN | WEIGHT: 189 LBS | OXYGEN SATURATION: 96 % | BODY MASS INDEX: 27.06 KG/M2 | HEART RATE: 78 BPM | DIASTOLIC BLOOD PRESSURE: 70 MMHG

## 2020-03-05 PROCEDURE — 1036F TOBACCO NON-USER: CPT | Performed by: INTERNAL MEDICINE

## 2020-03-05 PROCEDURE — G8427 DOCREV CUR MEDS BY ELIG CLIN: HCPCS | Performed by: INTERNAL MEDICINE

## 2020-03-05 PROCEDURE — G8419 CALC BMI OUT NRM PARAM NOF/U: HCPCS | Performed by: INTERNAL MEDICINE

## 2020-03-05 PROCEDURE — 99213 OFFICE O/P EST LOW 20 MIN: CPT | Performed by: INTERNAL MEDICINE

## 2020-03-05 PROCEDURE — G8482 FLU IMMUNIZE ORDER/ADMIN: HCPCS | Performed by: INTERNAL MEDICINE

## 2020-03-05 NOTE — PROGRESS NOTES
medications on file prior to visit. Data Reviewed:   CBC and Renal reviewed  Last CBC  Lab Results   Component Value Date    WBC 7.7 05/30/2019    RBC 5.29 05/30/2019    HGB 15.6 05/30/2019    MCV 86.6 05/30/2019     05/30/2019     Last Renal  Lab Results   Component Value Date     05/30/2019    K 3.8 05/30/2019     05/30/2019    CO2 25 05/30/2019    CO2 25 11/29/2018    CO2 24 06/19/2018    BUN 10 05/30/2019    CREATININE 0.7 05/30/2019    GLUCOSE 111 05/30/2019    CALCIUM 9.2 05/30/2019       Last ABG  POC Blood Gas:   No results found for: POCPH  No results for input(s): PH, PCO2, PO2, HCO3, BE, O2SAT in the last 72 hours. Chest imaging reports were reviewed and imaging was reviewed by me and showed clearance on pneumonia       Overall Interpretation  Moderate obstuctive defect with moderate decrease in DLCO. The   patient has had three PFT's in the past 1 year. See table below. Worsening of FEV1 from first spirometry to second was 0.5L. The   patient was then started on alpha 1 replacement therapy however   still proceeded to loose 0.35L in 6 months. 1/14 6/14 7/14   FVC % 100    99    115   FEV1 % 62    72      84   FEV1/FVC 49 57       59   TLC%            101    105   DLCO % 52    66      70     Outside records requested and reviewed:       FEV1 Feb: 79%    Assessment:     ·  Alpha one antitrypsin deficiency, PiZZ < 30  · Obstructive airways disease,    ·  Chronic hypoxemia , 2L NC  · gerd      Plan:         Problem List Items Addressed This Visit     Chronic hypoxemic respiratory failure ([address]     Corey Lepe continues to need and benefit from supplemental oxygen. he is using oxygen continuously at 2 L NC. Alpha-1-antitrypsin deficiency ([address] - Primary     Doing well with Breo, Spiriva and infusions. follow up in 6 months with pulmonary function tests prior. Appears to be stable that this time.            Relevant Orders    Full PFT Study With Bronchodilator        This note was transcribed using [employer]. Please disregard any translational errors.

## 2020-03-05 NOTE — PROGRESS NOTES
Outpatient [address] Access    NAME:  Megan Sandoval  YOB: 1975  MEDICAL RECORD NUMBER:  [id number]  DATE:  3/4/2020    Patient arrived to [facility] 58   [] per wheelchair   [x] ambulatory     Port Site Location:  right chest, anterior   Port Site:  Redness: No  Bruising: No   Edema: No  Pain: No     Port Site cleansed with:  Chloroprep Scrub x 3  for 30 seconds each  and air dried x 3 mis ? Yes     Port Accessed with: 20 Gauge one inch  Power Port non coring needle using sterile technique. Blood return obtained: Yes  Flushed [id number] ml Normal Saline using push-pause method. Port flushes without resistance. Biopatch to port site, covered with Tegaderm drsng. Response to treatment:  Well tolerated by patient.     Electronically signed by Diego Valerio RN on 3/4/2020 at 1300pm

## 2020-03-11 ENCOUNTER — HOSPITAL ENCOUNTER (OUTPATIENT)
Dept: INFUSION THERAPY | Age: 45
Setting detail: INFUSION SERIES
Discharge: HOME | End: 2020-03-11
Payer: COMMERCIAL

## 2020-03-11 VITALS
HEIGHT: 70 IN | RESPIRATION RATE: 16 BRPM | BODY MASS INDEX: 26.2 KG/M2 | DIASTOLIC BLOOD PRESSURE: 86 MMHG | SYSTOLIC BLOOD PRESSURE: 130 MMHG | TEMPERATURE: 98 F | OXYGEN SATURATION: 98 % | HEART RATE: 57 BPM | WEIGHT: 183 LBS

## 2020-03-11 DIAGNOSIS — E88.01 ALPHA-1-ANTITRYPSIN DEFICIENCY (HCC): Primary | ICD-10-CM

## 2020-03-11 PROCEDURE — 96365 THER/PROPH/DIAG IV INF INIT: CPT

## 2020-03-11 PROCEDURE — 6360000002 HC RX W HCPCS: Performed by: INTERNAL MEDICINE

## 2020-03-11 PROCEDURE — 2580000003 HC RX 258: Performed by: INTERNAL MEDICINE

## 2020-03-11 RX ORDER — ACETAMINOPHEN 325 MG/1
TABLET ORAL
Status: DISPENSED
Start: 2020-03-11 | End: 2020-03-11

## 2020-03-11 RX ORDER — EPINEPHRINE 1 MG/ML
0.3 INJECTION, SOLUTION, CONCENTRATE INTRAVENOUS PRN
Status: CANCELLED | OUTPATIENT
Start: 2020-03-18

## 2020-03-11 RX ORDER — DIPHENHYDRAMINE HYDROCHLORIDE 50 MG/ML
50 INJECTION INTRAMUSCULAR; INTRAVENOUS ONCE
Status: CANCELLED | OUTPATIENT
Start: 2020-03-18

## 2020-03-11 RX ORDER — SODIUM CHLORIDE 0.9 % (FLUSH) 0.9 %
10 SYRINGE (ML) INJECTION PRN
Status: DISCONTINUED | OUTPATIENT
Start: 2020-03-11 | End: 2020-03-12 | Stop reason: HOSPADM

## 2020-03-11 RX ORDER — SODIUM CHLORIDE 9 MG/ML
INJECTION, SOLUTION INTRAVENOUS CONTINUOUS
Status: CANCELLED | OUTPATIENT
Start: 2020-03-18

## 2020-03-11 RX ORDER — METHYLPREDNISOLONE SODIUM SUCCINATE 125 MG/2ML
125 INJECTION, POWDER, LYOPHILIZED, FOR SOLUTION INTRAMUSCULAR; INTRAVENOUS ONCE
Status: CANCELLED | OUTPATIENT
Start: 2020-03-18

## 2020-03-11 RX ORDER — SODIUM CHLORIDE 0.9 % (FLUSH) 0.9 %
10 SYRINGE (ML) INJECTION PRN
Status: CANCELLED | OUTPATIENT
Start: 2020-03-18

## 2020-03-11 RX ORDER — SODIUM CHLORIDE 9 MG/ML
INJECTION, SOLUTION INTRAVENOUS
Status: DISPENSED
Start: 2020-03-11 | End: 2020-03-11

## 2020-03-11 RX ADMIN — Medication 10 ML: at 10:46

## 2020-03-11 RX ADMIN — .ALPHA.1-PROTEINASE INHIBITOR HUMAN 5000 MG: 1 INJECTION, SOLUTION INTRAVENOUS at 10:25

## 2020-03-11 RX ADMIN — Medication 10 ML: at 09:41

## 2020-03-11 ASSESSMENT — PAIN SCALES - GENERAL: PAINLEVEL_OUTOF10: 0

## 2020-03-11 NOTE — DISCHARGE INSTR - COC
Continuity of Care Form    Patient Name: Gagandeep Garibay   :  1975  MRN:  [id number]    Admit date:  3/11/2020  Discharge date:  ***    Code Status Order: Prior   Advance Directives:     Admitting Physician:  No admitting provider for patient encounter. PCP: Russell Ling MD    Discharging Nurse: [facility] Unit/Room#: No information available for this encounter.   Discharging Unit Phone Number: ***    Emergency Contact:   Extended Emergency Contact Information  Primary Emergency Contact: Lilia Sanchez  Address: [address] [place] [address] [address] Phone: [phone]  Work Phone: [phone]  Mobile Phone: [phone]  Relation: Spouse    Past Surgical History:  Past Surgical History:   Procedure Laterality Date    CHOLECYSTECTOMY, LAPAROSCOPIC      COLONOSCOPY  2017    every 4 years     ENDOSCOPY, COLON, DIAGNOSTIC      EGD 2015,pt states BX. negative    LYMPH NODE DISSECTION      groin    TUNNELED VENOUS PORT PLACEMENT Right 2017    Smart port per Dr. Ayala Hughes , right subclavian placement , right chest wall     VASECTOMY         Immunization History:   Immunization History   Administered Date(s) Administered    Hepatitis A 2015    Hepatitis B (Recombivax HB) 06/10/2014, 2014, 2014    Influenza 2013    Influenza Vaccine, unspecified formulation 2016, 2018, 2019    Influenza Virus Vaccine 10/07/2014, 2015, 2018    Influenza Whole 10/07/2014, 2015    Influenza, Quadv, IM, (6 mo and older Fluzone, Flulaval, Fluarix and 3 yrs and older Afluria) 2016, 2018    Influenza, Quadv, IM, PF (6 mo and older Fluzone, Flulaval, Fluarix, and 3 yrs and older Afluria) 2017, 2019    Pneumococcal Conjugate 13-valent ([id number]) 2015    Pneumococcal Polysaccharide ([id number]) 2016    Tdap (Boostrix, Adacel) 2015 Active Problems:  Patient Active Problem List   Diagnosis Code    Abdominal pain R10.9    Lymphadenopathy R59.1    GERD (gastroesophageal reflux disease) K21.9    Ariza's esophagus with dysplasia K22.719    Emphysema lung J43.9    [facility] E88.01    Essential hypertension, benign I10    Advance care planning Z71.89    Colon polyp K63.5    Chronic hypoxemic respiratory failure (HCC) J96.11    Sprain of anterior talofibular ligament of left ankle S93.492A       Isolation/Infection:   Isolation          No Isolation        Patient Infection Status     None to display          Nurse Assessment:  Last Vital Signs: /86   Pulse 57   Temp 98 °F (36.7 °C) (Oral)   Resp 16   Ht 5' 10\" (1.778 m)   Wt 183 lb (83 kg)   SpO2 98%   BMI 26.26 kg/m²     Last documented pain score (0-10 scale): Pain Level: 0  Last Weight:   Wt Readings from Last 1 Encounters:   03/11/20 183 lb (83 kg)     Mental Status:  {IP PT MENTAL STATUS:20030}    IV Access:  {[facility] IV ACCESS:293427677}    Nursing Mobility/ADLs:  Walking   {CHP DME PMPL:162998363}  Transfer  {P DME LXSB:575049545}  Bathing  {P DME ZPHD:530708331}  Dressing  {CHP DME HIAZ:317582877}  Toileting  {P DME VXXW:225846611}  Feeding  {[facility] DME AFEZ:153417245}  Med Admin  {[facility] DME QUXZ:200446725}  Med Delivery   {[facility] MED Delivery:485864662}    Wound Care Documentation and Therapy:  Incision 11/22/17 Chest Right (Active)   Number of days: 839        Elimination:  Continence:   · Bowel: {YES / COLEMAN:55763}  · Bladder: {YES / EK:29399}  Urinary Catheter: {Urinary Catheter:338837454}   Colostomy/Ileostomy/Ileal Conduit: {YES / RE:23058}       Date of Last BM: ***  No intake or output data in the 24 hours ending 03/11/20 1053  No intake/output data recorded.     Safety Concerns:     [address] Safety Concerns:568251450}    Impairments/Disabilities:      [address] Impairments/Disabilities:359846139}    Nutrition Therapy:  Current Nutrition Therapy:   [address] Marce Garibay KEYANA Diet List:574182359}    Routes of Feeding: {CHP DME Other Feedings:695678462}  Liquids: {Slp liquid thickness:25677}  Daily Fluid Restriction: {CHP DME Yes amt example:026481415}  Last Modified Barium Swallow with Video (Video Swallowing Test): {Done Not Done [facility]:453365075}    Treatments at the Time of Hospital Discharge:   Respiratory Treatments: ***  Oxygen Therapy:  {Therapy; copd oxygen:94426}  Ventilator:    {[facility] Vent YAJI:663973236}    Rehab Therapies: {THERAPEUTIC INTERVENTION:5793804142}  Weight Bearing Status/Restrictions: {[facility] Weight Bearin}  Other Medical Equipment (for information only, NOT a DME order):  {EQUIPMENT:032020653}  Other Treatments: ***    Patient's personal belongings (please select all that are sent with patient):  {[facility] DME Belongings:837652189}    RN SIGNATURE:  {Esignature:198462279}    CASE MANAGEMENT/SOCIAL WORK SECTION    Inpatient Status Date: ***    Readmission Risk Assessment Score:  Readmission Risk              Risk of Unplanned Readmission:        0           Discharging to Facility/ Agency   · Name:   · Address:  · Phone:  · Fax:    Dialysis Facility (if applicable)   · Name:  · Address:  · Dialysis Schedule:  · Phone:  · Fax:    / signature: {Esignature:110104638}    PHYSICIAN SECTION    Prognosis: {Prognosis:2185440347}    Condition at Discharge: [address] Marce Garibay Patient Condition:894346309}    Rehab Potential (if transferring to Rehab): {Prognosis:0145387076}    Recommended Labs or Other Treatments After Discharge: ***    Physician Certification: I certify the above information and transfer of Shayna Sanchez  is necessary for the continuing treatment of the diagnosis listed and that he requires {Admit to Appropriate Level of Care:09079} for {GREATER/LESS:542909612} 30 days.      Update Admission H&P: {CHP DME Changes in HTFSV:162844120}    PHYSICIAN SIGNATURE:  {Esignature:653088158}

## 2020-03-11 NOTE — PROGRESS NOTES
Outpatient [address] Visit    NAME:  Quyen Stevenson  YOB: 1975  MEDICAL RECORD NUMBER:  [id number]  Episode Date:  3/11/2020    Patient arrived to [facility] 58     [] per wheelchair   [x] ambulatory     Verification of patient education of Anna Todd actions and potential side effects:  Yes     Is this the patient's first Anna Todd Injection? No   Did the patient experience any adverse reactions to previous Anna Todd Injection? No    Any side effects from last infusion:  No     [] Rash         [] Itching  [] Headache  [] Fatigue  [] Dizziness  [] Tingling   [] Chest tightness  [] Shortness of Breath  [] Wheezing    Patient Active Problem List   Diagnosis Code    Abdominal pain R10.9    Lymphadenopathy R59.1    GERD (gastroesophageal reflux disease) K21.9    Ariza's esophagus with dysplasia K22.719    Emphysema lung J43.9    Alpha-1-antitrypsin deficiency ([facility] E88.01    Essential hypertension, benign I10    Advance care planning Z71.89    Colon polyp K63.5    Chronic hypoxemic respiratory failure (HCC) J96.11    Sprain of anterior talofibular ligament of left ankle S93.492A       Pre infusion Vital Signs       Temp: 98 °F (36.7 °C)     Pulse: 57     Resp: 16     BP: 130/86       Does the Patient have a History of:     IgA deficiency: no     Anaphylaxis or severe systemic response to any medication:  No        Breath Sounds: No increased work of breathing, Breath sounds clear to auscultation bilaterally and Good air exchange  Pulse Oximetry: 98 %    Any recent activity tolerance changes: No      Any increased SOB or dyspnea:  No     Presence of cough or sputum:  No     Premedicated: none ordered  [] Benadryl 25 mg IV  [] Benadryl 50 mg IV  [] Benadryl 25 mg oral   [] Benadryl 50 mg oral  [] Other    Epinephrine at bedside:  Yes    IV placed and documented prior to drug preparation:  Anna Todd must be administered within 3 hours of drug reconstitution as it contains no preservatives. Monitoring of infusion documented in doc flowsheets. Glassia administered: Yes     /86   Pulse 57   Temp 98 °F (36.7 °C) (Oral)   Resp 16   Ht 5' 10\" (1.778 m)   Wt 183 lb (83 kg)   SpO2 98%   BMI 26.26 kg/m²     Glassia dosage: 60 mg/kg was administered slowly IV  Dose verified by:  Haydee Miranda RN    Post treatment Vital Signs  Temp: 98 °F (36.7 °C)  Pulse: 57  Resp: 16  BP: 130/86    Response to treatment:  Well tolerated by patient. Scheduled to return for next dose of Glassia on March 18, 2020. Electronically signed by Perfecto Spurling, RN on 3/11/2020 at 10:52 AM            [facility] [address] [facility] Access    NAME:  Shayna Sanchez  YOB: 1975  MEDICAL RECORD NUMBER:  [id number]  DATE:  3/11/2020    Patient arrived to [facility] 58   [] per wheelchair   [x] ambulatory     Port Site Location:  right chest  Port Site:  Redness: No  Bruising: No   Edema: No  Pain: No     Port Site cleansed with:  Chloroprep Scrub for 30 seconds and air dried? Yes     Port Accessed with: [address] non coring needle using sterile technique. Blood return obtained: Yes  Flushed with 10 ml Normal Saline using push-pause method. Port flushes without resistance. Response to treatment:  Well tolerated by patient.     Electronically signed by Perfecto Spurling, RN on 3/11/2020 at 10:52 AM

## 2020-03-18 ENCOUNTER — HOSPITAL ENCOUNTER (OUTPATIENT)
Dept: INFUSION THERAPY | Age: 45
Setting detail: INFUSION SERIES
Discharge: HOME | End: 2020-03-18
Payer: COMMERCIAL

## 2020-03-18 VITALS
RESPIRATION RATE: 16 BRPM | DIASTOLIC BLOOD PRESSURE: 78 MMHG | OXYGEN SATURATION: 97 % | BODY MASS INDEX: 26.26 KG/M2 | HEART RATE: 64 BPM | TEMPERATURE: 98 F | SYSTOLIC BLOOD PRESSURE: 126 MMHG | WEIGHT: 183 LBS

## 2020-03-18 DIAGNOSIS — E88.01 ALPHA-1-ANTITRYPSIN DEFICIENCY (HCC): Primary | ICD-10-CM

## 2020-03-18 PROCEDURE — 2580000003 HC RX 258: Performed by: INTERNAL MEDICINE

## 2020-03-18 PROCEDURE — 6360000002 HC RX W HCPCS: Performed by: INTERNAL MEDICINE

## 2020-03-18 PROCEDURE — 96365 THER/PROPH/DIAG IV INF INIT: CPT

## 2020-03-18 RX ORDER — METHYLPREDNISOLONE SODIUM SUCCINATE 125 MG/2ML
125 INJECTION, POWDER, LYOPHILIZED, FOR SOLUTION INTRAMUSCULAR; INTRAVENOUS ONCE
Status: CANCELLED | OUTPATIENT
Start: 2020-03-25

## 2020-03-18 RX ORDER — SODIUM CHLORIDE 9 MG/ML
INJECTION, SOLUTION INTRAVENOUS CONTINUOUS
Status: CANCELLED | OUTPATIENT
Start: 2020-03-25

## 2020-03-18 RX ORDER — EPINEPHRINE 1 MG/ML
0.3 INJECTION, SOLUTION, CONCENTRATE INTRAVENOUS PRN
Status: CANCELLED | OUTPATIENT
Start: 2020-03-25

## 2020-03-18 RX ORDER — DIPHENHYDRAMINE HYDROCHLORIDE 50 MG/ML
50 INJECTION INTRAMUSCULAR; INTRAVENOUS ONCE
Status: CANCELLED | OUTPATIENT
Start: 2020-03-25

## 2020-03-18 RX ORDER — SODIUM CHLORIDE 0.9 % (FLUSH) 0.9 %
10 SYRINGE (ML) INJECTION PRN
Status: CANCELLED | OUTPATIENT
Start: 2020-03-25

## 2020-03-18 RX ORDER — SODIUM CHLORIDE 0.9 % (FLUSH) 0.9 %
10 SYRINGE (ML) INJECTION PRN
Status: DISCONTINUED | OUTPATIENT
Start: 2020-03-18 | End: 2020-03-19 | Stop reason: HOSPADM

## 2020-03-18 RX ADMIN — Medication 10 ML: at 11:17

## 2020-03-18 RX ADMIN — Medication 10 ML: at 11:46

## 2020-03-18 RX ADMIN — Medication 30 ML: at 11:54

## 2020-03-18 RX ADMIN — .ALPHA.1-PROTEINASE INHIBITOR HUMAN 5000 MG: 1 INJECTION, SOLUTION INTRAVENOUS at 11:17

## 2020-03-18 RX ADMIN — Medication 20 ML: at 10:26

## 2020-03-18 ASSESSMENT — PAIN SCALES - GENERAL
PAINLEVEL_OUTOF10: 0

## 2020-03-18 NOTE — PROGRESS NOTES
Outpatient [address]  Infusion Visit    NAME:  Ivis Dunlap  YOB: 1975  MEDICAL RECORD NUMBER:  [id number]  Episode Date:  3/18/2020    Patient arrived to [facility] 58     [] per wheelchair   [x] ambulatory     Verification of patient education of Patricia Crofts actions and potential side effects:  Yes - patient has received multiple doses and verbalizes understanding of potential side effects. Is this the patient's first Glassia Infusion? No     Did the patient experience any adverse reactions to previous Patricia Crofts Infusion? No    Any side effects from last Glassia infusion:  No - denies all listed side effects     [] Rash         [] Itching  [] Headache  [] Fatigue  [] Dizziness  [] Tingling   [] Chest tightness  [] Shortness of Breath  [] Wheezing    Patient Active Problem List   Diagnosis Code    Abdominal pain R10.9    Lymphadenopathy R59.1    GERD (gastroesophageal reflux disease) K21.9    Ariza's esophagus with dysplasia K22.719    Emphysema lung J43.9    Alpha-[facility]-antitry[facility] deficiency ([facility] E88.01    Essential hypertension, benign I10    Advance care planning Z71.89    Colon polyp K63.5    Chronic hypoxemic respiratory failure (HCC) J96.11    Sprain of anterior talofibular ligament of left ankle S93.492A       Pre infusion Vital Signs       Temp: 97.8  Pulse: 65  Resp: 16  Blood pressure: 130/82    Does the Patient have a History of:     IgA deficiency: no     Anaphylaxis or severe systemic response to any medication:  No        Breath Sounds: No increased work of breathing, Breath sounds clear to auscultation bilaterally and Good air exchange - See DOC flowsheets    Pulse Oximetry: 96 %    Any recent activity tolerance changes: No      Any increased SOB or dyspnea:  No     Presence of cough or sputum:  No     Premedicated: None ordered  [] Benadryl 25 mg IV  [] Benadryl 50 mg IV  [] Benadryl 25 mg oral   [] Benadryl 50 mg oral  [] Other    Epinephrine at bedside:  No - readily available in Pyxis    IV placed and documented prior to drug preparation:  Sharrell Redwood must be administered within 3 hours of drug reconstitution as it contains no preservatives. Monitoring of infusion documented in doc flowsheets. Glassia administered: Yes     /78   Pulse 64   Temp 98 °F (36.7 °C) (Oral)   Resp 16   Wt 183 lb (83 kg)   SpO2 97%   BMI 26.26 kg/m²     Glassia dosage: 60 mg/kg (total dose of 5,000 mg in 250 ml diluent) was administered slowly IV using supplied filter    Dose verified by: Milind Allen RN    Post treatment Vital Signs  Temp: 98  Pulse: 64  Resp: 16  Blood pressure: 126/78    Response to treatment:  Well tolerated by patient. Scheduled to return for next dose of Glassia on March 25, 2020.      Electronically signed by Alice Gibbs RN on 3/18/2020 at 12:31 PM

## 2020-03-23 ENCOUNTER — PATIENT MESSAGE (OUTPATIENT)
Dept: PRIMARY CARE CLINIC | Age: 45
End: 2020-03-23

## 2020-03-24 ENCOUNTER — TELEPHONE (OUTPATIENT)
Dept: INFUSION THERAPY | Age: 45
End: 2020-03-24

## 2020-03-25 ENCOUNTER — HOSPITAL ENCOUNTER (OUTPATIENT)
Dept: INFUSION THERAPY | Age: 45
Setting detail: INFUSION SERIES
End: 2020-03-25
Payer: COMMERCIAL

## 2020-04-01 ENCOUNTER — HOSPITAL ENCOUNTER (OUTPATIENT)
Dept: INFUSION THERAPY | Age: 45
Setting detail: INFUSION SERIES
Discharge: HOME | End: 2020-04-01
Payer: COMMERCIAL

## 2020-04-01 VITALS
OXYGEN SATURATION: 96 % | SYSTOLIC BLOOD PRESSURE: 129 MMHG | WEIGHT: 184 LBS | TEMPERATURE: 98.4 F | DIASTOLIC BLOOD PRESSURE: 79 MMHG | BODY MASS INDEX: 26.4 KG/M2 | HEART RATE: 68 BPM | RESPIRATION RATE: 17 BRPM

## 2020-04-01 DIAGNOSIS — E88.01 ALPHA-1-ANTITRYPSIN DEFICIENCY (HCC): Primary | ICD-10-CM

## 2020-04-01 PROCEDURE — 96365 THER/PROPH/DIAG IV INF INIT: CPT

## 2020-04-01 PROCEDURE — 6360000002 HC RX W HCPCS: Performed by: INTERNAL MEDICINE

## 2020-04-01 PROCEDURE — 2580000003 HC RX 258: Performed by: INTERNAL MEDICINE

## 2020-04-01 RX ORDER — DIPHENHYDRAMINE HYDROCHLORIDE 50 MG/ML
50 INJECTION INTRAMUSCULAR; INTRAVENOUS ONCE
Status: CANCELLED | OUTPATIENT
Start: 2020-04-08

## 2020-04-01 RX ORDER — SODIUM CHLORIDE 0.9 % (FLUSH) 0.9 %
10 SYRINGE (ML) INJECTION PRN
Status: CANCELLED | OUTPATIENT
Start: 2020-04-08

## 2020-04-01 RX ORDER — SODIUM CHLORIDE 9 MG/ML
INJECTION, SOLUTION INTRAVENOUS CONTINUOUS
Status: CANCELLED | OUTPATIENT
Start: 2020-04-08

## 2020-04-01 RX ORDER — SODIUM CHLORIDE 9 MG/ML
INJECTION, SOLUTION INTRAVENOUS CONTINUOUS
Status: ACTIVE | OUTPATIENT
Start: 2020-04-01 | End: 2020-04-01

## 2020-04-01 RX ORDER — EPINEPHRINE 1 MG/ML
0.3 INJECTION, SOLUTION, CONCENTRATE INTRAVENOUS PRN
Status: CANCELLED | OUTPATIENT
Start: 2020-04-08

## 2020-04-01 RX ORDER — METHYLPREDNISOLONE SODIUM SUCCINATE 125 MG/2ML
125 INJECTION, POWDER, LYOPHILIZED, FOR SOLUTION INTRAMUSCULAR; INTRAVENOUS ONCE
Status: CANCELLED | OUTPATIENT
Start: 2020-04-08

## 2020-04-01 RX ORDER — SODIUM CHLORIDE 0.9 % (FLUSH) 0.9 %
10 SYRINGE (ML) INJECTION PRN
Status: DISCONTINUED | OUTPATIENT
Start: 2020-04-01 | End: 2020-04-02 | Stop reason: HOSPADM

## 2020-04-01 RX ADMIN — Medication 10 ML: at 09:30

## 2020-04-01 RX ADMIN — .ALPHA.1-PROTEINASE INHIBITOR HUMAN 5000 MG: 1 INJECTION, SOLUTION INTRAVENOUS at 10:11

## 2020-04-01 RX ADMIN — SODIUM CHLORIDE 1000 ML: 9 INJECTION, SOLUTION INTRAVENOUS at 10:00

## 2020-04-01 RX ADMIN — Medication 10 ML: at 10:41

## 2020-04-01 ASSESSMENT — PAIN SCALES - GENERAL: PAINLEVEL_OUTOF10: 0

## 2020-04-01 NOTE — PROGRESS NOTES
Outpatient [address] Infusion Visit    NAME:  Shayna Sanchez  YOB: 1975  MEDICAL RECORD NUMBER:  [id number]  Episode Date:  4/1/2020    Patient arrived to [facility] 58   [] per wheelchair   [x] ambulatory   Color good, no ankle edema. Pt . in good spirits, very talkative. Pt. denies any new c/o's or any change in his medical condition. Pt denies  Any pain from his Port . Verification of patient education of Coye Correa actions and potential side effects:  Yes     Is this the patient's first Glassia Infusion? No   Did the patient experience any adverse reactions to previous Coye Correa Infusion? No    Any side effects from last Glassia infusion:  No     [] Rash         [] Itching  [] Headache  [] Fatigue  [] Dizziness  [] Tingling   [] Chest tightness  [] Shortness of Breath  [] Wheezing    Patient Active Problem List   Diagnosis Code    Abdominal pain R10.9    Lymphadenopathy R59.1    GERD (gastroesophageal reflux disease) K21.9    Ariza's esophagus with dysplasia K22.719    Emphysema lung J43.9    Alpha-[facility]-antitry[facility] deficiency ([facility] E88.01    Essential hypertension, benign I10    Advance care planning Z71.89    Colon polyp K63.5    Chronic hypoxemic respiratory failure (HCC) J96.11    Sprain of anterior talofibular ligament of left ankle S93.492A       Pre infusion Vital Signs       Temp: 98.4 °F (36.9 °C)     Pulse: 78     Resp: 17     BP: 121/86       Does the Patient have a History of: IgA deficiency    :no -     Anaphylaxis or severe systemic response to any medication:  No        Breath Sounds: No increased work of breathing, Breath sounds clear to auscultation bilaterally and Good air exchange--no wheezing      Any recent activity tolerance changes: No      Any increased SOB or dyspnea:  No     Presence of cough or sputum:  Yes  , has mucus that is white to clear , no increase in amt. or any change in color.      Premedicated: no premeds ordered   [] Benadryl 25 mg IV  [] Benadryl 50 mg IV  [] Benadryl 25 mg oral   [] Benadryl 50 mg oral  [] Other    Epinephrine at bedside:  Yes    IV placed and documented prior to drug preparation:  Jolinda Riser must be administered within 3 hours of drug reconstitution as it contains no preservatives. Monitoring of infusion documented in doc flowsheets. Glassia administered: Yes     /86   Pulse 78   Temp 98.4 °F (36.9 °C) (Oral)   Resp 17   Wt 184 lb (83.5 kg)   SpO2 97%   BMI 26.40 kg/m²     Glassia dosage: 60 mg/kg was administered slowly IV--total dose is 5000 mg IVPB--with use of Pall Supor 5.0 um Filter   Dose verified by:   Virgie Melton RN      Response to treatment:  Well tolerated by patient. Scheduled to return for next dose of Glassia on Thursday April 9,2020 at 0930am  Reinforced infection prevention to pt and hand washing and avoidance of ill people, and to call MD immediately for fever, increased cough or sob . Pt voices understanding.       Electronically signed by Kasie Kim RN on 4/1/2020 at 11:58 AM

## 2020-04-01 NOTE — PROGRESS NOTES
.  Outpatient [address] [facility] [address] Access    NAME:  Angie Edouard  YOB: 1975  MEDICAL RECORD NUMBER:  [id number]  DATE:  4/1/2020    Patient arrived to [facility] 58   [] per wheelchair   [x] ambulatory     Port Site Location:  right chest  Port Site:  Redness: No  Bruising: No   Edema: No  Pain: No     Port Site cleansed with:  Chloroprep Scrub x 3  for 30 seconds and air dried x 3 mis. ? Yes     Port Accessed with: 20 Gauge . [address] [facility] non coring needle using sterile technique. Blood return obtained: Yes  Flushed with 20 ml Normal Saline using push-pause method. Port flushes without resistance. biopatch to site, and covered tegaderm drsng. Double T connector to port tubing. Response to treatment:  Well tolerated by patient.     Electronically signed by Jarred Gaxiola RN on 4/1/2020 at 11:50 AM

## 2020-04-08 ENCOUNTER — APPOINTMENT (OUTPATIENT)
Dept: INFUSION THERAPY | Age: 45
End: 2020-04-08
Payer: COMMERCIAL

## 2020-04-09 ENCOUNTER — HOSPITAL ENCOUNTER (OUTPATIENT)
Dept: INFUSION THERAPY | Age: 45
Setting detail: INFUSION SERIES
Discharge: HOME | End: 2020-04-09
Payer: COMMERCIAL

## 2020-04-09 VITALS
HEART RATE: 66 BPM | WEIGHT: 181 LBS | TEMPERATURE: 97.6 F | SYSTOLIC BLOOD PRESSURE: 122 MMHG | DIASTOLIC BLOOD PRESSURE: 84 MMHG | HEIGHT: 70 IN | RESPIRATION RATE: 17 BRPM | OXYGEN SATURATION: 97 % | BODY MASS INDEX: 25.91 KG/M2

## 2020-04-09 DIAGNOSIS — E88.01 ALPHA-1-ANTITRYPSIN DEFICIENCY (HCC): Primary | ICD-10-CM

## 2020-04-09 PROCEDURE — 96365 THER/PROPH/DIAG IV INF INIT: CPT

## 2020-04-09 PROCEDURE — 6360000002 HC RX W HCPCS: Performed by: INTERNAL MEDICINE

## 2020-04-09 PROCEDURE — 2580000003 HC RX 258: Performed by: INTERNAL MEDICINE

## 2020-04-09 RX ORDER — SODIUM CHLORIDE 0.9 % (FLUSH) 0.9 %
10 SYRINGE (ML) INJECTION PRN
Status: DISCONTINUED | OUTPATIENT
Start: 2020-04-09 | End: 2020-04-10 | Stop reason: HOSPADM

## 2020-04-09 RX ORDER — SODIUM CHLORIDE 9 MG/ML
INJECTION, SOLUTION INTRAVENOUS CONTINUOUS
Status: CANCELLED | OUTPATIENT
Start: 2020-04-15

## 2020-04-09 RX ORDER — DIPHENHYDRAMINE HYDROCHLORIDE 50 MG/ML
50 INJECTION INTRAMUSCULAR; INTRAVENOUS ONCE
Status: CANCELLED | OUTPATIENT
Start: 2020-04-15

## 2020-04-09 RX ORDER — METHYLPREDNISOLONE SODIUM SUCCINATE 125 MG/2ML
125 INJECTION, POWDER, LYOPHILIZED, FOR SOLUTION INTRAMUSCULAR; INTRAVENOUS ONCE
Status: CANCELLED | OUTPATIENT
Start: 2020-04-15

## 2020-04-09 RX ORDER — EPINEPHRINE 1 MG/ML
0.3 INJECTION, SOLUTION, CONCENTRATE INTRAVENOUS PRN
Status: CANCELLED | OUTPATIENT
Start: 2020-04-15

## 2020-04-09 RX ORDER — SODIUM CHLORIDE 0.9 % (FLUSH) 0.9 %
10 SYRINGE (ML) INJECTION PRN
Status: CANCELLED | OUTPATIENT
Start: 2020-04-15

## 2020-04-09 RX ADMIN — .ALPHA.1-PROTEINASE INHIBITOR HUMAN: 1 INJECTION, SOLUTION INTRAVENOUS at 10:32

## 2020-04-09 RX ADMIN — Medication 10 ML: at 11:03

## 2020-04-09 RX ADMIN — Medication 10 ML: at 10:32

## 2020-04-09 RX ADMIN — Medication 30 ML: at 11:15

## 2020-04-09 RX ADMIN — Medication 10 ML: at 09:53

## 2020-04-09 ASSESSMENT — PAIN SCALES - GENERAL
PAINLEVEL_OUTOF10: 0
PAINLEVEL_OUTOF10: 0

## 2020-04-09 NOTE — PROGRESS NOTES
Outpatient [address] Infusion Visit    NAME:  Yinka Solis  YOB: 1975  MEDICAL RECORD NUMBER:  [id number]  Episode Date:  4/9/2020    Patient arrived to [facility] 58     [] per wheelchair   [x] ambulatory     Verification of patient education of [place] Christianson actions and potential side effects:  Yes     Is this the patient's first Leo Christianson Infusion? No     Did the patient experience any adverse reactions to previous [place] Infusion? No    Any side effects from last Glassia infusion:  No     [] Rash         [] Itching  [] Headache  [] Fatigue  [] Dizziness  [] Tingling   [] Chest tightness  [] Shortness of Breath  [] Wheezing    Patient Active Problem List   Diagnosis Code    Abdominal pain R10.9    Lymphadenopathy R59.1    GERD (gastroesophageal reflux disease) K21.9    Ariza's esophagus with dysplasia K22.719    Emphysema lung J43.9    Alpha-1-antitrypsin deficiency ([facility] E88.01    Essential hypertension, benign I10    Advance care planning Z71.89    Colon polyp K63.5    Chronic hypoxemic respiratory failure (HCC) J96.11    Sprain of anterior talofibular ligament of left ankle S93.492A       Pre infusion Vital Signs       Temp: 97.7 °F (36.5 °C)     Pulse: 80     Resp: 17     BP: 120/79       Does the Patient have a History of:     IgA deficiency: no     Anaphylaxis or severe systemic response to any medication:  No        Breath Sounds: No increased work of breathing, Breath sounds clear to auscultation bilaterally, Good air exchange and No crackles - See DOC flowsheets    Pulse Oximetry: 96 %    Any recent activity tolerance changes: No      Any increased SOB or dyspnea:  No     Presence of cough or sputum:  Yes - thin, clear white sputum mostly in the morning upon waking and occasionally in the evening - patient reports that this is his usual     Premedicated: No premedication ordered  [] Benadryl 25 mg IV  [] Benadryl 50 mg IV  [] Benadryl 25 mg oral   [] Benadryl 50 mg oral  [] Other    Epinephrine at bedside:  No: Readily available in Pyxis    IV placed and documented prior to drug preparation:  Patricia Crofts must be administered within 3 hours of drug reconstitution as it contains no preservatives. Monitoring of infusion documented in doc flowsheets. Glassia administered: Yes     /84   Pulse 66   Temp 97.6 °F (36.4 °C) (Oral)   Resp 17   Ht 5' 10\" (1.778 m)   Wt 181 lb (82.1 kg)   SpO2 97%   BMI 25.97 kg/m²     Glassia dosage: 60 mg/kg (total dose of 5,000 mg in 250 ml) was administered slowly IV    Dose verified by:  Maricel Sharma RN    Post treatment Vital Signs  Temp: 97.6 °F (36.4 °C)  Pulse: 66  Resp: 17  BP: 122/84    Response to treatment:  Well tolerated by patient. Scheduled to return for next dose of Glassia on April 16, 2020.      Electronically signed by Ning Negron RN on 4/9/2020 at 11:26 AM

## 2020-04-14 RX ORDER — ALBUTEROL SULFATE 90 UG/1
2 AEROSOL, METERED RESPIRATORY (INHALATION) EVERY 6 HOURS PRN
Qty: 1 INHALER | Refills: 5 | Status: SHIPPED | OUTPATIENT
Start: 2020-04-14

## 2020-04-16 ENCOUNTER — PATIENT MESSAGE (OUTPATIENT)
Dept: PRIMARY CARE CLINIC | Age: 45
End: 2020-04-16

## 2020-04-16 ENCOUNTER — HOSPITAL ENCOUNTER (OUTPATIENT)
Dept: INFUSION THERAPY | Age: 45
Setting detail: INFUSION SERIES
Discharge: HOME | End: 2020-04-16
Payer: COMMERCIAL

## 2020-04-16 VITALS
DIASTOLIC BLOOD PRESSURE: 68 MMHG | BODY MASS INDEX: 26.11 KG/M2 | OXYGEN SATURATION: 95 % | HEART RATE: 66 BPM | RESPIRATION RATE: 16 BRPM | SYSTOLIC BLOOD PRESSURE: 129 MMHG | TEMPERATURE: 98.2 F | WEIGHT: 182 LBS

## 2020-04-16 DIAGNOSIS — E88.01 ALPHA-1-ANTITRYPSIN DEFICIENCY (HCC): Primary | ICD-10-CM

## 2020-04-16 PROCEDURE — 2580000003 HC RX 258: Performed by: INTERNAL MEDICINE

## 2020-04-16 PROCEDURE — 6360000002 HC RX W HCPCS: Performed by: INTERNAL MEDICINE

## 2020-04-16 PROCEDURE — 96365 THER/PROPH/DIAG IV INF INIT: CPT

## 2020-04-16 RX ORDER — SODIUM CHLORIDE 9 MG/ML
INJECTION, SOLUTION INTRAVENOUS CONTINUOUS
Status: CANCELLED | OUTPATIENT
Start: 2020-04-23

## 2020-04-16 RX ORDER — EPINEPHRINE 1 MG/ML
0.3 INJECTION, SOLUTION, CONCENTRATE INTRAVENOUS PRN
Status: CANCELLED | OUTPATIENT
Start: 2020-04-23

## 2020-04-16 RX ORDER — METHYLPREDNISOLONE SODIUM SUCCINATE 125 MG/2ML
125 INJECTION, POWDER, LYOPHILIZED, FOR SOLUTION INTRAMUSCULAR; INTRAVENOUS ONCE
Status: CANCELLED | OUTPATIENT
Start: 2020-04-23

## 2020-04-16 RX ORDER — SODIUM CHLORIDE 0.9 % (FLUSH) 0.9 %
10 SYRINGE (ML) INJECTION PRN
Status: CANCELLED | OUTPATIENT
Start: 2020-04-23

## 2020-04-16 RX ORDER — SODIUM CHLORIDE 0.9 % (FLUSH) 0.9 %
10 SYRINGE (ML) INJECTION PRN
Status: DISCONTINUED | OUTPATIENT
Start: 2020-04-16 | End: 2020-04-17 | Stop reason: HOSPADM

## 2020-04-16 RX ORDER — DIPHENHYDRAMINE HYDROCHLORIDE 50 MG/ML
50 INJECTION INTRAMUSCULAR; INTRAVENOUS ONCE
Status: CANCELLED | OUTPATIENT
Start: 2020-04-23

## 2020-04-16 RX ADMIN — Medication 20 ML: at 10:30

## 2020-04-16 RX ADMIN — .ALPHA.1-PROTEINASE INHIBITOR HUMAN 4956 MG: 1 INJECTION, SOLUTION INTRAVENOUS at 10:06

## 2020-04-16 RX ADMIN — Medication 20 ML: at 09:25

## 2020-04-16 NOTE — TELEPHONE ENCOUNTER
From: Moiz Seals  To: Lisette Osborn MD  Sent: 4/16/2020 11:01 AM EDT  Subject: Non-Urgent Medical Question      Just to clarify, we are canceling my appointment next Friday? Also, I was wondering about my labs because they are accessing my port next Thursday and they would be able to take blood. ----- Message -----   Ryan Mcgee MD   Sent:4/16/2020 10:53 AM EDT   To:Kush Sanchez   Subject:RE: Non-Urgent Medical Question    Good morning Daniel,    I would like you to wait until after this coronavirus settles down a little bit before you go get your labs. You are high risk for complications from it and I would like to minimize your exposure if possible. Thanks,  Nichole Anthony MD      ----- Message -----   From:Kush Sanchez   Sent:4/16/2020 9:33 AM EDT   Sidra Braga MD   Subject:Non-Urgent Medical Question    Good Morning Dr. Jazmin Spear     I have my yearly appointment next Friday and I was wondering if you would like me to have my labs taken the day before at my infusion. I dont know if you are trying to only see patients on a must see basis. If my labs show anything concerning we can schedule another appointment or tele-health, whatever you decide.      Thanks,  Jules Hunter

## 2020-04-16 NOTE — PROGRESS NOTES
Outpatient [address]  Infusion Visit    NAME:  Kwame Álvarez  YOB: 1975  MEDICAL RECORD NUMBER:  [id number]  Episode Date:  4/16/2020    Patient arrived to [facility] 58     [] per wheelchair   [x] ambulatory     Alert and oriented X4. Patient denies any new S/S of respiratory illness. Afebrile. Wearing mask when out as protection from illness. Skin color pink. Verification of patient education of Papa Angst actions and potential side effects:  Yes     Is this the patient's first Glassia Infusion? No   Did the patient experience any adverse reactions to previous Papa Angst Infusion? No    Any side effects from last Glassia infusion:  No     [] Rash         [] Itching  [] Headache  [] Fatigue  [] Dizziness  [] Tingling   [] Chest tightness  [] Shortness of Breath  [] Wheezing    Patient Active Problem List   Diagnosis Code    Abdominal pain R10.9    Lymphadenopathy R59.1    GERD (gastroesophageal reflux disease) K21.9    Ariza's esophagus with dysplasia K22.719    Emphysema lung J43.9    Alpha-1-antitrypsin deficiency ([facility] [address] [address] E88.01    Essential hypertension, benign I10    Advance care planning Z71.89    Colon polyp K63.5    Chronic hypoxemic respiratory failure (HCC) J96.11    Sprain of anterior talofibular ligament of left ankle S93.492A       Pre infusion Vital Signs       Temp: 98.2 °F (36.8 °C)     Pulse: 66     Resp: 16     BP: 129/68       Does the Patient have a History of: IgA deficiency: No    Anaphylaxis or severe systemic response to any medication:  No        Breath Sounds: No increased work of breathing, Breath sounds clear to auscultation bilaterally and Good air exchange no cough  Pulse Oximetry: 95 %    Any recent activity tolerance changes: No      Any increased SOB or dyspnea:  No     Presence of cough or sputum:  Yes usually in Am upon rising, productive cough of thin white sputum.     Premedicated: None ordered  [] Benadryl 25 mg IV  [] Benadryl 50 mg IV  [] Benadryl 25 mg oral   [] Benadryl 50 mg oral  [] Other    Epinephrine at bedside:  No: available in omnicell    IV placed and documented prior to drug preparation:Yes  Micaela Maidens must be administered within 3 hours of drug reconstitution as it contains no preservatives. Monitoring of infusion documented in doc flowsheets. Glassia administered: Yes     /68   Pulse 66   Temp 98.2 °F (36.8 °C) (Oral)   Resp 16   Wt 182 lb (82.6 kg)   SpO2 95%   BMI 26.11 kg/m²     Glassia dosage: 60 mg/kg (4,956 mg in 250ml)was administered  IV  Dose verified by: pharmacy      Response to treatment:  Well tolerated by patient. Scheduled to return for next dose of Glassia on April 23, 2020.      Electronically signed by Sienna Espana RN on 4/16/2020 at 10:42 AM

## 2020-04-21 ENCOUNTER — TELEPHONE (OUTPATIENT)
Dept: INFUSION THERAPY | Age: 45
End: 2020-04-21

## 2020-04-21 ENCOUNTER — TELEPHONE (OUTPATIENT)
Dept: PRIMARY CARE CLINIC | Age: 45
End: 2020-04-21

## 2020-04-21 NOTE — TELEPHONE ENCOUNTER
Called and talked with patient to confirm his appointment for Thurs 4/23/2020. Patient denies any fever, cough, difficulty breathing or any other flu symptom and he is aware we are located in [place].

## 2020-04-23 ENCOUNTER — HOSPITAL ENCOUNTER (OUTPATIENT)
Dept: INFUSION THERAPY | Age: 45
Setting detail: INFUSION SERIES
Discharge: HOME | End: 2020-04-23
Payer: COMMERCIAL

## 2020-04-23 VITALS
SYSTOLIC BLOOD PRESSURE: 127 MMHG | WEIGHT: 182 LBS | HEART RATE: 61 BPM | OXYGEN SATURATION: 97 % | DIASTOLIC BLOOD PRESSURE: 80 MMHG | TEMPERATURE: 97.9 F | RESPIRATION RATE: 16 BRPM | BODY MASS INDEX: 26.11 KG/M2

## 2020-04-23 DIAGNOSIS — J43.9 PULMONARY EMPHYSEMA, UNSPECIFIED EMPHYSEMA TYPE (HCC): ICD-10-CM

## 2020-04-23 DIAGNOSIS — E88.01 ALPHA-1-ANTITRYPSIN DEFICIENCY (HCC): Primary | ICD-10-CM

## 2020-04-23 DIAGNOSIS — E11.9 TYPE 2 DIABETES MELLITUS WITHOUT COMPLICATION, WITHOUT LONG-TERM CURRENT USE OF INSULIN (HCC): ICD-10-CM

## 2020-04-23 LAB
A/G RATIO: 1.3 (ref 1.1–2.2)
ALBUMIN SERPL-MCNC: 3.9 G/DL (ref 3.4–5)
ALP BLD-CCNC: 73 U/L (ref 40–129)
ALT SERPL-CCNC: 57 U/L (ref 10–40)
ANION GAP SERPL CALCULATED.3IONS-SCNC: 13 MMOL/L (ref 3–16)
AST SERPL-CCNC: 50 U/L (ref 15–37)
BASOPHILS ABSOLUTE: 0 K/UL (ref 0–0.2)
BASOPHILS RELATIVE PERCENT: 0.3 %
BILIRUB SERPL-MCNC: 0.4 MG/DL (ref 0–1)
BUN BLDV-MCNC: 9 MG/DL (ref 7–20)
CALCIUM SERPL-MCNC: 9.1 MG/DL (ref 8.3–10.6)
CHLORIDE BLD-SCNC: 104 MMOL/L (ref 99–110)
CHOLESTEROL, TOTAL: 138 MG/DL (ref 0–199)
CO2: 21 MMOL/L (ref 21–32)
CREAT SERPL-MCNC: 0.6 MG/DL (ref 0.9–1.3)
EOSINOPHILS ABSOLUTE: 0.2 K/UL (ref 0–0.6)
EOSINOPHILS RELATIVE PERCENT: 3.2 %
GFR AFRICAN AMERICAN: >60
GFR NON-AFRICAN AMERICAN: >60
GLOBULIN: 3 G/DL
GLUCOSE BLD-MCNC: 105 MG/DL (ref 70–99)
HCT VFR BLD CALC: 46.7 % (ref 40.5–52.5)
HDLC SERPL-MCNC: 35 MG/DL (ref 40–60)
HEMOGLOBIN: 15.5 G/DL (ref 13.5–17.5)
LDL CHOLESTEROL CALCULATED: 84 MG/DL
LYMPHOCYTES ABSOLUTE: 1.9 K/UL (ref 1–5.1)
LYMPHOCYTES RELATIVE PERCENT: 27.7 %
MCH RBC QN AUTO: 29 PG (ref 26–34)
MCHC RBC AUTO-ENTMCNC: 33.3 G/DL (ref 31–36)
MCV RBC AUTO: 87.2 FL (ref 80–100)
MONOCYTES ABSOLUTE: 0.5 K/UL (ref 0–1.3)
MONOCYTES RELATIVE PERCENT: 6.8 %
NEUTROPHILS ABSOLUTE: 4.3 K/UL (ref 1.7–7.7)
NEUTROPHILS RELATIVE PERCENT: 62 %
PDW BLD-RTO: 12.8 % (ref 12.4–15.4)
PLATELET # BLD: 112 K/UL (ref 135–450)
PLATELET SLIDE REVIEW: ABNORMAL
PMV BLD AUTO: 11.5 FL (ref 5–10.5)
POTASSIUM SERPL-SCNC: 4 MMOL/L (ref 3.5–5.1)
RBC # BLD: 5.35 M/UL (ref 4.2–5.9)
SLIDE REVIEW: ABNORMAL
SODIUM BLD-SCNC: 138 MMOL/L (ref 136–145)
TOTAL PROTEIN: 6.9 G/DL (ref 6.4–8.2)
TRIGL SERPL-MCNC: 97 MG/DL (ref 0–150)
VLDLC SERPL CALC-MCNC: 19 MG/DL
WBC # BLD: 7 K/UL (ref 4–11)

## 2020-04-23 PROCEDURE — 96365 THER/PROPH/DIAG IV INF INIT: CPT

## 2020-04-23 PROCEDURE — 80053 COMPREHEN METABOLIC PANEL: CPT

## 2020-04-23 PROCEDURE — 85025 COMPLETE CBC W/AUTO DIFF WBC: CPT

## 2020-04-23 PROCEDURE — 6360000002 HC RX W HCPCS: Performed by: INTERNAL MEDICINE

## 2020-04-23 PROCEDURE — 80061 LIPID PANEL: CPT

## 2020-04-23 PROCEDURE — 2580000003 HC RX 258: Performed by: INTERNAL MEDICINE

## 2020-04-23 RX ORDER — DIPHENHYDRAMINE HYDROCHLORIDE 50 MG/ML
50 INJECTION INTRAMUSCULAR; INTRAVENOUS ONCE
Status: CANCELLED | OUTPATIENT
Start: 2020-04-30

## 2020-04-23 RX ORDER — METHYLPREDNISOLONE SODIUM SUCCINATE 125 MG/2ML
125 INJECTION, POWDER, LYOPHILIZED, FOR SOLUTION INTRAMUSCULAR; INTRAVENOUS ONCE
Status: CANCELLED | OUTPATIENT
Start: 2020-04-30

## 2020-04-23 RX ORDER — SODIUM CHLORIDE 0.9 % (FLUSH) 0.9 %
10 SYRINGE (ML) INJECTION PRN
Status: CANCELLED | OUTPATIENT
Start: 2020-04-30

## 2020-04-23 RX ORDER — SODIUM CHLORIDE 9 MG/ML
INJECTION, SOLUTION INTRAVENOUS CONTINUOUS
Status: CANCELLED | OUTPATIENT
Start: 2020-04-30

## 2020-04-23 RX ORDER — EPINEPHRINE 1 MG/ML
0.3 INJECTION, SOLUTION, CONCENTRATE INTRAVENOUS PRN
Status: CANCELLED | OUTPATIENT
Start: 2020-04-30

## 2020-04-23 RX ORDER — SODIUM CHLORIDE 0.9 % (FLUSH) 0.9 %
10 SYRINGE (ML) INJECTION PRN
Status: DISCONTINUED | OUTPATIENT
Start: 2020-04-23 | End: 2020-04-24 | Stop reason: HOSPADM

## 2020-04-23 RX ADMIN — Medication 20 ML: at 10:06

## 2020-04-23 RX ADMIN — Medication 30 ML: at 09:40

## 2020-04-23 RX ADMIN — .ALPHA.1-PROTEINASE INHIBITOR HUMAN 5000 MG: 1 INJECTION, SOLUTION INTRAVENOUS at 09:49

## 2020-04-23 ASSESSMENT — PAIN SCALES - GENERAL: PAINLEVEL_OUTOF10: 0

## 2020-04-23 NOTE — PROGRESS NOTES
Outpatient [address] Infusion Visit    NAME:  Heather Barillas  YOB: 1975  MEDICAL RECORD NUMBER:  [id number]  Episode Date:  4/23/2020  Patient arrived to [facility] 58     [] per wheelchair   [x] ambulatory     Verification of patient education of Yash Brochure actions and potential side effects:  Yes     Is this the patient's first Yash Brochure Infusion? No   Did the patient experience any adverse reactions to previous Yash Brochure Infusion? No    Any side effects from last Glassia infusion:  NA     [] Rash         [] Itching  [] Headache  [] Fatigue  [] Dizziness  [] Tingling   [] Chest tightness  [] Shortness of Breath  [] Wheezing    Patient Active Problem List   Diagnosis Code    Abdominal pain R10.9    Lymphadenopathy R59.1    GERD (gastroesophageal reflux disease) K21.9    Ariza's esophagus with dysplasia K22.719    Emphysema lung J43.9    Alpha-1-antitrypsin deficiency ([facility] E88.01    Essential hypertension, benign I10    Advance care planning Z71.89    Colon polyp K63.5    Chronic hypoxemic respiratory failure (HCC) J96.11    Sprain of anterior talofibular ligament of left ankle S93.492A       Pre infusion Vital Signs        stable and afebrile         Does the Patient have a History of: IgA deficiency: no     Anaphylaxis or severe systemic response to any medication:  No        Breath Sounds: \"No increased work of breathing\",\"Breath sounds clear to auscultation bilaterally, expectorant cough of whie mucous. Pulse Oximetry: 96 %- 98%  Any recent activity tolerance changes: No      Any increased SOB or dyspnea:  No     Presence of cough or sputum:  Yes occasionally. IV placed and documented prior to drug preparation:  Yash Brochure must be administered within 3 hours of drug reconstitution as it contains no preservatives. yes    Monitoring of infusion documented in doc flowsheets. yes    Glassia administered: yes.       Wt 182 lb (82.6 kg)   BMI 26.11 kg/m²     Glassia dosage: 5,000 mg was administered  IV per port over 15-30 minutes   Dose verified by:    Malachi Cardenas RN. Offered no c/o's or complications from the infusion today. Stated that his doctor was able to take him off of lisinopril recently. He is still working night shift and doing ok with the hour changes. Response to treatment:  Well tolerated by patient. To return in 1 week. Scheduled to return for next dose of Glassia on April , 30, 2020.   Electronically signed by Riley Ireland RN on 4/23/2020 at 3:33 PM

## 2020-04-23 NOTE — PROGRESS NOTES
Outpatient [address] Access    NAME:  Quyen Stevenson  YOB: 1975  MEDICAL RECORD NUMBER:  [id number]  DATE:  4/23/2020    Patient arrived to [facility] 58   [] per wheelchair   [x] ambulatory     Port Site Location:  right chest  Port Site:  Redness: No  Bruising: No   Edema: No  Pain: No     Port Site cleansed with:  Chloroprep Scrub for 30 seconds and air dried? Yes     Port Accessed with: [address] non coring needle using sterile technique. Blood return obtained: Yes  Flushed with 10 ml Normal Saline using push-pause method. Port flushes without resistance. Response to treatment:  Well tolerated by patient. De-accessed without any difficulty. Not enough blood from port eeven after reposiioning him for his labs today, so peripheral stick was done from left hand x 1 attempt.   Electronically signed by Nely Wells RN on 4/23/2020 at 5:43 PM

## 2020-04-23 NOTE — PROGRESS NOTES
Outpatient [address] [facility]    Peripheral Lab Draw    NAME:  Quyen Stevenson  YOB: 1975  MEDICAL RECORD NUMBER:  [id number]  Episode Date:  4/23/2020    Blood Draw Site: Location:left hand  Site cleansed with Chloroprep Scrub for 30 seconds: Yes  Site cleansed with Alcohol pads: No:   Labs drawn with: 23 gauge             [x] Butterfly    [] Needle  Labs Obtained: {yes [id number]:   Yes, port did not give blood specimen but had good blood return. Number of attempts: 1    Lab Test(s) Ordered: CBC/ CMP    Lab Draw Site:  Redness: No  Bruising: No   Edema: No  Pain: No     Response to treatment:  Well tolerated by patient.       Electronically signed by Nely Wells RN on 4/23/2020 at 10:19 AM

## 2020-04-28 ENCOUNTER — TELEPHONE (OUTPATIENT)
Dept: INFUSION THERAPY | Age: 45
End: 2020-04-28

## 2020-04-30 ENCOUNTER — HOSPITAL ENCOUNTER (OUTPATIENT)
Dept: INFUSION THERAPY | Age: 45
Setting detail: INFUSION SERIES
Discharge: HOME | End: 2020-04-30
Payer: COMMERCIAL

## 2020-04-30 VITALS
DIASTOLIC BLOOD PRESSURE: 74 MMHG | WEIGHT: 180 LBS | HEART RATE: 72 BPM | RESPIRATION RATE: 16 BRPM | BODY MASS INDEX: 25.83 KG/M2 | OXYGEN SATURATION: 95 % | SYSTOLIC BLOOD PRESSURE: 119 MMHG | TEMPERATURE: 97.7 F

## 2020-04-30 DIAGNOSIS — E88.01 ALPHA-1-ANTITRYPSIN DEFICIENCY (HCC): Primary | ICD-10-CM

## 2020-04-30 PROCEDURE — 2580000003 HC RX 258: Performed by: INTERNAL MEDICINE

## 2020-04-30 PROCEDURE — 96365 THER/PROPH/DIAG IV INF INIT: CPT

## 2020-04-30 PROCEDURE — 6360000002 HC RX W HCPCS: Performed by: INTERNAL MEDICINE

## 2020-04-30 RX ORDER — SODIUM CHLORIDE 9 MG/ML
INJECTION, SOLUTION INTRAVENOUS CONTINUOUS
Status: CANCELLED | OUTPATIENT
Start: 2020-05-07

## 2020-04-30 RX ORDER — DIPHENHYDRAMINE HYDROCHLORIDE 50 MG/ML
50 INJECTION INTRAMUSCULAR; INTRAVENOUS ONCE
Status: CANCELLED | OUTPATIENT
Start: 2020-05-07

## 2020-04-30 RX ORDER — SODIUM CHLORIDE 0.9 % (FLUSH) 0.9 %
10 SYRINGE (ML) INJECTION PRN
Status: DISCONTINUED | OUTPATIENT
Start: 2020-04-30 | End: 2020-05-01 | Stop reason: HOSPADM

## 2020-04-30 RX ORDER — EPINEPHRINE 1 MG/ML
0.3 INJECTION, SOLUTION, CONCENTRATE INTRAVENOUS PRN
Status: CANCELLED | OUTPATIENT
Start: 2020-05-07

## 2020-04-30 RX ORDER — SODIUM CHLORIDE 9 MG/ML
INJECTION, SOLUTION INTRAVENOUS CONTINUOUS
Status: ACTIVE | OUTPATIENT
Start: 2020-04-30 | End: 2020-04-30

## 2020-04-30 RX ORDER — SODIUM CHLORIDE 0.9 % (FLUSH) 0.9 %
10 SYRINGE (ML) INJECTION PRN
Status: CANCELLED | OUTPATIENT
Start: 2020-05-07

## 2020-04-30 RX ORDER — METHYLPREDNISOLONE SODIUM SUCCINATE 125 MG/2ML
125 INJECTION, POWDER, LYOPHILIZED, FOR SOLUTION INTRAMUSCULAR; INTRAVENOUS ONCE
Status: CANCELLED | OUTPATIENT
Start: 2020-05-07

## 2020-04-30 RX ADMIN — Medication 20 ML: at 10:34

## 2020-04-30 RX ADMIN — .ALPHA.1-PROTEINASE INHIBITOR HUMAN 4896 MG: 1 INJECTION, SOLUTION INTRAVENOUS at 10:15

## 2020-04-30 RX ADMIN — Medication 20 ML: at 09:40

## 2020-04-30 ASSESSMENT — PAIN SCALES - GENERAL
PAINLEVEL_OUTOF10: 0
PAINLEVEL_OUTOF10: 0

## 2020-04-30 NOTE — PROGRESS NOTES
[facility]  Visit ;     NAME:  Alicia Villanueva  YOB: 1975  MEDICAL RECORD NUMBER:  [id number]  Episode Date:  4/30/2020    Patient arrived to [facility] 58    [] per wheelchair   [x] ambulatory     Verification of patient education of Marlyse Creamer actions and potential side effects:  Yes. Is this the patient's first Glassia infusion ; No.  Did the patient experience any adverse reactions to previous Glassia infusion ? No.    Any side effects from last infusion:  No.   Rash         [] Itching  [] Headache  [] Fatigue  [] Dizziness  [] Tingling   [] Chest tightness  [] Shortness of Breath  [] Wheezing    Patient Active Problem List   Diagnosis Code    Abdominal pain R10.9    Lymphadenopathy R59.1    GERD (gastroesophageal reflux disease) K21.9    Ariza's esophagus with dysplasia K22.719    Emphysema lung J43.9    Alpha-1-antitrypsin deficiency ([facility] E88.01    Essential hypertension, benign I10    Advance care planning Z71.89    Colon polyp K63.5    Chronic hypoxemic respiratory failure (HCC) J96.11    Sprain of anterior talofibular ligament of left ankle S93.492A       Pre infusion Vital Signs        stable and afebrile. Post vitals were ; 119 74 PULSE 72 RESP 18 TEMP. 97.7 ORALLY       Does the Patient have a History of: IgA  defiecency , no      Anaphylaxis or severe systemic response to any medication:  NO.      Breath Sounds:  No increased work of breathing\",\"Breath sounds clear to auscultation bilaterally. NO COUGH NOTED WHILE HERE SLIGHT TICKLE IN HIS THROAT. Pulse Oximetry: 95 %  Any recent activity tolerance changes: NO. Any increased SOB or dyspnea:  No.     Presence of cough or sputum:  No.     Premedicated:  [] Benadryl 25 mg IV  [] Benadryl 50 mg IV  . .....   NONE.  [] Benadryl 25 mg oral   [] Benadryl 50 mg oral  [] Other    Epinephrine at bedside:    IV placed and documented prior to drug preparation:  Geanie Doll must be administered within 3 hours of drug reconstitution as it contains no preservatives. Monitoring of infusion documented in doc flowsheets. Glassia administered: yes. Wt 180 lb (81.6 kg)   BMI 25.83 kg/m²     Glassia dosage:  4,896 mgs was administered  IV  Over 15 minutesDose verified by:  Amarilis Pierson RN    Response to treatment:  Well tolerated by patient. Scheduled to return for next dose of Zemaire on May, 7 [address] [facility], 2020. Electronically signed by Fer Garcia RN on 4/30/2020 at 3:58 PM      Outpatient [address] Access    NAME:  Mary Anne Vega  YOB: 1975  MEDICAL RECORD NUMBER:  [id number]  DATE:  4/30/2020    Patient arrived to [place] [address]   [] per wheelchair   [x] ambulatory     Port Site Location:  Right chest wall. Port Site:  Redness: no.  Bruising:  No.  Edema: no.  Pain: no.     Port Site cleansed with:  Chloroprep Scrub for 30 seconds and air dried? Yes. Port Accessed with:  [address] [place] non coring needle using sterile technique. Blood return obtained: yes . Flushed with 20 ml Normal Saline using push-pause method. Port flushes before and after without any resistance. Denies any pain from site. Response to treatment:  Well tolerated by patient. De -accessed without complications or pain sterile 2 x 2 in place with paper tape. Returns weekly and his appt was already made thru May 2020.   Electronically signed by Fer Garcia RN on 4/30/2020 at 4:06 PM

## 2020-05-06 ENCOUNTER — HOSPITAL ENCOUNTER (OUTPATIENT)
Dept: INFUSION THERAPY | Age: 45
Setting detail: INFUSION SERIES
Discharge: HOME | End: 2020-05-06
Payer: COMMERCIAL

## 2020-05-06 VITALS
HEART RATE: 70 BPM | DIASTOLIC BLOOD PRESSURE: 77 MMHG | WEIGHT: 187 LBS | RESPIRATION RATE: 16 BRPM | TEMPERATURE: 97.7 F | BODY MASS INDEX: 26.83 KG/M2 | SYSTOLIC BLOOD PRESSURE: 130 MMHG | OXYGEN SATURATION: 96 %

## 2020-05-06 DIAGNOSIS — E88.01 ALPHA-1-ANTITRYPSIN DEFICIENCY (HCC): Primary | ICD-10-CM

## 2020-05-06 PROCEDURE — 96365 THER/PROPH/DIAG IV INF INIT: CPT

## 2020-05-06 PROCEDURE — 6360000002 HC RX W HCPCS: Performed by: INTERNAL MEDICINE

## 2020-05-06 PROCEDURE — 2580000003 HC RX 258: Performed by: INTERNAL MEDICINE

## 2020-05-06 RX ORDER — SODIUM CHLORIDE 9 MG/ML
INJECTION, SOLUTION INTRAVENOUS CONTINUOUS
Status: ACTIVE | OUTPATIENT
Start: 2020-05-06 | End: 2020-05-06

## 2020-05-06 RX ORDER — EPINEPHRINE 1 MG/ML
0.3 INJECTION, SOLUTION, CONCENTRATE INTRAVENOUS PRN
Status: CANCELLED | OUTPATIENT
Start: 2020-05-07

## 2020-05-06 RX ORDER — SODIUM CHLORIDE 9 MG/ML
INJECTION, SOLUTION INTRAVENOUS CONTINUOUS
Status: CANCELLED | OUTPATIENT
Start: 2020-05-07

## 2020-05-06 RX ORDER — METHYLPREDNISOLONE SODIUM SUCCINATE 125 MG/2ML
125 INJECTION, POWDER, LYOPHILIZED, FOR SOLUTION INTRAMUSCULAR; INTRAVENOUS ONCE
Status: CANCELLED | OUTPATIENT
Start: 2020-05-07

## 2020-05-06 RX ORDER — SODIUM CHLORIDE 0.9 % (FLUSH) 0.9 %
10 SYRINGE (ML) INJECTION PRN
Status: DISCONTINUED | OUTPATIENT
Start: 2020-05-06 | End: 2020-05-07 | Stop reason: HOSPADM

## 2020-05-06 RX ORDER — DIPHENHYDRAMINE HYDROCHLORIDE 50 MG/ML
50 INJECTION INTRAMUSCULAR; INTRAVENOUS ONCE
Status: CANCELLED | OUTPATIENT
Start: 2020-05-07

## 2020-05-06 RX ORDER — SODIUM CHLORIDE 0.9 % (FLUSH) 0.9 %
10 SYRINGE (ML) INJECTION PRN
Status: CANCELLED | OUTPATIENT
Start: 2020-05-07

## 2020-05-06 RX ADMIN — Medication 20 ML: at 10:06

## 2020-05-06 RX ADMIN — Medication 20 ML: at 09:30

## 2020-05-06 RX ADMIN — .ALPHA.1-PROTEINASE INHIBITOR HUMAN 5000 MG: 1 INJECTION, SOLUTION INTRAVENOUS at 09:49

## 2020-05-06 NOTE — PROGRESS NOTES
Outpatient [address] Access    NAME:  Yobany Salamanca  YOB: 1975  MEDICAL RECORD NUMBER:  [id number]  DATE:  5/6/2020    Patient arrived to [facility] 58   [] per wheelchair   [x] ambulatory     Port Site Location:  right chest  Port Site:  Redness: No  Bruising: No   Edema: No  Pain: No     Port Site cleansed with:  Chloroprep Scrub for 30 seconds and air dried? Yes     Port Accessed with: [address] non coring needle using sterile technique. Blood return obtained: Yes  Flushed with 20 ml Normal Saline using push-pause method. Port flushes without resistance. De-accessed without any pain or problems. Response to treatment:  Well tolerated by patient. To return May 13 th or 14 th of next week.     Electronically signed by Mega Kwan RN on 5/6/2020 at 5:22 PM

## 2020-05-06 NOTE — PROGRESS NOTES
Outpatient [address]  Visit    NAME:  Kamila Alvarado  YOB: 1975  MEDICAL RECORD NUMBER:  [id number]  Episode Date:  5/6/2020    Patient arrived to [facility] 58     [] per wheelchair   [x] ambulatory     Verification of patient education of GLASSIA  actions and potential side effects:  yes  Is this the patient's first INFUSION ?  NO. Did the patient experience any adverse reactions to previous infusions ? NO. Any side effects from last infusion: denies. [] Rash         [] Itching  [] Headache  [] Fatigue  [] Dizziness  [] Tingling   [] Chest tightness  [] Shortness of Breath  [] Wheezing    Patient Active Problem List   Diagnosis Code    Abdominal pain R10.9    Lymphadenopathy R59.1    GERD (gastroesophageal reflux disease) K21.9    Ariza's esophagus with dysplasia K22.719    Emphysema lung J43.9    Alpha-1-antitrypsin deficiency ([facility] [address] E88.01    Essential hypertension, benign I10    Advance care planning Z71.89    Colon polyp K63.5    Chronic hypoxemic respiratory failure ([facility] [address] J96.11    Sprain of anterior talofibular ligament of left ankle S93.492A         Does the Patient have a History of: IgA deficiency:   NO. Anaphylaxis or severe systemic response to any medication:  :No.   LUNGS ; productive cough of white mucous. Pulse Oximetry:   96 %  Any recent activity tolerance changes:  No.    Any increased SOB or dyspnea:  No.   Presence of cough or sputum:  No.   Premedicated:  [] Benadryl 25 mg IV  [] Benadryl 50 mg IV  [] Benadryl 25 mg oral   [] Benadryl 50 mg oral  [] Other  None. .. Epinephrine at bedside: Yes. In med cabinet. IV placed and documented prior to drug preparation:  Kingston Pipe must be administered within 3 hours of drug reconstitution as it contains no preservatives. Yes. Monitoring of infusion documented in doc flowsheets. Kingston Pipe was administered: Yes.    Wt 187 lb (84.8 kg)   BMI 26.83 kg/m² glassia dosage: 5,000 mg was administered slowly IV. Dose verified by:   Kristyn Velasquez RN    Post treatment Vital Signs  121/ 86  Pulse 70, temp 98.1 orally and O2 sat is 96 %     Response to treatment:  Well tolerated by patient. Scheduled to return for next dose of glassia in 1 week. Denies any side effects from infusion.   Electronically signed by Ron Lui RN on 5/6/2020 at 5:17 PM

## 2020-05-07 ENCOUNTER — HOSPITAL ENCOUNTER (OUTPATIENT)
Dept: INFUSION THERAPY | Age: 45
Setting detail: INFUSION SERIES
End: 2020-05-07
Payer: COMMERCIAL

## 2020-05-14 ENCOUNTER — HOSPITAL ENCOUNTER (OUTPATIENT)
Dept: INFUSION THERAPY | Age: 45
Setting detail: INFUSION SERIES
Discharge: HOME | End: 2020-05-14
Payer: COMMERCIAL

## 2020-05-14 VITALS
BODY MASS INDEX: 25.68 KG/M2 | WEIGHT: 179 LBS | DIASTOLIC BLOOD PRESSURE: 79 MMHG | TEMPERATURE: 97.4 F | RESPIRATION RATE: 16 BRPM | HEART RATE: 66 BPM | SYSTOLIC BLOOD PRESSURE: 129 MMHG

## 2020-05-14 DIAGNOSIS — E88.01 ALPHA-1-ANTITRYPSIN DEFICIENCY (HCC): Primary | ICD-10-CM

## 2020-05-14 PROCEDURE — 6360000002 HC RX W HCPCS: Performed by: INTERNAL MEDICINE

## 2020-05-14 PROCEDURE — 2580000003 HC RX 258: Performed by: INTERNAL MEDICINE

## 2020-05-14 RX ORDER — SODIUM CHLORIDE 0.9 % (FLUSH) 0.9 %
10 SYRINGE (ML) INJECTION PRN
Status: CANCELLED | OUTPATIENT
Start: 2020-05-21

## 2020-05-14 RX ORDER — EPINEPHRINE 1 MG/ML
0.3 INJECTION, SOLUTION, CONCENTRATE INTRAVENOUS PRN
Status: CANCELLED | OUTPATIENT
Start: 2020-05-21

## 2020-05-14 RX ORDER — METHYLPREDNISOLONE SODIUM SUCCINATE 125 MG/2ML
125 INJECTION, POWDER, LYOPHILIZED, FOR SOLUTION INTRAMUSCULAR; INTRAVENOUS ONCE
Status: CANCELLED | OUTPATIENT
Start: 2020-05-21

## 2020-05-14 RX ORDER — SODIUM CHLORIDE 9 MG/ML
INJECTION, SOLUTION INTRAVENOUS CONTINUOUS
Status: CANCELLED | OUTPATIENT
Start: 2020-05-21

## 2020-05-14 RX ORDER — DIPHENHYDRAMINE HYDROCHLORIDE 50 MG/ML
50 INJECTION INTRAMUSCULAR; INTRAVENOUS ONCE
Status: CANCELLED | OUTPATIENT
Start: 2020-05-21

## 2020-05-14 RX ORDER — SODIUM CHLORIDE 0.9 % (FLUSH) 0.9 %
10 SYRINGE (ML) INJECTION PRN
Status: DISCONTINUED | OUTPATIENT
Start: 2020-05-14 | End: 2020-05-15 | Stop reason: HOSPADM

## 2020-05-14 RX ADMIN — Medication 20 ML: at 09:25

## 2020-05-14 RX ADMIN — .ALPHA.1-PROTEINASE INHIBITOR HUMAN 4872 MG: 1 INJECTION, SOLUTION INTRAVENOUS at 09:37

## 2020-05-14 RX ADMIN — Medication 20 ML: at 09:55

## 2020-05-14 ASSESSMENT — PAIN SCALES - GENERAL
PAINLEVEL_OUTOF10: 0
PAINLEVEL_OUTOF10: 0

## 2020-05-14 NOTE — PROGRESS NOTES
[facility] Infusion ;    NAME:  Lino Ramos  YOB: 1975  MEDICAL RECORD NUMBER:  [id number]  Episode Date:  5/14/2020    Patient arrived to [facility] 58     [] per wheelchair   [x] ambulatory     Verification of patient education of  GLASSIA actions and potential side effects:  NOT AT THIS WEEKLY VISIT. Is this the patient's first Glassia infusion? NO. Did the patient experience any adverse reactions to previous Glassia infusion ; NO. Any side effects from last infusion:  NO.       Patient Active Problem List   Diagnosis Code    Abdominal pain R10.9    Lymphadenopathy R59.1    GERD (gastroesophageal reflux disease) K21.9    Ariza's esophagus with dysplasia K22.719    Emphysema lung J43.9    Alpha-[facility]-antitrypsin deficiency ([facility] E88.01    Essential hypertension, benign I10    Advance care planning Z71.89    Colon polyp K63.5    Chronic hypoxemic respiratory failure (HCC) J96.11    Sprain of anterior talofibular ligament of left ankle S93.492A     Pre infusion Vital Signs       Temp: 97.4 °F (36.3 °C)     Pulse: 58     Resp: 16     BP: 119/74       Does the Patient have a History of: IgA deficiency: no.  Anaphylaxis or severe systemic response to any medication:  No.   Breath Sounds: '' No increased work of breathing\". Pulse Oximetry: 97  %. Any recent activity tolerance changes: no.    Any increased SOB or dyspnea:  No   Presence of cough or sputum:  No.   Epinephrine at bedside:  NO IN PYXIS. IV placed and documented prior to drug preparation: YES. Charley Grit must be administered within 3 hours of drug reconstitution as it contains no preservatives.yes. Monitoring of infusion documented in doc flowsheets.yes. Glassia infusion administered: Yes.      /74   Pulse 58   Temp 97.4 °F (36.3 °C) (Oral)   Resp 16   Wt 179 lb (81.2 kg)   BMI 25.68 kg/m²     dosage:60  mg/kg was administered slowly IV x 81.2 kgs. Equals dose of 4,872 mgs. Infusing over approx. 15 minutes. Dose verified by: Leonore Sandifer, RN    Post treatment Vital Signs  Temp: 97.4 °F (36.3 °C)  Pulse: 58  Resp: 16  BP: 119/74    Response to treatment:  Well tolerated by patient. Offers no c/o's of side effects from his weekly infusion per port.   Scheduled to return for next dose of Glassia infusion  on May 21, 2020  Electronically signed by Ashely Beltran RN on 5/14/2020 at 2:43 PM

## 2020-05-14 NOTE — PROGRESS NOTES
Port Access    NAME:  Dana Reyna  YOB: 1975  MEDICAL RECORD NUMBER:  [id number]  DATE:  5/14/2020    Patient arrived to [facility] 58   [] per wheelchair   [x] ambulatory     Port Site Location:  Chest right. Port Site:  Redness: No  Bruising: No   Edema: No  Pain: No     Port Site cleansed with:  Chloroprep Scrub for 30 seconds and air dried? Yes     Port Accessed with: [address] [place] non coring needle using sterile technique. Blood return obtained: Yes  Flushed with 10 ml Normal Saline using push-pause method. Port flushes without resistance. Response to treatment:  Well tolerated by patient.     Electronically signed by David Quintana RN on 5/14/2020 at 2:45 PM

## 2020-05-19 ENCOUNTER — TELEPHONE (OUTPATIENT)
Dept: INFUSION THERAPY | Age: 45
End: 2020-05-19

## 2020-05-21 ENCOUNTER — HOSPITAL ENCOUNTER (OUTPATIENT)
Dept: INFUSION THERAPY | Age: 45
Setting detail: INFUSION SERIES
Discharge: HOME | End: 2020-05-21
Payer: COMMERCIAL

## 2020-05-21 VITALS
RESPIRATION RATE: 16 BRPM | SYSTOLIC BLOOD PRESSURE: 125 MMHG | DIASTOLIC BLOOD PRESSURE: 86 MMHG | HEART RATE: 80 BPM | OXYGEN SATURATION: 96 % | TEMPERATURE: 98.5 F | WEIGHT: 180 LBS | BODY MASS INDEX: 25.83 KG/M2

## 2020-05-21 DIAGNOSIS — E88.01 ALPHA-1-ANTITRYPSIN DEFICIENCY (HCC): Primary | ICD-10-CM

## 2020-05-21 PROCEDURE — 6360000002 HC RX W HCPCS: Performed by: INTERNAL MEDICINE

## 2020-05-21 PROCEDURE — 96365 THER/PROPH/DIAG IV INF INIT: CPT

## 2020-05-21 PROCEDURE — 2580000003 HC RX 258: Performed by: INTERNAL MEDICINE

## 2020-05-21 RX ORDER — SODIUM CHLORIDE 0.9 % (FLUSH) 0.9 %
10 SYRINGE (ML) INJECTION PRN
Status: CANCELLED | OUTPATIENT
Start: 2020-05-28

## 2020-05-21 RX ORDER — METHYLPREDNISOLONE SODIUM SUCCINATE 125 MG/2ML
125 INJECTION, POWDER, LYOPHILIZED, FOR SOLUTION INTRAMUSCULAR; INTRAVENOUS ONCE
Status: CANCELLED | OUTPATIENT
Start: 2020-05-28

## 2020-05-21 RX ORDER — DIPHENHYDRAMINE HYDROCHLORIDE 50 MG/ML
50 INJECTION INTRAMUSCULAR; INTRAVENOUS ONCE
Status: CANCELLED | OUTPATIENT
Start: 2020-05-28

## 2020-05-21 RX ORDER — SODIUM CHLORIDE 9 MG/ML
INJECTION, SOLUTION INTRAVENOUS CONTINUOUS
Status: CANCELLED | OUTPATIENT
Start: 2020-05-28

## 2020-05-21 RX ORDER — SODIUM CHLORIDE 0.9 % (FLUSH) 0.9 %
10 SYRINGE (ML) INJECTION PRN
Status: DISCONTINUED | OUTPATIENT
Start: 2020-05-21 | End: 2020-05-22 | Stop reason: HOSPADM

## 2020-05-21 RX ORDER — SODIUM CHLORIDE 9 MG/ML
INJECTION, SOLUTION INTRAVENOUS CONTINUOUS
Status: ACTIVE | OUTPATIENT
Start: 2020-05-21 | End: 2020-05-21

## 2020-05-21 RX ORDER — EPINEPHRINE 1 MG/ML
0.3 INJECTION, SOLUTION, CONCENTRATE INTRAVENOUS PRN
Status: CANCELLED | OUTPATIENT
Start: 2020-05-28

## 2020-05-21 RX ADMIN — .ALPHA.1-PROTEINASE INHIBITOR HUMAN 4896 MG: 1 INJECTION, SOLUTION INTRAVENOUS at 10:09

## 2020-05-21 RX ADMIN — SODIUM CHLORIDE, PRESERVATIVE FREE 10 ML: 5 INJECTION INTRAVENOUS at 09:40

## 2020-05-21 RX ADMIN — SODIUM CHLORIDE: 9 INJECTION, SOLUTION INTRAVENOUS at 09:57

## 2020-05-21 RX ADMIN — SODIUM CHLORIDE, PRESERVATIVE FREE 20 ML: 5 INJECTION INTRAVENOUS at 10:45

## 2020-05-21 ASSESSMENT — PAIN SCALES - GENERAL: PAINLEVEL_OUTOF10: 0

## 2020-05-21 NOTE — PROGRESS NOTES
Outpatient [address] Access    NAME:  Crystal Espino  YOB: 1975  MEDICAL RECORD NUMBER:  [id number]  DATE:  5/21/2020    Patient arrived to [place] [address]   [] per wheelchair   [x] ambulatory     Alert and orientedx4. Patient denies any new S/S of infection, afebrle. No increase in respiratory symptoms. Wearing a mask to prevent spread ofCOVID-19. Port Site Location:  right chest  Port Site:  Redness: No  Bruising: No   Edema: No  Pain: No     Port Site cleansed with:  Chloroprep Scrub for 30 seconds and air dried? Yes     Port Accessed with: [facility] non coring needle using sterile technique. Blood return obtained: Yes  Flushed with 10 ml Normal Saline using push-pause method. Port flushes without resistance. Response to treatment:  Well tolerated by patient. Electronically signed by Johanna Gomez RN on 5/21/2020 at 9:48 AM    Outpatient [address]  Infusion Visit    NAME:  Crystal Espino  YOB: 1975  MEDICAL RECORD NUMBER:  [id number]  Episode Date:  5/21/2020    Patient arrived to [place] [address]    [] per wheelchair   [x] ambulatory     Verification of patient education of Larri Flirt actions and potential side effects:  Yes     Is this the patient's first Larri Flirt Infusion? No   Did the patient experience any adverse reactions to previous Larri Flirt Infusion?  No    Any side effects from last Glassia infusion:  No     [] Rash         [] Itching  [] Headache  [] Fatigue  [] Dizziness  [] Tingling   [] Chest tightness  [] Shortness of Breath  [] Wheezing    Patient Active Problem List   Diagnosis Code    Abdominal pain R10.9    Lymphadenopathy R59.1    GERD (gastroesophageal reflux disease) K21.9    Ariza's esophagus with dysplasia K22.719    Emphysema lung J43.9    [facility]-antitry[facility] deficiency ([facility] E88.01    Essential hypertension, benign I10    Advance care planning Z71.89    Colon polyp K63.5    Chronic hypoxemic respiratory failure (HCC) J96.11    Sprain of anterior talofibular ligament of left ankle S93.492A       Pre infusion Vital Signs       Temp: 98.5 °F (36.9 °C)     Pulse: 80     Resp: 16     BP: 125/86       Does the Patient have a History of: IgA deficiency: no     Anaphylaxis or severe systemic response to any medication:  No        Breath Sounds: No increased work of breathing, Breath sounds clear to auscultation bilaterally and Good air exchange. Pulse Oximetry: 96 %    Any recent activity tolerance changes: No  Continues to work full time job    Any increased SOB or dyspnea:  No     Presence of cough or sputum:  Yes occasionally first thing in AM, white sputum     Premedicated: No, none ordered  [] Benadryl 25 mg IV  [] Benadryl 50 mg IV  [] Benadryl 25 mg oral   [] Benadryl 50 mg oral  [] Other    Epinephrine at bedside:  No: readily available in omnicell    IV placed and documented prior to drug preparation:  Patricia Crofts must be administered within 3 hours of drug reconstitution as it contains no preservatives. Monitoring of infusion documented in doc flowsheets. Glassia administered: Yes     /86   Pulse 80   Temp 98.5 °F (36.9 °C) (Oral)   Resp 16   Wt 180 lb (81.6 kg)   SpO2 96%   BMI 25.83 kg/m²     Glassia dosage: 60  mg/kg was administered slowly IV for a total of 4896 mg in 250 ml  Dose verified by: DAVE Flood RN    Post treatment Vital Signs  Temp: 98.5 °F (36.9 °C)  Pulse: 80  Resp: 16  BP: 125/86    Response to treatment:  Well tolerated by patient. Scheduled to return for next dose of Glassia on May 28, 2020. Electronically signed by Lalita Torres RN on 5/21/2020 at 9:47 AM       Port flushed with 20ml NS using push-pause method per protocol. Port deaccessed. 2 x 2 gauze and paper tape to site. Patient tolerated well.

## 2020-05-28 ENCOUNTER — HOSPITAL ENCOUNTER (OUTPATIENT)
Dept: INFUSION THERAPY | Age: 45
Setting detail: INFUSION SERIES
Discharge: HOME | End: 2020-05-28
Payer: COMMERCIAL

## 2020-05-28 VITALS
WEIGHT: 180 LBS | BODY MASS INDEX: 25.83 KG/M2 | HEART RATE: 78 BPM | RESPIRATION RATE: 16 BRPM | DIASTOLIC BLOOD PRESSURE: 77 MMHG | OXYGEN SATURATION: 95 % | TEMPERATURE: 98.4 F | SYSTOLIC BLOOD PRESSURE: 119 MMHG

## 2020-05-28 DIAGNOSIS — E88.01 ALPHA-1-ANTITRYPSIN DEFICIENCY (HCC): Primary | ICD-10-CM

## 2020-05-28 PROCEDURE — 96365 THER/PROPH/DIAG IV INF INIT: CPT

## 2020-05-28 PROCEDURE — 2580000003 HC RX 258: Performed by: INTERNAL MEDICINE

## 2020-05-28 PROCEDURE — 6360000002 HC RX W HCPCS: Performed by: INTERNAL MEDICINE

## 2020-05-28 RX ORDER — EPINEPHRINE 1 MG/ML
0.3 INJECTION, SOLUTION, CONCENTRATE INTRAVENOUS PRN
Status: CANCELLED | OUTPATIENT
Start: 2020-06-04

## 2020-05-28 RX ORDER — DIPHENHYDRAMINE HYDROCHLORIDE 50 MG/ML
50 INJECTION INTRAMUSCULAR; INTRAVENOUS ONCE
Status: CANCELLED | OUTPATIENT
Start: 2020-06-04

## 2020-05-28 RX ORDER — METHYLPREDNISOLONE SODIUM SUCCINATE 125 MG/2ML
125 INJECTION, POWDER, LYOPHILIZED, FOR SOLUTION INTRAMUSCULAR; INTRAVENOUS ONCE
Status: CANCELLED | OUTPATIENT
Start: 2020-06-04

## 2020-05-28 RX ORDER — SODIUM CHLORIDE 9 MG/ML
INJECTION, SOLUTION INTRAVENOUS CONTINUOUS
Status: ACTIVE | OUTPATIENT
Start: 2020-05-28 | End: 2020-05-28

## 2020-05-28 RX ORDER — SODIUM CHLORIDE 0.9 % (FLUSH) 0.9 %
10 SYRINGE (ML) INJECTION PRN
Status: DISCONTINUED | OUTPATIENT
Start: 2020-05-28 | End: 2020-05-29 | Stop reason: HOSPADM

## 2020-05-28 RX ORDER — SODIUM CHLORIDE 9 MG/ML
INJECTION, SOLUTION INTRAVENOUS CONTINUOUS
Status: CANCELLED | OUTPATIENT
Start: 2020-06-04

## 2020-05-28 RX ORDER — SODIUM CHLORIDE 0.9 % (FLUSH) 0.9 %
10 SYRINGE (ML) INJECTION PRN
Status: CANCELLED | OUTPATIENT
Start: 2020-06-04

## 2020-05-28 RX ADMIN — .ALPHA.1-PROTEINASE INHIBITOR HUMAN 4896 MG: 1 INJECTION, SOLUTION INTRAVENOUS at 10:38

## 2020-05-28 RX ADMIN — SODIUM CHLORIDE, PRESERVATIVE FREE 10 ML: 5 INJECTION INTRAVENOUS at 09:45

## 2020-05-28 RX ADMIN — SODIUM CHLORIDE, PRESERVATIVE FREE 10 ML: 5 INJECTION INTRAVENOUS at 11:01

## 2020-05-28 ASSESSMENT — PAIN SCALES - GENERAL: PAINLEVEL_OUTOF10: 0

## 2020-05-28 NOTE — PROGRESS NOTES
Outpatient [address] Visit    NAME:  Gagandeep Garibay  YOB: 1975  MEDICAL RECORD NUMBER:  [id number]  Episode Date:  5/28/2020    Patient arrived to [facility] 58     [] per wheelchair   [x] ambulatory     Verification of patient education of Bernis Jordyn actions and potential side effects:  Yes     Is this the patient's first Bernis Jordyn Injection? No   Did the patient experience any adverse reactions to previous Bernis Jordyn Injection? No    Any side effects from last infusion:  No     [] Rash         [] Itching  [] Headache  [] Fatigue  [] Dizziness  [] Tingling   [] Chest tightness  [] Shortness of Breath  [] Wheezing    Patient Active Problem List   Diagnosis Code    Abdominal pain R10.9    Lymphadenopathy R59.1    GERD (gastroesophageal reflux disease) K21.9    Ariza's esophagus with dysplasia K22.719    Emphysema lung J43.9    Alpha-1-antitrypsin deficiency ([facility] E88.01    Essential hypertension, benign I10    Advance care planning Z71.89    Colon polyp K63.5    Chronic hypoxemic respiratory failure (HCC) J96.11    Sprain of anterior talofibular ligament of left ankle S93.492A       Pre infusion Vital Signs       Temp: 98.4 °F (36.9 °C)     Pulse: 78     Resp: 16     BP: 119/77       Does the Patient have a History of:     IgA deficiency: no     Anaphylaxis or severe systemic response to any medication:  No        Breath Sounds: No increased work of breathing, Breath sounds clear to auscultation bilaterally and Good air exchange  Pulse Oximetry: 95 %    Any recent activity tolerance changes: No      Any increased SOB or dyspnea:  No     Presence of cough or sputum:  Yes in the AM as usual usually yellow     Premedicated:none ordered  [] Benadryl 25 mg IV  [] Benadryl 50 mg IV  [] Benadryl 25 mg oral   [] Benadryl 50 mg oral  [] Other    Epinephrine at bedside:  Yes in the omnicell  IV placed and documented prior to drug preparation:  Huang Fletcher must be administered within 3 hours of drug reconstitution as it contains no preservatives. Monitoring of infusion documented in doc flowsheets. Glassia administered: Yes     /77   Pulse 78   Temp 98.4 °F (36.9 °C) (Oral)   Resp 16   Wt 180 lb (81.6 kg)   SpO2 95%   BMI 25.83 kg/m²     Glassia dosage: 60 mg/kg was administered slowly IV  Dose verified by: Supa Armas RN    Post treatment Vital Signs  Temp: 98.4 °F (36.9 °C)  Pulse: 78  Resp: 16  BP: 119/77    Response to treatment:  Well tolerated by patient. Scheduled to return for next dose of Glassia on June 4, 2020.      Electronically signed by Kim Lackey RN on 5/28/2020 at 11:03 AM

## 2020-06-04 ENCOUNTER — HOSPITAL ENCOUNTER (OUTPATIENT)
Dept: INFUSION THERAPY | Age: 45
Setting detail: INFUSION SERIES
Discharge: HOME | End: 2020-06-04
Payer: COMMERCIAL

## 2020-06-04 VITALS
DIASTOLIC BLOOD PRESSURE: 79 MMHG | SYSTOLIC BLOOD PRESSURE: 122 MMHG | TEMPERATURE: 97.8 F | OXYGEN SATURATION: 95 % | BODY MASS INDEX: 25.83 KG/M2 | RESPIRATION RATE: 16 BRPM | HEART RATE: 79 BPM | WEIGHT: 180 LBS

## 2020-06-04 DIAGNOSIS — E88.01 ALPHA-1-ANTITRYPSIN DEFICIENCY (HCC): Primary | ICD-10-CM

## 2020-06-04 PROCEDURE — 6360000002 HC RX W HCPCS: Performed by: INTERNAL MEDICINE

## 2020-06-04 PROCEDURE — 96365 THER/PROPH/DIAG IV INF INIT: CPT

## 2020-06-04 PROCEDURE — 2580000003 HC RX 258: Performed by: INTERNAL MEDICINE

## 2020-06-04 RX ORDER — SODIUM CHLORIDE 0.9 % (FLUSH) 0.9 %
10 SYRINGE (ML) INJECTION PRN
Status: DISCONTINUED | OUTPATIENT
Start: 2020-06-04 | End: 2020-06-05 | Stop reason: HOSPADM

## 2020-06-04 RX ORDER — METHYLPREDNISOLONE SODIUM SUCCINATE 125 MG/2ML
125 INJECTION, POWDER, LYOPHILIZED, FOR SOLUTION INTRAMUSCULAR; INTRAVENOUS ONCE
Status: CANCELLED | OUTPATIENT
Start: 2020-06-11

## 2020-06-04 RX ORDER — EPINEPHRINE 1 MG/ML
0.3 INJECTION, SOLUTION, CONCENTRATE INTRAVENOUS PRN
Status: CANCELLED | OUTPATIENT
Start: 2020-06-11

## 2020-06-04 RX ORDER — SODIUM CHLORIDE 0.9 % (FLUSH) 0.9 %
10 SYRINGE (ML) INJECTION PRN
Status: CANCELLED | OUTPATIENT
Start: 2020-06-11

## 2020-06-04 RX ORDER — SODIUM CHLORIDE 9 MG/ML
INJECTION, SOLUTION INTRAVENOUS CONTINUOUS
Status: CANCELLED | OUTPATIENT
Start: 2020-06-11

## 2020-06-04 RX ORDER — SODIUM CHLORIDE 9 MG/ML
INJECTION, SOLUTION INTRAVENOUS CONTINUOUS
Status: ACTIVE | OUTPATIENT
Start: 2020-06-04 | End: 2020-06-04

## 2020-06-04 RX ORDER — DIPHENHYDRAMINE HYDROCHLORIDE 50 MG/ML
50 INJECTION INTRAMUSCULAR; INTRAVENOUS ONCE
Status: CANCELLED | OUTPATIENT
Start: 2020-06-11

## 2020-06-04 RX ADMIN — .ALPHA.1-PROTEINASE INHIBITOR HUMAN 4896 MG: 1 INJECTION, SOLUTION INTRAVENOUS at 09:59

## 2020-06-04 RX ADMIN — Medication 10 ML: at 09:38

## 2020-06-04 RX ADMIN — Medication 10 ML: at 10:22

## 2020-06-04 ASSESSMENT — PAIN SCALES - GENERAL: PAINLEVEL_OUTOF10: 0

## 2020-06-04 NOTE — PROGRESS NOTES
Outpatient [address] Access    NAME:  Yobany Salamanca  YOB: 1975  MEDICAL RECORD NUMBER:  [id number]  DATE:  6/4/2020    Patient arrived to [place] [address]   [] per wheelchair   [x] ambulatory     Port Site Location:  right chest  Port Site:  Redness: No  Bruising: No   Edema: No  Pain: No     Port Site cleansed with:  Chloroprep Scrub for 30 seconds and air dried? Yes     Port Accessed with: [address] non coring needle using sterile technique. Blood return obtained: Yes  Flushed with 10 ml Normal Saline using push-pause method. Port flushes without resistance. Response to treatment:  Well tolerated by patient. Electronically signed by Grace Atwood RN on 6/4/2020 at 10:29 AM    Outpatient [address] Visit    NAME:  Yobany Salamanca  YOB: 1975  MEDICAL RECORD NUMBER:  [id number]  Episode Date:  6/4/2020    Patient arrived to [place] [address]     [] per wheelchair   [x] ambulatory     Verification of patient education of Zemaira actions and potential side effects:  Yes     Is this the patient's first Lani Shingles Injection? No   Did the patient experience any adverse reactions to previous Lani Shingles Injection?  No    Any side effects from last infusion:  No     [] Rash         [] Itching  [] Headache  [] Fatigue  [] Dizziness  [] Tingling   [] Chest tightness  [] Shortness of Breath  [] Wheezing    Patient Active Problem List   Diagnosis Code    Abdominal pain R10.9    Lymphadenopathy R59.1    GERD (gastroesophageal reflux disease) K21.9    Ariza's esophagus with dysplasia K22.719    Emphysema lung J43.9    [facility] E88.01    Essential hypertension, benign I10    Advance care planning Z71.89    Colon polyp K63.5    Chronic hypoxemic respiratory failure (HCC) J96.11    Sprain of anterior talofibular ligament of left ankle J24.726B Pre infusion Vital Signs       Temp: 97.8 °F (36.6 °C)     Pulse: 79     Resp: 16     BP: 122/79       Does the Patient have a History of: IgA deficiency: no     Anaphylaxis or severe systemic response to any medication:  No        Breath Sounds: No increased work of breathing, Breath sounds clear to auscultation bilaterally and Good air exchange  Pulse Oximetry: 95 %    Any recent activity tolerance changes: No      Any increased SOB or dyspnea:  No     Presence of cough or sputum:  Yes just when first awakening in AM. Usually clear to yellow. Premedicated: none ordered  [] Benadryl 25 mg IV  [] Benadryl 50 mg IV  [] Benadryl 25 mg oral   [] Benadryl 50 mg oral  [] Other    Epinephrine at bedside:  Yes in the omnicell  IV placed and documented prior to drug preparation:  Jolinda Riser must be administered within 3 hours of drug reconstitution as it contains no preservatives. Monitoring of infusion documented in doc flowsheets. Glassia administered: Yes     /79   Pulse 79   Temp 97.8 °F (36.6 °C) (Oral)   Resp 16   Wt 180 lb (81.6 kg)   SpO2 95%   BMI 25.83 kg/m²     Glassia dosage: 60 mg/kg was administered; 4896 mg in 250 ml NS given IV. Dose verified by:  Issa Brooks RN    Post treatment Vital Signs  Temp: 97.8 °F (36.6 °C)  Pulse: 79  Resp: 16  BP: 122/79    Response to treatment:  Well tolerated by patient. Scheduled to return for next dose of Zemaire on June 11, 2020.      Electronically signed by Hermilo Vizcarra RN on 6/4/2020 at 10:29 AM

## 2020-06-11 ENCOUNTER — HOSPITAL ENCOUNTER (OUTPATIENT)
Dept: INFUSION THERAPY | Age: 45
Setting detail: INFUSION SERIES
Discharge: HOME | End: 2020-06-11
Payer: COMMERCIAL

## 2020-06-11 VITALS
HEART RATE: 72 BPM | TEMPERATURE: 97.5 F | RESPIRATION RATE: 16 BRPM | OXYGEN SATURATION: 97 % | SYSTOLIC BLOOD PRESSURE: 127 MMHG | DIASTOLIC BLOOD PRESSURE: 82 MMHG | BODY MASS INDEX: 25.83 KG/M2 | WEIGHT: 180 LBS

## 2020-06-11 DIAGNOSIS — E88.01 ALPHA-1-ANTITRYPSIN DEFICIENCY (HCC): Primary | ICD-10-CM

## 2020-06-11 PROCEDURE — 6360000002 HC RX W HCPCS: Performed by: INTERNAL MEDICINE

## 2020-06-11 PROCEDURE — 2580000003 HC RX 258: Performed by: INTERNAL MEDICINE

## 2020-06-11 PROCEDURE — 96365 THER/PROPH/DIAG IV INF INIT: CPT

## 2020-06-11 RX ORDER — DIPHENHYDRAMINE HYDROCHLORIDE 50 MG/ML
50 INJECTION INTRAMUSCULAR; INTRAVENOUS ONCE
Status: CANCELLED | OUTPATIENT
Start: 2020-06-18

## 2020-06-11 RX ORDER — SODIUM CHLORIDE 0.9 % (FLUSH) 0.9 %
10 SYRINGE (ML) INJECTION PRN
Status: DISCONTINUED | OUTPATIENT
Start: 2020-06-11 | End: 2020-06-12 | Stop reason: HOSPADM

## 2020-06-11 RX ORDER — SODIUM CHLORIDE 9 MG/ML
INJECTION, SOLUTION INTRAVENOUS CONTINUOUS
Status: CANCELLED | OUTPATIENT
Start: 2020-06-18

## 2020-06-11 RX ORDER — SODIUM CHLORIDE 0.9 % (FLUSH) 0.9 %
10 SYRINGE (ML) INJECTION PRN
Status: CANCELLED | OUTPATIENT
Start: 2020-06-18

## 2020-06-11 RX ORDER — METHYLPREDNISOLONE SODIUM SUCCINATE 125 MG/2ML
125 INJECTION, POWDER, LYOPHILIZED, FOR SOLUTION INTRAMUSCULAR; INTRAVENOUS ONCE
Status: CANCELLED | OUTPATIENT
Start: 2020-06-18

## 2020-06-11 RX ORDER — EPINEPHRINE 1 MG/ML
0.3 INJECTION, SOLUTION, CONCENTRATE INTRAVENOUS PRN
Status: CANCELLED | OUTPATIENT
Start: 2020-06-18

## 2020-06-11 RX ORDER — SODIUM CHLORIDE 9 MG/ML
INJECTION, SOLUTION INTRAVENOUS CONTINUOUS
Status: ACTIVE | OUTPATIENT
Start: 2020-06-11 | End: 2020-06-11

## 2020-06-11 RX ADMIN — Medication 10 ML: at 10:15

## 2020-06-11 RX ADMIN — Medication 10 ML: at 09:45

## 2020-06-11 RX ADMIN — .ALPHA.1-PROTEINASE INHIBITOR HUMAN 5000 MG: 1 INJECTION, SOLUTION INTRAVENOUS at 09:55

## 2020-06-11 ASSESSMENT — PAIN SCALES - GENERAL: PAINLEVEL_OUTOF10: 0

## 2020-06-11 NOTE — DISCHARGE INSTR - COC
Active Problems:  Patient Active Problem List   Diagnosis Code    Abdominal pain R10.9    Lymphadenopathy R59.1    GERD (gastroesophageal reflux disease) K21.9    Ariza's esophagus with dysplasia K22.719    Emphysema lung J43.9    [facility] E88.01    Essential hypertension, benign I10    Advance care planning Z71.89    Colon polyp K63.5    Chronic hypoxemic respiratory failure (HCC) J96.11    Sprain of anterior talofibular ligament of left ankle S93.492A       Isolation/Infection:   Isolation          No Isolation        Patient Infection Status     None to display          Nurse Assessment:  Last Vital Signs: /82   Pulse 72   Temp 97.5 °F (36.4 °C) (Oral)   Resp 16   Wt 180 lb (81.6 kg)   SpO2 97%   BMI 25.83 kg/m²     Last documented pain score (0-10 scale): Pain Level: 0  Last Weight:   Wt Readings from Last 1 Encounters:   06/11/20 180 lb (81.6 kg)     Mental Status:  {IP PT MENTAL STATUS:95612}    IV Access:  {[facility] IV ACCESS:114712440}    Nursing Mobility/ADLs:  Walking   {P DME JASO:889996254}  Transfer  {P DME DDVR:776829082}  Bathing  {P DME SLXZ:653560280}  Dressing  {P DME WVQH:699425432}  Toileting  {P DME BJJY:013330969}  Feeding  {[facility] DME ZWDC:051472297}  Med Admin  {[facility] DME RFWW:827202651}  Med Delivery   {[facility] MED Delivery:564338565}    Wound Care Documentation and Therapy:  Incision 11/22/17 Chest Right (Active)   Number of days: 931        Elimination:  Continence:   · Bowel: {YES / HU:75488}  · Bladder: {YES / FS:30006}  Urinary Catheter: {Urinary Catheter:433977620}   Colostomy/Ileostomy/Ileal Conduit: {YES / YQ:48558}       Date of Last BM: ***  No intake or output data in the 24 hours ending 06/11/20 1027  No intake/output data recorded.     Safety Concerns:     [address] Safety Concerns:552897237}    Impairments/Disabilities:      [address] KEYANA Impairments/Disabilities:577496114}    Nutrition Therapy:  Current Nutrition Therapy: [address] KEYANA Diet List:188659032}    Routes of Feeding: {CHP DME Other Feedings:214731507}  Liquids: {Slp liquid thickness:23209}  Daily Fluid Restriction: {CHP DME Yes amt example:549109293}  Last Modified Barium Swallow with Video (Video Swallowing Test): {Done Not Done WPNW:582829555}    Treatments at the Time of Hospital Discharge:   Respiratory Treatments: ***  Oxygen Therapy:  {Therapy; copd oxygen:32208}  Ventilator:    {[facility] Vent TVUL:938728601}    Rehab Therapies: {THERAPEUTIC INTERVENTION:5833016832}  Weight Bearing Status/Restrictions: { CC Weight Bearin}  Other Medical Equipment (for information only, NOT a DME order):  {EQUIPMENT:141592011}  Other Treatments: ***    Patient's personal belongings (please select all that are sent with patient):  {[facility] DME Belongings:589800872}    RN SIGNATURE:  {Esignature:711014844}    CASE MANAGEMENT/SOCIAL WORK SECTION    Inpatient Status Date: ***    Readmission Risk Assessment Score:  Readmission Risk              Risk of Unplanned Readmission:        0           Discharging to Facility/ Agency   · Name:   · Address:  · Phone:  · Fax:    Dialysis Facility (if applicable)   · Name:  · Address:  · Dialysis Schedule:  · Phone:  · Fax:    / signature: {Esignature:367368236}    PHYSICIAN SECTION    Prognosis: {Prognosis:9385229426}    Condition at Discharge: [address] Garib[address] Patient Condition:509008763}    Rehab Potential (if transferring to Rehab): {Prognosis:9784963480}    Recommended Labs or Other Treatments After Discharge: ***    Physician Certification: I certify the above information and transfer of Williams Mayorga  is necessary for the continuing treatment of the diagnosis listed and that he requires {Admit to Appropriate Level of Care:19496} for {GREATER/LESS:375638422} 30 days.      Update Admission H&P: {CHP DME Changes in [facility]:561041051}    PHYSICIAN SIGNATURE:  {Esignature:773667798}

## 2020-06-11 NOTE — PROGRESS NOTES
Outpatient [address] Visit    NAME:  Mejia Prasad  YOB: 1975  MEDICAL RECORD NUMBER:  [id number]  Episode Date:  6/11/2020    Patient arrived to [address] [place] [address]    [] per wheelchair   [x] ambulatory     Verification of patient education of Tempie Songster actions and potential side effects:  Yes     Is this the patient's first Tempie Songster Injection? No   Did the patient experience any adverse reactions to previous Tempie Songster Injection? No    Any side effects from last infusion:  No     [] Rash         [] Itching  [] Headache  [] Fatigue  [] Dizziness  [] Tingling   [] Chest tightness  [] Shortness of Breath  [] Wheezing    Patient Active Problem List   Diagnosis Code    Abdominal pain R10.9    Lymphadenopathy R59.1    GERD (gastroesophageal reflux disease) K21.9    Ariza's esophagus with dysplasia K22.719    Emphysema lung J43.9    Alpha-1-antitrypsin deficiency ([facility] E88.01    Essential hypertension, benign I10    Advance care planning Z71.89    Colon polyp K63.5    Chronic hypoxemic respiratory failure (HCC) J96.11    Sprain of anterior talofibular ligament of left ankle S93.492A       Pre infusion Vital Signs       Temp: 97.5 °F (36.4 °C)     Pulse: 72     Resp: 16     BP: 127/82       Does the Patient have a History of: IgA deficiency: no     Anaphylaxis or severe systemic response to any medication:  No        Breath Sounds: No increased work of breathing, Breath sounds clear to auscultation bilaterally and Good air exchange  Pulse Oximetry: 97 %    Any recent activity tolerance changes: No      Any increased SOB or dyspnea:  No     Presence of cough or sputum:  Yes just in AM, usually clear to yellow phlem.      Premedicated: none ordered  [] Benadryl 25 mg IV  [] Benadryl 50 mg IV  [] Benadryl 25 mg oral   [] Benadryl 50 mg oral  [] Other    Epinephrine at bedside:  Yes, in the omnicell    IV placed and documented prior to drug preparation:  Zada Emely must be administered within 3 hours of drug reconstitution as it contains no preservatives. Monitoring of infusion documented in doc flowsheets. Glassia administered: Yes     /82   Pulse 72   Temp 97.5 °F (36.4 °C) (Oral)   Resp 16   Wt 180 lb (81.6 kg)   SpO2 97%   BMI 25.83 kg/m²     Glassia dosage: 5000 mg was administered slowly IV  Dose verified by:  Wanda Colbert RN    Post treatment Vital Signs  Temp: 97.5 °F (36.4 °C)  Pulse: 72  Resp: 16  BP: 127/82    Response to treatment:  Well tolerated by patient. Scheduled to return for next dose of Zemaire on June 18, 2020.      Electronically signed by Chesley Fabry, RN on 6/11/2020 at 10:24 AM

## 2020-06-11 NOTE — PROGRESS NOTES
Outpatient [address] Access    NAME:  Megan Sandoval  YOB: 1975  MEDICAL RECORD NUMBER:  [id number]  DATE:  6/11/2020    Patient arrived to [facility]   [] per wheelchair   [x] ambulatory     Port Site Location:  right chest  Port Site:  Redness: No  Bruising: No   Edema: No  Pain: No     Port Site cleansed with:  Chloroprep Scrub for 30 seconds and air dried? Yes     Port Accessed with: [address] non coring needle using sterile technique. Blood return obtained: Yes  Flushed with 10 ml Normal Saline using push-pause method. Port flushes without resistance. Port de accessed after flushing with 20 ml sodium chloride and Glassia infusion. 2X2 and paper tape to site. Response to treatment:  Well tolerated by patient.     Electronically signed by Steven Zelaya RN on 6/11/2020 at 10:24 AM

## 2020-06-18 ENCOUNTER — HOSPITAL ENCOUNTER (OUTPATIENT)
Dept: INFUSION THERAPY | Age: 45
Setting detail: INFUSION SERIES
Discharge: HOME | End: 2020-06-18
Payer: COMMERCIAL

## 2020-06-18 VITALS
WEIGHT: 178 LBS | DIASTOLIC BLOOD PRESSURE: 75 MMHG | TEMPERATURE: 97.9 F | HEART RATE: 80 BPM | RESPIRATION RATE: 16 BRPM | SYSTOLIC BLOOD PRESSURE: 128 MMHG | OXYGEN SATURATION: 96 % | BODY MASS INDEX: 25.54 KG/M2

## 2020-06-18 DIAGNOSIS — E88.01 ALPHA-1-ANTITRYPSIN DEFICIENCY (HCC): Primary | ICD-10-CM

## 2020-06-18 PROCEDURE — 6360000002 HC RX W HCPCS: Performed by: INTERNAL MEDICINE

## 2020-06-18 PROCEDURE — 2580000003 HC RX 258: Performed by: INTERNAL MEDICINE

## 2020-06-18 PROCEDURE — 96365 THER/PROPH/DIAG IV INF INIT: CPT

## 2020-06-18 RX ORDER — METHYLPREDNISOLONE SODIUM SUCCINATE 125 MG/2ML
125 INJECTION, POWDER, LYOPHILIZED, FOR SOLUTION INTRAMUSCULAR; INTRAVENOUS ONCE
Status: CANCELLED | OUTPATIENT
Start: 2020-06-25

## 2020-06-18 RX ORDER — SODIUM CHLORIDE 0.9 % (FLUSH) 0.9 %
10 SYRINGE (ML) INJECTION PRN
Status: DISCONTINUED | OUTPATIENT
Start: 2020-06-18 | End: 2020-06-19 | Stop reason: HOSPADM

## 2020-06-18 RX ORDER — SODIUM CHLORIDE 9 MG/ML
INJECTION, SOLUTION INTRAVENOUS CONTINUOUS
Status: CANCELLED | OUTPATIENT
Start: 2020-06-25

## 2020-06-18 RX ORDER — SODIUM CHLORIDE 0.9 % (FLUSH) 0.9 %
10 SYRINGE (ML) INJECTION PRN
Status: CANCELLED | OUTPATIENT
Start: 2020-06-25

## 2020-06-18 RX ORDER — DIPHENHYDRAMINE HYDROCHLORIDE 50 MG/ML
50 INJECTION INTRAMUSCULAR; INTRAVENOUS ONCE
Status: CANCELLED | OUTPATIENT
Start: 2020-06-25

## 2020-06-18 RX ORDER — EPINEPHRINE 1 MG/ML
0.3 INJECTION, SOLUTION, CONCENTRATE INTRAVENOUS PRN
Status: CANCELLED | OUTPATIENT
Start: 2020-06-25

## 2020-06-18 RX ADMIN — Medication 20 ML: at 09:59

## 2020-06-18 RX ADMIN — .ALPHA.1-PROTEINASE INHIBITOR HUMAN 4842 MG: 1 INJECTION, SOLUTION INTRAVENOUS at 09:42

## 2020-06-18 RX ADMIN — Medication 20 ML: at 09:30

## 2020-06-18 ASSESSMENT — PAIN SCALES - GENERAL
PAINLEVEL_OUTOF10: 0
PAINLEVEL_OUTOF10: 0

## 2020-06-18 NOTE — PROGRESS NOTES
Outpatient [address] Infusion Visit    NAME:  Williams Mayorga  YOB: 1975  MEDICAL RECORD NUMBER:  [id number]  Episode Date:  6/18/2020    Patient arrived to [facility]     [] per wheelchair   [x] ambulatory   Verification of patient education of Pearle Alea actions and potential side effects: yes. Is this the patient's first Glassia Infusion? NO. Did the patient experience any adverse reactions to previous Pearle Alea Infusion? NO  Any side effects from last Glassia infusion: NO.     Patient Active Problem List   Diagnosis Code    Abdominal pain R10.9    Lymphadenopathy R59.1    GERD (gastroesophageal reflux disease) K21.9    Ariza's esophagus with dysplasia K22.719    Emphysema lung J43.9    Alpha-1-antitry[facility] deficiency ([facility] E88.01    Essential hypertension, benign I10    Advance care planning Z71.89    Colon polyp K63.5    Chronic hypoxemic respiratory failure (HCC) J96.11    Sprain of anterior talofibular ligament of left ankle S93.492A     Pre infusion Vital Signs       Temp: 97.9 °F (36.6 °C)     Pulse: 72     Resp: 16     BP: 112/75     Does the Patient have a History of: IgA deficiency: no.    Anaphylaxis or severe systemic response to any medication:  No.        Breath Sounds: No increased work of breathing. Pulse Oximetry:  95 %    Any recent activity tolerance changes:   No.   Any increased SOB or dyspnea:  No.    Presence of cough or sputum:  Yes  A dry cough. Epinephrine at bedside:  Yes, [facility] [address] [facility] IV placed and documented prior to drug preparation: yes. Pearle Alea must be administered within 3 hours of drug reconstitution as it contains no preservatives. yes    Monitoring of infusion documented in doc flowsheets. Glassia administered: Yes.    /75   Pulse 80   Temp 97.9 °F (36.6 °C) (Oral)   Resp 16   Wt 178 lb (80.7 kg)   SpO2 96%   BMI 25.54 kg/m²     Glassia dosage:   4,842 mgs was administered slowly IVPB OVER 15-20 MINUTES. Dose verified by:  DAVE Ludwig RN    Post treatment Vital Signs  Temp: 97.9 °F (36.6 °C)  Pulse: 80  Resp: 16  BP: 128/75    Response to treatment:  Well tolerated by patient. Scheduled to return for next dose of Glassia on June 25, 2020. Port Site Location:  right chest  Port Site:  Redness: No  Bruising: No   Edema: No  Pain: No     Port Site cleansed with:  Chloroprep Scrub for 30 seconds and air dried? Yes     Port Accessed with: [facility] non coring needle using sterile technique. Blood return obtained: Yes  Flushed with 10 ml Normal Saline using push-pause method. Port flushes without resistance. Response to treatment:  Well tolerated by patient. DE-ACCESSED AFTER INFUSION WITHOUT COMPLICATIONS. Gauze dressing applied. Flushed well with NS.   Electronically signed by Judi Sanchez RN on 6/18/2020 at 4:22 PM

## 2020-06-22 ENCOUNTER — OFFICE VISIT (OUTPATIENT)
Dept: PRIMARY CARE CLINIC | Age: 45
End: 2020-06-22
Payer: COMMERCIAL

## 2020-06-22 VITALS
HEIGHT: 70 IN | HEART RATE: 68 BPM | SYSTOLIC BLOOD PRESSURE: 128 MMHG | TEMPERATURE: 97.7 F | WEIGHT: 186 LBS | DIASTOLIC BLOOD PRESSURE: 87 MMHG | BODY MASS INDEX: 26.63 KG/M2

## 2020-06-22 PROCEDURE — 99214 OFFICE O/P EST MOD 30 MIN: CPT | Performed by: FAMILY MEDICINE

## 2020-06-22 PROCEDURE — 1036F TOBACCO NON-USER: CPT | Performed by: FAMILY MEDICINE

## 2020-06-22 PROCEDURE — G8427 DOCREV CUR MEDS BY ELIG CLIN: HCPCS | Performed by: FAMILY MEDICINE

## 2020-06-22 PROCEDURE — G8419 CALC BMI OUT NRM PARAM NOF/U: HCPCS | Performed by: FAMILY MEDICINE

## 2020-06-22 ASSESSMENT — PATIENT HEALTH QUESTIONNAIRE - PHQ9
1. LITTLE INTEREST OR PLEASURE IN DOING THINGS: 0
SUM OF ALL RESPONSES TO PHQ QUESTIONS 1-9: 0
SUM OF ALL RESPONSES TO PHQ9 QUESTIONS 1 & 2: 0
SUM OF ALL RESPONSES TO PHQ QUESTIONS 1-9: 0
2. FEELING DOWN, DEPRESSED OR HOPELESS: 0

## 2020-06-22 ASSESSMENT — ENCOUNTER SYMPTOMS
CONSTIPATION: 0
ABDOMINAL PAIN: 0
WHEEZING: 1
SHORTNESS OF BREATH: 1
DIARRHEA: 0
COUGH: 1
VOMITING: 0
NAUSEA: 0

## 2020-06-22 NOTE — PROGRESS NOTES
current facility-administered medications for this visit. Past Medical History:   Diagnosis Date    Advance care planning     LIving will on chart    Alpha-1-antitrypsin deficiency ([facility] 11/15/2013    PFT's FEV1 72%    Ariza's esophagus with dysplasia 04/2012    Had checked 9/18/17 recheck in 4 years    CAP (community acquired pneumonia) 11/4/14    Colon polyp 09/25/2013    Had checked 9/18/17 recheck in 4 years    Elevated liver enzymes     Emphysema lung ([facility] Oct 2013    on oxygen at night. and prn during the day     Essential hypertension, benign 12/9/2014    GERD (gastroesophageal reflux disease) 11/10/2011    EGD 4/3/11 with Ariza's Esophagus    Kidney stone     Kidney stone     Pleural effusion     at age 15    Type 2 diabetes mellitus without complication, without long-term current use of insulin ([facility] 7/18/2017    Wears glasses     blind in right eye          Objective   /87   Pulse 68   Temp 97.7 °F (36.5 °C)   Ht 5' 10\" (1.778 m)   Wt 186 lb (84.4 kg)   BMI 26.69 kg/m²   Wt Readings from Last 3 Encounters:   06/22/20 186 lb (84.4 kg)   06/18/20 178 lb (80.7 kg)   06/11/20 180 lb (81.6 kg)       Physical Exam  Constitutional:       Appearance: He is well-developed. Cardiovascular:      Rate and Rhythm: Normal rate and regular rhythm. Heart sounds: No murmur. No friction rub. No gallop. Pulmonary:      Effort: Pulmonary effort is normal.      Breath sounds: Wheezing and rhonchi present. No rales. Abdominal:      General: Bowel sounds are normal. There is no distension. Palpations: Abdomen is soft. There is no mass. Tenderness: There is no abdominal tenderness. Skin:     General: Skin is warm and dry. Findings: No rash.            Chemistry        Component Value Date/Time     04/23/2020 0955    K 4.0 04/23/2020 0955     04/23/2020 0955    CO2 21 04/23/2020 0955    BUN 9 04/23/2020 0955    CREATININE 0.6 (L) 04/23/2020 3454

## 2020-06-25 ENCOUNTER — HOSPITAL ENCOUNTER (OUTPATIENT)
Dept: INFUSION THERAPY | Age: 45
Setting detail: INFUSION SERIES
Discharge: HOME | End: 2020-06-25
Payer: COMMERCIAL

## 2020-06-25 VITALS
SYSTOLIC BLOOD PRESSURE: 124 MMHG | DIASTOLIC BLOOD PRESSURE: 76 MMHG | TEMPERATURE: 97.8 F | WEIGHT: 180 LBS | RESPIRATION RATE: 16 BRPM | HEART RATE: 58 BPM | BODY MASS INDEX: 25.77 KG/M2 | HEIGHT: 70 IN | OXYGEN SATURATION: 97 %

## 2020-06-25 DIAGNOSIS — E88.01 ALPHA-1-ANTITRYPSIN DEFICIENCY (HCC): Primary | ICD-10-CM

## 2020-06-25 PROCEDURE — 96365 THER/PROPH/DIAG IV INF INIT: CPT

## 2020-06-25 PROCEDURE — 6360000002 HC RX W HCPCS: Performed by: INTERNAL MEDICINE

## 2020-06-25 PROCEDURE — 2580000003 HC RX 258: Performed by: INTERNAL MEDICINE

## 2020-06-25 RX ORDER — DIPHENHYDRAMINE HYDROCHLORIDE 50 MG/ML
50 INJECTION INTRAMUSCULAR; INTRAVENOUS ONCE
Status: CANCELLED | OUTPATIENT
Start: 2020-07-02

## 2020-06-25 RX ORDER — SODIUM CHLORIDE 0.9 % (FLUSH) 0.9 %
10 SYRINGE (ML) INJECTION PRN
Status: CANCELLED | OUTPATIENT
Start: 2020-07-02

## 2020-06-25 RX ORDER — METHYLPREDNISOLONE SODIUM SUCCINATE 125 MG/2ML
125 INJECTION, POWDER, LYOPHILIZED, FOR SOLUTION INTRAMUSCULAR; INTRAVENOUS ONCE
Status: CANCELLED | OUTPATIENT
Start: 2020-07-02

## 2020-06-25 RX ORDER — EPINEPHRINE 1 MG/ML
0.3 INJECTION, SOLUTION, CONCENTRATE INTRAVENOUS PRN
Status: CANCELLED | OUTPATIENT
Start: 2020-07-02

## 2020-06-25 RX ORDER — SODIUM CHLORIDE 0.9 % (FLUSH) 0.9 %
10 SYRINGE (ML) INJECTION PRN
Status: DISCONTINUED | OUTPATIENT
Start: 2020-06-25 | End: 2020-06-26 | Stop reason: HOSPADM

## 2020-06-25 RX ORDER — SODIUM CHLORIDE 9 MG/ML
INJECTION, SOLUTION INTRAVENOUS CONTINUOUS
Status: CANCELLED | OUTPATIENT
Start: 2020-07-02

## 2020-06-25 RX ADMIN — Medication 10 ML: at 10:04

## 2020-06-25 RX ADMIN — Medication 20 ML: at 09:38

## 2020-06-25 RX ADMIN — Medication 10 ML: at 10:32

## 2020-06-25 RX ADMIN — Medication 30 ML: at 10:40

## 2020-06-25 RX ADMIN — .ALPHA.1-PROTEINASE INHIBITOR HUMAN: 1 INJECTION, SOLUTION INTRAVENOUS at 10:05

## 2020-06-25 ASSESSMENT — PAIN SCALES - GENERAL
PAINLEVEL_OUTOF10: 0
PAINLEVEL_OUTOF10: 0

## 2020-06-25 NOTE — PROGRESS NOTES
Outpatient [address] Infusion Visit    NAME:  Mallory Newby  YOB: 1975  MEDICAL RECORD NUMBER:  [id number]  Episode Date:  6/25/2020    Patient arrived to [facility]     [] per wheelchair   [x] ambulatory     Verification of patient education of Mika Petey actions and potential side effects:  Yes     Is this the patient's first Mika Petey Infusion? No     Did the patient experience any adverse reactions to previous Mika Petey Infusion? No    Any side effects from last Glassia infusion:  No     [] Rash         [] Itching  [] Headache  [] Fatigue  [] Dizziness  [] Tingling   [] Chest tightness  [] Shortness of Breath  [] Wheezing    Patient Active Problem List   Diagnosis Code    Abdominal pain R10.9    Lymphadenopathy R59.1    GERD (gastroesophageal reflux disease) K21.9    Ariza's esophagus with dysplasia K22.719    Emphysema lung J43.9    Alpha-1-antitrypsin deficiency ([facility] E88.01    Essential hypertension, benign I10    Advance care planning Z71.89    Colon polyp K63.5    Chronic hypoxemic respiratory failure (HCC) J96.11    Sprain of anterior talofibular ligament of left ankle S93.492A       Pre infusion Vital Signs       BP - 111/76  Pulse - 64   Respirations - 16  Temp - 97.6         Does the Patient have a History of: IgA deficiency: no     Anaphylaxis or severe systemic response to any medication:  No        Breath Sounds: No increased work of breathing while at rest. Patient reports minimal shortness of breath with moderate exertion. Breath sounds clear to auscultation bilaterally.  Good air exchange and no crackles noted - See [facility] flowsheets    Pulse Oximetry: 98 %    Any recent activity tolerance changes: No      Any increased SOB or dyspnea:  No     Presence of cough or sputum:  Yes - Patient reports having a small amount of thin white sputum in the AM only     Premedicated: None ordered  [] Benadryl 25 mg IV  [] Benadryl 50 mg IV  [] Benadryl 25 mg oral   [] Benadryl 50 mg oral  [] Other    Epinephrine at bedside:  No - readily available in the [facility] accessed and documented prior to drug preparation: right subclavian/chest wall    Papa Angst must be administered within 3 hours of drug reconstitution as it contains no preservatives. Monitoring of infusion documented in doc flowsheets. Glassia administered: Yes     /76   Pulse 64   Temp 97.6 °F (36.4 °C) (Oral)   Resp 16   Ht 5' 10\" (1.778 m)   Wt 180 lb (81.6 kg)   SpO2 98%   BMI 25.83 kg/m²     Glassia dosage: 60 mg/kg (total dose 5,000mg) was administered slowly IV    Dose verified by:  Caleb Bhatti RN    Post treatment Vital Signs  BP - 124/76  Pulse - 58   Respirations - 16  Temp - 97.8          Response to treatment:  Well tolerated by patient. Scheduled to return for next dose of Glassia on July 2, 2020.      Electronically signed by Lindajo Halsted, RN on 6/25/2020 at 5:58 PM

## 2020-07-02 ENCOUNTER — HOSPITAL ENCOUNTER (OUTPATIENT)
Dept: INFUSION THERAPY | Age: 45
Setting detail: INFUSION SERIES
Discharge: HOME | End: 2020-07-02
Payer: COMMERCIAL

## 2020-07-02 VITALS — BODY MASS INDEX: 25.77 KG/M2 | WEIGHT: 180 LBS | HEIGHT: 70 IN

## 2020-07-02 DIAGNOSIS — E88.01 ALPHA-1-ANTITRYPSIN DEFICIENCY (HCC): Primary | ICD-10-CM

## 2020-07-02 RX ORDER — DIPHENHYDRAMINE HYDROCHLORIDE 50 MG/ML
50 INJECTION INTRAMUSCULAR; INTRAVENOUS ONCE
Status: CANCELLED | OUTPATIENT
Start: 2020-07-09

## 2020-07-02 RX ORDER — EPINEPHRINE 1 MG/ML
0.3 INJECTION, SOLUTION, CONCENTRATE INTRAVENOUS PRN
Status: CANCELLED | OUTPATIENT
Start: 2020-07-09

## 2020-07-02 RX ORDER — SODIUM CHLORIDE 9 MG/ML
INJECTION, SOLUTION INTRAVENOUS CONTINUOUS
Status: DISCONTINUED | OUTPATIENT
Start: 2020-07-02 | End: 2020-07-02

## 2020-07-02 RX ORDER — METHYLPREDNISOLONE SODIUM SUCCINATE 125 MG/2ML
125 INJECTION, POWDER, LYOPHILIZED, FOR SOLUTION INTRAMUSCULAR; INTRAVENOUS ONCE
Status: CANCELLED | OUTPATIENT
Start: 2020-07-09

## 2020-07-02 RX ORDER — SODIUM CHLORIDE 0.9 % (FLUSH) 0.9 %
10 SYRINGE (ML) INJECTION PRN
Status: DISCONTINUED | OUTPATIENT
Start: 2020-07-02 | End: 2020-07-02

## 2020-07-02 RX ORDER — SODIUM CHLORIDE 9 MG/ML
INJECTION, SOLUTION INTRAVENOUS CONTINUOUS
Status: CANCELLED | OUTPATIENT
Start: 2020-07-09

## 2020-07-02 RX ORDER — SODIUM CHLORIDE 0.9 % (FLUSH) 0.9 %
10 SYRINGE (ML) INJECTION PRN
Status: CANCELLED | OUTPATIENT
Start: 2020-07-09

## 2020-07-02 NOTE — PROGRESS NOTES
Outpatient [address] Visit    NAME:  Megan Sandoval  YOB: 1975  MEDICAL RECORD NUMBER:  [id number]  Episode Date:  7/2/2020      Pharmacy called to say his medication wasn't here today. Will call patient on Monday if it arrives and will reschedule then.

## 2020-07-09 ENCOUNTER — HOSPITAL ENCOUNTER (OUTPATIENT)
Dept: INFUSION THERAPY | Age: 45
Setting detail: INFUSION SERIES
Discharge: HOME | End: 2020-07-09
Payer: COMMERCIAL

## 2020-07-09 VITALS
BODY MASS INDEX: 25.48 KG/M2 | HEIGHT: 70 IN | WEIGHT: 178 LBS | OXYGEN SATURATION: 96 % | SYSTOLIC BLOOD PRESSURE: 117 MMHG | HEART RATE: 71 BPM | TEMPERATURE: 98.4 F | DIASTOLIC BLOOD PRESSURE: 79 MMHG | RESPIRATION RATE: 18 BRPM

## 2020-07-09 DIAGNOSIS — E88.01 ALPHA-1-ANTITRYPSIN DEFICIENCY (HCC): Primary | ICD-10-CM

## 2020-07-09 PROCEDURE — 2580000003 HC RX 258: Performed by: INTERNAL MEDICINE

## 2020-07-09 PROCEDURE — 96365 THER/PROPH/DIAG IV INF INIT: CPT

## 2020-07-09 PROCEDURE — 6360000002 HC RX W HCPCS: Performed by: INTERNAL MEDICINE

## 2020-07-09 RX ORDER — SODIUM CHLORIDE 0.9 % (FLUSH) 0.9 %
10 SYRINGE (ML) INJECTION PRN
Status: CANCELLED | OUTPATIENT
Start: 2020-07-16

## 2020-07-09 RX ORDER — DIPHENHYDRAMINE HYDROCHLORIDE 50 MG/ML
50 INJECTION INTRAMUSCULAR; INTRAVENOUS ONCE
Status: CANCELLED | OUTPATIENT
Start: 2020-07-16

## 2020-07-09 RX ORDER — SODIUM CHLORIDE 9 MG/ML
INJECTION, SOLUTION INTRAVENOUS CONTINUOUS
Status: CANCELLED | OUTPATIENT
Start: 2020-07-16

## 2020-07-09 RX ORDER — METHYLPREDNISOLONE SODIUM SUCCINATE 125 MG/2ML
125 INJECTION, POWDER, LYOPHILIZED, FOR SOLUTION INTRAMUSCULAR; INTRAVENOUS ONCE
Status: CANCELLED | OUTPATIENT
Start: 2020-07-16

## 2020-07-09 RX ORDER — SODIUM CHLORIDE 0.9 % (FLUSH) 0.9 %
10 SYRINGE (ML) INJECTION PRN
Status: DISCONTINUED | OUTPATIENT
Start: 2020-07-09 | End: 2020-07-10 | Stop reason: HOSPADM

## 2020-07-09 RX ORDER — EPINEPHRINE 1 MG/ML
0.3 INJECTION, SOLUTION, CONCENTRATE INTRAVENOUS PRN
Status: CANCELLED | OUTPATIENT
Start: 2020-07-16

## 2020-07-09 RX ORDER — SODIUM CHLORIDE 9 MG/ML
INJECTION, SOLUTION INTRAVENOUS CONTINUOUS
Status: ACTIVE | OUTPATIENT
Start: 2020-07-09 | End: 2020-07-09

## 2020-07-09 RX ADMIN — Medication 30 ML: at 10:18

## 2020-07-09 RX ADMIN — .ALPHA.1-PROTEINASE INHIBITOR HUMAN 4842 MG: 1 INJECTION, SOLUTION INTRAVENOUS at 10:02

## 2020-07-09 RX ADMIN — Medication 10 ML: at 09:45

## 2020-07-09 ASSESSMENT — PAIN SCALES - GENERAL: PAINLEVEL_OUTOF10: 0

## 2020-07-09 NOTE — PROGRESS NOTES
Outpatient [address] Access    NAME:  Gagandeep Garibay  YOB: 1975  MEDICAL RECORD NUMBER:  [id number]  DATE:  7/9/2020    Patient arrived to [facility]   [] per wheelchair   [x] ambulatory     Port Site Location:  right chest  Port Site:  Redness: No  Bruising: No   Edema: No  Pain:no     Port Site cleansed with:  Chloroprep Scrub for 30 seconds and air dried? Yes     Port Accessed with: [address] non coring needle using sterile technique. Blood return obtained: Yes  Flushed with 10 ml Normal Saline using push-pause method. Port flushes without resistance. Response to treatment:  Well tolerated by patient.     Electronically signed by Migdalia Stanton RN on 7/9/2020 at 10:28 AM

## 2020-07-09 NOTE — PROGRESS NOTES
Outpatient [address] Infusion Visit    NAME:  Mary Anne Vega  YOB: 1975  MEDICAL RECORD NUMBER:  [id number]  Episode Date:  7/9/2020    Patient arrived to [facility]    [] per wheelchair   [x] ambulatory     Verification of patient education of Darlys Rohit actions and potential side effects: yes     Is this the patient's first Darlys Rohit Infusion? No   Did the patient experience any adverse reactions to previous Darlys Rohit Infusion? No    Any side effects from last Glassia infusion:  No     [] Rash         [] Itching  [] Headache  [] Fatigue  [] Dizziness  [] Tingling   [] Chest tightness  [] Shortness of Breath  [] Wheezing    Patient Active Problem List   Diagnosis Code    Abdominal pain R10.9    Lymphadenopathy R59.1    GERD (gastroesophageal reflux disease) K21.9    Ariza's esophagus with dysplasia K22.719    Emphysema lung J43.9    Alpha-1-antitrypsin deficiency ([facility] E88.01    Essential hypertension, benign I10    Advance care planning Z71.89    Colon polyp K63.5    Chronic hypoxemic respiratory failure (HCC) J96.11    Sprain of anterior talofibular ligament of left ankle S93.492A       Pre infusion Vital Signs       Temp: 98.4 °F (36.9 °C)     Pulse: 71     Resp: 18     BP: 117/79       Does the Patient have a History of: IgA deficiency: no     Anaphylaxis or severe systemic response to any medication:  No        Breath Sounds: No increased work of breathing, Breath sounds clear to auscultation bilaterally and Good air exchange  Pulse Oximetry: 96 %    Any recent activity tolerance changes: No    Patient is only using O2 at night now.   Any increased SOB or dyspnea:  No     Presence of cough or sputum:  No     Premedicated:  [] Benadryl 25 mg IV  [] Benadryl 50 mg IV  [] Benadryl 25 mg oral   [] Benadryl 50 mg oral  [] Other    Epinephrine at bedside:  Yes    IV placed and documented prior to drug preparation:  Darlys Rohit must be administered within 3 hours of drug reconstitution as it contains no preservatives. Monitoring of infusion documented in doc flowsheets. Glassia administered: Yes     /79   Pulse 71   Temp 98.4 °F (36.9 °C) (Oral)   Resp 18   Ht 5' 10\" (1.778 m)   Wt 178 lb (80.7 kg)   SpO2 96%   BMI 25.54 kg/m²     Glassia dosage: 4842 mg/kg was administered in 15 minutesIV  Dose verified by:  Tab Galdamez RN    Post treatment Vital Signs  Temp: 98.4 °F (36.9 °C)  Pulse: 71  Resp: 18  BP: 117/79    Response to treatment:  Well tolerated by patient.      Scheduled to return for next dose of Glassia on July 16, 2020  Electronically signed by Thomas Garnica RN on 7/9/2020 at 9:39 AM

## 2020-07-16 ENCOUNTER — HOSPITAL ENCOUNTER (OUTPATIENT)
Dept: INFUSION THERAPY | Age: 45
Setting detail: INFUSION SERIES
Discharge: HOME | End: 2020-07-16
Payer: COMMERCIAL

## 2020-07-16 VITALS
BODY MASS INDEX: 25.83 KG/M2 | HEART RATE: 71 BPM | DIASTOLIC BLOOD PRESSURE: 73 MMHG | OXYGEN SATURATION: 98 % | SYSTOLIC BLOOD PRESSURE: 126 MMHG | RESPIRATION RATE: 16 BRPM | TEMPERATURE: 97.9 F | WEIGHT: 180 LBS

## 2020-07-16 DIAGNOSIS — E88.01 ALPHA-1-ANTITRYPSIN DEFICIENCY (HCC): Primary | ICD-10-CM

## 2020-07-16 PROCEDURE — 6360000002 HC RX W HCPCS: Performed by: INTERNAL MEDICINE

## 2020-07-16 PROCEDURE — 2580000003 HC RX 258: Performed by: INTERNAL MEDICINE

## 2020-07-16 PROCEDURE — 96365 THER/PROPH/DIAG IV INF INIT: CPT

## 2020-07-16 RX ORDER — DIPHENHYDRAMINE HYDROCHLORIDE 50 MG/ML
50 INJECTION INTRAMUSCULAR; INTRAVENOUS ONCE
Status: CANCELLED | OUTPATIENT
Start: 2020-07-23

## 2020-07-16 RX ORDER — SODIUM CHLORIDE 9 MG/ML
INJECTION, SOLUTION INTRAVENOUS CONTINUOUS
Status: ACTIVE | OUTPATIENT
Start: 2020-07-16 | End: 2020-07-16

## 2020-07-16 RX ORDER — SODIUM CHLORIDE 9 MG/ML
INJECTION, SOLUTION INTRAVENOUS CONTINUOUS
Status: CANCELLED | OUTPATIENT
Start: 2020-07-23

## 2020-07-16 RX ORDER — SODIUM CHLORIDE 0.9 % (FLUSH) 0.9 %
10 SYRINGE (ML) INJECTION PRN
Status: DISCONTINUED | OUTPATIENT
Start: 2020-07-16 | End: 2020-07-17 | Stop reason: HOSPADM

## 2020-07-16 RX ORDER — SODIUM CHLORIDE 0.9 % (FLUSH) 0.9 %
10 SYRINGE (ML) INJECTION PRN
Status: CANCELLED | OUTPATIENT
Start: 2020-07-23

## 2020-07-16 RX ORDER — EPINEPHRINE 1 MG/ML
0.3 INJECTION, SOLUTION, CONCENTRATE INTRAVENOUS PRN
Status: CANCELLED | OUTPATIENT
Start: 2020-07-23

## 2020-07-16 RX ORDER — METHYLPREDNISOLONE SODIUM SUCCINATE 125 MG/2ML
125 INJECTION, POWDER, LYOPHILIZED, FOR SOLUTION INTRAMUSCULAR; INTRAVENOUS ONCE
Status: CANCELLED | OUTPATIENT
Start: 2020-07-23

## 2020-07-16 RX ADMIN — Medication 10 ML: at 10:44

## 2020-07-16 RX ADMIN — Medication 30 ML: at 11:00

## 2020-07-16 RX ADMIN — .ALPHA.1-PROTEINASE INHIBITOR HUMAN 4896 MG: 1 INJECTION, SOLUTION INTRAVENOUS at 10:16

## 2020-07-16 RX ADMIN — Medication 10 ML: at 10:16

## 2020-07-16 RX ADMIN — Medication 20 ML: at 09:44

## 2020-07-16 ASSESSMENT — PAIN SCALES - GENERAL
PAINLEVEL_OUTOF10: 0

## 2020-07-16 NOTE — PROGRESS NOTES
usually resolved by noontime. Premedicated: None ordered  [] Benadryl 25 mg IV  [] Benadryl 50 mg IV  [] Benadryl 25 mg oral   [] Benadryl 50 mg oral  [] Other    Epinephrine at bedside:  No - readily available in [employer]    IV placed and documented prior to drug preparation:  Pearle Alea must be administered within 3 hours of drug reconstitution as it contains no preservatives. Monitoring of infusion documented in doc flowsheets. Pearle Alea administered: Yes     Glassia dosage: 60 mg/kg (total dose 4,896 mg) was administered slowly IV    Dose verified by: Kelly Woods RN    Post treatment Vital Signs:  Temp - 97.9, Pulse - 71, Respirations - 16, Blood pressure - 126/73, O2 saturation - 98%       Response to treatment:  Well tolerated by patient. Scheduled to return for next dose of Glassia on July 23, 2020.      Electronically signed by Delicia Read RN on 7/16/2020 at 11:14 AM

## 2020-07-23 ENCOUNTER — HOSPITAL ENCOUNTER (OUTPATIENT)
Dept: INFUSION THERAPY | Age: 45
Setting detail: INFUSION SERIES
Discharge: HOME | End: 2020-07-23
Payer: COMMERCIAL

## 2020-07-23 VITALS
OXYGEN SATURATION: 97 % | TEMPERATURE: 97.8 F | HEART RATE: 70 BPM | RESPIRATION RATE: 16 BRPM | DIASTOLIC BLOOD PRESSURE: 74 MMHG | BODY MASS INDEX: 25.81 KG/M2 | WEIGHT: 179.9 LBS | SYSTOLIC BLOOD PRESSURE: 133 MMHG

## 2020-07-23 DIAGNOSIS — E88.01 ALPHA-1-ANTITRYPSIN DEFICIENCY (HCC): Primary | ICD-10-CM

## 2020-07-23 PROCEDURE — 6360000002 HC RX W HCPCS: Performed by: INTERNAL MEDICINE

## 2020-07-23 PROCEDURE — 96365 THER/PROPH/DIAG IV INF INIT: CPT

## 2020-07-23 PROCEDURE — 2580000003 HC RX 258: Performed by: INTERNAL MEDICINE

## 2020-07-23 RX ORDER — METHYLPREDNISOLONE SODIUM SUCCINATE 125 MG/2ML
125 INJECTION, POWDER, LYOPHILIZED, FOR SOLUTION INTRAMUSCULAR; INTRAVENOUS ONCE
Status: CANCELLED | OUTPATIENT
Start: 2020-07-30

## 2020-07-23 RX ORDER — DIPHENHYDRAMINE HYDROCHLORIDE 50 MG/ML
50 INJECTION INTRAMUSCULAR; INTRAVENOUS ONCE
Status: CANCELLED | OUTPATIENT
Start: 2020-07-30

## 2020-07-23 RX ORDER — EPINEPHRINE 1 MG/ML
0.3 INJECTION, SOLUTION, CONCENTRATE INTRAVENOUS PRN
Status: CANCELLED | OUTPATIENT
Start: 2020-07-30

## 2020-07-23 RX ORDER — SODIUM CHLORIDE 9 MG/ML
INJECTION, SOLUTION INTRAVENOUS CONTINUOUS
Status: ACTIVE | OUTPATIENT
Start: 2020-07-23 | End: 2020-07-23

## 2020-07-23 RX ORDER — SODIUM CHLORIDE 9 MG/ML
INJECTION, SOLUTION INTRAVENOUS CONTINUOUS
Status: CANCELLED | OUTPATIENT
Start: 2020-07-30

## 2020-07-23 RX ORDER — SODIUM CHLORIDE 0.9 % (FLUSH) 0.9 %
10 SYRINGE (ML) INJECTION PRN
Status: DISCONTINUED | OUTPATIENT
Start: 2020-07-23 | End: 2020-07-24 | Stop reason: HOSPADM

## 2020-07-23 RX ORDER — SODIUM CHLORIDE 0.9 % (FLUSH) 0.9 %
10 SYRINGE (ML) INJECTION PRN
Status: CANCELLED | OUTPATIENT
Start: 2020-07-30

## 2020-07-23 RX ADMIN — Medication 10 ML: at 11:11

## 2020-07-23 RX ADMIN — Medication 10 ML: at 10:46

## 2020-07-23 RX ADMIN — Medication 30 ML: at 09:58

## 2020-07-23 RX ADMIN — .ALPHA.1-PROTEINASE INHIBITOR HUMAN 4896 MG: 1 INJECTION, SOLUTION INTRAVENOUS at 10:46

## 2020-07-23 RX ADMIN — Medication 30 ML: at 11:16

## 2020-07-23 ASSESSMENT — PAIN SCALES - GENERAL
PAINLEVEL_OUTOF10: 0

## 2020-07-23 NOTE — PROGRESS NOTES
Outpatient [address] In[facility] Visit    NAME:  Kamila Alvarado  YOB: 1975  MEDICAL RECORD NUMBER:  [id number]  Episode Date:  7/23/2020    Patient arrived to [facility] 58     [] per wheelchair   [x] ambulatory     Verification of patient education of Za Flatter actions and potential side effects:  Yes     Is this the patient's first Za Flatter Infusion? No     Did the patient experience any adverse reactions to previous Za Flatter Infusion? No    Any side effects from last Glassia infusion:  No     [] Rash         [] Itching  [] Headache  [] Fatigue  [] Dizziness  [] Tingling   [] Chest tightness  [] Shortness of Breath  [] Wheezing    Patient Active Problem List   Diagnosis Code    Abdominal pain R10.9    Lymphadenopathy R59.1    GERD (gastroesophageal reflux disease) K21.9    Ariza's esophagus with dysplasia K22.719    Emphysema lung J43.9    Alpha-1-antitrypsin deficiency ([facility] [address] E88.01    Essential hypertension, benign I10    Advance care planning Z71.89    Colon polyp K63.5    Chronic hypoxemic respiratory failure (HCC) J96.11    Sprain of anterior talofibular ligament of left ankle S93.492A       Pre infusion Vital Signs       Temp: 97.8 °F (36.6 °C)     Pulse: 64     Resp: 16     BP: 120/79       Does the Patient have a History of: IgA deficiency: no     Anaphylaxis or severe systemic response to any medication:  No        Breath Sounds: Respirations even and easy upon arrival to clinic. Patient reports some dyspnea with moderate exertion. Lung sound clear to auscultation x 4 lung fields. No cough noted. See YULIANA GUZMAN [facility] flowsheets. Pulse Oximetry: 96 %    Any recent activity tolerance changes: No      Any increased SOB or dyspnea:  No     Presence of cough or sputum:  Yes - moderate amount of thin white sputum in the morning only - patient reports that it diminishes by early afternoon.      Premedicated: None ordered  [] Benadryl 25 mg IV  [] Benadryl 50 mg IV  [] Benadryl 25 mg oral   [] Benadryl 50 mg oral  [] Other    Epinephrine at bedside:  No: Readily available in the pyxis    IV placed and documented prior to drug preparation:  Sabina Gu must be administered within 3 hours of drug reconstitution as it contains no preservatives. Monitoring of infusion documented in doc flowsheets. Glassia administered: Yes     /74   Pulse 70   Temp 97.8 °F (36.6 °C) (Oral)   Resp 16   Wt 179 lb 14.4 oz (81.6 kg)   SpO2 97%   BMI 25.81 kg/m²     Glassia dosage: 60 mg/kg (4,896 mg total dose) was administered slowly IV    Dose verified by:  Cherylene Feather, RN    Post treatment Vital Signs  Temp: 97.8 °F (36.6 °C)  Pulse: 70  Resp: 16  BP: 133/74    Response to treatment:  Well tolerated by patient. Scheduled to return for next dose of Glassia on July 30, 2020.      Electronically signed by Alin Yan RN on 7/23/2020 at 7:15 PM

## 2020-07-30 ENCOUNTER — HOSPITAL ENCOUNTER (OUTPATIENT)
Dept: INFUSION THERAPY | Age: 45
Setting detail: INFUSION SERIES
Discharge: HOME | End: 2020-07-30
Payer: COMMERCIAL

## 2020-07-30 VITALS
TEMPERATURE: 99 F | HEART RATE: 69 BPM | WEIGHT: 178 LBS | HEIGHT: 70 IN | DIASTOLIC BLOOD PRESSURE: 79 MMHG | RESPIRATION RATE: 17 BRPM | SYSTOLIC BLOOD PRESSURE: 119 MMHG | BODY MASS INDEX: 25.48 KG/M2

## 2020-07-30 DIAGNOSIS — E88.01 ALPHA-1-ANTITRYPSIN DEFICIENCY (HCC): Primary | ICD-10-CM

## 2020-07-30 PROCEDURE — 96365 THER/PROPH/DIAG IV INF INIT: CPT

## 2020-07-30 PROCEDURE — 6360000002 HC RX W HCPCS: Performed by: INTERNAL MEDICINE

## 2020-07-30 PROCEDURE — 2580000003 HC RX 258: Performed by: INTERNAL MEDICINE

## 2020-07-30 RX ORDER — METHYLPREDNISOLONE SODIUM SUCCINATE 125 MG/2ML
125 INJECTION, POWDER, LYOPHILIZED, FOR SOLUTION INTRAMUSCULAR; INTRAVENOUS ONCE
Status: CANCELLED | OUTPATIENT
Start: 2020-08-06

## 2020-07-30 RX ORDER — EPINEPHRINE 1 MG/ML
0.3 INJECTION, SOLUTION, CONCENTRATE INTRAVENOUS PRN
Status: CANCELLED | OUTPATIENT
Start: 2020-08-06

## 2020-07-30 RX ORDER — SODIUM CHLORIDE 0.9 % (FLUSH) 0.9 %
10 SYRINGE (ML) INJECTION PRN
Status: CANCELLED | OUTPATIENT
Start: 2020-08-06

## 2020-07-30 RX ORDER — DIPHENHYDRAMINE HYDROCHLORIDE 50 MG/ML
50 INJECTION INTRAMUSCULAR; INTRAVENOUS ONCE
Status: CANCELLED | OUTPATIENT
Start: 2020-08-06

## 2020-07-30 RX ORDER — SODIUM CHLORIDE 0.9 % (FLUSH) 0.9 %
10 SYRINGE (ML) INJECTION PRN
Status: DISCONTINUED | OUTPATIENT
Start: 2020-07-30 | End: 2020-07-31 | Stop reason: HOSPADM

## 2020-07-30 RX ORDER — SODIUM CHLORIDE 9 MG/ML
INJECTION, SOLUTION INTRAVENOUS CONTINUOUS
Status: CANCELLED | OUTPATIENT
Start: 2020-08-06

## 2020-07-30 RX ADMIN — .ALPHA.1-PROTEINASE INHIBITOR HUMAN 5000 MG: 1 INJECTION, SOLUTION INTRAVENOUS at 10:21

## 2020-07-30 RX ADMIN — Medication 20 ML: at 10:46

## 2020-07-30 NOTE — PROGRESS NOTES
Outpatient [address] Visit    NAME:  Mikal White  YOB: 1975  MEDICAL RECORD NUMBER:  [id number]  Episode Date:  7/30/2020    Patient arrived to [facility] 58     [] per wheelchair   [x] ambulatory     Verification of patient education of Cathlean Pink actions and potential side effects:  Yes     Is this the patient's first Glassia Infusion? No   Did the patient experience any adverse reactions to previous Cathlean Pink Infusion? No    Any side effects from last Glassia infusion:  No     [] Rash         [] Itching  [] Headache  [] Fatigue  [] Dizziness  [] Tingling   [] Chest tightness  [] Shortness of Breath  [] Wheezing    Patient Active Problem List   Diagnosis Code    Abdominal pain R10.9    Lymphadenopathy R59.1    GERD (gastroesophageal reflux disease) K21.9    Ariza's esophagus with dysplasia K22.719    Emphysema lung J43.9    Alpha-1-antitrypsin deficiency ([facility] [address] E88.01    Essential hypertension, benign I10    Advance care planning Z71.89    Colon polyp K63.5    Chronic hypoxemic respiratory failure (HCC) J96.11    Sprain of anterior talofibular ligament of left ankle S93.492A       Pre infusion Vital Signs       Temp: 99 °F (37.2 °C)     Pulse: 69     Resp: 17     BP: 119/79       Does the Patient have a History of:     IgA deficiency: no     Anaphylaxis or severe systemic response to any medication:  No        Breath Sounds: No increased work of breathing, Breath sounds clear to auscultation bilaterally and Good air exchange  Pulse Oximetry: 95 %    Any recent activity tolerance changes: No      Any increased SOB or dyspnea:  No     Presence of cough or sputum:  No     Premedicated:  [] Benadryl 25 mg IV  [] Benadryl 50 mg IV  [] Benadryl 25 mg oral   [] Benadryl 50 mg oral  [] Other    Epinephrine at bedside:  Yes    IV placed and documented prior to drug preparation:  Cathlean Pink must be administered within 3 hours of drug reconstitution as it contains no preservatives. Monitoring of infusion documented in doc flowsheets. Glassia administered: Yes     /79   Pulse 69   Temp 99 °F (37.2 °C) (Oral)   Resp 17   Ht 5' 10\" (1.778 m)   Wt 178 lb (80.7 kg)   BMI 25.54 kg/m²     Glassia dosage: 60 mg/kg was administered slowly IV  Dose verified by:  Ming Forde RN    Post treatment Vital Signs  Temp: 99 °F (37.2 °C)  Pulse: 69  Resp: 17  BP: 119/79    Response to treatment:  Well tolerated by patient. Scheduled to return for next dose of Glassia on August 6, 2020.      Electronically signed by Gail Santiago RN on 7/30/2020 at 10:50 AM

## 2020-07-30 NOTE — PROGRESS NOTES
Outpatient [address] Access    NAME:  Mary Anne Vega  YOB: 1975  MEDICAL RECORD NUMBER:  [id number]  DATE:  7/30/2020    Patient arrived to [facility] 58   [] per wheelchair   [x] ambulatory     Port Site Location:  right chest  Port Site:  Redness: No  Bruising: No   Edema: No  Pain: No     Port Site cleansed with:  Chloroprep Scrub for 30 seconds and air dried? Yes     Port Accessed with: [address] non coring needle using sterile technique. Blood return obtained: Yes  Flushed with 10 ml Normal Saline using push-pause method. Port flushes without resistance. Response to treatment:  Well tolerated by patient.     Electronically signed by Van Medeiros RN on 7/30/2020 at 10:48 AM

## 2020-08-06 ENCOUNTER — HOSPITAL ENCOUNTER (OUTPATIENT)
Dept: INFUSION THERAPY | Age: 45
Setting detail: INFUSION SERIES
Discharge: HOME | End: 2020-08-06
Payer: COMMERCIAL

## 2020-08-06 VITALS — HEIGHT: 70 IN | BODY MASS INDEX: 25.77 KG/M2 | WEIGHT: 180 LBS

## 2020-08-06 DIAGNOSIS — E88.01 ALPHA-1-ANTITRYPSIN DEFICIENCY (HCC): Primary | ICD-10-CM

## 2020-08-06 RX ORDER — SODIUM CHLORIDE 9 MG/ML
INJECTION, SOLUTION INTRAVENOUS CONTINUOUS
Status: CANCELLED | OUTPATIENT
Start: 2020-08-13

## 2020-08-06 RX ORDER — EPINEPHRINE 1 MG/ML
0.3 INJECTION, SOLUTION, CONCENTRATE INTRAVENOUS PRN
Status: CANCELLED | OUTPATIENT
Start: 2020-08-13

## 2020-08-06 RX ORDER — DIPHENHYDRAMINE HYDROCHLORIDE 50 MG/ML
50 INJECTION INTRAMUSCULAR; INTRAVENOUS ONCE
Status: CANCELLED | OUTPATIENT
Start: 2020-08-13

## 2020-08-06 RX ORDER — SODIUM CHLORIDE 0.9 % (FLUSH) 0.9 %
10 SYRINGE (ML) INJECTION PRN
Status: DISCONTINUED | OUTPATIENT
Start: 2020-08-06 | End: 2020-08-06 | Stop reason: RX

## 2020-08-06 RX ORDER — METHYLPREDNISOLONE SODIUM SUCCINATE 125 MG/2ML
125 INJECTION, POWDER, LYOPHILIZED, FOR SOLUTION INTRAMUSCULAR; INTRAVENOUS ONCE
Status: CANCELLED | OUTPATIENT
Start: 2020-08-13

## 2020-08-06 RX ORDER — SODIUM CHLORIDE 0.9 % (FLUSH) 0.9 %
10 SYRINGE (ML) INJECTION PRN
Status: CANCELLED | OUTPATIENT
Start: 2020-08-13

## 2020-08-13 ENCOUNTER — HOSPITAL ENCOUNTER (OUTPATIENT)
Dept: INFUSION THERAPY | Age: 45
Setting detail: INFUSION SERIES
Discharge: HOME | End: 2020-08-13
Payer: COMMERCIAL

## 2020-08-13 VITALS
OXYGEN SATURATION: 97 % | WEIGHT: 180 LBS | TEMPERATURE: 97.8 F | RESPIRATION RATE: 17 BRPM | BODY MASS INDEX: 25.77 KG/M2 | DIASTOLIC BLOOD PRESSURE: 76 MMHG | SYSTOLIC BLOOD PRESSURE: 124 MMHG | HEART RATE: 86 BPM | HEIGHT: 70 IN

## 2020-08-13 DIAGNOSIS — E88.01 ALPHA-1-ANTITRYPSIN DEFICIENCY (HCC): Primary | ICD-10-CM

## 2020-08-13 PROCEDURE — 2580000003 HC RX 258: Performed by: INTERNAL MEDICINE

## 2020-08-13 PROCEDURE — 96365 THER/PROPH/DIAG IV INF INIT: CPT

## 2020-08-13 PROCEDURE — 6360000002 HC RX W HCPCS: Performed by: INTERNAL MEDICINE

## 2020-08-13 RX ORDER — SODIUM CHLORIDE 9 MG/ML
INJECTION, SOLUTION INTRAVENOUS CONTINUOUS
Status: CANCELLED | OUTPATIENT
Start: 2020-08-20

## 2020-08-13 RX ORDER — EPINEPHRINE 1 MG/ML
0.3 INJECTION, SOLUTION, CONCENTRATE INTRAVENOUS PRN
Status: CANCELLED | OUTPATIENT
Start: 2020-08-20

## 2020-08-13 RX ORDER — DIPHENHYDRAMINE HYDROCHLORIDE 50 MG/ML
50 INJECTION INTRAMUSCULAR; INTRAVENOUS ONCE
Status: CANCELLED | OUTPATIENT
Start: 2020-08-20

## 2020-08-13 RX ORDER — SODIUM CHLORIDE 0.9 % (FLUSH) 0.9 %
10 SYRINGE (ML) INJECTION PRN
Status: CANCELLED | OUTPATIENT
Start: 2020-08-20

## 2020-08-13 RX ORDER — SODIUM CHLORIDE 0.9 % (FLUSH) 0.9 %
10 SYRINGE (ML) INJECTION PRN
Status: DISCONTINUED | OUTPATIENT
Start: 2020-08-13 | End: 2020-08-14 | Stop reason: HOSPADM

## 2020-08-13 RX ORDER — METHYLPREDNISOLONE SODIUM SUCCINATE 125 MG/2ML
125 INJECTION, POWDER, LYOPHILIZED, FOR SOLUTION INTRAMUSCULAR; INTRAVENOUS ONCE
Status: CANCELLED | OUTPATIENT
Start: 2020-08-20

## 2020-08-13 RX ORDER — SODIUM CHLORIDE 9 MG/ML
INJECTION, SOLUTION INTRAVENOUS CONTINUOUS
Status: ACTIVE | OUTPATIENT
Start: 2020-08-13 | End: 2020-08-13

## 2020-08-13 RX ADMIN — Medication 30 ML: at 10:16

## 2020-08-13 RX ADMIN — .ALPHA.1-PROTEINASE INHIBITOR HUMAN 5000 MG: 1 INJECTION, SOLUTION INTRAVENOUS at 10:16

## 2020-08-13 RX ADMIN — Medication 30 ML: at 11:00

## 2020-08-13 NOTE — PROGRESS NOTES
Outpatient [address] Visit    NAME:  Shayna Sanchez  YOB: 1975  MEDICAL RECORD NUMBER:  [id number]  Episode Date:  8/13/2020    Patient arrived to [place] [address] 9:30    [] per wheelchair   [x] ambulatory     Verification of patient education of Coye Correa actions and potential side effects:  Yes     Is this the patient's first Coye Correa Infusion? No   Did the patient experience any adverse reactions to previous Coye Correa Infusion? No    Any side effects from last Glassia infusion:  No     [] Rash         [] Itching  [] Headache  [] Fatigue  [] Dizziness  [] Tingling   [] Chest tightness  [] Shortness of Breath  [] Wheezing    Patient Active Problem List   Diagnosis Code    Abdominal pain R10.9    Lymphadenopathy R59.1    GERD (gastroesophageal reflux disease) K21.9    Ariza's esophagus with dysplasia K22.719    Emphysema lung J43.9    Alpha-1-antitrypsin deficiency ([facility] [address] E88.01    Essential hypertension, benign I10    Advance care planning Z71.89    Colon polyp K63.5    Chronic hypoxemic respiratory failure (HCC) J96.11    Sprain of anterior talofibular ligament of left ankle S93.492A       Pre infusion Vital Signs       Temp: 97.8 °F (36.6 °C)     Pulse: 86     Resp: 17     BP: 124/76       Does the Patient have a History of:     IgA deficiency: no     Anaphylaxis or severe systemic response to any medication:  No        Breath Sounds: No increased work of breathing, Breath sounds clear to auscultation bilaterally and Good air exchange  Pulse Oximetry: 97 %    Any recent activity tolerance changes: No      Any increased SOB or dyspnea:  No     Presence of cough or sputum:  Yes slight , clear     Premedicated:  [] Benadryl 25 mg IV  [] Benadryl 50 mg IV  [] Benadryl 25 mg oral   [] Benadryl 50 mg oral  [] Other    Epinephrine at bedside:  Yes    IV placed and documented prior to drug preparation:  Coye Correa must be administered within 3 hours of drug reconstitution as it contains no preservatives. Monitoring of infusion documented in doc flowsheets. Glassia administered: Yes     /76   Pulse 86   Temp 97.8 °F (36.6 °C) (Oral)   Resp 17   Ht 5' 10\" (1.778 m)   Wt 180 lb (81.6 kg)   SpO2 97%   BMI 25.83 kg/m²     Glassia dosage: 60 mg/kg was administered slowly IV  Dose verified by:  Katherine Gomez RN    Post treatment Vital Signs  Temp: 97.8 °F (36.6 °C)  Pulse: 86  Resp: 17  BP: 124/76    Response to treatment:  Well tolerated by patient. Scheduled to return for next dose of Glassia on August 20, 2020. Electronically signed by Ghassan Haque RN on 8/13/2020 at 9:39 AM                           Outpatient [address] Access    NAME:  Archana Valera  YOB: 1975  MEDICAL RECORD NUMBER:  [id number]  DATE:  8/13/2020    Patient arrived to [facility] 58   [] per wheelchair   [x] ambulatory     Port Site Location:  right chest  Port Site:  Redness: Yes   Bruising: No   Edema: No  Pain: No     Port Site cleansed with:  Chloroprep Scrub for 30 seconds and air dried? Yes     Port Accessed with: [address] non coring needle using sterile technique. Blood return obtained: Yes  Flushed with 10 ml Normal Saline using push-pause method. Port flushes without resistance. Response to treatment:  Well tolerated by patient.     Electronically signed by Ghassan Haque RN on 8/13/2020 at 9:43 AM

## 2020-08-20 ENCOUNTER — HOSPITAL ENCOUNTER (OUTPATIENT)
Dept: INFUSION THERAPY | Age: 45
Setting detail: INFUSION SERIES
Discharge: HOME | End: 2020-08-20
Payer: COMMERCIAL

## 2020-08-20 VITALS
RESPIRATION RATE: 18 BRPM | HEART RATE: 72 BPM | DIASTOLIC BLOOD PRESSURE: 77 MMHG | WEIGHT: 179 LBS | SYSTOLIC BLOOD PRESSURE: 126 MMHG | BODY MASS INDEX: 25.68 KG/M2 | OXYGEN SATURATION: 97 % | TEMPERATURE: 98.3 F

## 2020-08-20 DIAGNOSIS — E88.01 ALPHA-1-ANTITRYPSIN DEFICIENCY (HCC): Primary | ICD-10-CM

## 2020-08-20 PROCEDURE — 96365 THER/PROPH/DIAG IV INF INIT: CPT

## 2020-08-20 PROCEDURE — 2580000003 HC RX 258: Performed by: INTERNAL MEDICINE

## 2020-08-20 PROCEDURE — 6360000002 HC RX W HCPCS: Performed by: INTERNAL MEDICINE

## 2020-08-20 RX ORDER — SODIUM CHLORIDE 0.9 % (FLUSH) 0.9 %
10 SYRINGE (ML) INJECTION PRN
Status: DISCONTINUED | OUTPATIENT
Start: 2020-08-20 | End: 2020-08-21 | Stop reason: HOSPADM

## 2020-08-20 RX ORDER — DIPHENHYDRAMINE HYDROCHLORIDE 50 MG/ML
50 INJECTION INTRAMUSCULAR; INTRAVENOUS ONCE
Status: CANCELLED | OUTPATIENT
Start: 2020-08-27

## 2020-08-20 RX ORDER — EPINEPHRINE 1 MG/ML
0.3 INJECTION, SOLUTION, CONCENTRATE INTRAVENOUS PRN
Status: CANCELLED | OUTPATIENT
Start: 2020-08-27

## 2020-08-20 RX ORDER — SODIUM CHLORIDE 9 MG/ML
INJECTION, SOLUTION INTRAVENOUS CONTINUOUS
Status: CANCELLED | OUTPATIENT
Start: 2020-08-27

## 2020-08-20 RX ORDER — METHYLPREDNISOLONE SODIUM SUCCINATE 125 MG/2ML
125 INJECTION, POWDER, LYOPHILIZED, FOR SOLUTION INTRAMUSCULAR; INTRAVENOUS ONCE
Status: CANCELLED | OUTPATIENT
Start: 2020-08-27

## 2020-08-20 RX ORDER — SODIUM CHLORIDE 9 MG/ML
INJECTION, SOLUTION INTRAVENOUS CONTINUOUS
Status: ACTIVE | OUTPATIENT
Start: 2020-08-20 | End: 2020-08-20

## 2020-08-20 RX ORDER — SODIUM CHLORIDE 0.9 % (FLUSH) 0.9 %
10 SYRINGE (ML) INJECTION PRN
Status: CANCELLED | OUTPATIENT
Start: 2020-08-27

## 2020-08-20 RX ADMIN — Medication 30 ML: at 09:43

## 2020-08-20 RX ADMIN — Medication 10 ML: at 10:21

## 2020-08-20 RX ADMIN — Medication 30 ML: at 10:58

## 2020-08-20 RX ADMIN — Medication 10 ML: at 10:46

## 2020-08-20 RX ADMIN — .ALPHA.1-PROTEINASE INHIBITOR HUMAN: 1 INJECTION, SOLUTION INTRAVENOUS at 10:21

## 2020-08-20 ASSESSMENT — PAIN SCALES - GENERAL
PAINLEVEL_OUTOF10: 0

## 2020-08-20 NOTE — PROGRESS NOTES
Outpatient [address]  Infusion Visit    NAME:  Veronica Yung  YOB: 1975  MEDICAL RECORD NUMBER:  [id number]  Episode Date:  8/20/2020    Patient arrived to [facility] 58     [] per wheelchair   [x] ambulatory     Verification of patient education of Jolinda Riser actions and potential side effects:  Yes     Is this the patient's first Glassia Infusion? No     Did the patient experience any adverse reactions to previous Jolinda Riser Infusion? No    Any side effects from last Glassia infusion:  No - patient denies any side effects from Jolinda Riser     [] Rash         [] Itching  [] Headache  [] Fatigue  [] Dizziness  [] Tingling   [] Chest tightness  [] Shortness of Breath  [] Wheezing    Patient Active Problem List   Diagnosis Code    Abdominal pain R10.9    Lymphadenopathy R59.1    GERD (gastroesophageal reflux disease) K21.9    Ariza's esophagus with dysplasia K22.719    Emphysema lung J43.9    Alpha-1-antitrypsin deficiency (HCC) E88.01    Essential hypertension, benign I10    Advance care planning Z71.89    Colon polyp K63.5    Chronic hypoxemic respiratory failure (HCC) J96.11    Sprain of anterior talofibular ligament of left ankle S93.492A       Pre infusion Vital Signs       Temp: 98.3  Blood Pressure: 118/79  Pulse: 72  Respirations: 18  O2 Sat: 97%    Does the Patient have a History of: IgA deficiency: no     Anaphylaxis or severe systemic response to any medication:  No        Breath Sounds: Respirations even and easy. Slight dyspnea noted with ambulation. Patient reports feeling slightly SOB at times due to wearing mask for Covid-19 prevention. Lungs sounds clear to auscultation x 4 lung fields. No wheezing or crackles noted.  See YULIANA GUZMAN [facility] flowsheets    Pulse Oximetry: 97 %    Any recent activity tolerance changes: No      Any increased SOB or dyspnea:  No     Presence of cough or sputum:  Yes - only in the AM - small amount thin, clear white that dissipates during the day. Premedicated: None ordered  [] Benadryl 25 mg IV  [] Benadryl 50 mg IV  [] Benadryl 25 mg oral   [] Benadryl 50 mg oral  [] Other    Epinephrine at bedside:  No: readily available in Pyxis    IV placed and documented prior to drug preparation:  Starvalerio Amaodr must be administered within 3 hours of drug reconstitution as it contains no preservatives. Monitoring of infusion documented in doc flowsheets. Glassia administered: Yes     /77   Pulse 72   Temp 98.3 °F (36.8 °C) (Oral)   Resp 18   Wt 179 lb (81.2 kg)   SpO2 97%   BMI 25.68 kg/m²     Glassia dosage: 60 mg/kg (total dose = 4,872 mg) was administered slowly IV    Dose verified by:  Sudhakar Francis RN    Post treatment Vital Signs  Temp: 98.3  Blood Pressure: 126/77  Pulse: 72  Respirations: 18  O2 Sat: 97%    Response to treatment:  Well tolerated by patient. Scheduled to return for next dose of Glassia on August 27, 2020.      Electronically signed by Adleso Najera RN on 8/20/2020 at 7:47 PM

## 2020-08-27 ENCOUNTER — HOSPITAL ENCOUNTER (OUTPATIENT)
Dept: INFUSION THERAPY | Age: 45
Setting detail: INFUSION SERIES
Discharge: HOME | End: 2020-08-27
Payer: COMMERCIAL

## 2020-08-27 VITALS
TEMPERATURE: 98.3 F | SYSTOLIC BLOOD PRESSURE: 115 MMHG | HEART RATE: 68 BPM | DIASTOLIC BLOOD PRESSURE: 78 MMHG | RESPIRATION RATE: 18 BRPM | BODY MASS INDEX: 26.11 KG/M2 | WEIGHT: 182 LBS | OXYGEN SATURATION: 95 %

## 2020-08-27 DIAGNOSIS — E88.01 ALPHA-1-ANTITRYPSIN DEFICIENCY (HCC): Primary | ICD-10-CM

## 2020-08-27 PROCEDURE — 96365 THER/PROPH/DIAG IV INF INIT: CPT

## 2020-08-27 PROCEDURE — 6360000002 HC RX W HCPCS: Performed by: INTERNAL MEDICINE

## 2020-08-27 PROCEDURE — 2580000003 HC RX 258: Performed by: INTERNAL MEDICINE

## 2020-08-27 RX ORDER — EPINEPHRINE 1 MG/ML
0.3 INJECTION, SOLUTION, CONCENTRATE INTRAVENOUS PRN
Status: CANCELLED | OUTPATIENT
Start: 2020-09-03

## 2020-08-27 RX ORDER — SODIUM CHLORIDE 0.9 % (FLUSH) 0.9 %
10 SYRINGE (ML) INJECTION PRN
Status: DISCONTINUED | OUTPATIENT
Start: 2020-08-27 | End: 2020-08-28 | Stop reason: HOSPADM

## 2020-08-27 RX ORDER — SODIUM CHLORIDE 9 MG/ML
INJECTION, SOLUTION INTRAVENOUS CONTINUOUS
Status: CANCELLED | OUTPATIENT
Start: 2020-09-03

## 2020-08-27 RX ORDER — SODIUM CHLORIDE 9 MG/ML
INJECTION, SOLUTION INTRAVENOUS CONTINUOUS
Status: ACTIVE | OUTPATIENT
Start: 2020-08-27 | End: 2020-08-27

## 2020-08-27 RX ORDER — SODIUM CHLORIDE 0.9 % (FLUSH) 0.9 %
10 SYRINGE (ML) INJECTION PRN
Status: CANCELLED | OUTPATIENT
Start: 2020-09-03

## 2020-08-27 RX ORDER — DIPHENHYDRAMINE HYDROCHLORIDE 50 MG/ML
50 INJECTION INTRAMUSCULAR; INTRAVENOUS ONCE
Status: CANCELLED | OUTPATIENT
Start: 2020-09-03

## 2020-08-27 RX ORDER — METHYLPREDNISOLONE SODIUM SUCCINATE 125 MG/2ML
125 INJECTION, POWDER, LYOPHILIZED, FOR SOLUTION INTRAMUSCULAR; INTRAVENOUS ONCE
Status: CANCELLED | OUTPATIENT
Start: 2020-09-03

## 2020-08-27 RX ADMIN — Medication 10 ML: at 09:34

## 2020-08-27 RX ADMIN — Medication 30 ML: at 10:27

## 2020-08-27 RX ADMIN — .ALPHA.1-PROTEINASE INHIBITOR HUMAN 5000 MG: 1 INJECTION, SOLUTION INTRAVENOUS at 09:56

## 2020-08-27 NOTE — PROGRESS NOTES
Outpatient [address] Visit    NAME:  Quyen Stevenson  YOB: 1975  MEDICAL RECORD NUMBER:  [id number]  Episode Date:  8/27/2020    Patient arrived to [facility] 58    [] per wheelchair   [x] ambulatory     Verification of patient education of Anna Todd actions and potential side effects:  Yes     Is this the patient's first Anna Todd Infusion? No   Did the patient experience any adverse reactions to previous Anna Todd Infusion? No    Any side effects from last Glassia infusion:  No, denies any     [] Rash         [] Itching  [] Headache  [] Fatigue  [] Dizziness  [] Tingling   [] Chest tightness  [] Shortness of Breath  [] Wheezing    Patient Active Problem List   Diagnosis Code    Abdominal pain R10.9    Lymphadenopathy R59.1    GERD (gastroesophageal reflux disease) K21.9    Ariza's esophagus with dysplasia K22.719    Emphysema lung J43.9    Alpha-1-antitrypsin deficiency ([facility] [address] E88.01    Essential hypertension, benign I10    Advance care planning Z71.89    Colon polyp K63.5    Chronic hypoxemic respiratory failure (HCC) J96.11    Sprain of anterior talofibular ligament of left ankle S93.492A       Pre infusion Vital Signs       Temp: 98.3 °F (36.8 °C)     Pulse: 68     Resp: 18     BP: 115/78       Does the Patient have a History of: IgA deficiency: no     Anaphylaxis or severe systemic response to any medication:  No        Breath Sounds: No increased work of breathing, Breath sounds clear to auscultation bilaterally, Good air exchange and No crackles  Pulse Oximetry: 95 %    Any recent activity tolerance changes: No      Any increased SOB or dyspnea:  No     Presence of cough or sputum:  Yes , small amount clear to yellowish sputum at times. Premedicated: No premeds ordered.   [] Benadryl 25 mg IV  [] Benadryl 50 mg IV  [] Benadryl 25 mg oral   [] Benadryl 50 mg oral  [] Other    Epinephrine at bedside:  No: but epi is in     IV placed and documented prior to drug preparation:  Lani Shingles must be administered within 3 hours of drug reconstitution as it contains no preservatives. Monitoring of infusion documented in doc flowsheets. Glassia administered: Yes     /78   Pulse 68   Temp 98.3 °F (36.8 °C) (Oral)   Resp 18   Wt 182 lb (82.6 kg)   SpO2 95%   BMI 26.11 kg/m²     Glassia dosage: 5,000 mg was administered slowly IV over 30 minutes. Post treatment Vital Signs  Temp: 98.3 °F (36.8 °C)  Pulse: 68  Resp: 18  BP: 115/78    Response to treatment:  Well tolerated by patient. Scheduled to return for next dose of Glassia on September 24, 2020.     Patient has appt to see Dr. Chris Andrade on 9/2/2020 and has PFT scheduled for 9/17/2020     Electronically signed by Sarthak Mix RN on 8/27/2020 at 9:47 AM

## 2020-09-02 ENCOUNTER — OFFICE VISIT (OUTPATIENT)
Dept: PULMONOLOGY | Age: 45
End: 2020-09-02
Payer: COMMERCIAL

## 2020-09-02 VITALS
OXYGEN SATURATION: 93 % | HEART RATE: 70 BPM | RESPIRATION RATE: 16 BRPM | WEIGHT: 186 LBS | HEIGHT: 70 IN | SYSTOLIC BLOOD PRESSURE: 116 MMHG | DIASTOLIC BLOOD PRESSURE: 72 MMHG | TEMPERATURE: 98.5 F | BODY MASS INDEX: 26.63 KG/M2

## 2020-09-02 PROCEDURE — G8427 DOCREV CUR MEDS BY ELIG CLIN: HCPCS | Performed by: INTERNAL MEDICINE

## 2020-09-02 PROCEDURE — G8419 CALC BMI OUT NRM PARAM NOF/U: HCPCS | Performed by: INTERNAL MEDICINE

## 2020-09-02 PROCEDURE — 1036F TOBACCO NON-USER: CPT | Performed by: INTERNAL MEDICINE

## 2020-09-02 PROCEDURE — 99213 OFFICE O/P EST LOW 20 MIN: CPT | Performed by: INTERNAL MEDICINE

## 2020-09-02 NOTE — PROGRESS NOTES
REASON FOR CONSULTATION/CC: Alpha 1 antitrypsin deficiency      CONSULTING PHYSICIAN: Dr. Mounika Henry  PCP: Estephania Yadav MD    HISTORY OF PRESENT ILLNESS: Get Brock is a 54y.o. year old male with a history of smoking who presents to establish care and follow-up for Alpha 1 antitrypsin Deficiency. Alpha 1. No issues with infusions. Breo and Spiriva. No issues with cost or side effects   Planning pulmonary function tests this month. Chronic hypoxemia  Only using at night now. PAST MEDICAL HISTORY:  Past Medical History:   Diagnosis Date    Advance care planning     LIving will on chart    Alpha-1-antitrypsin deficiency ([address] 11/15/2013    PFT's FEV1 72%    Ariza's esophagus with dysplasia 04/2012    Had checked 9/18/17 recheck in 4 years    CAP (community acquired pneumonia) 11/4/14    Colon polyp 09/25/2013    Had checked 9/18/17 recheck in 4 years    Elevated liver enzymes     Emphysema lung ([address] Oct 2013    on oxygen at night.  and prn during the day     Essential hypertension, benign 12/9/2014    GERD (gastroesophageal reflux disease) 11/10/2011    EGD 4/3/11 with Ariza's Esophagus    Kidney stone     Kidney stone     Pleural effusion     at age 15    Type 2 diabetes mellitus without complication, without long-term current use of insulin ([address] 7/18/2017    Wears glasses     blind in right eye        PAST SURGICAL HISTORY:  Past Surgical History:   Procedure Laterality Date    CHOLECYSTECTOMY, LAPAROSCOPIC  2005    COLONOSCOPY  2017    every 4 years     ENDOSCOPY, COLON, DIAGNOSTIC      EGD August 2015,pt states BX. negative    LYMPH NODE DISSECTION  2011    groin    TUNNELED VENOUS PORT PLACEMENT Right 11/22/2017    Smart port per Dr. Frida Parrish , right subclavian placement , right chest wall     VASECTOMY         FAMILY HISTORY:  family history includes Cancer in his maternal grandfather; Cancer (age of onset: 61) in his father. SOCIAL HISTORY:   reports that he quit smoking about 8 years ago. His smoking use included cigarettes. He started smoking about 34 years ago. He has a 48.00 pack-year smoking history. He has never used smokeless tobacco.    ALLERGIES:  Patient is allergic to ibuprofen. REVIEW OF SYSTEMS:  Constitutional: Negative for fever     HENT: Negative for sore throat  Respiratory: Negative for dyspnea, cough  Cardiovascular: Negative for chest pain  Gastrointestinal: Negative for vomiting, diarrhea   Skin: Negative for rash  Psychiatric/Behavorial: Negative for anxiety     Objective:   PHYSICAL EXAM:  Blood pressure 116/72, pulse 70, temperature 98.5 °F (36.9 °C), temperature source Oral, resp. rate 16, height 5' 10\" (1.778 m), weight 186 lb (84.4 kg), SpO2 93 %.'  Gen: No distress. ENT:   Resp: No accessory muscle use. No crackles. No wheezes. No rhonchi. CV: Regular rate. Regular rhythm. No murmur or rub. No edema. Skin: Warm, dry, normal texture and turgor. No nodule on exposed extremities. M/S: No cyanosis. No clubbing. No joint deformity. Psych: Oriented x 3. No anxiety. Awake. Alert. Intact judgement and insight. Good Mood / Affect.   Memory appears in tact `      Current Outpatient Medications on File Prior to Visit   Medication Sig Dispense Refill    albuterol sulfate  (90 Base) MCG/ACT inhaler Inhale 2 puffs into the lungs every 6 hours as needed for Wheezing 1 Inhaler 5    tiotropium (SPIRIVA RESPIMAT) 2.5 MCG/ACT AERS inhaler INHALE 2 PUFFS BY MOUTH INTO THE LUNGS DAILY 1 Inhaler 5    Fluticasone furoate-vilanterol (BREO ELLIPTA) 200-25 MCG/INH AEPB inhaler Inhale 1 puff into the lungs daily 1 each 5    alpha1-proteinase inhibitor (GLASSIA) 1000 MG/50ML SOLN Infuse 248.7 mLs intravenously once for 1 dose 248.7 mL 0    esomeprazole (NEXIUM) 40 MG delayed release capsule Take 1 capsule by mouth 2 times daily 180 capsule 3    naproxen sodium (ALEVE) 220 MG tablet Take 220 mg by mouth as needed        No current facility-administered medications on file prior to visit. Data Reviewed:   CBC and Renal reviewed  Last CBC  Lab Results   Component Value Date    WBC 7.0 04/23/2020    RBC 5.35 04/23/2020    HGB 15.5 04/23/2020    MCV 87.2 04/23/2020     04/23/2020     Last Renal  Lab Results   Component Value Date     04/23/2020    K 4.0 04/23/2020     04/23/2020    CO2 21 04/23/2020    CO2 25 05/30/2019    CO2 25 11/29/2018    BUN 9 04/23/2020    CREATININE 0.6 04/23/2020    GLUCOSE 105 04/23/2020    CALCIUM 9.1 04/23/2020       Last ABG  POC Blood Gas:   No results found for: POCPH  No results for input(s): PH, PCO2, PO2, HCO3, BE, O2SAT in the last 72 hours. Chest imaging reports were reviewed and imaging was reviewed by me and showed clearance on pneumonia       Overall Interpretation  Moderate obstuctive defect with moderate decrease in DLCO. The   patient has had three PFT's in the past 1 year. See table below. Worsening of FEV1 from first spirometry to second was 0.5L. The   patient was then started on alpha 1 replacement therapy however   still proceeded to loose 0.35L in 6 months. 1/14 6/14 7/14   FVC % 100    99    115   FEV1 % 62    72      84   FEV1/FVC 49 57       59   TLC%            101    105   DLCO % 52    66      70     Outside records requested and reviewed:       FEV1 Feb: 79%    Assessment:     ·  Alpha one antitrypsin deficiency, PiZZ < 30  · Obstructive airways disease,    ·  Chronic hypoxemia , 2L NC  · gerd      Plan:         Problem List Items Addressed This Visit     Chronic hypoxemic respiratory failure (HCC)     Hypoxemia is improved with weight loss. He is currently only using 2 L with sleep. Alpha-1-antitrypsin deficiency ([facility] [address] - Primary     Continues to do well with infusions along with Breo Spiriva. He is due for his yearly pulmonary function test.  This is scheduled.              This note was transcribed using [id number] [facility]. Please disregard any translational errors.

## 2020-09-03 ENCOUNTER — HOSPITAL ENCOUNTER (OUTPATIENT)
Dept: INFUSION THERAPY | Age: 45
Setting detail: INFUSION SERIES
Discharge: HOME | End: 2020-09-03
Payer: COMMERCIAL

## 2020-09-03 VITALS
BODY MASS INDEX: 26.2 KG/M2 | DIASTOLIC BLOOD PRESSURE: 88 MMHG | WEIGHT: 183 LBS | TEMPERATURE: 97.7 F | HEIGHT: 70 IN | SYSTOLIC BLOOD PRESSURE: 120 MMHG | HEART RATE: 66 BPM | OXYGEN SATURATION: 95 % | RESPIRATION RATE: 17 BRPM

## 2020-09-03 DIAGNOSIS — E88.01 ALPHA-1-ANTITRYPSIN DEFICIENCY (HCC): Primary | ICD-10-CM

## 2020-09-03 PROCEDURE — 6360000002 HC RX W HCPCS: Performed by: INTERNAL MEDICINE

## 2020-09-03 PROCEDURE — 96365 THER/PROPH/DIAG IV INF INIT: CPT

## 2020-09-03 PROCEDURE — 2580000003 HC RX 258: Performed by: INTERNAL MEDICINE

## 2020-09-03 RX ORDER — SODIUM CHLORIDE 0.9 % (FLUSH) 0.9 %
10 SYRINGE (ML) INJECTION PRN
Status: CANCELLED | OUTPATIENT
Start: 2020-09-10

## 2020-09-03 RX ORDER — SODIUM CHLORIDE 9 MG/ML
INJECTION, SOLUTION INTRAVENOUS CONTINUOUS
Status: CANCELLED | OUTPATIENT
Start: 2020-09-10

## 2020-09-03 RX ORDER — SODIUM CHLORIDE 0.9 % (FLUSH) 0.9 %
10 SYRINGE (ML) INJECTION PRN
Status: DISCONTINUED | OUTPATIENT
Start: 2020-09-03 | End: 2020-09-04 | Stop reason: HOSPADM

## 2020-09-03 RX ORDER — EPINEPHRINE 1 MG/ML
0.3 INJECTION, SOLUTION, CONCENTRATE INTRAVENOUS PRN
Status: CANCELLED | OUTPATIENT
Start: 2020-09-10

## 2020-09-03 RX ORDER — METHYLPREDNISOLONE SODIUM SUCCINATE 125 MG/2ML
125 INJECTION, POWDER, LYOPHILIZED, FOR SOLUTION INTRAMUSCULAR; INTRAVENOUS ONCE
Status: CANCELLED | OUTPATIENT
Start: 2020-09-10

## 2020-09-03 RX ORDER — DIPHENHYDRAMINE HYDROCHLORIDE 50 MG/ML
50 INJECTION INTRAMUSCULAR; INTRAVENOUS ONCE
Status: CANCELLED | OUTPATIENT
Start: 2020-09-10

## 2020-09-03 RX ADMIN — .ALPHA.1-PROTEINASE INHIBITOR HUMAN 4980 MG: 1 INJECTION, SOLUTION INTRAVENOUS at 09:43

## 2020-09-03 RX ADMIN — Medication 20 ML: at 10:07

## 2020-09-03 RX ADMIN — Medication 10 ML: at 09:43

## 2020-09-03 NOTE — PROGRESS NOTES
Outpatient [address] [facility] Visit    NAME:  Angie Edouard  YOB: 1975  MEDICAL RECORD NUMBER:  [id number]  Episode Date:  9/3/2020    Patient arrived to [facility] 58    [] per wheelchair   [x] ambulatory     Verification of patient education of Zada Hanover actions and potential side effects:  Yes     Is this the patient's first Zada Emely Infusion? No   Did the patient experience any adverse reactions to previous Zada Hanover Infusion? No    Any side effects from last Glassia infusion:  No     [] Rash         [] Itching  [] Headache  [] Fatigue  [] Dizziness  [] Tingling   [] Chest tightness  [] Shortness of Breath  [] Wheezing    Patient Active Problem List   Diagnosis Code    Abdominal pain R10.9    Lymphadenopathy R59.1    GERD (gastroesophageal reflux disease) K21.9    Ariza's esophagus with dysplasia K22.719    Emphysema lung J43.9    Alpha-1-antitrypsin deficiency ([facility] E88.01    Essential hypertension, benign I10    Advance care planning Z71.89    Colon polyp K63.5    Chronic hypoxemic respiratory failure (HCC) J96.11    Sprain of anterior talofibular ligament of left ankle S93.492A       Pre infusion Vital Signs       Temp: 97.7 °F (36.5 °C)     Pulse: 66     Resp: 17     BP: 120/88       Does the Patient have a History of:     IgA deficiency: no     Anaphylaxis or severe systemic response to any medication:  No        Breath Sounds: No increased work of breathing, Breath sounds clear to auscultation bilaterally and Good air exchange  Pulse Oximetry: 95 %    Any recent activity tolerance changes: No      Any increased SOB or dyspnea:  No     Presence of cough or sputum:  Yes clear     Premedicated:  [] Benadryl 25 mg IV  [] Benadryl 50 mg IV  [] Benadryl 25 mg oral   [] Benadryl 50 mg oral  [] Other    Epinephrine at bedside:  Yes    IV placed and documented prior to drug preparation:  Zada Hanover must be administered within 3 hours of drug reconstitution as it contains no preservatives. Monitoring of infusion documented in doc flowsheets. Glassia administered: {yes no:933702::\"Yes\"}     /88   Pulse 66   Temp 97.7 °F (36.5 °C) (Oral)   Resp 17   Ht 5' 10\" (1.778 m)   Wt 183 lb (83 kg)   SpO2 95%   BMI 26.26 kg/m²     Glassia dosage: *** mg/kg was administered slowly IV  Dose verified by:  *** RN    Post treatment Vital Signs  Temp: 97.7 °F (36.5 °C)  Pulse: 66  Resp: 17  BP: 120/88    Response to treatment:  [facility] TOLERATED:650830}     Scheduled to return for next dose of Glassia on {MONTH:04076} {NUMBERS 1-31 (DATE):58570}, {NKIQ:[id number]}.      Electronically signed by Ricky Duval RN on 9/3/2020 at 9:48 AM

## 2020-09-10 ENCOUNTER — HOSPITAL ENCOUNTER (OUTPATIENT)
Dept: INFUSION THERAPY | Age: 45
Setting detail: INFUSION SERIES
Discharge: HOME | End: 2020-09-10
Payer: COMMERCIAL

## 2020-09-10 VITALS
WEIGHT: 180 LBS | SYSTOLIC BLOOD PRESSURE: 125 MMHG | HEART RATE: 67 BPM | OXYGEN SATURATION: 96 % | BODY MASS INDEX: 25.83 KG/M2 | TEMPERATURE: 98 F | RESPIRATION RATE: 17 BRPM | DIASTOLIC BLOOD PRESSURE: 82 MMHG

## 2020-09-10 DIAGNOSIS — E88.01 ALPHA-1-ANTITRYPSIN DEFICIENCY (HCC): Primary | ICD-10-CM

## 2020-09-10 PROCEDURE — 6360000002 HC RX W HCPCS: Performed by: INTERNAL MEDICINE

## 2020-09-10 PROCEDURE — 2580000003 HC RX 258: Performed by: INTERNAL MEDICINE

## 2020-09-10 PROCEDURE — 96365 THER/PROPH/DIAG IV INF INIT: CPT

## 2020-09-10 RX ORDER — SODIUM CHLORIDE 0.9 % (FLUSH) 0.9 %
10 SYRINGE (ML) INJECTION PRN
Status: CANCELLED | OUTPATIENT
Start: 2020-09-17

## 2020-09-10 RX ORDER — SODIUM CHLORIDE 0.9 % (FLUSH) 0.9 %
10 SYRINGE (ML) INJECTION PRN
Status: DISCONTINUED | OUTPATIENT
Start: 2020-09-10 | End: 2020-09-11 | Stop reason: HOSPADM

## 2020-09-10 RX ORDER — SODIUM CHLORIDE 9 MG/ML
INJECTION, SOLUTION INTRAVENOUS CONTINUOUS
Status: CANCELLED | OUTPATIENT
Start: 2020-09-17

## 2020-09-10 RX ORDER — METHYLPREDNISOLONE SODIUM SUCCINATE 125 MG/2ML
125 INJECTION, POWDER, LYOPHILIZED, FOR SOLUTION INTRAMUSCULAR; INTRAVENOUS ONCE
Status: CANCELLED | OUTPATIENT
Start: 2020-09-17

## 2020-09-10 RX ORDER — EPINEPHRINE 1 MG/ML
0.3 INJECTION, SOLUTION, CONCENTRATE INTRAVENOUS PRN
Status: CANCELLED | OUTPATIENT
Start: 2020-09-17

## 2020-09-10 RX ORDER — DIPHENHYDRAMINE HYDROCHLORIDE 50 MG/ML
50 INJECTION INTRAMUSCULAR; INTRAVENOUS ONCE
Status: CANCELLED | OUTPATIENT
Start: 2020-09-17

## 2020-09-10 RX ADMIN — Medication 10 ML: at 10:08

## 2020-09-10 RX ADMIN — .ALPHA.1-PROTEINASE INHIBITOR HUMAN 4896 MG: 1 INJECTION, SOLUTION INTRAVENOUS at 09:49

## 2020-09-10 RX ADMIN — Medication 10 ML: at 09:40

## 2020-09-10 ASSESSMENT — PAIN SCALES - GENERAL: PAINLEVEL_OUTOF10: 0

## 2020-09-10 NOTE — PROGRESS NOTES
Outpatient [facility] Visit    NAME:  Veronica Yung  YOB: 1975  MEDICAL RECORD NUMBER:  [id number]  Episode Date:  9/10/2020    Patient arrived to [facility] 58    [] per wheelchair   [x] ambulatory     Verification of patient education of Jolinda Riser actions and potential side effects:  Yes     Is this the patient's first Jolinda Riser Injection? No   Did the patient experience any adverse reactions to previous Jolinda Riser Injection? No    Any side effects from last infusion:  No     [] Rash         [] Itching  [] Headache  [] Fatigue  [] Dizziness  [] Tingling   [] Chest tightness  [] Shortness of Breath  [] Wheezing    Patient Active Problem List   Diagnosis Code    Abdominal pain R10.9    Lymphadenopathy R59.1    GERD (gastroesophageal reflux disease) K21.9    Ariza's esophagus with dysplasia K22.719    Emphysema lung J43.9    Alpha-1-antitrypsin deficiency ([facility] E88.01    Essential hypertension, benign I10    Advance care planning Z71.89    Colon polyp K63.5    Chronic hypoxemic respiratory failure (HCC) J96.11    Sprain of anterior talofibular ligament of left ankle S93.492A       Pre infusion Vital Signs       Temp: 98 °F (36.7 °C)     Pulse: 67     Resp: 17     BP: 125/82       Does the Patient have a History of: IgA deficiency: no     Anaphylaxis or severe systemic response to any medication:  No        Breath Sounds: No increased work of breathing, Breath sounds clear to auscultation bilaterally and Good air exchange  Pulse Oximetry: 96 %    Any recent activity tolerance changes: No      Any increased SOB or dyspnea:  No     Presence of cough or sputum:  Yes usually in the mornings. Clear to yellow sputum.      Premedicated: none ordered  [] Benadryl 25 mg IV  [] Benadryl 50 mg IV  [] Benadryl 25 mg oral   [] Benadryl 50 mg oral  [] Other    Epinephrine at bedside:  Yes in omnicell    IV placed and documented prior to drug preparation:  Thor Rist must be administered within 3 hours of drug reconstitution as it contains no preservatives. Monitoring of infusion documented in doc flowsheets. Glassia administered: Yes     /82   Pulse 67   Temp 98 °F (36.7 °C) (Oral)   Resp 17   Wt 180 lb (81.6 kg)   SpO2 96%   BMI 25.83 kg/m²     Glassia dosage: 60 mg/kg was administered slowly IV  Dose verified by:  Iron Hu RN    Post treatment Vital Signs  Temp: 98 °F (36.7 °C)  Pulse: 67  Resp: 17  BP: 125/82    Response to treatment:  Well tolerated by patient. Scheduled to return for next dose of Glassia on September 17, 2020.      Electronically signed by Anabel Yan RN on 9/10/2020 at 10:46 AM

## 2020-09-11 ENCOUNTER — OFFICE VISIT (OUTPATIENT)
Dept: PRIMARY CARE CLINIC | Age: 45
End: 2020-09-11
Payer: COMMERCIAL

## 2020-09-11 PROCEDURE — G8419 CALC BMI OUT NRM PARAM NOF/U: HCPCS | Performed by: NURSE PRACTITIONER

## 2020-09-11 PROCEDURE — 99211 OFF/OP EST MAY X REQ PHY/QHP: CPT | Performed by: NURSE PRACTITIONER

## 2020-09-11 PROCEDURE — G8428 CUR MEDS NOT DOCUMENT: HCPCS | Performed by: NURSE PRACTITIONER

## 2020-09-11 NOTE — PROGRESS NOTES
Patient presented to [facility] drive up clinic for preop testing. Patient was swabbed and given information advising them to remain isolated until procedure date.

## 2020-09-12 LAB — SARS-COV-2, NAA: NOT DETECTED

## 2020-09-15 ENCOUNTER — TELEPHONE (OUTPATIENT)
Dept: SLEEP MEDICINE | Age: 45
End: 2020-09-15

## 2020-09-15 NOTE — TELEPHONE ENCOUNTER
Abdiel Rai with [employer] calls. Kush's Yash Lonny script will  October 10 and will need new PA. Can reach Melanie at [phone]. She ask for Mara to call her back.

## 2020-09-16 NOTE — TELEPHONE ENCOUNTER
Mazin Connolly is aware that we will wait for the prior auth request to come thru. She wants to know when we get the auth what the decision is.

## 2020-09-17 ENCOUNTER — HOSPITAL ENCOUNTER (OUTPATIENT)
Dept: INFUSION THERAPY | Age: 45
Setting detail: INFUSION SERIES
Discharge: HOME | End: 2020-09-17
Payer: COMMERCIAL

## 2020-09-17 ENCOUNTER — HOSPITAL ENCOUNTER (OUTPATIENT)
Dept: PULMONOLOGY | Age: 45
Discharge: HOME | End: 2020-09-17
Payer: COMMERCIAL

## 2020-09-17 VITALS
WEIGHT: 180 LBS | TEMPERATURE: 98.2 F | SYSTOLIC BLOOD PRESSURE: 114 MMHG | OXYGEN SATURATION: 98 % | RESPIRATION RATE: 17 BRPM | DIASTOLIC BLOOD PRESSURE: 72 MMHG | HEART RATE: 69 BPM | BODY MASS INDEX: 25.83 KG/M2

## 2020-09-17 DIAGNOSIS — E88.01 ALPHA-1-ANTITRYPSIN DEFICIENCY (HCC): Primary | ICD-10-CM

## 2020-09-17 LAB
DLCO %PRED: 52 %
DLCO PRED: NORMAL
DLCO/VA %PRED: NORMAL
DLCO/VA PRED: NORMAL
DLCO/VA: NORMAL
DLCO: NORMAL
EXPIRATORY TIME-POST: NORMAL
EXPIRATORY TIME: NORMAL
FEF 25-75% %CHNG: NORMAL
FEF 25-75% %PRED-POST: NORMAL
FEF 25-75% %PRED-PRE: NORMAL
FEF 25-75% PRED: NORMAL
FEF 25-75%-POST: NORMAL
FEF 25-75%-PRE: NORMAL
FEV1 %PRED-POST: 59 %
FEV1 %PRED-PRE: 53 %
FEV1 PRED: NORMAL
FEV1-POST: NORMAL
FEV1-PRE: NORMAL
FEV1/FVC %PRED-POST: NORMAL
FEV1/FVC %PRED-PRE: NORMAL
FEV1/FVC PRED: NORMAL
FEV1/FVC-POST: 41 %
FEV1/FVC-PRE: 37 %
FVC %PRED-POST: NORMAL
FVC %PRED-PRE: NORMAL
FVC PRED: NORMAL
FVC-POST: NORMAL
FVC-PRE: NORMAL
GAW %PRED: NORMAL
GAW PRED: NORMAL
GAW: NORMAL
IC %PRED: NORMAL
IC PRED: NORMAL
IC: NORMAL
MEP: NORMAL
MIP: NORMAL
MVV %PRED-PRE: NORMAL
MVV PRED: NORMAL
MVV-PRE: NORMAL
PEF %PRED-POST: NORMAL
PEF %PRED-PRE: NORMAL
PEF PRED: NORMAL
PEF%CHNG: NORMAL
PEF-POST: NORMAL
PEF-PRE: NORMAL
RAW %PRED: NORMAL
RAW PRED: NORMAL
RAW: NORMAL
RV %PRED: NORMAL
RV PRED: NORMAL
RV: NORMAL
SVC %PRED: NORMAL
SVC PRED: NORMAL
SVC: NORMAL
TLC %PRED: 110 %
TLC PRED: NORMAL
TLC: NORMAL
VA %PRED: NORMAL
VA PRED: NORMAL
VA: NORMAL
VTG %PRED: NORMAL
VTG PRED: NORMAL
VTG: NORMAL

## 2020-09-17 PROCEDURE — 2580000003 HC RX 258: Performed by: INTERNAL MEDICINE

## 2020-09-17 PROCEDURE — 94060 EVALUATION OF WHEEZING: CPT

## 2020-09-17 PROCEDURE — 94726 PLETHYSMOGRAPHY LUNG VOLUMES: CPT

## 2020-09-17 PROCEDURE — 6360000002 HC RX W HCPCS: Performed by: INTERNAL MEDICINE

## 2020-09-17 PROCEDURE — 96365 THER/PROPH/DIAG IV INF INIT: CPT

## 2020-09-17 PROCEDURE — 94060 EVALUATION OF WHEEZING: CPT | Performed by: INTERNAL MEDICINE

## 2020-09-17 PROCEDURE — 94726 PLETHYSMOGRAPHY LUNG VOLUMES: CPT | Performed by: INTERNAL MEDICINE

## 2020-09-17 PROCEDURE — 94729 DIFFUSING CAPACITY: CPT | Performed by: INTERNAL MEDICINE

## 2020-09-17 PROCEDURE — 94640 AIRWAY INHALATION TREATMENT: CPT

## 2020-09-17 PROCEDURE — 94664 DEMO&/EVAL PT USE INHALER: CPT

## 2020-09-17 PROCEDURE — 94729 DIFFUSING CAPACITY: CPT

## 2020-09-17 PROCEDURE — 6370000000 HC RX 637 (ALT 250 FOR IP): Performed by: INTERNAL MEDICINE

## 2020-09-17 RX ORDER — DIPHENHYDRAMINE HYDROCHLORIDE 50 MG/ML
50 INJECTION INTRAMUSCULAR; INTRAVENOUS ONCE
Status: CANCELLED | OUTPATIENT
Start: 2020-09-24

## 2020-09-17 RX ORDER — SODIUM CHLORIDE 9 MG/ML
INJECTION, SOLUTION INTRAVENOUS CONTINUOUS
Status: CANCELLED | OUTPATIENT
Start: 2020-09-24

## 2020-09-17 RX ORDER — ALBUTEROL SULFATE 90 UG/1
4 AEROSOL, METERED RESPIRATORY (INHALATION) ONCE
Status: COMPLETED | OUTPATIENT
Start: 2020-09-17 | End: 2020-09-17

## 2020-09-17 RX ORDER — METHYLPREDNISOLONE SODIUM SUCCINATE 125 MG/2ML
125 INJECTION, POWDER, LYOPHILIZED, FOR SOLUTION INTRAMUSCULAR; INTRAVENOUS ONCE
Status: CANCELLED | OUTPATIENT
Start: 2020-09-24

## 2020-09-17 RX ORDER — SODIUM CHLORIDE 0.9 % (FLUSH) 0.9 %
10 SYRINGE (ML) INJECTION PRN
Status: DISCONTINUED | OUTPATIENT
Start: 2020-09-17 | End: 2020-09-18 | Stop reason: HOSPADM

## 2020-09-17 RX ORDER — EPINEPHRINE 1 MG/ML
0.3 INJECTION, SOLUTION, CONCENTRATE INTRAVENOUS PRN
Status: CANCELLED | OUTPATIENT
Start: 2020-09-24

## 2020-09-17 RX ORDER — SODIUM CHLORIDE 0.9 % (FLUSH) 0.9 %
10 SYRINGE (ML) INJECTION PRN
Status: CANCELLED | OUTPATIENT
Start: 2020-09-24

## 2020-09-17 RX ADMIN — Medication 10 ML: at 09:15

## 2020-09-17 RX ADMIN — ALBUTEROL SULFATE 4 PUFF: 90 AEROSOL, METERED RESPIRATORY (INHALATION) at 11:03

## 2020-09-17 RX ADMIN — Medication 20 ML: at 10:21

## 2020-09-17 RX ADMIN — .ALPHA.1-PROTEINASE INHIBITOR HUMAN 4896 MG: 1 INJECTION, SOLUTION INTRAVENOUS at 10:04

## 2020-09-17 ASSESSMENT — PULMONARY FUNCTION TESTS
FEV1/FVC_POST: 41
FEV1_PERCENT_PREDICTED_POST: 59
FEV1_PERCENT_PREDICTED_PRE: 53
FEV1/FVC_PRE: 37

## 2020-09-17 ASSESSMENT — PAIN SCALES - GENERAL: PAINLEVEL_OUTOF10: 0

## 2020-09-17 NOTE — PROGRESS NOTES
Outpatient [address] [facility] Visit    NAME:  Dana Reyna  YOB: 1975  MEDICAL RECORD NUMBER:  [id number]  Episode Date:  9/17/2020    Patient arrived to [facility] 58     [] per wheelchair   [x] ambulatory     Verification of patient education of Rose Luther actions and potential side effects:  Yes     Is this the patient's first Rose Luther Infusion? No   Did the patient experience any adverse reactions to previous Rose Luther Infusion? No    Any side effects from last Glassia infusion:  No.   [] Rash         [] Itching  [] Headache  [] Fatigue  [] Dizziness  [] Tingling   [] Chest tightness  [] Shortness of Breath  [] Wheezing    Patient Active Problem List   Diagnosis Code    Abdominal pain R10.9    Lymphadenopathy R59.1    GERD (gastroesophageal reflux disease) K21.9    Ariza's esophagus with dysplasia K22.719    Emphysema lung J43.9    [facility]itry[facility] E88.01    Essential hypertension, benign I10    Advance care planning Z71.89    Colon polyp K63.5    Chronic hypoxemic respiratory failure (HCC) J96.11    Sprain of anterior talofibular ligament of left ankle S93.492A       Pre infusion Vital Signs         Vitals 114/ 72 pulse is 69, resp. 17 temp orally is 98.2               O 2 sat was 98 % on room air. Does the Patient have a History of: IgA deficiency: no     Anaphylaxis or severe systemic response to any medication:  No   breath Sounds: No increased work of breathing\",\"Breath sounds clear to auscultation bilaterally. Denies any productive cough. ,  Pulse Oximetry: 98 to 100 %  On room air. Any recent activity tolerance changes: No.     Any increased SOB or dyspnea:  No.     Presence of cough or sputum:  No.   epinephrine at bedside:  Readily available in [facility] [employer] [facility] just around short corner from pt's room.     IV placed and documented prior to drug preparation:  Rose Luther must be administered within 3 hours of drug reconstitution as it contains no preservatives. / yes. Monitoring of infusion documented in doc flowsheets. Glassia administered: yes . Wt 180 lb (81.6 kg)   BMI 25.83 kg/m²     Glassia dosage: 60 mg/kg  To equal a total doseage of 4,896 mgs was administered IV  Dose verified by:  Jay Acharya RN    Response to treatment:  Well tolerated by patient. Scheduled to return for next dose of Glassia on September 24, 2020. no complications from infusion and or port care today . Port Access and de-accessed. NAME:  Megan Sandoval  YOB: 1975  MEDICAL RECORD NUMBER:  [id number]  DATE:  9/17/2020    Patient arrived to [place] [address]   [] per wheelchair   [x] ambulatory     Port Site Location:  right chest  Port Site:  Redness: No  Bruising: No   Edema: No  Pain: No     Port Site cleansed with:  Chloroprep Scrub for 30 seconds and air dried? Yes     Port Accessed with: [address] non coring needle using sterile technique. Blood return obtained: Yes  Flushed with 10 ml Normal Saline using push-pause method. Port flushes without resistance. Response to treatment:  Well tolerated by patient.     Electronically signed by Cherelle Kohli RN on 9/17/2020 at 5:49 PM

## 2020-09-24 ENCOUNTER — HOSPITAL ENCOUNTER (OUTPATIENT)
Dept: INFUSION THERAPY | Age: 45
Setting detail: INFUSION SERIES
Discharge: HOME | End: 2020-09-24
Payer: COMMERCIAL

## 2020-09-24 VITALS
HEIGHT: 70 IN | BODY MASS INDEX: 25.77 KG/M2 | SYSTOLIC BLOOD PRESSURE: 131 MMHG | OXYGEN SATURATION: 95 % | WEIGHT: 180 LBS | TEMPERATURE: 98.2 F | RESPIRATION RATE: 17 BRPM | DIASTOLIC BLOOD PRESSURE: 81 MMHG | HEART RATE: 81 BPM

## 2020-09-24 DIAGNOSIS — E88.01 ALPHA-1-ANTITRYPSIN DEFICIENCY (HCC): Primary | ICD-10-CM

## 2020-09-24 PROCEDURE — 6360000002 HC RX W HCPCS: Performed by: INTERNAL MEDICINE

## 2020-09-24 PROCEDURE — 2580000003 HC RX 258: Performed by: INTERNAL MEDICINE

## 2020-09-24 PROCEDURE — 96365 THER/PROPH/DIAG IV INF INIT: CPT

## 2020-09-24 RX ORDER — SODIUM CHLORIDE 0.9 % (FLUSH) 0.9 %
10 SYRINGE (ML) INJECTION PRN
Status: CANCELLED | OUTPATIENT
Start: 2020-10-01

## 2020-09-24 RX ORDER — DIPHENHYDRAMINE HYDROCHLORIDE 50 MG/ML
50 INJECTION INTRAMUSCULAR; INTRAVENOUS ONCE
Status: CANCELLED | OUTPATIENT
Start: 2020-10-01

## 2020-09-24 RX ORDER — METHYLPREDNISOLONE SODIUM SUCCINATE 125 MG/2ML
125 INJECTION, POWDER, LYOPHILIZED, FOR SOLUTION INTRAMUSCULAR; INTRAVENOUS ONCE
Status: CANCELLED | OUTPATIENT
Start: 2020-10-01

## 2020-09-24 RX ORDER — SODIUM CHLORIDE 9 MG/ML
INJECTION, SOLUTION INTRAVENOUS CONTINUOUS
Status: CANCELLED | OUTPATIENT
Start: 2020-10-01

## 2020-09-24 RX ORDER — SODIUM CHLORIDE 0.9 % (FLUSH) 0.9 %
10 SYRINGE (ML) INJECTION PRN
Status: DISCONTINUED | OUTPATIENT
Start: 2020-09-24 | End: 2020-09-25 | Stop reason: HOSPADM

## 2020-09-24 RX ORDER — EPINEPHRINE 1 MG/ML
0.3 INJECTION, SOLUTION, CONCENTRATE INTRAVENOUS PRN
Status: CANCELLED | OUTPATIENT
Start: 2020-10-01

## 2020-09-24 RX ADMIN — .ALPHA.1-PROTEINASE INHIBITOR HUMAN 4896 MG: 1 INJECTION, SOLUTION INTRAVENOUS at 10:12

## 2020-09-24 RX ADMIN — Medication 30 ML: at 10:43

## 2020-09-24 NOTE — PROGRESS NOTES
Outpatient [address] [facility] Visit    NAME:  Jordy Jackson  YOB: 1975  MEDICAL RECORD NUMBER:  [id number]  Episode Date:  9/24/2020    Patient arrived to [facility] 58    [] per wheelchair   [x] ambulatory     Verification of patient education of Corene Idler actions and potential side effects:  Yes     Is this the patient's first Corene Idler Infusion? No   Did the patient experience any adverse reactions to previous Corene Idler Infusion? No    Any side effects from last Glassia infusion:  No     [] Rash         [] Itching  [] Headache  [] Fatigue  [] Dizziness  [] Tingling   [] Chest tightness  [] Shortness of Breath  [] Wheezing    Patient Active Problem List   Diagnosis Code    Abdominal pain R10.9    Lymphadenopathy R59.1    GERD (gastroesophageal reflux disease) K21.9    Ariza's esophagus with dysplasia K22.719    Emphysema lung J43.9    Alpha-1-antitrypsin deficiency ([facility] E88.01    Essential hypertension, benign I10    Advance care planning Z71.89    Colon polyp K63.5    Chronic hypoxemic respiratory failure (HCC) J96.11    Sprain of anterior talofibular ligament of left ankle S93.492A       Pre infusion Vital Signs       Temp: 98.2 °F (36.8 °C)     Pulse: 81     Resp: 17     BP: 131/81       Does the Patient have a History of: IgA deficiency: no     Anaphylaxis or severe systemic response to any medication:  No        Breath Sounds: No increased work of breathing, Breath sounds clear to auscultation bilaterally and Good air exchange  Pulse Oximetry: 95 %    Any recent activity tolerance changes: No      Any increased SOB or dyspnea:  No     Presence of cough or sputum:  No     Premedicated:  [] Benadryl 25 mg IV  [] Benadryl 50 mg IV  [] Benadryl 25 mg oral   [] Benadryl 50 mg readily available.     IV placed and documented prior to drug preparation:  Corene Idler must be administered within 3 hours of drug reconstitution as it contains no preservatives. Monitoring of infusion documented in doc flowsheets. Glassia administered: Yes     /81   Pulse 81   Temp 98.2 °F (36.8 °C) (Oral)   Resp 17   Ht 5' 10\" (1.778 m)   Wt 180 lb (81.6 kg)   SpO2 95%   BMI 25.83 kg/m²     Glassia dosage: 60 mg/kg was administered slowly IV  Dose verified by:  Jono Guido RN    Post treatment Vital Signs  Temp 98.7  Pulse 76  /67  Resp 17      Response to treatment:  Well tolerated by patient. Scheduled to return for next dose of Glassia on October 1, 2020.      Electronically signed by Issa Dunlap RN on 9/24/2020 at 9:36 AM

## 2020-10-01 ENCOUNTER — HOSPITAL ENCOUNTER (OUTPATIENT)
Dept: INFUSION THERAPY | Age: 45
Setting detail: INFUSION SERIES
Discharge: HOME | End: 2020-10-01
Payer: COMMERCIAL

## 2020-10-01 VITALS
WEIGHT: 182 LBS | SYSTOLIC BLOOD PRESSURE: 120 MMHG | BODY MASS INDEX: 26.11 KG/M2 | TEMPERATURE: 97.8 F | RESPIRATION RATE: 16 BRPM | OXYGEN SATURATION: 97 % | HEART RATE: 64 BPM | DIASTOLIC BLOOD PRESSURE: 81 MMHG

## 2020-10-01 DIAGNOSIS — E88.01 ALPHA-1-ANTITRYPSIN DEFICIENCY (HCC): Primary | ICD-10-CM

## 2020-10-01 PROCEDURE — 6360000002 HC RX W HCPCS: Performed by: INTERNAL MEDICINE

## 2020-10-01 PROCEDURE — 2580000003 HC RX 258: Performed by: INTERNAL MEDICINE

## 2020-10-01 PROCEDURE — 96365 THER/PROPH/DIAG IV INF INIT: CPT

## 2020-10-01 RX ORDER — SODIUM CHLORIDE 0.9 % (FLUSH) 0.9 %
10 SYRINGE (ML) INJECTION PRN
Status: DISCONTINUED | OUTPATIENT
Start: 2020-10-01 | End: 2020-10-02 | Stop reason: HOSPADM

## 2020-10-01 RX ORDER — SODIUM CHLORIDE 9 MG/ML
INJECTION, SOLUTION INTRAVENOUS CONTINUOUS
Status: CANCELLED | OUTPATIENT
Start: 2020-10-08

## 2020-10-01 RX ORDER — SODIUM CHLORIDE 9 MG/ML
INJECTION, SOLUTION INTRAVENOUS CONTINUOUS
Status: ACTIVE | OUTPATIENT
Start: 2020-10-01 | End: 2020-10-01

## 2020-10-01 RX ORDER — SODIUM CHLORIDE 0.9 % (FLUSH) 0.9 %
10 SYRINGE (ML) INJECTION PRN
Status: CANCELLED | OUTPATIENT
Start: 2020-10-08

## 2020-10-01 RX ORDER — METHYLPREDNISOLONE SODIUM SUCCINATE 125 MG/2ML
125 INJECTION, POWDER, LYOPHILIZED, FOR SOLUTION INTRAMUSCULAR; INTRAVENOUS ONCE
Status: CANCELLED | OUTPATIENT
Start: 2020-10-08

## 2020-10-01 RX ORDER — EPINEPHRINE 1 MG/ML
0.3 INJECTION, SOLUTION, CONCENTRATE INTRAVENOUS PRN
Status: CANCELLED | OUTPATIENT
Start: 2020-10-08

## 2020-10-01 RX ORDER — DIPHENHYDRAMINE HYDROCHLORIDE 50 MG/ML
50 INJECTION INTRAMUSCULAR; INTRAVENOUS ONCE
Status: CANCELLED | OUTPATIENT
Start: 2020-10-08

## 2020-10-01 RX ADMIN — .ALPHA.1-PROTEINASE INHIBITOR HUMAN 4956 MG: 1 INJECTION, SOLUTION INTRAVENOUS at 10:08

## 2020-10-01 RX ADMIN — Medication 10 ML: at 09:39

## 2020-10-01 RX ADMIN — Medication 10 ML: at 10:38

## 2020-10-01 RX ADMIN — Medication 30 ML: at 10:48

## 2020-10-01 RX ADMIN — Medication 10 ML: at 10:08

## 2020-10-01 ASSESSMENT — PAIN SCALES - GENERAL
PAINLEVEL_OUTOF10: 0

## 2020-10-01 NOTE — PROGRESS NOTES
Outpatient [address] [facility] [facility] Visit    NAME:  Angie Edouard  YOB: 1975  MEDICAL RECORD NUMBER:  [id number]  Episode Date:  10/1/2020    Patient arrived to [facility] 58     [] per wheelchair   [x] ambulatory     Patient to Outpatient Infusion for Glassia infusion. Patient reports feeling well lately despite declining PFT results. Patient reports having a PFT on 9/17/2020 and states that his lung fuction has decreased slightly. Patient has follow up with Dr Saint Heading 12/21/2020 and Dr Flaquita Zapata 3/2/2021    Verification of patient education of Zada Emely actions and potential side effects:  Yes     Is this the patient's first Glassia Infusion? No     Did the patient experience any adverse reactions to previous [facility] [place] Infusion? No    Any side effects from last Glassia infusion:  No     [] Rash         [] Itching  [] Headache  [] Fatigue  [] Dizziness  [] Tingling   [] Chest tightness  [] Shortness of Breath  [] Wheezing    Patient Active Problem List   Diagnosis Code    Abdominal pain R10.9    Lymphadenopathy R59.1    GERD (gastroesophageal reflux disease) K21.9    Ariza's esophagus with dysplasia K22.719    Emphysema lung J43.9    [facility] E88.01    Essential hypertension, benign I10    Advance care planning Z71.89    Colon polyp K63.5    Chronic hypoxemic respiratory failure (HCC) J96.11    Sprain of anterior talofibular ligament of left ankle S93.492A       Pre infusion Vital Signs       Temp: 97.6 °F (36.4 °C)     Pulse: 68     Resp: 18     BP: 126/68       Does the Patient have a History of: IgA deficiency: no     Anaphylaxis or severe systemic response to any medication:  No        Breath Sounds: Respirations even and easy upon arrival. Lung sounds clear to auscultation x 4 lung fields. No wheezing or crackles noted. No cough noted.  See YULIANA GUZMAN [facility] flowsheets    Pulse Oximetry: 96 %    Any recent activity tolerance changes: No      Any increased SOB or dyspnea:  No     Presence of cough or sputum:  Yes - reports having a mild productive cough when he first wakes up. States that the sputum is thin, clear/white. Premedicated: None ordered  [] Benadryl 25 mg IV  [] Benadryl 50 mg IV  [] Benadryl 25 mg oral   [] Benadryl 50 mg oral  [] Other    Epinephrine at bedside:  No: Readily available in [employer]    IV placed and documented prior to drug preparation:  Corene Idler must be administered within 3 hours of drug reconstitution as it contains no preservatives. Monitoring of infusion documented in doc flowsheets. Glassia administered: Yes     /81   Pulse 64   Temp 97.8 °F (36.6 °C) (Oral)   Resp 16   Wt 182 lb (82.6 kg)   SpO2 97%   BMI 26.11 kg/m²     Glassia dosage: 60 mg/kg (total dose = 4,956 mg) was administered slowly IV     Dose verified by: Kylee Stearns RN    Post treatment Vital Signs  Temp: 97.8 °F (36.6 °C)  Pulse: 64  Resp: 16  BP: 120/81    Response to treatment:  Well tolerated by patient. Scheduled to return for next dose of Glassia on October 8, 2020.      Electronically signed by Lowell Singleton RN on 10/1/2020 at 11:41 AM

## 2020-10-08 ENCOUNTER — HOSPITAL ENCOUNTER (OUTPATIENT)
Dept: INFUSION THERAPY | Age: 45
Setting detail: INFUSION SERIES
Discharge: HOME | End: 2020-10-08
Payer: COMMERCIAL

## 2020-10-08 VITALS
OXYGEN SATURATION: 96 % | WEIGHT: 181 LBS | BODY MASS INDEX: 25.97 KG/M2 | RESPIRATION RATE: 16 BRPM | DIASTOLIC BLOOD PRESSURE: 75 MMHG | HEART RATE: 71 BPM | SYSTOLIC BLOOD PRESSURE: 119 MMHG | TEMPERATURE: 98 F

## 2020-10-08 DIAGNOSIS — E88.01 ALPHA-1-ANTITRYPSIN DEFICIENCY (HCC): Primary | ICD-10-CM

## 2020-10-08 PROCEDURE — 96365 THER/PROPH/DIAG IV INF INIT: CPT

## 2020-10-08 PROCEDURE — 6360000002 HC RX W HCPCS: Performed by: INTERNAL MEDICINE

## 2020-10-08 PROCEDURE — 2580000003 HC RX 258: Performed by: INTERNAL MEDICINE

## 2020-10-08 RX ORDER — SODIUM CHLORIDE 9 MG/ML
INJECTION, SOLUTION INTRAVENOUS CONTINUOUS
Status: CANCELLED | OUTPATIENT
Start: 2020-10-15

## 2020-10-08 RX ORDER — METHYLPREDNISOLONE SODIUM SUCCINATE 125 MG/2ML
125 INJECTION, POWDER, LYOPHILIZED, FOR SOLUTION INTRAMUSCULAR; INTRAVENOUS ONCE
Status: CANCELLED | OUTPATIENT
Start: 2020-10-15

## 2020-10-08 RX ORDER — EPINEPHRINE 1 MG/ML
0.3 INJECTION, SOLUTION, CONCENTRATE INTRAVENOUS PRN
Status: CANCELLED | OUTPATIENT
Start: 2020-10-15

## 2020-10-08 RX ORDER — SODIUM CHLORIDE 0.9 % (FLUSH) 0.9 %
10 SYRINGE (ML) INJECTION PRN
Status: DISCONTINUED | OUTPATIENT
Start: 2020-10-08 | End: 2020-10-09 | Stop reason: HOSPADM

## 2020-10-08 RX ORDER — SODIUM CHLORIDE 9 MG/ML
INJECTION, SOLUTION INTRAVENOUS CONTINUOUS
Status: ACTIVE | OUTPATIENT
Start: 2020-10-08 | End: 2020-10-08

## 2020-10-08 RX ORDER — DIPHENHYDRAMINE HYDROCHLORIDE 50 MG/ML
50 INJECTION INTRAMUSCULAR; INTRAVENOUS ONCE
Status: CANCELLED | OUTPATIENT
Start: 2020-10-15

## 2020-10-08 RX ORDER — SODIUM CHLORIDE 0.9 % (FLUSH) 0.9 %
10 SYRINGE (ML) INJECTION PRN
Status: CANCELLED | OUTPATIENT
Start: 2020-10-15

## 2020-10-08 RX ADMIN — .ALPHA.1-PROTEINASE INHIBITOR HUMAN 4926 MG: 1 INJECTION, SOLUTION INTRAVENOUS at 11:52

## 2020-10-08 RX ADMIN — Medication 10 ML: at 12:20

## 2020-10-08 ASSESSMENT — PAIN SCALES - GENERAL: PAINLEVEL_OUTOF10: 0

## 2020-10-08 NOTE — PROGRESS NOTES
Outpatient [address] Access    NAME:  Vanessa Portillo  YOB: 1975  MEDICAL RECORD NUMBER:  [id number]  DATE:  10/8/2020    Patient arrived to [place] [address]   [] per wheelchair   [x] ambulatory     Port Site Location:  right chest  Port Site:  Redness: No  Bruising: No   Edema: No  Pain: No     Port Site cleansed with:  Chloroprep Scrub for 30 seconds and air dried? Yes     [facility] [address] [facility] [address] non coring needle using sterile technique. Blood return obtained: Yes  Flushed with 10 ml Normal Saline using push-pause method. Port flushes without resistance. Response to treatment:  Well tolerated by patient. Electronically signed by Jannette El RN on 10/8/2020 at [facility] Visit    NAME:  Vanessa Portillo  YOB: 1975  MEDICAL RECORD NUMBER:  [id number]  Episode Date:  10/8/2020    Patient arrived to [place] [address]     [] per wheelchair   [x] ambulatory     Verification of patient education of Graylon Guiles actions and potential side effects:  Yes     Is this the patient's first Graylon Guiles Injection? No   Did the patient experience any adverse reactions to previous Graylon Guiles Injection?  No    Any side effects from last infusion:  No     [] Rash         [] Itching  [] Headache  [] Fatigue  [] Dizziness  [] Tingling   [] Chest tightness  [] Shortness of Breath  [] Wheezing    Patient Active Problem List   Diagnosis Code    Abdominal pain R10.9    Lymphadenopathy R59.1    GERD (gastroesophageal reflux disease) K21.9    Ariza's esophagus with dysplasia K22.719    Emphysema lung J43.9    Alpha-1-antitrypsin deficiency ([facility] E88.01    Essential hypertension, benign I10    Advance care planning Z71.89    Colon polyp K63.5    Chronic hypoxemic respiratory failure (HCC) J96.11    Sprain of anterior talofibular ligament of left ankle C03.897I

## 2020-10-08 NOTE — TELEPHONE ENCOUNTER
Rohini Reed from [facility] ([phone]) called stating Daniel's authorization for his Arline Dexter is due to  20. She said there is some type of change in the insurance coverage and how the product will be shipped/billed. Since the infusion lab is responsible for the PA for this I gave her the number to the infusion lab to contact Zeinab to explain all this to her.

## 2020-10-08 NOTE — TELEPHONE ENCOUNTER
Spoke to Sabas Dudley in the infusion lab and she said that Conan Sever has already contacted them regarding Daniel's authorization and Zee Montgomery will be looking into this.  Told Hope to contact our office if there is anything we need to do to facilitate this

## 2020-10-15 ENCOUNTER — HOSPITAL ENCOUNTER (OUTPATIENT)
Dept: INFUSION THERAPY | Age: 45
Setting detail: INFUSION SERIES
Discharge: HOME | End: 2020-10-15
Payer: COMMERCIAL

## 2020-10-15 VITALS
BODY MASS INDEX: 26.2 KG/M2 | WEIGHT: 183 LBS | TEMPERATURE: 98.4 F | RESPIRATION RATE: 17 BRPM | SYSTOLIC BLOOD PRESSURE: 132 MMHG | OXYGEN SATURATION: 97 % | HEIGHT: 70 IN | HEART RATE: 72 BPM | DIASTOLIC BLOOD PRESSURE: 74 MMHG

## 2020-10-15 DIAGNOSIS — E88.01 ALPHA-1-ANTITRYPSIN DEFICIENCY (HCC): Primary | ICD-10-CM

## 2020-10-15 PROCEDURE — 96365 THER/PROPH/DIAG IV INF INIT: CPT

## 2020-10-15 PROCEDURE — 2580000003 HC RX 258: Performed by: INTERNAL MEDICINE

## 2020-10-15 PROCEDURE — 6360000002 HC RX W HCPCS: Performed by: INTERNAL MEDICINE

## 2020-10-15 RX ORDER — SODIUM CHLORIDE 9 MG/ML
INJECTION, SOLUTION INTRAVENOUS CONTINUOUS
Status: CANCELLED | OUTPATIENT
Start: 2020-10-22

## 2020-10-15 RX ORDER — EPINEPHRINE 1 MG/ML
0.3 INJECTION, SOLUTION, CONCENTRATE INTRAVENOUS PRN
Status: CANCELLED | OUTPATIENT
Start: 2020-10-22

## 2020-10-15 RX ORDER — METHYLPREDNISOLONE SODIUM SUCCINATE 125 MG/2ML
125 INJECTION, POWDER, LYOPHILIZED, FOR SOLUTION INTRAMUSCULAR; INTRAVENOUS ONCE
Status: CANCELLED | OUTPATIENT
Start: 2020-10-22

## 2020-10-15 RX ORDER — DIPHENHYDRAMINE HYDROCHLORIDE 50 MG/ML
50 INJECTION INTRAMUSCULAR; INTRAVENOUS ONCE
Status: CANCELLED | OUTPATIENT
Start: 2020-10-22

## 2020-10-15 RX ORDER — SODIUM CHLORIDE 0.9 % (FLUSH) 0.9 %
10 SYRINGE (ML) INJECTION PRN
Status: DISCONTINUED | OUTPATIENT
Start: 2020-10-15 | End: 2020-10-16 | Stop reason: HOSPADM

## 2020-10-15 RX ORDER — SODIUM CHLORIDE 0.9 % (FLUSH) 0.9 %
10 SYRINGE (ML) INJECTION PRN
Status: CANCELLED | OUTPATIENT
Start: 2020-10-22

## 2020-10-15 RX ADMIN — Medication 20 ML: at 09:32

## 2020-10-15 RX ADMIN — Medication 10 ML: at 10:33

## 2020-10-15 RX ADMIN — Medication 10 ML: at 10:49

## 2020-10-15 RX ADMIN — Medication 30 ML: at 11:08

## 2020-10-15 RX ADMIN — .ALPHA.1-PROTEINASE INHIBITOR HUMAN 4980 MG: 1 INJECTION, SOLUTION INTRAVENOUS at 10:33

## 2020-10-15 ASSESSMENT — PAIN SCALES - GENERAL
PAINLEVEL_OUTOF10: 0

## 2020-10-15 NOTE — PROGRESS NOTES
Outpatient [address] [facility] Visit    NAME:  Vanessa Portillo  YOB: 1975  MEDICAL RECORD NUMBER:  [id number]  Episode Date:  10/15/2020    Patient arrived to [facility] 58     [] per wheelchair   [x] ambulatory     Verification of patient education of Graylon Guiles actions and potential side effects:  Yes     Is this the patient's first Graylon Guiles Infusion? No     Did the patient experience any adverse reactions to previous Graylon Guiles Infusion? No    Any side effects from last Glassia infusion:  No     [] Rash         [] Itching  [] Headache  [] Fatigue  [] Dizziness  [] Tingling   [] Chest tightness  [] Shortness of Breath  [] Wheezing    Patient Active Problem List   Diagnosis Code    Abdominal pain R10.9    Lymphadenopathy R59.1    GERD (gastroesophageal reflux disease) K21.9    Ariza's esophagus with dysplasia K22.719    Emphysema lung J43.9    Alpha-1-antitrypsin deficiency ([facility] E88.01    Essential hypertension, benign I10    Advance care planning Z71.89    Colon polyp K63.5    Chronic hypoxemic respiratory failure (HCC) J96.11    Sprain of anterior talofibular ligament of left ankle S93.492A       Pre infusion Vital Signs       Temp: 98.6 °F (37 °C)     Pulse: 68     Resp: 18     BP: 122/80       Does the Patient have a History of: IgA deficiency: no     Anaphylaxis or severe systemic response to any medication:  No        Breath Sounds: Respirations slightly labored upon arrival but after a short rest period, respirations even and easy. Lungs clear to auscultation x 4 lung fields. No crackles or wheezing noted. Patient currently denies cough.     Pulse Oximetry: 96 %    Any recent activity tolerance changes: No      Any increased SOB or dyspnea:  No     Presence of cough or sputum:  No     Premedicated: none ordered  [] Benadryl 25 mg IV  [] Benadryl 50 mg IV  [] Benadryl 25 mg oral   [] Benadryl 50 mg oral  [] Other    Epinephrine at bedside:  No: readily available in xis    Port-A-Cath accessed and documented prior to drug preparation:  Patricia Marichuy must be administered within 3 hours of drug reconstitution as it contains no preservatives. Monitoring of infusion documented in doc flowsheets. Glassia administered: Yes     /74   Pulse 72   Temp 98.4 °F (36.9 °C) (Oral)   Resp 17   Ht 5' 10\" (1.778 m)   Wt 183 lb (83 kg)   SpO2 97%   BMI 26.26 kg/m²     Glassia dosage: 60 mg/kg (total dose = 4,980mg) was administered slowly IV  Dose verified by: Michael Ashby RN    Post treatment Vital Signs  Temp: 98.4 °F (36.9 °C)  Pulse: 72  Resp: 17  BP: 132/74    Response to treatment:  Well tolerated by patient. Scheduled to return for next dose of Glassia on October 22, 2020.      Electronically signed by Ning Negron RN on 10/15/2020 at 11:17 AM

## 2020-10-22 NOTE — TELEPHONE ENCOUNTER
Dany Gomez from the [facility] Infusion lab called to let us know that Hemarina has indicated that the Hospital can no longer do [address] his [place]. I t will have to be prescribed through [address] [place], [place] [address]ace She said this will begin 11/2/20.   She will be looking into a PA to get this approved, but also wants to look into the legistics if this is going to be shipped from [facility] to the infusion lab etc.

## 2020-11-06 RX ORDER — DIPHENHYDRAMINE HYDROCHLORIDE 50 MG/ML
50 INJECTION INTRAMUSCULAR; INTRAVENOUS ONCE
Status: CANCELLED | OUTPATIENT
Start: 2020-11-12

## 2020-11-06 RX ORDER — EPINEPHRINE 1 MG/ML
0.3 INJECTION, SOLUTION, CONCENTRATE INTRAVENOUS PRN
Status: CANCELLED | OUTPATIENT
Start: 2020-11-12

## 2020-11-06 RX ORDER — SODIUM CHLORIDE 9 MG/ML
INJECTION, SOLUTION INTRAVENOUS CONTINUOUS
Status: CANCELLED | OUTPATIENT
Start: 2020-11-12

## 2020-11-06 RX ORDER — METHYLPREDNISOLONE SODIUM SUCCINATE 125 MG/2ML
125 INJECTION, POWDER, LYOPHILIZED, FOR SOLUTION INTRAMUSCULAR; INTRAVENOUS ONCE
Status: CANCELLED | OUTPATIENT
Start: 2020-11-12

## 2020-11-06 RX ORDER — SODIUM CHLORIDE 0.9 % (FLUSH) 0.9 %
10 SYRINGE (ML) INJECTION PRN
Status: CANCELLED | OUTPATIENT
Start: 2020-11-12

## 2020-11-11 ENCOUNTER — HOSPITAL ENCOUNTER (OUTPATIENT)
Dept: INFUSION THERAPY | Age: 45
Setting detail: INFUSION SERIES
End: 2020-11-11
Payer: COMMERCIAL

## 2020-11-12 ENCOUNTER — HOSPITAL ENCOUNTER (OUTPATIENT)
Dept: INFUSION THERAPY | Age: 45
Setting detail: INFUSION SERIES
Discharge: HOME | End: 2020-11-12
Payer: COMMERCIAL

## 2020-11-12 ENCOUNTER — TELEPHONE (OUTPATIENT)
Dept: PULMONOLOGY | Age: 45
End: 2020-11-12

## 2020-11-12 NOTE — TELEPHONE ENCOUNTER
Paul Guadarrama with [employer] calls. [facility] for [facility] refills. Current script has 0 refills. Please add 52 refills. Paul Guadarrama will be able to see script once updated and send to insurance. Thanks.

## 2020-11-20 ENCOUNTER — TELEPHONE (OUTPATIENT)
Dept: PULMONOLOGY | Age: 45
End: 2020-11-20

## 2020-11-20 NOTE — TELEPHONE ENCOUNTER
Tessie matos/ [facility] called regarding orders that were faxed on 11/18/20. I checked media, faxes, and Dr. Viviane Morley signables didn't see anything.      Tessie's phone number [phone]

## 2020-11-23 NOTE — TELEPHONE ENCOUNTER
Spoke with Ajay Manriquez at [address] [place] [address]:  Correction to Anival De Los Santos phone: [phone] on Dr Dione Espinoza desk to be signed

## 2020-11-30 ENCOUNTER — HOSPITAL ENCOUNTER (OUTPATIENT)
Dept: INFUSION THERAPY | Age: 45
Setting detail: INFUSION SERIES
Discharge: HOME | End: 2020-11-30
Payer: COMMERCIAL

## 2020-11-30 VITALS
TEMPERATURE: 97.8 F | WEIGHT: 189.82 LBS | OXYGEN SATURATION: 96 % | HEART RATE: 69 BPM | BODY MASS INDEX: 27.24 KG/M2 | DIASTOLIC BLOOD PRESSURE: 78 MMHG | RESPIRATION RATE: 18 BRPM | SYSTOLIC BLOOD PRESSURE: 124 MMHG

## 2020-11-30 DIAGNOSIS — E88.01 ALPHA-1-ANTITRYPSIN DEFICIENCY (HCC): Primary | ICD-10-CM

## 2020-11-30 PROCEDURE — 6360000002 HC RX W HCPCS: Performed by: INTERNAL MEDICINE

## 2020-11-30 PROCEDURE — 2580000003 HC RX 258: Performed by: INTERNAL MEDICINE

## 2020-11-30 PROCEDURE — 96365 THER/PROPH/DIAG IV INF INIT: CPT

## 2020-11-30 RX ORDER — DIPHENHYDRAMINE HYDROCHLORIDE 50 MG/ML
50 INJECTION INTRAMUSCULAR; INTRAVENOUS ONCE
Status: CANCELLED | OUTPATIENT
Start: 2020-12-07

## 2020-11-30 RX ORDER — SODIUM CHLORIDE 9 MG/ML
INJECTION, SOLUTION INTRAVENOUS CONTINUOUS
Status: CANCELLED | OUTPATIENT
Start: 2020-12-07

## 2020-11-30 RX ORDER — SODIUM CHLORIDE 0.9 % (FLUSH) 0.9 %
10 SYRINGE (ML) INJECTION PRN
Status: CANCELLED | OUTPATIENT
Start: 2020-12-07

## 2020-11-30 RX ORDER — METHYLPREDNISOLONE SODIUM SUCCINATE 125 MG/2ML
125 INJECTION, POWDER, LYOPHILIZED, FOR SOLUTION INTRAMUSCULAR; INTRAVENOUS ONCE
Status: CANCELLED | OUTPATIENT
Start: 2020-12-07

## 2020-11-30 RX ORDER — EPINEPHRINE 1 MG/ML
0.3 INJECTION, SOLUTION, CONCENTRATE INTRAVENOUS PRN
Status: CANCELLED | OUTPATIENT
Start: 2020-12-07

## 2020-11-30 RX ORDER — SODIUM CHLORIDE 0.9 % (FLUSH) 0.9 %
10 SYRINGE (ML) INJECTION PRN
Status: DISCONTINUED | OUTPATIENT
Start: 2020-11-30 | End: 2020-12-01 | Stop reason: HOSPADM

## 2020-11-30 RX ADMIN — Medication 30 ML: at 10:24

## 2020-11-30 RX ADMIN — .ALPHA.1-PROTEINASE INHIBITOR HUMAN 5000 MG: 1 INJECTION, SOLUTION INTRAVENOUS at 10:25

## 2020-11-30 RX ADMIN — Medication 10 ML: at 10:53

## 2020-11-30 RX ADMIN — Medication 40 ML: at 11:04

## 2020-11-30 ASSESSMENT — PAIN SCALES - GENERAL
PAINLEVEL_OUTOF10: 0
PAINLEVEL_OUTOF10: 1

## 2020-11-30 NOTE — PROGRESS NOTES
Outpatient [address] [facility] Visit    NAME:  Dmitri Kellogg  YOB: 1975  MEDICAL RECORD NUMBER:  [id number]  Episode Date:  11/30/2020    Patient arrived to [facility]    [] per wheelchair   [x] ambulatory     Verification of patient education of Micaela Maidens actions and potential side effects:  Yes     Is this the patient's first [facility] Infusion? No   Did the patient experience any adverse reactions to previous [facility] Infusion? No    Any side effects from last Glassia infusion:  No     [] Rash         [] Itching  [] Headache  [] Fatigue  [] Dizziness  [] Tingling   [] Chest tightness  [] Shortness of Breath  [] Wheezing    Patient Active Problem List   Diagnosis Code    Abdominal pain R10.9    Lymphadenopathy R59.1    GERD (gastroesophageal reflux disease) K21.9    Ariza's esophagus with dysplasia K22.719    Emphysema lung J43.9    Alpha-1-antitrypsin deficiency ([facility] [address] E88.01    Essential hypertension, benign I10    Advance care planning Z71.89    Colon polyp K63.5    Chronic hypoxemic respiratory failure (HCC) J96.11    Sprain of anterior talofibular ligament of left ankle S93.492A       Pre infusion Vital Signs       Temp: 97.8 °F (36.6 °C)     Pulse: 81     Resp: 18     BP: 111/71       Does the Patient have a History of: IgA deficiency: no     Anaphylaxis or severe systemic response to any medication:  No        Breath Sounds: No increased work of breathing, Breath sounds clear to auscultation bilaterally, Good air exchange and No crackles  Pulse Oximetry: 95 %    Any recent activity tolerance changes: No      Any increased SOB or dyspnea:  No     Presence of cough or sputum:  Yes in early am small amount yellowish  Or clear sputum. Premedicated: No premeds  [] Benadryl 25 mg IV  [] Benadryl 50 mg IV  [] Benadryl 25 mg oral   [] Benadryl 50 mg oral  [] Other    Epinephrine at bedside:  No: [facility] Patient does not have epi pen at home. IV placed and documented prior to drug preparation:  Za Flatter must be administered within 3 hours of drug reconstitution as it contains no preservatives. Monitoring of infusion documented in doc flowsheets. Glassia administered: Yes     /71   Pulse 81   Temp 97.8 °F (36.6 °C) (Oral)   Resp 18   Wt 189 lb 13.1 oz (86.1 kg)   BMI 27.24 kg/m²     Glassia dosage: 5,000 mg total dose  was administered slowly IV      Post treatment Vital Signs  Temp: 97.8 °F (36.6 °C)  Pulse: 81  Resp: 18  BP: 111/71    Response to treatment:  Well tolerated by patient. Patient insurance has changed protocol for Za Flatter and they no longer will buy and bill Za Flatter, His insurance wants [place] supplied from an approved Specialty Pharmacy. We were first told [facility] would supply the glassia. [facility] said they do not supply this med and it would have to go to [place]. [facility] told me it would have to go through [place]. Spoke to [place] and they asked for some documantation and the order. After a few days I got back with them and someone else told me it has to go through [facility]. It eventually ended up being [id number]. Spoke with patient advocate , [facility] from [place] and they are going to train patient and wife how to infuse med at home using patient's port. This has been going on for several weeks and patient states he has not heard from anyone yet from [place] or [employer] [facility]. Today will be the last infusion we give him from here. Patient given phone # of patient advocate [facility] and he has phone # of DAVE Farias. Patient is supposed to be trained on doing home infusion using his port. Nothing has been set up as yet.     Seeing Dr. Rodrigez Person on 3/2/2020     Electronically signed by Gage Soler RN on 11/30/2020 at 6:00 PM.

## 2020-12-21 ENCOUNTER — TELEPHONE (OUTPATIENT)
Dept: PRIMARY CARE CLINIC | Age: 45
End: 2020-12-21

## 2021-06-17 ENCOUNTER — TELEPHONE (OUTPATIENT)
Dept: ORTHOPEDIC SURGERY | Age: 46
End: 2021-06-17

## 2021-06-17 ENCOUNTER — OFFICE VISIT (OUTPATIENT)
Dept: PRIMARY CARE CLINIC | Age: 46
End: 2021-06-17
Payer: COMMERCIAL

## 2021-06-17 VITALS
WEIGHT: 191.6 LBS | BODY MASS INDEX: 27.43 KG/M2 | HEIGHT: 70 IN | SYSTOLIC BLOOD PRESSURE: 135 MMHG | DIASTOLIC BLOOD PRESSURE: 87 MMHG | HEART RATE: 63 BPM

## 2021-06-17 DIAGNOSIS — K21.00 GASTROESOPHAGEAL REFLUX DISEASE WITH ESOPHAGITIS WITHOUT HEMORRHAGE: ICD-10-CM

## 2021-06-17 DIAGNOSIS — Z13.220 SCREENING CHOLESTEROL LEVEL: ICD-10-CM

## 2021-06-17 DIAGNOSIS — E88.01 ALPHA-1-ANTITRYPSIN DEFICIENCY (HCC): Primary | ICD-10-CM

## 2021-06-17 DIAGNOSIS — K22.719 BARRETT'S ESOPHAGUS WITH DYSPLASIA: ICD-10-CM

## 2021-06-17 DIAGNOSIS — E88.01 ALPHA-1-ANTITRYPSIN DEFICIENCY (HCC): ICD-10-CM

## 2021-06-17 PROCEDURE — 99214 OFFICE O/P EST MOD 30 MIN: CPT | Performed by: FAMILY MEDICINE

## 2021-06-17 PROCEDURE — 1036F TOBACCO NON-USER: CPT | Performed by: FAMILY MEDICINE

## 2021-06-17 PROCEDURE — G8419 CALC BMI OUT NRM PARAM NOF/U: HCPCS | Performed by: FAMILY MEDICINE

## 2021-06-17 PROCEDURE — G8427 DOCREV CUR MEDS BY ELIG CLIN: HCPCS | Performed by: FAMILY MEDICINE

## 2021-06-17 RX ORDER — ESOMEPRAZOLE MAGNESIUM 40 MG/1
40 CAPSULE, DELAYED RELEASE ORAL 2 TIMES DAILY
Qty: 180 CAPSULE | Refills: 3 | Status: SHIPPED | OUTPATIENT
Start: 2021-06-17

## 2021-06-17 ASSESSMENT — PATIENT HEALTH QUESTIONNAIRE - PHQ9
1. LITTLE INTEREST OR PLEASURE IN DOING THINGS: 0
SUM OF ALL RESPONSES TO PHQ QUESTIONS 1-9: 0
SUM OF ALL RESPONSES TO PHQ QUESTIONS 1-9: 0
SUM OF ALL RESPONSES TO PHQ9 QUESTIONS 1 & 2: 0
SUM OF ALL RESPONSES TO PHQ QUESTIONS 1-9: 0
2. FEELING DOWN, DEPRESSED OR HOPELESS: 0

## 2021-06-17 NOTE — ASSESSMENT & PLAN NOTE
Uncontrolled, changes made today: having breakthrough symptoms, will restart Rx nexium.   recheck 6 months and prn

## 2021-06-17 NOTE — TELEPHONE ENCOUNTER
PA submitted via [facility] for Esomeprazole Magnesium 40MG dr danielle.   Marlys MCINTYRE Case ID: [id number] - Rx #: [id number]    STATUS: PENDING

## 2021-06-17 NOTE — PROGRESS NOTES
Chief Complaint   Patient presents with    Gastroesophageal Reflux    Hearing Loss    Other     Alpha-1-Antitrypsin Deficiency       HPI: Daisy Greenberg presents for evaluation and management of alpha-1 antitrypsin, reflux with Ariza's    Cindia Hey notes he is feeling well. He gets dyspnea climbing 1 flight of steps and he has a slight cough but otherwise notes no difficulty breathing. He is keeping up with his Glassia injections for his alpha-1 antitrypsin. He also keeps follow-up with Dr. Murtaza Adams. He notes he is having breakthrough heartburn despite taking over-the-counter Nexium. He would like to get back on his Nexium therapy    Today's PHQ:    PHQ Scores 6/17/2021 6/22/2020 12/6/2019 6/19/2018 7/18/2017   PHQ2 Score 0 0 0 0 0   PHQ9 Score 0 0 0 0 0     Interpretation of Total Score Depression Severity: 1-4 = Minimal depression, 5-9 = Mild depression, 10-14 = Moderate depression, 15-19 = Moderately severe depression, 20-27 = Severe depression        Review of Systems    Allergies   Allergen Reactions    Ibuprofen Other (See Comments)     Pt states ibuprofen gave him cramps in his stomach.      New Prescriptions    No medications on file     Current Outpatient Medications   Medication Sig Dispense Refill    esomeprazole (NEXIUM) 40 MG delayed release capsule Take 1 capsule by mouth 2 times daily 180 capsule 3    alpha1-proteinase inhibitor (GLASSIA) 1000 MG/50ML SOLN Infuse 248.7 mLs intravenously every 28 days 250 mL 52    albuterol sulfate  (90 Base) MCG/ACT inhaler Inhale 2 puffs into the lungs every 6 hours as needed for Wheezing 1 Inhaler 5    tiotropium (SPIRIVA RESPIMAT) 2.5 MCG/ACT AERS inhaler INHALE 2 PUFFS BY MOUTH INTO THE LUNGS DAILY 1 Inhaler 5    Fluticasone furoate-vilanterol (BREO ELLIPTA) 200-25 MCG/INH AEPB inhaler Inhale 1 puff into the lungs daily 1 each 5    naproxen sodium (ALEVE) 220 MG tablet Take 220 mg by mouth as needed        No current facility-administered medications for this visit. Past Medical History:   Diagnosis Date    Advance care planning     LIving will on chart    Alpha-1-antitrypsin deficiency ([facility] [address] 11/15/2013    PFT's FEV1 72%    Ariza's esophagus with dysplasia 04/2012    Dr Brain Born 9/18/17 recheck in 4 years    CAP (community acquired pneumonia) 11/04/2014    Colon polyp 09/25/2013    Had checked 9/18/17 recheck in 4 years    Elevated liver enzymes     Emphysema lung ([facility] [address] 10/2013    on oxygen at night. and prn during the day     Essential hypertension, benign 12/09/2014    GERD (gastroesophageal reflux disease) 11/10/2011    EGD 4/3/11 with Ariza's Esophagus    Kidney stone     Pleural effusion     at age 15    Type 2 diabetes mellitus without complication, without long-term current use of insulin ([facility] [address] 07/18/2017    Wears glasses     blind in right eye          Objective   /87   Pulse 63   Ht 5' 10\" (1.778 m)   Wt 191 lb 9.6 oz (86.9 kg)   BMI 27.49 kg/m²   Wt Readings from Last 3 Encounters:   06/17/21 191 lb 9.6 oz (86.9 kg)   11/30/20 189 lb 13.1 oz (86.1 kg)   10/15/20 183 lb (83 kg)       Physical Exam  Constitutional:       Appearance: He is well-developed. Cardiovascular:      Rate and Rhythm: Normal rate and regular rhythm. Heart sounds: No murmur heard. No friction rub. No gallop. Pulmonary:      Effort: Pulmonary effort is normal.      Breath sounds: Normal breath sounds. No wheezing or rales. Abdominal:      General: Bowel sounds are normal. There is no distension. Palpations: Abdomen is soft. There is no mass. Tenderness: There is no abdominal tenderness. Skin:     General: Skin is warm and dry. Findings: No rash.            Chemistry        Component Value Date/Time     04/23/2020 0955    K 4.0 04/23/2020 0955     04/23/2020 0955    CO2 21 04/23/2020 0955    BUN 9 04/23/2020 0955    CREATININE 0.6 (L) 04/23/2020 0955        Component Value Date/Time    CALCIUM 9.1 04/23/2020 0955    ALKPHOS 73 04/23/2020 0955    AST 50 (H) 04/23/2020 0955    ALT 57 (H) 04/23/2020 0955    BILITOT 0.4 04/23/2020 0955          Lab Results   Component Value Date    WBC 7.0 04/23/2020    HGB 15.5 04/23/2020    HCT 46.7 04/23/2020    MCV 87.2 04/23/2020     (L) 04/23/2020     Lab Results   Component Value Date    LABA1C 5.6 05/30/2019     Lab Results   Component Value Date    .0 05/30/2019     Lab Results   Component Value Date    LABA1C 5.6 05/30/2019     No components found for: CHLPL  Lab Results   Component Value Date    TRIG 97 04/23/2020    TRIG 124 05/30/2019    TRIG 130 06/19/2018     Lab Results   Component Value Date    HDL 35 (L) 04/23/2020    HDL 38 (L) 05/30/2019    HDL 34 (L) 06/19/2018     Lab Results   Component Value Date    LDLCALC 84 04/23/2020    LDLCALC 135 (H) 05/30/2019    LDLCALC 130 (H) 06/19/2018     Lab Results   Component Value Date    LABVLDL 19 04/23/2020    LABVLDL 25 05/30/2019    LABVLDL 26 06/19/2018         Assessment   Plan   1. Alpha-1-antitrypsin deficiency [facility])  Assessment & Plan:   appears stable, continue Glassia and f/u Dr. Foster Andino  Orders:  -     Comprehensive Metabolic Panel; Future  2. Gastroesophageal reflux disease with esophagitis without hemorrhage   Having breakthrough symptoms. Restarted his Nexium  3. Screening cholesterol level  -     Lipid Panel; Future  -     Comprehensive Metabolic Panel; Future  4. Ariza's esophagus with dysplasia  Assessment & Plan:   Uncontrolled, changes made today: having breakthrough symptoms, will restart Rx nexium. recheck 6 months and prn  Orders:  -     esomeprazole (NEXIUM) 40 MG delayed release capsule; Take 1 capsule by mouth 2 times daily, Disp-180 capsule, R-3**Patient requests 90 day supply**Normal   Discussed use, benefit, and side effects of prescribed medications. Barriers to medication compliance addressed. All patient questions answered. Pt voiced understanding.          RTC Return in about 6 months (around 12/17/2021).

## 2021-06-18 LAB
A/G RATIO: 1.6 (ref 1.1–2.2)
ALBUMIN SERPL-MCNC: 4.4 G/DL (ref 3.4–5)
ALP BLD-CCNC: 81 U/L (ref 40–129)
ALT SERPL-CCNC: 59 U/L (ref 10–40)
ANION GAP SERPL CALCULATED.3IONS-SCNC: 13 MMOL/L (ref 3–16)
AST SERPL-CCNC: 43 U/L (ref 15–37)
BILIRUB SERPL-MCNC: 0.4 MG/DL (ref 0–1)
BUN BLDV-MCNC: 10 MG/DL (ref 7–20)
CALCIUM SERPL-MCNC: 9.4 MG/DL (ref 8.3–10.6)
CHLORIDE BLD-SCNC: 102 MMOL/L (ref 99–110)
CHOLESTEROL, TOTAL: 193 MG/DL (ref 0–199)
CO2: 24 MMOL/L (ref 21–32)
CREAT SERPL-MCNC: 0.8 MG/DL (ref 0.9–1.3)
GFR AFRICAN AMERICAN: >60
GFR NON-AFRICAN AMERICAN: >60
GLOBULIN: 2.7 G/DL
GLUCOSE BLD-MCNC: 95 MG/DL (ref 70–99)
HDLC SERPL-MCNC: 36 MG/DL (ref 40–60)
LDL CHOLESTEROL CALCULATED: 120 MG/DL
POTASSIUM SERPL-SCNC: 4.4 MMOL/L (ref 3.5–5.1)
SODIUM BLD-SCNC: 139 MMOL/L (ref 136–145)
TOTAL PROTEIN: 7.1 G/DL (ref 6.4–8.2)
TRIGL SERPL-MCNC: 183 MG/DL (ref 0–150)
VLDLC SERPL CALC-MCNC: 37 MG/DL

## 2021-06-18 NOTE — TELEPHONE ENCOUNTER
Received APPROVAL for Esomeprazole Magnesium 40MG dr capsules through 06/17/2022. Approval letter attached. Please advise patient. Thank you!

## 2021-07-19 ENCOUNTER — TELEPHONE (OUTPATIENT)
Dept: ORTHOPEDIC SURGERY | Age: 46
End: 2021-07-19

## 2021-07-19 NOTE — TELEPHONE ENCOUNTER
Received another PA request for Esomeprazole Magnesium 40MG dr capsules from [employer]. A PA for this medication was approved on 6/18/21. Approval runs through 06/17/2022; patient should be able to get 180 capsules for 90 days.

## 2021-09-09 ENCOUNTER — OFFICE VISIT (OUTPATIENT)
Dept: PRIMARY CARE CLINIC | Age: 46
End: 2021-09-09
Payer: COMMERCIAL

## 2021-09-09 VITALS
WEIGHT: 193 LBS | BODY MASS INDEX: 27.63 KG/M2 | DIASTOLIC BLOOD PRESSURE: 83 MMHG | HEIGHT: 70 IN | HEART RATE: 78 BPM | SYSTOLIC BLOOD PRESSURE: 129 MMHG

## 2021-09-09 DIAGNOSIS — K22.719 BARRETT'S ESOPHAGUS WITH DYSPLASIA: ICD-10-CM

## 2021-09-09 DIAGNOSIS — E88.01 ALPHA-1-ANTITRYPSIN DEFICIENCY (HCC): ICD-10-CM

## 2021-09-09 DIAGNOSIS — D48.5 NEOPLASM OF UNCERTAIN BEHAVIOR OF SKIN: Primary | ICD-10-CM

## 2021-09-09 PROCEDURE — G8427 DOCREV CUR MEDS BY ELIG CLIN: HCPCS | Performed by: FAMILY MEDICINE

## 2021-09-09 PROCEDURE — 90756 CCIIV4 VACC ABX FREE IM: CPT | Performed by: FAMILY MEDICINE

## 2021-09-09 PROCEDURE — 99214 OFFICE O/P EST MOD 30 MIN: CPT | Performed by: FAMILY MEDICINE

## 2021-09-09 PROCEDURE — G8419 CALC BMI OUT NRM PARAM NOF/U: HCPCS | Performed by: FAMILY MEDICINE

## 2021-09-09 PROCEDURE — 1036F TOBACCO NON-USER: CPT | Performed by: FAMILY MEDICINE

## 2021-09-09 PROCEDURE — 90471 IMMUNIZATION ADMIN: CPT | Performed by: FAMILY MEDICINE

## 2021-09-09 NOTE — PROGRESS NOTES
Chief Complaint   Patient presents with    Skin Lesion       HPI: Murtaza Esquivel presents for evaluation and management of > 1 month h/o skin lesion on his head,  Picked at it and it bleeds pretty easily. Notes no other complaints continues on glassia for his alpha 1 antitrypsin deficiency and breathing pretty well. His Barrets is currently treated with nexium, no dysphagia but intermittent heartburn. Review of Systems    Allergies   Allergen Reactions    Ibuprofen Other (See Comments)     Pt states ibuprofen gave him cramps in his stomach. New Prescriptions    No medications on file     Current Outpatient Medications   Medication Sig Dispense Refill    esomeprazole (NEXIUM) 40 MG delayed release capsule Take 1 capsule by mouth 2 times daily 180 capsule 3    alpha1-proteinase inhibitor (GLASSIA) 1000 MG/50ML SOLN Infuse 248.7 mLs intravenously every 28 days 250 mL 52    albuterol sulfate  (90 Base) MCG/ACT inhaler Inhale 2 puffs into the lungs every 6 hours as needed for Wheezing 1 Inhaler 5    tiotropium (SPIRIVA RESPIMAT) 2.5 MCG/ACT AERS inhaler INHALE 2 PUFFS BY MOUTH INTO THE LUNGS DAILY 1 Inhaler 5    Fluticasone furoate-vilanterol (BREO ELLIPTA) 200-25 MCG/INH AEPB inhaler Inhale 1 puff into the lungs daily 1 each 5    naproxen sodium (ALEVE) 220 MG tablet Take 220 mg by mouth as needed        No current facility-administered medications for this visit. Past Medical History:   Diagnosis Date    Advance care planning     LIving will on chart    Alpha-1-antitrypsin deficiency ([facility] [address] 11/15/2013    PFT's FEV1 72%    Ariza's esophagus with dysplasia 04/2012    Dr Jono Tristan 9/18/17 recheck in 4 years    CAP (community acquired pneumonia) 11/04/2014    Colon polyp 09/25/2013    Had checked 9/18/17 recheck in 4 years    Elevated liver enzymes     Emphysema lung ([address] 10/2013    on oxygen at night.  and prn during the day     Essential hypertension, benign 12/09/2014    GERD (gastroesophageal reflux disease) 11/10/2011    EGD 4/3/11 with Ariza's Esophagus    Kidney stone     Pleural effusion     at age 15    Type 2 diabetes mellitus without complication, without long-term current use of insulin ([facility] 07/18/2017    Wears glasses     blind in right eye          Objective   /83   Pulse 78   Ht 5' 10\" (1.778 m)   Wt 193 lb (87.5 kg)   BMI 27.69 kg/m²   Wt Readings from Last 3 Encounters:   09/09/21 193 lb (87.5 kg)   06/17/21 191 lb 9.6 oz (86.9 kg)   11/30/20 189 lb 13.1 oz (86.1 kg)       Physical Exam  Constitutional:       Appearance: He is well-developed. Cardiovascular:      Rate and Rhythm: Normal rate and regular rhythm. Heart sounds: No murmur heard. No friction rub. No gallop. Pulmonary:      Effort: Pulmonary effort is normal.      Breath sounds: Wheezing present. No rales. Abdominal:      General: Bowel sounds are normal. There is no distension. Palpations: Abdomen is soft. There is no mass. Tenderness: There is no abdominal tenderness. Skin:     General: Skin is warm and dry. Findings: Lesion present. No rash.                Chemistry        Component Value Date/Time     06/17/2021 1049    K 4.4 06/17/2021 1049     06/17/2021 1049    CO2 24 06/17/2021 1049    BUN 10 06/17/2021 1049    CREATININE 0.8 (L) 06/17/2021 1049        Component Value Date/Time    CALCIUM 9.4 06/17/2021 1049    ALKPHOS 81 06/17/2021 1049    AST 43 (H) 06/17/2021 1049    ALT 59 (H) 06/17/2021 1049    BILITOT 0.4 06/17/2021 1049          Lab Results   Component Value Date    WBC 7.0 04/23/2020    HGB 15.5 04/23/2020    HCT 46.7 04/23/2020    MCV 87.2 04/23/2020     (L) 04/23/2020     Lab Results   Component Value Date    LABA1C 5.6 05/30/2019     Lab Results   Component Value Date    .0 05/30/2019     Lab Results   Component Value Date    LABA1C 5.6 05/30/2019     No components found for: CHLPL  Lab Results   Component Value Date TRIG 183 (H) 06/17/2021    TRIG 97 04/23/2020    TRIG 124 05/30/2019     Lab Results   Component Value Date    HDL 36 (L) 06/17/2021    HDL 35 (L) 04/23/2020    HDL 38 (L) 05/30/2019     Lab Results   Component Value Date    LDLCALC 120 (H) 06/17/2021    LDLCALC 84 04/23/2020    LDLCALC 135 (H) 05/30/2019     Lab Results   Component Value Date    LABVLDL 37 06/17/2021    LABVLDL 19 04/23/2020    LABVLDL 25 05/30/2019         Assessment   Plan   1. Neoplasm of uncertain behavior of skin  Comments:  Concerning for skin cancer, BCC vs SCCA. will consult Dr. Ashley Hurt for evaluation and definitive management. Orders:  -     Darshana Perera MD Plastic Surgery, [place]  2. Alpha-1-antitrypsin deficiency ([facility] [address]  Comments:  Appears stable, continue meds and f/u with Dr. Justin Ventura. 3. Ariza's esophagus with dysplasia  Comments:  counseled importance of f/u with Dr. Bibi Quispe, continue meds. Discussed use, benefit, and side effects of prescribed medications. Barriers to medication compliance addressed. All patient questions answered. Pt voiced understanding. RTC Return in about 3 months (around 12/9/2021).

## 2021-09-09 NOTE — PATIENT INSTRUCTIONS
[facility], Dr. Kwaku Mcgarry MD  [facility] [address] [address]  Phone #: [phone]

## 2021-09-09 NOTE — Clinical Note
Bud Mag,  This schuyler has a 1 cm lesion on the crown of his scalp, can you help me with this? Thanks,  Corinthia Snellen - photo in note.

## 2021-11-12 ENCOUNTER — TELEPHONE (OUTPATIENT)
Dept: PULMONOLOGY | Age: 46
End: 2021-11-12

## 2021-11-12 NOTE — TELEPHONE ENCOUNTER
Reginald Mohr calls from [facility] with Pamela kemp. Pamela kemp is needing to be PA before 12/1 and need Clinic notes. I informed Reginald Mohr that Ty Henning has not been seen since 9/ 2020. Reginald Mohr will call Jf Barnett to inform him an appointment is needed.

## 2021-11-16 DIAGNOSIS — E88.01 ALPHA-1-ANTITRYPSIN DEFICIENCY (HCC): Primary | ICD-10-CM

## 2021-12-02 ENCOUNTER — HOSPITAL ENCOUNTER (OUTPATIENT)
Dept: PULMONOLOGY | Age: 46
Discharge: HOME | End: 2021-12-02
Payer: COMMERCIAL

## 2021-12-02 PROCEDURE — 94726 PLETHYSMOGRAPHY LUNG VOLUMES: CPT

## 2021-12-02 PROCEDURE — 94729 DIFFUSING CAPACITY: CPT

## 2021-12-02 PROCEDURE — 6370000000 HC RX 637 (ALT 250 FOR IP): Performed by: INTERNAL MEDICINE

## 2021-12-02 PROCEDURE — 94640 AIRWAY INHALATION TREATMENT: CPT

## 2021-12-02 PROCEDURE — 94060 EVALUATION OF WHEEZING: CPT

## 2021-12-02 RX ORDER — ALBUTEROL SULFATE 90 UG/1
4 AEROSOL, METERED RESPIRATORY (INHALATION) ONCE
Status: COMPLETED | OUTPATIENT
Start: 2021-12-02 | End: 2021-12-02

## 2021-12-02 RX ADMIN — ALBUTEROL SULFATE 4 PUFF: 90 AEROSOL, METERED RESPIRATORY (INHALATION) at 11:21

## 2021-12-03 PROCEDURE — 94726 PLETHYSMOGRAPHY LUNG VOLUMES: CPT | Performed by: INTERNAL MEDICINE

## 2021-12-03 PROCEDURE — 94060 EVALUATION OF WHEEZING: CPT | Performed by: INTERNAL MEDICINE

## 2021-12-03 PROCEDURE — 94729 DIFFUSING CAPACITY: CPT | Performed by: INTERNAL MEDICINE

## 2021-12-03 NOTE — PROCEDURES
Reason for PFT:  Alpha-1 deficiency     Spirometry  1. Spirometry shows moderately severe obstructive defect. 2. The FVC is diminished suggesting a possible restrictive defect; suggest lung volumes if clinically indicated. Lung Volumes  3. Lung volumes are normal.    Bronchodilator  1. There is a non-significant response to bronchodilator demonstrated. [Increase in FEV1 and/or FVC => 12% of control and => 200 ml]    Flow-Volume Loop  4. The flow-volume loop is compatible with an obstructive process. Diffusing Capacity  5. Diffusing capacity is moderately decreased. [40-60%]      Overall Interpretation  Moderately-severe obstruction is present    No restriction is present    There is not a significant bronchodilator response present    Diffusion capacity is moderately reduced     FEV1 is 2.27 L at 58% predicted. FVC is 5.87 L at 119% predicted    FEV1/FVC ratio is 39    Moderately-severe obstruction with non-significant bronchodilator response. Normal volumes. Moderately reduced diffusing capacity. When compared to the PFTs on 9/2020, there has been a mild improvement in flows and diffusing capacity without change in volumes. OBSTRUCTION % Predicted FEV1   MILD >70%   MODERATE 60-69%   MODERATELY-SEVERE 50-59%   SEVERE 35-49%   VERY SEVERE <35%         RESTRICTION % Predicted TLC   MILD Less than LLN but > than 70%   MODERATE 60-69%   MODERATELY-SEVERE <60%                 DIFFUSION CAPACITY DL,CO % Pred   MILD >60% AND < LLN   MODERATE 40-60%   SEVERE <40%       PFT data will be scanned into the media tab in epic.     Kylee Monique [facility] Pulmonology

## 2021-12-06 ENCOUNTER — OFFICE VISIT (OUTPATIENT)
Dept: PULMONOLOGY | Age: 46
End: 2021-12-06
Payer: COMMERCIAL

## 2021-12-06 VITALS
TEMPERATURE: 97 F | RESPIRATION RATE: 16 BRPM | DIASTOLIC BLOOD PRESSURE: 80 MMHG | HEART RATE: 78 BPM | WEIGHT: 194 LBS | SYSTOLIC BLOOD PRESSURE: 128 MMHG | OXYGEN SATURATION: 96 % | HEIGHT: 70 IN | BODY MASS INDEX: 27.77 KG/M2

## 2021-12-06 DIAGNOSIS — E88.01 ALPHA-1-ANTITRYPSIN DEFICIENCY (HCC): ICD-10-CM

## 2021-12-06 DIAGNOSIS — J96.11 CHRONIC HYPOXEMIC RESPIRATORY FAILURE (HCC): ICD-10-CM

## 2021-12-06 PROCEDURE — G8427 DOCREV CUR MEDS BY ELIG CLIN: HCPCS | Performed by: INTERNAL MEDICINE

## 2021-12-06 PROCEDURE — G8419 CALC BMI OUT NRM PARAM NOF/U: HCPCS | Performed by: INTERNAL MEDICINE

## 2021-12-06 PROCEDURE — 99214 OFFICE O/P EST MOD 30 MIN: CPT | Performed by: INTERNAL MEDICINE

## 2021-12-06 PROCEDURE — G8482 FLU IMMUNIZE ORDER/ADMIN: HCPCS | Performed by: INTERNAL MEDICINE

## 2021-12-06 PROCEDURE — 1036F TOBACCO NON-USER: CPT | Performed by: INTERNAL MEDICINE

## 2021-12-06 RX ORDER — FLUTICASONE FUROATE, UMECLIDINIUM BROMIDE AND VILANTEROL TRIFENATATE 200; 62.5; 25 UG/1; UG/1; UG/1
1 POWDER RESPIRATORY (INHALATION) DAILY
Qty: 1 EACH | Refills: 0 | COMMUNITY
Start: 2021-12-06 | End: 2022-06-06

## 2021-12-06 NOTE — PROGRESS NOTES
REASON FOR CONSULTATION/CC: Alpha 1 antitrypsin deficiency      CONSULTING PHYSICIAN: Dr. Serge Ulloa  PCP: Getachew Roberts MD    HISTORY OF PRESENT ILLNESS: Agustina Giraldo is a 51y.o. year old male with a history of smoking who presents to establish care and follow-up for Alpha 1 antitrypsin Deficiency. Alpha 1   pulmonary function tests completed   Alayna Rishi Calhoun   Would like to try sample of Trelegy       Chronic hypoxemia   2L NC .             REVIEW OF SYSTEMS:  Constitutional: Negative for fever     HENT: Negative for sore throat  Respiratory: Negative for dyspnea, cough  Cardiovascular: Negative for chest pain  Gastrointestinal: Negative for vomiting, diarrhea   Skin: Negative for rash  Psychiatric/Behavorial: Negative for anxiety     Objective:   PHYSICAL EXAM:  Blood pressure 128/80, pulse 78, temperature 97 °F (36.1 °C), temperature source Infrared, resp. rate 16, height 5' 10\" (1.778 m), weight 194 lb (88 kg), SpO2 96 %.'  Gen: No distress. ENT:   Resp: No accessory muscle use. No crackles. No wheezes. No rhonchi. CV: Regular rate. Regular rhythm. No murmur or rub. No edema. Skin: Warm, dry, normal texture and turgor. No nodule on exposed extremities. M/S: No cyanosis. No clubbing. No joint deformity. Psych: Oriented x 3. No anxiety. Awake. Alert. Intact judgement and insight. Good Mood / Affect.   Memory appears in tact `    Data Reviewed:                  1/14 6/14 7/14   FVC % 100    99    115   FEV1 % 62    72      84   FEV1/FVC 49 57       59   TLC%            101    105   DLCO % 52    66      70     Outside records requested and reviewed:   FEV1 Jul '16: >70%   FEV1 Jan '18: 74%   FEV1 Nov '18: 68%   FEV1 Aug '19: 59%   FEV1 Sept '20: 53%   FEV1 Dec '21: 58%    Assessment:     ·  Alpha one antitrypsin deficiency, PiZZ < 30  · Obstructive airways disease,    ·  Chronic hypoxemia , 2L NC  · gerd      Plan:         Problem List Items Addressed This Visit     Chronic

## 2022-06-06 ENCOUNTER — OFFICE VISIT (OUTPATIENT)
Dept: PULMONOLOGY | Age: 47
End: 2022-06-06
Payer: COMMERCIAL

## 2022-06-06 VITALS
HEART RATE: 77 BPM | BODY MASS INDEX: 26.11 KG/M2 | TEMPERATURE: 97.6 F | HEIGHT: 70 IN | OXYGEN SATURATION: 95 % | RESPIRATION RATE: 16 BRPM | DIASTOLIC BLOOD PRESSURE: 70 MMHG | SYSTOLIC BLOOD PRESSURE: 118 MMHG | WEIGHT: 182.4 LBS

## 2022-06-06 DIAGNOSIS — J96.11 CHRONIC HYPOXEMIC RESPIRATORY FAILURE (HCC): ICD-10-CM

## 2022-06-06 DIAGNOSIS — J43.9 PULMONARY EMPHYSEMA, UNSPECIFIED EMPHYSEMA TYPE (HCC): ICD-10-CM

## 2022-06-06 DIAGNOSIS — E88.01 ALPHA-1-ANTITRYPSIN DEFICIENCY (HCC): Primary | ICD-10-CM

## 2022-06-06 PROCEDURE — 1036F TOBACCO NON-USER: CPT | Performed by: INTERNAL MEDICINE

## 2022-06-06 PROCEDURE — 3023F SPIROM DOC REV: CPT | Performed by: INTERNAL MEDICINE

## 2022-06-06 PROCEDURE — G8427 DOCREV CUR MEDS BY ELIG CLIN: HCPCS | Performed by: INTERNAL MEDICINE

## 2022-06-06 PROCEDURE — G8419 CALC BMI OUT NRM PARAM NOF/U: HCPCS | Performed by: INTERNAL MEDICINE

## 2022-06-06 PROCEDURE — 99214 OFFICE O/P EST MOD 30 MIN: CPT | Performed by: INTERNAL MEDICINE

## 2022-06-06 NOTE — ASSESSMENT & PLAN NOTE
Continue Breo Spiriva and Glassia infusions. Yearly pulmonary function test.  Stable pulmonary function test since August 2019. Slow decline from 2016.

## 2022-06-06 NOTE — PROGRESS NOTES
REASON FOR CONSULTATION/CC: Alpha 1 antitrypsin deficiency      CONSULTING PHYSICIAN: Dr. Agatha Knox  PCP: Priya Eli MD    HISTORY OF PRESENT ILLNESS: Eddie Tillman is a 51y.o. year old male with a history of smoking who presents to establish care and follow-up for Alpha 1 antitrypsin Deficiency. Alpha 1    Chronic hypoxemia        Golfing on Sundays. Alpha 1    Did not like Trelegy . Breo and Alphia Kocher. Chronic hypoxemia  Using 2 L nC with sleeping             Objective:   PHYSICAL EXAM:  Blood pressure 118/70, pulse 77, temperature 97.6 °F (36.4 °C), temperature source Infrared, resp. rate 16, height 5' 10\" (1.778 m), weight 182 lb 6.4 oz (82.7 kg), SpO2 95 %.'  Gen: No distress. ENT:   Resp: No accessory muscle use. No crackles. No wheezes. No rhonchi. CV: Regular rate. Regular rhythm. No murmur or rub. No edema. Skin: Warm, dry, normal texture and turgor. No nodule on exposed extremities. M/S: No cyanosis. No clubbing. No joint deformity. Psych: Oriented x 3. No anxiety. Awake. Alert. Intact judgement and insight. Good Mood / Affect.   Memory appears in tact `    Data Reviewed:                  1/14 6/14 7/14   FVC % 100    99    115   FEV1 % 62    72      84   FEV1/FVC 49 57       59   TLC%            101    105   DLCO % 52    66      70     Outside records requested and reviewed:   FEV1 Jul '16: >70%   FEV1 Jan '18: 74%   FEV1 Nov '18: 68%   FEV1 Aug '19: 59%   FEV1 Sept '20: 53%   FEV1 Dec '21: 58%    Assessment:     ·  Alpha one antitrypsin deficiency, PiZZ < 30  · Obstructive airways disease,    ·  Chronic hypoxemia , 2L NC  · gerd      Plan:         Problem List Items Addressed This Visit     Emphysema lung    Relevant Medications    tiotropium (SPIRIVA RESPIMAT) 2.5 MCG/ACT AERS inhaler    Fluticasone furoate-vilanterol (BREO ELLIPTA) 200-25 MCG/INH AEPB inhaler    Chronic hypoxemic respiratory failure (HCC)      Continues to need and benefit from 2 L nasal cannula         Alpha-1-antitrypsin deficiency (HCC) - Primary      Continue Breo Spiriva and Glassia infusions. Yearly pulmonary function test.  Stable pulmonary function test since August 2019. Slow decline from 2016. Relevant Orders    Full PFT Study With Bronchodilator        This note was transcribed using Dragon Dictation software. Please disregard any translational errors.

## 2022-07-04 ENCOUNTER — HOSPITAL ENCOUNTER (EMERGENCY)
Age: 47
Discharge: HOME | End: 2022-07-04
Attending: EMERGENCY MEDICINE
Payer: COMMERCIAL

## 2022-07-04 ENCOUNTER — APPOINTMENT (OUTPATIENT)
Dept: CT IMAGING | Age: 47
End: 2022-07-04
Payer: COMMERCIAL

## 2022-07-04 VITALS
SYSTOLIC BLOOD PRESSURE: 131 MMHG | HEART RATE: 64 BPM | BODY MASS INDEX: 25.97 KG/M2 | RESPIRATION RATE: 18 BRPM | OXYGEN SATURATION: 96 % | WEIGHT: 181 LBS | TEMPERATURE: 97.5 F | DIASTOLIC BLOOD PRESSURE: 81 MMHG

## 2022-07-04 DIAGNOSIS — Z86.39 HISTORY OF ALPHA-1-ANTITRYPSIN DEFICIENCY: ICD-10-CM

## 2022-07-04 DIAGNOSIS — R07.9 CHEST PAIN, UNSPECIFIED TYPE: Primary | ICD-10-CM

## 2022-07-04 DIAGNOSIS — Z87.09 HISTORY OF COPD: ICD-10-CM

## 2022-07-04 DIAGNOSIS — R91.1 LUNG NODULE: ICD-10-CM

## 2022-07-04 LAB
ANION GAP SERPL CALCULATED.3IONS-SCNC: 13 MMOL/L (ref 3–16)
BASOPHILS ABSOLUTE: 0.1 K/UL (ref 0–0.2)
BASOPHILS RELATIVE PERCENT: 1.3 %
BUN BLDV-MCNC: 12 MG/DL (ref 7–20)
CALCIUM SERPL-MCNC: 8.9 MG/DL (ref 8.3–10.6)
CHLORIDE BLD-SCNC: 104 MMOL/L (ref 99–110)
CO2: 23 MMOL/L (ref 21–32)
CREAT SERPL-MCNC: 0.7 MG/DL (ref 0.9–1.3)
EKG ATRIAL RATE: 74 BPM
EKG DIAGNOSIS: NORMAL
EKG P AXIS: 53 DEGREES
EKG P-R INTERVAL: 156 MS
EKG Q-T INTERVAL: 384 MS
EKG QRS DURATION: 76 MS
EKG QTC CALCULATION (BAZETT): 426 MS
EKG R AXIS: 38 DEGREES
EKG T AXIS: 44 DEGREES
EKG VENTRICULAR RATE: 74 BPM
EOSINOPHILS ABSOLUTE: 0.3 K/UL (ref 0–0.6)
EOSINOPHILS RELATIVE PERCENT: 2.8 %
GFR AFRICAN AMERICAN: >60
GFR NON-AFRICAN AMERICAN: >60
GLUCOSE BLD-MCNC: 107 MG/DL (ref 70–99)
HCT VFR BLD CALC: 41.9 % (ref 40.5–52.5)
HEMOGLOBIN: 14.4 G/DL (ref 13.5–17.5)
LYMPHOCYTES ABSOLUTE: 1.9 K/UL (ref 1–5.1)
LYMPHOCYTES RELATIVE PERCENT: 21.4 %
MCH RBC QN AUTO: 29.4 PG (ref 26–34)
MCHC RBC AUTO-ENTMCNC: 34.4 G/DL (ref 31–36)
MCV RBC AUTO: 85.3 FL (ref 80–100)
MONOCYTES ABSOLUTE: 0.7 K/UL (ref 0–1.3)
MONOCYTES RELATIVE PERCENT: 8.4 %
NEUTROPHILS ABSOLUTE: 5.8 K/UL (ref 1.7–7.7)
NEUTROPHILS RELATIVE PERCENT: 66.1 %
PDW BLD-RTO: 12 % (ref 12.4–15.4)
PLATELET # BLD: 181 K/UL (ref 135–450)
PMV BLD AUTO: 10.2 FL (ref 5–10.5)
POTASSIUM REFLEX MAGNESIUM: 4.1 MMOL/L (ref 3.5–5.1)
RBC # BLD: 4.91 M/UL (ref 4.2–5.9)
SODIUM BLD-SCNC: 140 MMOL/L (ref 136–145)
TROPONIN: <0.01 NG/ML
WBC # BLD: 8.8 K/UL (ref 4–11)

## 2022-07-04 PROCEDURE — 93010 ELECTROCARDIOGRAM REPORT: CPT | Performed by: INTERNAL MEDICINE

## 2022-07-04 PROCEDURE — 6370000000 HC RX 637 (ALT 250 FOR IP): Performed by: EMERGENCY MEDICINE

## 2022-07-04 PROCEDURE — 93005 ELECTROCARDIOGRAM TRACING: CPT | Performed by: EMERGENCY MEDICINE

## 2022-07-04 PROCEDURE — 6360000002 HC RX W HCPCS: Performed by: EMERGENCY MEDICINE

## 2022-07-04 PROCEDURE — 71260 CT THORAX DX C+: CPT | Performed by: EMERGENCY MEDICINE

## 2022-07-04 PROCEDURE — 96374 THER/PROPH/DIAG INJ IV PUSH: CPT

## 2022-07-04 PROCEDURE — 6360000004 HC RX CONTRAST MEDICATION: Performed by: EMERGENCY MEDICINE

## 2022-07-04 PROCEDURE — 84484 ASSAY OF TROPONIN QUANT: CPT

## 2022-07-04 PROCEDURE — 80048 BASIC METABOLIC PNL TOTAL CA: CPT

## 2022-07-04 PROCEDURE — 85025 COMPLETE CBC W/AUTO DIFF WBC: CPT

## 2022-07-04 PROCEDURE — 99285 EMERGENCY DEPT VISIT HI MDM: CPT

## 2022-07-04 PROCEDURE — 36415 COLL VENOUS BLD VENIPUNCTURE: CPT

## 2022-07-04 RX ORDER — LIDOCAINE 4 G/G
1 PATCH TOPICAL ONCE
Status: DISCONTINUED | OUTPATIENT
Start: 2022-07-04 | End: 2022-07-04 | Stop reason: HOSPADM

## 2022-07-04 RX ORDER — ACETAMINOPHEN 325 MG/1
650 TABLET ORAL ONCE
Status: COMPLETED | OUTPATIENT
Start: 2022-07-04 | End: 2022-07-04

## 2022-07-04 RX ORDER — KETOROLAC TROMETHAMINE 30 MG/ML
15 INJECTION, SOLUTION INTRAMUSCULAR; INTRAVENOUS ONCE
Status: COMPLETED | OUTPATIENT
Start: 2022-07-04 | End: 2022-07-04

## 2022-07-04 RX ADMIN — IOPAMIDOL 75 ML: 755 INJECTION, SOLUTION INTRAVENOUS at 11:35

## 2022-07-04 RX ADMIN — ACETAMINOPHEN 650 MG: 325 TABLET ORAL at 09:56

## 2022-07-04 RX ADMIN — KETOROLAC TROMETHAMINE 15 MG: 30 INJECTION, SOLUTION INTRAMUSCULAR at 09:56

## 2022-07-04 ASSESSMENT — PAIN DESCRIPTION - FREQUENCY: FREQUENCY: CONTINUOUS

## 2022-07-04 ASSESSMENT — PAIN SCALES - GENERAL
PAINLEVEL_OUTOF10: 4
PAINLEVEL_OUTOF10: 5
PAINLEVEL_OUTOF10: 7
PAINLEVEL_OUTOF10: 3

## 2022-07-04 ASSESSMENT — PAIN DESCRIPTION - LOCATION
LOCATION: CHEST

## 2022-07-04 ASSESSMENT — PAIN - FUNCTIONAL ASSESSMENT
PAIN_FUNCTIONAL_ASSESSMENT: 0-10
PAIN_FUNCTIONAL_ASSESSMENT: 0-10

## 2022-07-04 ASSESSMENT — PAIN DESCRIPTION - ORIENTATION: ORIENTATION: MID

## 2022-07-04 ASSESSMENT — PAIN DESCRIPTION - DESCRIPTORS
DESCRIPTORS: SHARP
DESCRIPTORS: ACHING

## 2022-07-04 NOTE — ED NOTES
Handoff report to Art, RN to assume care. Denies further questions.       Suly Lui RN  07/04/22 0741

## 2022-07-04 NOTE — ED TRIAGE NOTES
Pt presents to ED with c/o of midsternal sharp chest pain starting at 2030 last night and has been constant since. +COPD. No cardiac hx. EKG performed in triage. Pt states nothing makes pain better or worse. Resp even and unlabored. A/ox4. No acute distress noted. Denies any need at this time. Call light within reach. Bed in lowest position. Will continue to monitor.

## 2022-07-04 NOTE — ED PROVIDER NOTES
[address] [facility]      Pt Name: Anjel Newton  MRN: [id number]  [place] 1975  Date of evaluation: 7/4/2022  Provider: Johnnie Rangel MD    CHIEF COMPLAINT     I woke up last night around 830 to go to work and after waking up I noticed a had some pain in my chest in the mid  HISTORY OF PRESENT ILLNESS  (Location/Symptom, Timing/Onset,Context/Setting, Quality, Duration, Modifying Factors, Severity). Note limiting factors. Chief Complaint   Patient presents with    Chest Pain     chest pain started last night. no cardiac history. Anjel Newton is a 49 y.o. male who presents to the emergency department secondary to concern for chest pain. He noted it last night upon waking up. He states the pain did not wake him up from sleep. He reports a history of COPD and always feeling a little bit short of breath and not being sure if the shortness of breath he has now is worse or the same as usual.  He states last week he did have a cold associated with cough, congestion but that both symptoms have significantly improved. He states the pain he has is located in the middle of his sternum, he describes it as sharp. It does not radiate. He states it might be a little bit worse or more noticeable when he takes a deep breath. He states nothing else really makes it better or worse and it is just constant. 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 He denies any fever, chills, nausea, vomiting, cough in the last couple of days. He does endorse traveling recently to [facility] by driving (last week). He was vaccinated for COVID, states he tested for COVID last week and it was negative. Past medical history noted below. No longer smoking. Aside from what is stated above denies any other symptoms or modifying factors. Nursing Notes reviewed.     REVIEW OF SYSTEMS  (2-9 systems for level 4, 10 or more for level 5)   Review of Systems Pertinent positive and negative findings as documented in the HPI; otherwise all other systems were reviewed and were negative. PAST MEDICAL HISTORY     Past Medical History:   Diagnosis Date    Advance care planning     LIving will on chart    Alpha-1-antitrypsin deficiency ([address] 11/15/2013    PFT's FEV1 72%    Ariza's esophagus with dysplasia 04/2012    Dr Michael Mirza 6/23/22 recheck in 4 years (due 2026)    CAP (community acquired pneumonia) 11/04/2014    Colon polyp 09/25/2013    Dr. Gina Martinez  recheck in 4 years (due 2026)    Elevated liver enzymes     Emphysema due to alpha-1-antitrypsin deficiency ([address]     Emphysema lung ([address] 10/2013    on oxygen at night.  and prn during the day     Essential hypertension, benign 12/09/2014    GERD (gastroesophageal reflux disease) 11/10/2011    EGD 4/3/11 with Ariza's Esophagus    Kidney stone     Pleural effusion     at age 15    Type 2 diabetes mellitus without complication, without long-term current use of insulin ([address] 07/18/2017    Wears glasses     blind in right eye        SURGICALHISTORY       Past Surgical History:   Procedure Laterality Date    CHOLECYSTECTOMY, LAPAROSCOPIC  2005    COLONOSCOPY  2017    every 4 years     ENDOSCOPY, COLON, DIAGNOSTIC      EGD August 2015,pt states BX. negative    LYMPH NODE DISSECTION  2011    groin    TUNNELED VENOUS PORT PLACEMENT Right 11/22/2017    Smart port per Dr. Argenis Dorsey , right subclavian placement , right chest wall     VASECTOMY       CURRENT MEDICATIONS       Previous Medications    ALBUTEROL SULFATE  (90 BASE) MCG/ACT INHALER    Inhale 2 puffs into the lungs every 6 hours as needed for Wheezing    ALPHA1-PROTEINASE INHIBITOR (GLASSIA) 1000 MG/50ML SOLN    Infuse 248.7 mLs intravenously every 28 days    ESOMEPRAZOLE (NEXIUM) 40 MG DELAYED RELEASE CAPSULE    Take 1 capsule by mouth 2 times daily    FLUTICASONE FUROATE-VILANTEROL (BREO ELLIPTA) 200-25 MCG/INH AEPB INHALER Inhale 1 puff into the lungs daily    NAPROXEN SODIUM (ALEVE) 220 MG TABLET    Take 220 mg by mouth as needed     TIOTROPIUM (SPIRIVA RESPIMAT) 2.5 MCG/ACT AERS INHALER    INHALE 2 PUFFS BY MOUTH INTO THE LUNGS DAILY      ALLERGIES     Ibuprofen  FAMILY HISTORY       Family History   Problem Relation Age of Onset    Cancer Father 61        colon    Cancer Maternal Grandfather         Lung     SOCIAL HISTORY       Social History     Socioeconomic History    Marital status:      Spouse name: Ting Nogueira    Number of children: 2    Years of education: Not on file    Highest education level: Not on file   Occupational History    Occupation: works at [employer]      Employer: [employer]     Comment: Jan 2019   Tobacco Use    Smoking status: Former Smoker     Packs/day: 1.60     Years: 30.00     Pack years: 48.00     Types: Cigarettes     Start date: 1/1/1986     Quit date: 10/5/2011     Years since quitting: 10.7    Smokeless tobacco: Never Used   Vaping Use    Vaping Use: Never used   Substance and Sexual Activity    Alcohol use: No     Comment: rare    Drug use: No    Sexual activity: Yes     Partners: Female   Other Topics Concern    Not on file   Social History Narrative    Not on file     Social Determinants of Health     Financial Resource Strain:     Difficulty of Paying Living Expenses: Not on file   Food Insecurity:     Worried About Running Out of Food in the Last Year: Not on file    Bartolo of Food in the Last Year: Not on file   Transportation Needs:     Lack of Transportation (Medical): Not on file    Lack of Transportation (Non-Medical):  Not on file   Physical Activity:     Days of Exercise per Week: Not on file    Minutes of Exercise per Session: Not on file   Stress:     Feeling of Stress : Not on file   Social Connections:     Frequency of Communication with Friends and Family: Not on file    Frequency of Social Gatherings with Friends and Family: Not on file   [facility] Attends Sabianist Services: Not on file    Active Member of Clubs or Organizations: Not on file    Attends Club or Organization Meetings: Not on file    Marital Status: Not on file   Intimate Partner Violence:     Fear of Current or Ex-Partner: Not on file    Emotionally Abused: Not on file    Physically Abused: Not on file    Sexually Abused: Not on file   Housing Stability:     Unable to Pay for Housing in the Last Year: Not on file    Number of [place] in the Last Year: Not on file    Unstable Housing in the Last Year: Not on file     SCREENINGS    [place] Coma Scale  Eye Opening: Spontaneous  Best Verbal Response: Oriented  Best Motor Response: Obeys commands  [place] Coma Scale Score: 15    PHYSICAL EXAM  (up to 7 for level 4, 8 or more for level 5)   INITIAL VITALS: BP: (!) 140/90, Temp: 97.6 °F (36.4 °C), Heart Rate: 81, Resp: 18, SpO2: 95 %   Physical Exam  Vitals reviewed. Constitutional:       General: He is not in acute distress. Appearance: He is not ill-appearing, toxic-appearing or diaphoretic. HENT:      Head: Normocephalic and atraumatic. Right Ear: External ear normal.      Left Ear: External ear normal.      Nose: No congestion. Eyes:      General: No scleral icterus. Right eye: No discharge. Left eye: No discharge. Conjunctiva/sclera: Conjunctivae normal.   Neck:      Trachea: No tracheal deviation. Cardiovascular:      Rate and Rhythm: Normal rate. Pulses: Normal pulses. Pulmonary:      Effort: Pulmonary effort is normal. No respiratory distress. Breath sounds: No wheezing, rhonchi or rales. Chest:      Chest wall: Tenderness present. Abdominal:      Tenderness: There is no abdominal tenderness. There is no guarding or rebound. Musculoskeletal:      Cervical back: Normal range of motion. Right lower leg: No edema. Left lower leg: No edema. Skin:     General: Skin is dry.       Capillary Refill: Capillary refill takes less than 2 seconds. Neurological:      General: No focal deficit present. Mental Status: He is alert and oriented to person, place, and time. Psychiatric:         Mood and Affect: Mood normal.         Behavior: Behavior normal.       DIAGNOSTIC RESULTS   EKG: interpreted by the Emergency Department Physician who either signs or Co-signs this chart in the absence of a cardiologist.  Indication: Chest pain  Interpretation: Rate 74, rhythm sinus, axis normal.  ME/QRS/QTc within normal limits. No T/ST changes consistent with acute ischemia. Comparison to prior EKG from September 12, 2017 shows no acute ischemic changes noted. RADIOLOGY:   Interpretation per Radiologist below, if available at the time of this note:  CT CHEST PULMONARY EMBOLISM W CONTRAST   Final Result   No gross findings of pulmonary embolism. Moderate emphysema, greater than expected for patient age. Mural thickening   of bilateral bronchi; correlate for airways disease. Bibasilar atelectasis. 6 mm left basilar pulmonary nodule, which may be followed based upon clinical   risk factors. RECOMMENDATIONS:   Fleischner Society guidelines for follow-up and management of incidentally   detected pulmonary nodules:      Single Solid Nodule:      Nodule size less than 6 mm   In a low-risk patient, no routine follow-up. In a high-risk patient, optional CT at 12 months. Nodule size equals 6-8 mm   In a low-risk patient, CT at 6-12 months, then consider CT at 18-24 months. In a high-risk patient, CT at 6-12 months, then CT at 18-24 months. Nodule size greater than 8 mm         In a low-risk patient, consider CT at 3 months, PET/CT, or tissue sampling. In a high-risk patient, consider CT at 3 months, PET/CT, or tissue sampling. Multiple Solid Nodules:      Nodule size less than 6 mm   In a low-risk patient, no routine follow-up. In a high-risk patient, optional CT at 12 months.       Nodule size equals 6-8 mm In a low-risk patient, CT at 3-6 months, then consider CT at 18-24 months. In a high-risk patient, CT at 3-6 months, then CT at 18-24 months. Nodule size greater than 8 mm   In a low-risk patient, CT at 3-6 months, then consider CT at 18-24 months. In a high-risk patient, CT at 3-6 months, then CT at 18-24 months. - Low risk patients include individuals with minimal or absent history of   smoking and other known risk factors. - High risk patients include individuals with a history or smoking or known   risk factors. Radiology 2017 http://pubs. rsna.org/doi/full/10.1148/radiol. 8618492228           LABS:  Labs Reviewed   CBC WITH AUTO DIFFERENTIAL - Abnormal; Notable for the following components:       Result Value    RDW 12.0 (*)     All other components within normal limits   BASIC METABOLIC PANEL W/ REFLEX TO MG FOR LOW K - Abnormal; Notable for the following components:    Glucose 107 (*)     CREATININE 0.7 (*)     All other components within normal limits   TROPONIN       EMERGENCY DEPARTMENT COURSE and DIFFERENTIAL DIAGNOSIS/MDM:   Patient was given the following medications:  Orders Placed This Encounter   Medications    ketorolac (TORADOL) injection 15 mg    acetaminophen (TYLENOL) tablet 650 mg    lidocaine 4 % external patch 1 patch    iopamidol (ISOVUE-370) 76 % injection 75 mL     CONSULTS:  None    INITIAL VITALS: BP: (!) 140/90, Temp: 97.6 °F (36.4 °C), Heart Rate: 81, Resp: 18, SpO2: 95 %   RECENT VITALS:  BP: 133/82,Temp: 97.6 °F (36.4 °C), Heart Rate: 75, Resp: 18, SpO2: 97 %     Is this patient to be included in the SEP-1 Core Measure due to severe sepsis or septic shock? No   Exclusion criteria - the patient is NOT to be included for SEP-1 Core Measure due to:  2+ SIRS criteria are not met    Shaun Brody is a 49 y.o. male who presents to the emergency department secondary to concern for symptoms as noted in HPI. On arrival he is awake, alert, oriented.   Vitals are hemodynamically stable. Physical exam is largely reassuring as noted above. No wheezing or findings to suggest COPD exacerbation. Given his history of recent travel and pain worse with inspiration decision was made to get a CT scan after discussing the/benefits of this type of radiation. Discussed also treatment for potential musculoskeletal injury given his recent URI symptoms. A peripheral IV was placed, labs were ordered along with medications and imaging. EKG acute ischemic changes. On reassessment discussed results of labs and imaging. At that time he reported he was feeling a little better but still had some symptoms. Discussed lung nodule which was a new finding and following up, he does follow with Dr. Saran Newton already and was aware of the emphysema and the alpha-1-antitrypsin which has causes worsening findings for his age. His wife was also present at the bedside at that time when we went over findings and recommendations. Discussed return precautions. Repeat vitals at that time HDS. Both He and his wife expressed understanding of all instructions, were in agreement with plan, and he was discharged home in stable condition. FINAL IMPRESSION      1. Chest pain, unspecified type    2. Lung nodule    3. History of COPD    4.  History of alpha-1-antitrypsin deficiency        DISPOSITION/PLAN   DISPOSITION        PATIENT REFERRED TO:  Altagracia Verde MD  [address] [place] [address]  [phone]    Call   For follow up appointment    Gorge Crandall, [address] [place] [address] ([facility]) [place]  [address]  [place] [address]  [phone]    Call   For follow up appointment      DISCHARGE MEDICATIONS:  New Prescriptions    No medications on file            (Please note that portions of this note were completed with a voice recognition program. Efforts were made to edit the dictations but occasionally words are mis-transcribed.)    Rhonda Disla MD (electronically signed)  Attending Emergency Physician        Rhonda Disla MD  07/04/22 [id number]

## 2022-07-04 NOTE — Clinical Note
Terry Leo was seen and treated in our emergency department on 7/4/2022. He may return to work on 07/05/2022. If you have any questions or concerns, please don't hesitate to call.       Tea Orr MD

## 2022-07-04 NOTE — Clinical Note
Rakesh Baeza was seen and treated in our emergency department on 7/4/2022. He may return to work on 07/05/2022. If you have any questions or concerns, please don't hesitate to call.       Jayson Rodríguez MD

## 2022-07-04 NOTE — ED NOTES
D/C: Order noted for d/c. Pt confirmed d/c paperwork   have correct name. Discharge and education instructions reviewed with patient. Teach-back successful. Pt verbalized understanding and signed d/c papers. Pt denied questions at this time. No acute distress noted. Patient instructed to follow-up as noted - return to emergency department if symptoms worsen. Patient verbalized understanding. Discharged per EDMD with discharge instructions. Pt discharged per self with family to private vehicle. Patient stable upon departure. Thanked patient for choosing [facility] for care.           Blanca Alanis, MIRIAN  07/04/22 [id number]

## 2022-07-04 NOTE — Clinical Note
Clair Tyler was seen and treated in our emergency department on 7/4/2022. He may return to work on 07/05/2022. If you have any questions or concerns, please don't hesitate to call.       Joel Chen MD

## 2022-07-11 NOTE — PROGRESS NOTES
[facility]    Basic Information  Patient: Peyton Cordova Age: 39year old Sex: female  MRN: [id number] Encounter Date: 7/11/2022, 8:10 AM  PCP: No Pcp        Chief Complaint  Chief Complaint   Patient presents with   â¢ Abdominal Pain       History of Present Illness    38 yo F here with abd pain, has hx of pancreatitis. Also menstruating. Patient having nausea and vomiting. Has a history of cholelithiasis. Was recently admitted for similar presentation. Underwent an ERCP as there was concern for choledocholithiasis. Was found to not have this. She says pain this time is significantly worse than it was previously. Pain is epigastric and right upper quadrant but also having left lower quadrant tenderness which she is concerned about. No fever or chills no diarrhea. I have reviewed the non physician practitioners documentation, personally taken the patient's history, performed an exam and agree with the physical findings, diagnosis and management plan.       Orders  Medications   sodium chloride (NORMAL SALINE) 0.9 % bolus 1,000 mL (1,000 mLs Intravenous New Bag 7/11/22 0735)   morphine injection 4 mg (4 mg Intravenous Given 7/11/22 0735)   ondansetron (ZOFRAN) injection 4 mg (4 mg Intravenous Given 7/11/22 0736)   famotidine (PEPCID) injection 20 mg (20 mg Intravenous Given 7/11/22 0736)         Laboratory and Radiology Results    Results for orders placed or performed during the hospital encounter of 07/11/22   Comprehensive Metabolic Panel   Result Value Ref Range    Fasting Status      Sodium 141 135 - 145 mmol/L    Potassium 4.2 3.4 - 5.1 mmol/L    Chloride 112 (H) 97 - 110 mmol/L    Carbon Dioxide 22 21 - 32 mmol/L    Anion Gap 11 7 - 19 mmol/L    Glucose 120 (H) 70 - 99 mg/dL    BUN 12 6 - 20 mg/dL    Creatinine 0.51 0.51 - 0.95 mg/dL    Glomerular Filtration Rate >90 >=60    BUN/ Creatinine Ratio 24 7 - 25    Calcium 8.0 (L) 8.4 - 10.2 mg/dL    Bilirubin, Total Outpatient [address]    NAME:  Ivis Dunlap  YOB: 1975  MEDICAL RECORD NUMBER:  [id number]  DATE:  10/4/2018    Patient arrived to [facility]   [] per wheelchair   [x] ambulatory     Port Site Location:  right chest, anterior                                                                                                                                                                                                                                                                                                                                                                Port Site:                       Redness: No  Bruising: No   Edema: No  Pain: No   No edema or redness of chest wall or shoulder area   Port Site cleansed with:  Chloroprep Scrub  X 2 for 60 seconds and air dried x 2 mis ? Yes     Port Accessed with: 20 Gauge  One inch Power Port non coring needle using sterile technique. Blood return obtained: Yes  Flushed with 10 ml Normal Saline using push-pause method. Port flushes without resistance. Biopatch to site, covered with Tegaderm HP , double T connector to port tubing   Response to treatment:  Well tolerated by patient.     Electronically signed by Mayo Kendrick RN on 10/4/2018 at 0930am 0.1 (L) 0.2 - 1.0 mg/dL    GOT/AST 19 <=37 Units/L    GPT/ALT 24 <64 Units/L    Alkaline Phosphatase 73 45 - 117 Units/L    Albumin 3.2 (L) 3.6 - 5.1 g/dL    Protein, Total 7.0 6.4 - 8.2 g/dL    Globulin 3.8 2.0 - 4.0 g/dL    A/G Ratio 0.8 (L) 1.0 - 2.4   Lipase   Result Value Ref Range    Lipase 1,597 (H) 73 - 393 Units/L   CBC with Automated Differential (performable only)   Result Value Ref Range    WBC 6.8 4.2 - 11.0 K/mcL    RBC 4.24 4.00 - 5.20 mil/mcL    HGB 13.2 12.0 - 15.5 g/dL    HCT 39.7 36.0 - 46.5 %    MCV 93.6 78.0 - 100.0 fl    MCH 31.1 26.0 - 34.0 pg    MCHC 33.2 32.0 - 36.5 g/dL    RDW-CV 12.9 11.0 - 15.0 %    RDW-SD 44.4 39.0 - 50.0 fL     140 - 450 K/mcL    NRBC 0 <=0 /100 WBC    Neutrophil, Percent 56 %    Lymphocytes, Percent 36 %    Mono, Percent 6 %    Eosinophils, Percent 1 %    Basophils, Percent 1 %    Immature Granulocytes 0 %    Absolute Neutrophils 3.8 1.8 - 7.7 K/mcL    Absolute Lymphocytes 2.4 1.0 - 4.8 K/mcL    Absolute Monocytes 0.4 0.3 - 0.9 K/mcL    Absolute Eosinophils  0.1 0.0 - 0.5 K/mcL    Absolute Basophils 0.1 0.0 - 0.3 K/mcL    Absolute Immmature Granulocytes 0.0 0.0 - 0.2 K/mcL       US GALLBLADDER   Final Result   1. Small gallbladder polyp and probable 7 mm gallstone with no   sonographic evidence of acute cholecystitis. 2.   Hepatic steatosis without focal lesion or biliary ductal dilatation. Electronically Signed by: Wicho Foster M.D. Signed on: 7/11/2022 11:15 AM          XR CHEST PA OR AP 1 VIEW   Final Result   1. No acute intrathoracic abnormalities identified. Electronically Signed by: Wicho Foster M.D. Signed on: 7/11/2022 10:13 AM          CT ABDOMEN PELVIS W CONTRAST   Final Result   1. Appendix unremarkable with no focal acute intra-abdominal or pelvic   abnormalities identified. 2.   Uncomplicated cholelithiasis. 3.   Hepatic steatosis. Electronically Signed by: Wicho Foster M.D.     Signed on: 7/11/2022 8:53 AM                Physical Exam  ED Triage Vitals [07/11/22 0605]   BP (!) 161/108   Heart Rate 97   Resp 16   Temp 97.9 Â°F (36.6 Â°C)   SpO2 95 %     General:  No acute distress. Alert. Skin:  Warm. Dry. Intact. Head:  Normocephalic. Atraumatic. Eye:  PERRL. EOMI. Normal conjunctiva. ENMT:  Oral mucosa moist.  Cardiovascular:  Regular rate and rhythm. No edema. Respiratory:  Respirations are non-labored. Lungs CTA. Gastrointestinal:  Soft. + epigastric and LLQ tenderness. Non distended. Back: Nontender. Musculoskeletal:  Normal ROM. Neurological:  A/O x 4. No focal neurological deficit observed. Psychiatric: Cooperative. Appropriate mood and affect. ED Course  I participated in the following activities of this patients care: the medical history, the physical exam, medical decision making. I personally performed: supervision of the patient's care. The case was discussed with: the non physician practitioners, Midlevel Provider. Evaluation and management service: I agree with the evaluation and management decisions made in this patient's care. Results interpretation: I agree with the study interpretation in this patient's care, History, physical, EMR documentation completed by Midlevel Provider has been reviewed and agree. Lipase elevated consistent with pancreatitis. CT does show  cholelithiasis but no evidence of cholecystitis. Will admit for IV hydration pain control and nausea management. Impression and Plan    Diagnosis:  1. Acute pancreatitis, unspecified complication status, unspecified pancreatitis type    2. Abdominal pain, unspecified abdominal location    3.  Elevated troponin            Disposition:  Myah Lacey MD, 7/11/2022 8:10 AM                Opal Perez MD  07/11/22 1939

## 2022-07-12 ENCOUNTER — OFFICE VISIT (OUTPATIENT)
Dept: PRIMARY CARE CLINIC | Age: 47
End: 2022-07-12
Payer: COMMERCIAL

## 2022-07-12 VITALS
TEMPERATURE: 97.1 F | DIASTOLIC BLOOD PRESSURE: 82 MMHG | HEART RATE: 73 BPM | WEIGHT: 184.4 LBS | SYSTOLIC BLOOD PRESSURE: 139 MMHG | HEIGHT: 70 IN | BODY MASS INDEX: 26.4 KG/M2 | OXYGEN SATURATION: 94 %

## 2022-07-12 DIAGNOSIS — K21.00 GASTROESOPHAGEAL REFLUX DISEASE WITH ESOPHAGITIS WITHOUT HEMORRHAGE: ICD-10-CM

## 2022-07-12 DIAGNOSIS — K22.719 BARRETT'S ESOPHAGUS WITH DYSPLASIA: Primary | ICD-10-CM

## 2022-07-12 DIAGNOSIS — D48.5 NEOPLASM OF UNCERTAIN BEHAVIOR OF SKIN: ICD-10-CM

## 2022-07-12 DIAGNOSIS — R91.1 NODULE OF LOWER LOBE OF LEFT LUNG: ICD-10-CM

## 2022-07-12 DIAGNOSIS — E88.01 ALPHA-1-ANTITRYPSIN DEFICIENCY (HCC): ICD-10-CM

## 2022-07-12 PROCEDURE — G8427 DOCREV CUR MEDS BY ELIG CLIN: HCPCS | Performed by: FAMILY MEDICINE

## 2022-07-12 PROCEDURE — 1036F TOBACCO NON-USER: CPT | Performed by: FAMILY MEDICINE

## 2022-07-12 PROCEDURE — G8419 CALC BMI OUT NRM PARAM NOF/U: HCPCS | Performed by: FAMILY MEDICINE

## 2022-07-12 PROCEDURE — 99214 OFFICE O/P EST MOD 30 MIN: CPT | Performed by: FAMILY MEDICINE

## 2022-07-12 ASSESSMENT — PATIENT HEALTH QUESTIONNAIRE - PHQ9
SUM OF ALL RESPONSES TO PHQ QUESTIONS 1-9: 0
2. FEELING DOWN, DEPRESSED OR HOPELESS: 0
1. LITTLE INTEREST OR PLEASURE IN DOING THINGS: 0
SUM OF ALL RESPONSES TO PHQ QUESTIONS 1-9: 0
SUM OF ALL RESPONSES TO PHQ9 QUESTIONS 1 & 2: 0

## 2022-07-12 NOTE — PROGRESS NOTES
furoate-vilanterol (BREO ELLIPTA) 200-25 MCG/INH AEPB inhaler Inhale 1 puff into the lungs daily 1 each 11    esomeprazole (NEXIUM) 40 MG delayed release capsule Take 1 capsule by mouth 2 times daily 180 capsule 3    alpha1-proteinase inhibitor (GLASSIA) 1000 MG/50ML SOLN Infuse 248.7 mLs intravenously every 28 days 250 mL 52    naproxen sodium (ALEVE) 220 MG tablet Take 220 mg by mouth as needed       albuterol sulfate  (90 Base) MCG/ACT inhaler Inhale 2 puffs into the lungs every 6 hours as needed for Wheezing (Patient not taking: Reported on 6/6/2022) 1 Inhaler 5     No current facility-administered medications for this visit. Past Medical History:   Diagnosis Date    Advance care planning     LIving will on chart    Alpha-1-antitrypsin deficiency ([facility] 11/15/2013    PFT's FEV1 72%    Ariza's esophagus with dysplasia 04/2012    Dr Karen Robbins 6/23/22 recheck in 4 years (due 2026)    CAP (community acquired pneumonia) 11/04/2014    Colon polyp 09/25/2013    Dr. Philly Tijerina  recheck in 4 years (due 2026)    Elevated liver enzymes     Emphysema due to alpha-1-antitrypsin deficiency ([address]     Emphysema lung ([address] 10/2013    on oxygen at night.  and prn during the day     Essential hypertension, benign 12/09/2014    GERD (gastroesophageal reflux disease) 11/10/2011    EGD 4/3/11 with Ariza's Esophagus    Kidney stone     Nodule of lower lobe of left lung 7/12/2022    Pleural effusion     at age 15    Type 2 diabetes mellitus without complication, without long-term current use of insulin ([address] 07/18/2017    Wears glasses     blind in right eye          Objective   /82   Pulse 73   Temp 97.1 °F (36.2 °C)   Ht 5' 10\" (1.778 m)   Wt 184 lb 6.4 oz (83.6 kg)   SpO2 94%   BMI 26.46 kg/m²   Wt Readings from Last 3 Encounters:   07/12/22 184 lb 6.4 oz (83.6 kg)   07/04/22 181 lb (82.1 kg)   06/06/22 182 lb 6.4 oz (82.7 kg)       Physical Exam  Constitutional:       Appearance: He is well-developed. Cardiovascular:      Rate and Rhythm: Normal rate and regular rhythm. Heart sounds: No murmur heard. No friction rub. No gallop. Pulmonary:      Effort: Pulmonary effort is normal.      Breath sounds: Normal breath sounds. No wheezing or rales. Abdominal:      General: Bowel sounds are normal. There is no distension. Palpations: Abdomen is soft. There is no mass. Tenderness: There is no abdominal tenderness. Skin:     General: Skin is warm and dry. Findings: No rash. Chemistry        Component Value Date/Time     07/04/2022 0930    K 4.1 07/04/2022 0930     07/04/2022 0930    CO2 23 07/04/2022 0930    BUN 12 07/04/2022 0930    CREATININE 0.7 (L) 07/04/2022 0930        Component Value Date/Time    CALCIUM 8.9 07/04/2022 0930    ALKPHOS 81 06/17/2021 1049    AST 43 (H) 06/17/2021 1049    ALT 59 (H) 06/17/2021 1049    BILITOT 0.4 06/17/2021 1049          Lab Results   Component Value Date    WBC 8.8 07/04/2022    HGB 14.4 07/04/2022    HCT 41.9 07/04/2022    MCV 85.3 07/04/2022     07/04/2022     Lab Results   Component Value Date    LABA1C 5.6 05/30/2019     Lab Results   Component Value Date    .0 05/30/2019     Lab Results   Component Value Date    LABA1C 5.6 05/30/2019     No components found for: CHLPL  Lab Results   Component Value Date    TRIG 183 (H) 06/17/2021    TRIG 97 04/23/2020    TRIG 124 05/30/2019     Lab Results   Component Value Date    HDL 36 (L) 06/17/2021    HDL 35 (L) 04/23/2020    HDL 38 (L) 05/30/2019     Lab Results   Component Value Date    LDLCALC 120 (H) 06/17/2021    LDLCALC 84 04/23/2020    LDLCALC 135 (H) 05/30/2019     Lab Results   Component Value Date    LABVLDL 37 06/17/2021    LABVLDL 19 04/23/2020    LABVLDL 25 05/30/2019         Assessment   Plan   1. Ariza's esophagus with dysplasia  Controlled: Appears stable.   We will continue current management and monitor for adverse reaction and disease progression. Follow-up as noted below      2. Neoplasm of uncertain behavior of skin  Appears to have resolved:    3. Alpha-1-antitrypsin deficiency ([address]  Appears stable: Continue chronic disease management and keep follow-up with pulmonology    4. Gastroesophageal reflux disease with esophagitis without hemorrhage  Controlled: Appears stable. We will continue current management and monitor for adverse reaction and disease progression. Follow-up as noted below      5. Nodule of lower lobe of left lung  Given his history of smoking. We will set up CT of his chest in 3 months and follow-up at that time  - [address]; Future        RTC Return in about 3 months (around 10/26/2022).

## 2022-10-11 ENCOUNTER — HOSPITAL ENCOUNTER (OUTPATIENT)
Dept: CT IMAGING | Age: 47
Discharge: HOME | End: 2022-10-11
Payer: COMMERCIAL

## 2022-10-11 DIAGNOSIS — R91.1 NODULE OF LOWER LOBE OF LEFT LUNG: ICD-10-CM

## 2022-10-11 PROCEDURE — 71260 CT THORAX DX C+: CPT

## 2022-10-11 PROCEDURE — 6360000004 HC RX CONTRAST MEDICATION: Performed by: FAMILY MEDICINE

## 2022-10-11 RX ADMIN — IOPAMIDOL 75 ML: 755 INJECTION, SOLUTION INTRAVENOUS at 08:53

## 2022-10-12 NOTE — RESULT ENCOUNTER NOTE
Good Morning Ifeoma Schofield ,    Thanks for getting your CT,  It looked good!       Have a great week,    Dr. Deepthi Soto      For your convenience I have listed your future appointment(s) below:  Future Appointments  11/18/2022 9:30 AM    SCHEDULE, CHET PFT  1    CHET PFTARUN            [facility]  12/12/2022 8:20 AM    Yu Adan MD    [facility] PULM             MMA

## 2022-11-18 ENCOUNTER — HOSPITAL ENCOUNTER (OUTPATIENT)
Dept: PULMONOLOGY | Age: 47
Discharge: HOME | End: 2022-11-18
Payer: COMMERCIAL

## 2022-11-18 DIAGNOSIS — E88.01 ALPHA-1-ANTITRYPSIN DEFICIENCY (HCC): ICD-10-CM

## 2022-11-18 LAB
DLCO %PRED: 59 %
DLCO PRED: NORMAL
DLCO/VA %PRED: NORMAL
DLCO/VA PRED: NORMAL
DLCO/VA: NORMAL
DLCO: NORMAL
EXPIRATORY TIME-POST: NORMAL
EXPIRATORY TIME: NORMAL
FEF 25-75% %CHNG: NORMAL
FEF 25-75% %PRED-POST: NORMAL
FEF 25-75% %PRED-PRE: NORMAL
FEF 25-75% PRED: NORMAL
FEF 25-75%-POST: NORMAL
FEF 25-75%-PRE: NORMAL
FEV1 %PRED-POST: 69 %
FEV1 %PRED-PRE: 63 %
FEV1 PRED: NORMAL
FEV1-POST: NORMAL
FEV1-PRE: NORMAL
FEV1/FVC %PRED-POST: 55 %
FEV1/FVC %PRED-PRE: 51 %
FEV1/FVC PRED: NORMAL
FEV1/FVC-POST: NORMAL
FEV1/FVC-PRE: NORMAL
FVC %PRED-POST: NORMAL
FVC %PRED-PRE: NORMAL
FVC PRED: NORMAL
FVC-POST: NORMAL
FVC-PRE: NORMAL
GAW %PRED: NORMAL
GAW PRED: NORMAL
GAW: NORMAL
IC %PRED: NORMAL
IC PRED: NORMAL
IC: NORMAL
MEP: NORMAL
MIP: NORMAL
MVV %PRED-PRE: NORMAL
MVV PRED: NORMAL
MVV-PRE: NORMAL
PEF %PRED-POST: NORMAL
PEF %PRED-PRE: NORMAL
PEF PRED: NORMAL
PEF%CHNG: NORMAL
PEF-POST: NORMAL
PEF-PRE: NORMAL
RAW %PRED: NORMAL
RAW PRED: NORMAL
RAW: NORMAL
RV %PRED: NORMAL
RV PRED: NORMAL
RV: NORMAL
SVC %PRED: NORMAL
SVC PRED: NORMAL
SVC: NORMAL
TLC %PRED: 117 %
TLC PRED: NORMAL
TLC: NORMAL
VA %PRED: NORMAL
VA PRED: NORMAL
VA: NORMAL
VTG %PRED: NORMAL
VTG PRED: NORMAL
VTG: NORMAL

## 2022-11-18 PROCEDURE — 94640 AIRWAY INHALATION TREATMENT: CPT

## 2022-11-18 PROCEDURE — 94060 EVALUATION OF WHEEZING: CPT | Performed by: INTERNAL MEDICINE

## 2022-11-18 PROCEDURE — 94729 DIFFUSING CAPACITY: CPT | Performed by: INTERNAL MEDICINE

## 2022-11-18 PROCEDURE — 94060 EVALUATION OF WHEEZING: CPT

## 2022-11-18 PROCEDURE — 94726 PLETHYSMOGRAPHY LUNG VOLUMES: CPT | Performed by: INTERNAL MEDICINE

## 2022-11-18 PROCEDURE — 6370000000 HC RX 637 (ALT 250 FOR IP): Performed by: INTERNAL MEDICINE

## 2022-11-18 PROCEDURE — 94729 DIFFUSING CAPACITY: CPT

## 2022-11-18 PROCEDURE — 94726 PLETHYSMOGRAPHY LUNG VOLUMES: CPT

## 2022-11-18 RX ORDER — ALBUTEROL SULFATE 90 UG/1
4 AEROSOL, METERED RESPIRATORY (INHALATION) ONCE
Status: COMPLETED | OUTPATIENT
Start: 2022-11-18 | End: 2022-11-18

## 2022-11-18 RX ADMIN — ALBUTEROL SULFATE 4 PUFF: 90 AEROSOL, METERED RESPIRATORY (INHALATION) at 09:41

## 2022-11-18 ASSESSMENT — PULMONARY FUNCTION TESTS
FEV1_PERCENT_PREDICTED_POST: 69
FEV1/FVC_PERCENT_PREDICTED_POST: 55
FEV1_PERCENT_PREDICTED_PRE: 63
FEV1/FVC_PERCENT_PREDICTED_PRE: 51

## 2022-11-18 NOTE — PROCEDURES
Spirometry was acceptable and reproducible by ATS standards    Spirometry  Spirometry shows moderate obstructive defect. Bronchodilator  There is no response to bronchodilator demonstrated. [Increase in FEV1 and/or FVC => 12% of control and => 200 ml]    Lung Volumes  Lung volumes are normal.    Diffusing Capacity  Diffusing capacity is moderately decreased. [40-60%]      Overall Interpretation  PFT does  demonstrates obstruction with FEV1 of 63 % FVC of 123%  without bronchodilator response with associated decrease in diffusion capacity. Air trapping is not present. Hyperinflation is not present. This consistent with Moderate COPD.   Clinical correlation needed     No significant change from prior pft in Dec 2021     Pulmonary Function Testing Results:    FEV1 %Pred-Pre   Date Value Ref Range Status   11/18/2022 63 % Final     FEV1 %Pred-Post   Date Value Ref Range Status   11/18/2022 69 % Final     FEV1/FVC-Pre   Date Value Ref Range Status   09/17/2020 37 % Final     FEV1/FVC-Post   Date Value Ref Range Status   09/17/2020 41 % Final     TLC %Pred   Date Value Ref Range Status   11/18/2022 117 % Final     DLCO %Pred   Date Value Ref Range Status   11/18/2022 59 % Final             OBSTRUCTION % Predicted FEV1   MILD >70%   MODERATE 60-69%   MODERATELY-SEVERE 50-59%   SEVERE 35-49%   VERY SEVERE <35%         RESTRICTION % Predicted TLC   MILD Less than LLN but > than 70%   MODERATE 60-69%   MODERATELY-SEVERE <60%                 DIFFUSION CAPACITY DL,CO % Pred   MILD >60% AND < LLN   MODERATE 40-60%   SEVERE <40%       PFT data will be scanned into the procedure tab in epic under this encounter    MD Key[facility] Pul[facility]

## 2022-12-12 ENCOUNTER — OFFICE VISIT (OUTPATIENT)
Dept: PULMONOLOGY | Age: 47
End: 2022-12-12
Payer: COMMERCIAL

## 2022-12-12 VITALS
TEMPERATURE: 97.4 F | WEIGHT: 187 LBS | HEIGHT: 70 IN | DIASTOLIC BLOOD PRESSURE: 80 MMHG | BODY MASS INDEX: 26.77 KG/M2 | SYSTOLIC BLOOD PRESSURE: 120 MMHG | OXYGEN SATURATION: 99 % | RESPIRATION RATE: 16 BRPM | HEART RATE: 81 BPM

## 2022-12-12 DIAGNOSIS — J96.11 CHRONIC HYPOXEMIC RESPIRATORY FAILURE (HCC): ICD-10-CM

## 2022-12-12 DIAGNOSIS — E88.01 ALPHA-1-ANTITRYPSIN DEFICIENCY (HCC): ICD-10-CM

## 2022-12-12 DIAGNOSIS — Z23 NEEDS FLU SHOT: Primary | ICD-10-CM

## 2022-12-12 PROCEDURE — G8427 DOCREV CUR MEDS BY ELIG CLIN: HCPCS | Performed by: INTERNAL MEDICINE

## 2022-12-12 PROCEDURE — 3074F SYST BP LT 130 MM HG: CPT | Performed by: INTERNAL MEDICINE

## 2022-12-12 PROCEDURE — 3078F DIAST BP <80 MM HG: CPT | Performed by: INTERNAL MEDICINE

## 2022-12-12 PROCEDURE — 90471 IMMUNIZATION ADMIN: CPT | Performed by: INTERNAL MEDICINE

## 2022-12-12 PROCEDURE — G8482 FLU IMMUNIZE ORDER/ADMIN: HCPCS | Performed by: INTERNAL MEDICINE

## 2022-12-12 PROCEDURE — 99214 OFFICE O/P EST MOD 30 MIN: CPT | Performed by: INTERNAL MEDICINE

## 2022-12-12 PROCEDURE — 90674 CCIIV4 VAC NO PRSV 0.5 ML IM: CPT | Performed by: INTERNAL MEDICINE

## 2022-12-12 PROCEDURE — 1036F TOBACCO NON-USER: CPT | Performed by: INTERNAL MEDICINE

## 2022-12-12 PROCEDURE — G8419 CALC BMI OUT NRM PARAM NOF/U: HCPCS | Performed by: INTERNAL MEDICINE

## 2022-12-12 NOTE — PROGRESS NOTES
REASON FOR CONSULTATION/CC: Alpha 1 antitrypsin deficiency      CONSULTING PHYSICIAN: Dr. Jeremy Atkins  PCP: Amy Solre MD    HISTORY OF PRESENT ILLNESS: Martha Ernst is a 49y.o. year old male with a history of smoking who presents to establish care and follow-up for Alpha 1 antitrypsin Deficiency. Alpha 1  Breo and Spiriva   Glassia. Chronic hypoxemia  Using 2 L nC with sleeping  No change              Objective:   PHYSICAL EXAM:  Blood pressure 120/80, pulse 81, temperature 97.4 °F (36.3 °C), temperature source Infrared, resp. rate 16, height 5' 10\" (1.778 m), weight 187 lb (84.8 kg), SpO2 99 %.'  Gen: No distress. ENT:   Resp: No accessory muscle use. No crackles. No wheezes. No rhonchi. CV: Regular rate. Regular rhythm. No murmur or rub. No edema. Skin: Warm, dry, normal texture and turgor. No nodule on exposed extremities. M/S: No cyanosis. No clubbing. No joint deformity. Psych: Oriented x 3. No anxiety. Awake. Alert. Intact judgement and insight. Good Mood / Affect. Memory appears in tact `    Data Reviewed:                  1/14 6/14 7/14   FVC % 100    99    115   FEV1 % 62    72      84   FEV1/FVC 49 57       59   TLC%            101    105   DLCO % 52    66      70        FEV1 Jul '16: >70%   FEV1 Jan '18: 74%   FEV1 Nov '18: 68%   FEV1 Aug '19: 59%   FEV1 Sept '20: 53%   FEV1 Dec '21: 58%  FEV1 Nov '22: 63%          Assessment:      Alpha one antitrypsin deficiency, PiZZ < 30  Obstructive airways disease,     Chronic hypoxemia , 2L NC  gerd      Plan:         Problem List Items Addressed This Visit       Chronic hypoxemic respiratory failure (HCC)     Using 2 L NC nightly          Alpha-1-antitrypsin deficiency ([address]     Controlled witih Breo, Spiriva and Glassia.             Other Visit Diagnoses       Needs flu shot    -  Primary    Relevant Orders    Influenza, FLUCELVAX, (age 10 mo+), IM, PF, 0.5 mL (Completed)            This note was transcribed using Dragon Dictation software. Please disregard any translational errors.

## 2023-01-10 ENCOUNTER — TELEPHONE (OUTPATIENT)
Dept: PULMONOLOGY | Age: 48
End: 2023-01-10

## 2023-01-10 NOTE — TELEPHONE ENCOUNTER
Called [facility] today. Informed that patient is still approved for his Cherrie Portillo until 10/23. Pt should receive shipment of Cherrie Portillo on 01/11/2023 according to pharmacist at [facility]. Message left for pt to call back in regards to the previous message re:  [facility]

## 2023-01-10 NOTE — TELEPHONE ENCOUNTER
Phone call from \"[facility]\" regarding need for renewal of his Glassia medication. See [facility] for more information. May be able to do by phone.

## 2023-02-17 NOTE — TELEPHONE ENCOUNTER
Patient's wife is not feeling too well, says they are going to get her a Covid test. Patient has an 11:30 am appointment today with Dr. Aj Xiao and would like to see if he can get switched to virtual. [phone], called office twice and no answer. Please call and inform patient if able.   email is [email or web address]
Switched to VV
TRAUMA

## 2023-07-08 DIAGNOSIS — J43.9 PULMONARY EMPHYSEMA, UNSPECIFIED EMPHYSEMA TYPE (HCC): ICD-10-CM

## 2023-07-10 RX ORDER — ALBUTEROL SULFATE 90 UG/1
2 AEROSOL, METERED RESPIRATORY (INHALATION) EVERY 6 HOURS PRN
Qty: 1 EACH | Refills: 5 | Status: SHIPPED | OUTPATIENT
Start: 2023-07-10

## 2023-07-10 NOTE — TELEPHONE ENCOUNTER
Last appt: 12/12/2022  Next appt: 07/25/2023  Medication matches medication on Epic list    Call routed to Andrew Shah

## 2023-07-25 ENCOUNTER — OFFICE VISIT (OUTPATIENT)
Dept: PULMONOLOGY | Age: 48
End: 2023-07-25
Payer: COMMERCIAL

## 2023-07-25 VITALS
TEMPERATURE: 97.7 F | WEIGHT: 182.4 LBS | SYSTOLIC BLOOD PRESSURE: 110 MMHG | RESPIRATION RATE: 16 BRPM | BODY MASS INDEX: 26.11 KG/M2 | OXYGEN SATURATION: 95 % | HEIGHT: 70 IN | HEART RATE: 83 BPM | DIASTOLIC BLOOD PRESSURE: 60 MMHG

## 2023-07-25 DIAGNOSIS — E88.01 ALPHA-1-ANTITRYPSIN DEFICIENCY (HCC): Primary | ICD-10-CM

## 2023-07-25 DIAGNOSIS — J96.11 CHRONIC HYPOXEMIC RESPIRATORY FAILURE (HCC): ICD-10-CM

## 2023-07-25 PROCEDURE — 1036F TOBACCO NON-USER: CPT | Performed by: INTERNAL MEDICINE

## 2023-07-25 PROCEDURE — 3078F DIAST BP <80 MM HG: CPT | Performed by: INTERNAL MEDICINE

## 2023-07-25 PROCEDURE — G8427 DOCREV CUR MEDS BY ELIG CLIN: HCPCS | Performed by: INTERNAL MEDICINE

## 2023-07-25 PROCEDURE — 3074F SYST BP LT 130 MM HG: CPT | Performed by: INTERNAL MEDICINE

## 2023-07-25 PROCEDURE — G8419 CALC BMI OUT NRM PARAM NOF/U: HCPCS | Performed by: INTERNAL MEDICINE

## 2023-07-25 PROCEDURE — 99214 OFFICE O/P EST MOD 30 MIN: CPT | Performed by: INTERNAL MEDICINE

## 2023-07-25 ASSESSMENT — COPD QUESTIONNAIRES
QUESTION5_HOMEACTIVITIES: 1
CAT_TOTALSCORE: 17
QUESTION7_SLEEPQUALITY: 2
QUESTION4_WALKINCLINE: 4
QUESTION3_CHESTTIGHTNESS: 1
QUESTION6_LEAVINGHOUSE: 0
QUESTION2_CHESTPHLEGM: 3
QUESTION1_COUGHFREQUENCY: 3
QUESTION8_ENERGYLEVEL: 3

## 2023-08-07 SDOH — HEALTH STABILITY: PHYSICAL HEALTH: ON AVERAGE, HOW MANY MINUTES DO YOU ENGAGE IN EXERCISE AT THIS LEVEL?: 120 MIN

## 2023-08-07 SDOH — HEALTH STABILITY: PHYSICAL HEALTH: ON AVERAGE, HOW MANY DAYS PER WEEK DO YOU ENGAGE IN MODERATE TO STRENUOUS EXERCISE (LIKE A BRISK WALK)?: 1 DAY

## 2023-08-10 ENCOUNTER — OFFICE VISIT (OUTPATIENT)
Dept: FAMILY MEDICINE CLINIC | Age: 48
End: 2023-08-10
Payer: COMMERCIAL

## 2023-08-10 VITALS
SYSTOLIC BLOOD PRESSURE: 134 MMHG | DIASTOLIC BLOOD PRESSURE: 84 MMHG | OXYGEN SATURATION: 95 % | RESPIRATION RATE: 18 BRPM | WEIGHT: 183 LBS | HEART RATE: 60 BPM | BODY MASS INDEX: 26.26 KG/M2

## 2023-08-10 DIAGNOSIS — K22.719 BARRETT'S ESOPHAGUS WITH DYSPLASIA: ICD-10-CM

## 2023-08-10 DIAGNOSIS — Z13.1 SCREENING FOR DIABETES MELLITUS: ICD-10-CM

## 2023-08-10 DIAGNOSIS — Z00.00 ROUTINE GENERAL MEDICAL EXAMINATION AT A HEALTH CARE FACILITY: Primary | ICD-10-CM

## 2023-08-10 DIAGNOSIS — Z12.5 SCREENING PSA (PROSTATE SPECIFIC ANTIGEN): ICD-10-CM

## 2023-08-10 DIAGNOSIS — J96.11 CHRONIC HYPOXEMIC RESPIRATORY FAILURE (HCC): ICD-10-CM

## 2023-08-10 DIAGNOSIS — R91.1 NODULE OF LOWER LOBE OF LEFT LUNG: ICD-10-CM

## 2023-08-10 DIAGNOSIS — Z13.220 SCREENING, LIPID: ICD-10-CM

## 2023-08-10 DIAGNOSIS — E88.01 ALPHA-1-ANTITRYPSIN DEFICIENCY (HCC): ICD-10-CM

## 2023-08-10 DIAGNOSIS — I10 ESSENTIAL HYPERTENSION, BENIGN: ICD-10-CM

## 2023-08-10 LAB
ANION GAP SERPL CALCULATED.3IONS-SCNC: 12 MMOL/L (ref 3–16)
BUN SERPL-MCNC: 9 MG/DL (ref 7–20)
CALCIUM SERPL-MCNC: 9.5 MG/DL (ref 8.3–10.6)
CHLORIDE SERPL-SCNC: 106 MMOL/L (ref 99–110)
CHOLEST SERPL-MCNC: 189 MG/DL (ref 0–199)
CO2 SERPL-SCNC: 25 MMOL/L (ref 21–32)
CREAT SERPL-MCNC: 0.9 MG/DL (ref 0.9–1.3)
GFR SERPLBLD CREATININE-BSD FMLA CKD-EPI: >60 ML/MIN/{1.73_M2}
GLUCOSE SERPL-MCNC: 128 MG/DL (ref 70–99)
HDLC SERPL-MCNC: 39 MG/DL (ref 40–60)
LDLC SERPL CALC-MCNC: 128 MG/DL
POTASSIUM SERPL-SCNC: 4.9 MMOL/L (ref 3.5–5.1)
PSA SERPL DL<=0.01 NG/ML-MCNC: 0.71 NG/ML (ref 0–4)
SODIUM SERPL-SCNC: 143 MMOL/L (ref 136–145)
TRIGL SERPL-MCNC: 108 MG/DL (ref 0–150)
VLDLC SERPL CALC-MCNC: 22 MG/DL

## 2023-08-10 PROCEDURE — 3075F SYST BP GE 130 - 139MM HG: CPT | Performed by: FAMILY MEDICINE

## 2023-08-10 PROCEDURE — 3079F DIAST BP 80-89 MM HG: CPT | Performed by: FAMILY MEDICINE

## 2023-08-10 PROCEDURE — 36415 COLL VENOUS BLD VENIPUNCTURE: CPT | Performed by: FAMILY MEDICINE

## 2023-08-10 PROCEDURE — 99386 PREV VISIT NEW AGE 40-64: CPT | Performed by: FAMILY MEDICINE

## 2023-08-10 SDOH — ECONOMIC STABILITY: HOUSING INSECURITY
IN THE LAST 12 MONTHS, WAS THERE A TIME WHEN YOU DID NOT HAVE A STEADY PLACE TO SLEEP OR SLEPT IN A SHELTER (INCLUDING NOW)?: NO

## 2023-08-10 SDOH — ECONOMIC STABILITY: INCOME INSECURITY: HOW HARD IS IT FOR YOU TO PAY FOR THE VERY BASICS LIKE FOOD, HOUSING, MEDICAL CARE, AND HEATING?: NOT HARD AT ALL

## 2023-08-10 SDOH — ECONOMIC STABILITY: FOOD INSECURITY: WITHIN THE PAST 12 MONTHS, YOU WORRIED THAT YOUR FOOD WOULD RUN OUT BEFORE YOU GOT MONEY TO BUY MORE.: NEVER TRUE

## 2023-08-10 SDOH — ECONOMIC STABILITY: FOOD INSECURITY: WITHIN THE PAST 12 MONTHS, THE FOOD YOU BOUGHT JUST DIDN'T LAST AND YOU DIDN'T HAVE MONEY TO GET MORE.: NEVER TRUE

## 2023-08-10 ASSESSMENT — PATIENT HEALTH QUESTIONNAIRE - PHQ9
SUM OF ALL RESPONSES TO PHQ QUESTIONS 1-9: 0
SUM OF ALL RESPONSES TO PHQ QUESTIONS 1-9: 0
1. LITTLE INTEREST OR PLEASURE IN DOING THINGS: 0
2. FEELING DOWN, DEPRESSED OR HOPELESS: 0
SUM OF ALL RESPONSES TO PHQ QUESTIONS 1-9: 0
SUM OF ALL RESPONSES TO PHQ QUESTIONS 1-9: 0
SUM OF ALL RESPONSES TO PHQ9 QUESTIONS 1 & 2: 0

## 2023-08-10 NOTE — PROGRESS NOTES
8/10/2023    Irma Kyle (:  1975) is a 51 y.o. male, here for evaluation of the following chief complaint(s):  New Patient and Diabetes      ASSESSMENT/PLAN:     Diagnosis Orders   1. Routine general medical examination at a health care facility        2. Screening PSA (prostate specific antigen)  PSA Screening      3. Screening, lipid  Lipid Panel      4. Screening for diabetes mellitus  Hemoglobin A1C      5. Alpha-1-antitrypsin deficiency ([address])      taking his med [employer] for this; not smoking; Pneumococcal vaccination UTD      6. Chronic hypoxemic respiratory failure (HCC)      stable cont inhaled tx      7. Essential hypertension, benign  Basic Metabolic Panel    at goal off meds check renal; has lost 40 lbs and BP normalized      8. Nodule of lower lobe of left lung      follows pulmonology regularly and CT OK OCT 2022      9. Ariza's esophagus with dysplasia      OK PPI cont GI surveillance          Return in about 6 years (around 8/10/2029) for HTN, COPD, GERD, Well Adult, screen lipid, screen psa. An electronic signature was used to authenticate this note.     SUBJECTIVE/OBJECTIVE:  (NOTE : prior results listed below reviewed at this visit to assist in medical decision making.)    HPI / ROS    # new patient to me  # COPD OK inhaler therapy per pt; no new wheezing or increased FONSECA ; known A1AT deficiency; not smoking    Reviewed repeat CT OCT 2022    # HTN - nicolette meds no CP/SOB  BP Readings from Last 3 Encounters:   08/10/23 134/84   23 110/60   22 120/80     Lab Results   Component Value Date/Time     2022 09:30 AM    K 4.1 2022 09:30 AM     2022 09:30 AM    CO2 23 2022 09:30 AM    BUN 12 2022 09:30 AM    CREATININE 0.7 2022 09:30 AM    GLUCOSE 107 2022 09:30 AM    CALCIUM 8.9 2022 09:30 AM       # PSA screening history:  No results found for: PSA, PSADIA    # Lipids - recent screening history:  Lab Results

## 2023-08-11 LAB
EST. AVERAGE GLUCOSE BLD GHB EST-MCNC: 139.9 MG/DL
HBA1C MFR BLD: 6.5 %

## 2023-11-29 ENCOUNTER — HOSPITAL ENCOUNTER (OUTPATIENT)
Dept: PULMONOLOGY | Age: 48
Discharge: HOME | End: 2023-11-29
Attending: INTERNAL MEDICINE
Payer: COMMERCIAL

## 2023-11-29 DIAGNOSIS — E88.01 ALPHA-1-ANTITRYPSIN DEFICIENCY (HCC): ICD-10-CM

## 2023-11-29 LAB
DLCO %PRED: 55 %
DLCO PRED: NORMAL
DLCO/VA %PRED: NORMAL
DLCO/VA PRED: NORMAL
DLCO/VA: NORMAL
DLCO: NORMAL
EXPIRATORY TIME-POST: NORMAL
EXPIRATORY TIME: NORMAL
FEF 25-75% %CHNG: NORMAL
FEF 25-75% %PRED-POST: NORMAL
FEF 25-75% %PRED-PRE: NORMAL
FEF 25-75% PRED: NORMAL
FEF 25-75%-POST: NORMAL
FEF 25-75%-PRE: NORMAL
FEV1 %PRED-POST: 64 %
FEV1 %PRED-PRE: 60 %
FEV1 PRED: NORMAL
FEV1-POST: NORMAL
FEV1-PRE: NORMAL
FEV1/FVC %PRED-POST: NORMAL
FEV1/FVC %PRED-PRE: NORMAL
FEV1/FVC PRED: NORMAL
FEV1/FVC-POST: NORMAL
FEV1/FVC-PRE: NORMAL
FVC %PRED-POST: 129 L
FVC %PRED-PRE: 121 %
FVC PRED: NORMAL
FVC-POST: NORMAL
FVC-PRE: NORMAL
GAW %PRED: NORMAL
GAW PRED: NORMAL
GAW: NORMAL
IC %PRED: NORMAL
IC PRED: NORMAL
IC: NORMAL
MEP: NORMAL
MIP: NORMAL
MVV %PRED-PRE: NORMAL
MVV PRED: NORMAL
MVV-PRE: NORMAL
PEF %PRED-POST: NORMAL
PEF %PRED-PRE: NORMAL
PEF PRED: NORMAL
PEF%CHNG: NORMAL
PEF-POST: NORMAL
PEF-PRE: NORMAL
RAW %PRED: NORMAL
RAW PRED: NORMAL
RAW: NORMAL
RV %PRED: NORMAL
RV PRED: NORMAL
RV: NORMAL
SVC %PRED: NORMAL
SVC PRED: NORMAL
SVC: NORMAL
TLC %PRED: 113 %
TLC PRED: NORMAL
TLC: NORMAL
VA %PRED: NORMAL
VA PRED: NORMAL
VA: NORMAL
VTG %PRED: NORMAL
VTG PRED: NORMAL
VTG: NORMAL

## 2023-11-29 PROCEDURE — 94726 PLETHYSMOGRAPHY LUNG VOLUMES: CPT

## 2023-11-29 PROCEDURE — 6370000000 HC RX 637 (ALT 250 FOR IP): Performed by: INTERNAL MEDICINE

## 2023-11-29 PROCEDURE — 94729 DIFFUSING CAPACITY: CPT

## 2023-11-29 PROCEDURE — 94640 AIRWAY INHALATION TREATMENT: CPT

## 2023-11-29 PROCEDURE — 94060 EVALUATION OF WHEEZING: CPT

## 2023-11-29 RX ORDER — ALBUTEROL SULFATE 90 UG/1
4 AEROSOL, METERED RESPIRATORY (INHALATION) ONCE
Status: COMPLETED | OUTPATIENT
Start: 2023-11-29 | End: 2023-11-29

## 2023-11-29 RX ADMIN — ALBUTEROL SULFATE 4 PUFF: 90 AEROSOL, METERED RESPIRATORY (INHALATION) at 10:30

## 2023-11-29 ASSESSMENT — PULMONARY FUNCTION TESTS
FVC_PERCENT_PREDICTED_POST: 129
FVC_PERCENT_PREDICTED_PRE: 121
FEV1_PERCENT_PREDICTED_PRE: 60
FEV1_PERCENT_PREDICTED_POST: 64

## 2023-11-30 PROCEDURE — 94726 PLETHYSMOGRAPHY LUNG VOLUMES: CPT | Performed by: INTERNAL MEDICINE

## 2023-11-30 PROCEDURE — 94729 DIFFUSING CAPACITY: CPT | Performed by: INTERNAL MEDICINE

## 2023-11-30 PROCEDURE — 94060 EVALUATION OF WHEEZING: CPT | Performed by: INTERNAL MEDICINE

## 2023-11-30 NOTE — PROCEDURES
spirometry was acceptable and reproducible by ATS standards      Spirometry/Flow volume loop: Moderate airflow obstruction with no statistically significant postbronchodilator response    Lung volumes:  Normal lung volumes    Diffusing capacity:  Moderately reduced diffusion capacity    Impression:  Moderate COPD    FEV1 %Pred-Post   Date Value Ref Range Status   11/29/2023 64 % Final     FEV1/FVC-Post   Date Value Ref Range Status   09/17/2020 41 % Final     TLC %Pred   Date Value Ref Range Status   11/29/2023 113 % Final     DLCO %Pred   Date Value Ref Range Status   11/29/2023 55 % Final           OBSTRUCTION % Predicted FEV1   MILD >70%   MODERATE 60-69%   MODERATELY-SEVERE 50-59%   SEVERE 35-49%   VERY SEVERE <35%         RESTRICTION % Predicted TLC   MILD 66-80%   MODERATE 54-65%   MODERATELY-SEVERE <54%                 DIFFUSION CAPACITY DLCO % Pred   MILD >60% AND < LLN   MODERATE 40-60%   SEVERE <40%       PFT data will be scanned into the media tab under this encounter. Please see the scanned data for numerical values.      Tr Ruiz MD  [facility] Pulmonary, Sleep and Critical Care Medicine

## 2024-01-23 ENCOUNTER — OFFICE VISIT (OUTPATIENT)
Dept: PULMONOLOGY | Age: 49
End: 2024-01-23
Payer: COMMERCIAL

## 2024-01-23 VITALS
OXYGEN SATURATION: 97 % | SYSTOLIC BLOOD PRESSURE: 110 MMHG | BODY MASS INDEX: 25.77 KG/M2 | WEIGHT: 180 LBS | HEIGHT: 70 IN | HEART RATE: 76 BPM | DIASTOLIC BLOOD PRESSURE: 72 MMHG | TEMPERATURE: 97.6 F | RESPIRATION RATE: 18 BRPM

## 2024-01-23 DIAGNOSIS — E88.01 ALPHA-1-ANTITRYPSIN DEFICIENCY (HCC): ICD-10-CM

## 2024-01-23 DIAGNOSIS — J96.11 CHRONIC HYPOXEMIC RESPIRATORY FAILURE (HCC): Primary | ICD-10-CM

## 2024-01-23 PROCEDURE — 3078F DIAST BP <80 MM HG: CPT | Performed by: INTERNAL MEDICINE

## 2024-01-23 PROCEDURE — G8427 DOCREV CUR MEDS BY ELIG CLIN: HCPCS | Performed by: INTERNAL MEDICINE

## 2024-01-23 PROCEDURE — 1036F TOBACCO NON-USER: CPT | Performed by: INTERNAL MEDICINE

## 2024-01-23 PROCEDURE — 99214 OFFICE O/P EST MOD 30 MIN: CPT | Performed by: INTERNAL MEDICINE

## 2024-01-23 PROCEDURE — 3074F SYST BP LT 130 MM HG: CPT | Performed by: INTERNAL MEDICINE

## 2024-01-23 PROCEDURE — G8484 FLU IMMUNIZE NO ADMIN: HCPCS | Performed by: INTERNAL MEDICINE

## 2024-01-23 PROCEDURE — G8419 CALC BMI OUT NRM PARAM NOF/U: HCPCS | Performed by: INTERNAL MEDICINE

## 2024-01-23 ASSESSMENT — COPD QUESTIONNAIRES
QUESTION7_SLEEPQUALITY: 2
QUESTION4_WALKINCLINE: 4
QUESTION1_COUGHFREQUENCY: 3
QUESTION2_CHESTPHLEGM: 3
QUESTION5_HOMEACTIVITIES: 2
QUESTION3_CHESTTIGHTNESS: 2
QUESTION8_ENERGYLEVEL: 2
CAT_TOTALSCORE: 18
QUESTION6_LEAVINGHOUSE: 0

## 2024-01-23 NOTE — PROGRESS NOTES
REASON FOR CONSULTATION/CC: Alpha 1 antitrypsin deficiency      CONSULTING PHYSICIAN: Dr. Ruiz  PCP: Kush Malik MD    HISTORY OF PRESENT ILLNESS: Kush Sanchez is a 48 y.o. year old male with a history of smoking who presents to establish care and follow-up for Alpha 1 antitrypsin Deficiency.          Alpha 1  Only using albuterol.    Glassia.   Reviewed pft.  Stable from 2019.       GERD   Nexium     Chronic hypoxemia  Using 2 L nC with sleeping  Hard time using with sleep.                Objective:   PHYSICAL EXAM:  Blood pressure 110/72, pulse 76, temperature 97.6 °F (36.4 °C), resp. rate 18, height 1.778 m (5' 10\"), weight 81.6 kg (180 lb), SpO2 97 %.'  Gen: No distress.      ENT:   Resp: No accessory muscle use. No crackles. No wheezes. No rhonchi.    CV: Regular rate. Regular rhythm. No murmur or rub. No edema.   Skin: Warm, dry, normal texture and turgor. No nodule on exposed extremities.   M/S: No cyanosis. No clubbing. No joint deformity.  Psych: Oriented x 3. No anxiety.  Awake. Alert. Intact judgement and insight. Good Mood / Affect.  Memory appears in tact `    Data Reviewed:                  1/14 6/14 7/14   FVC % 100    99    115   FEV1 % 62    72      84   FEV1/FVC 49 57       59   TLC%            101    105   DLCO % 52    66      70        FEV1 Jul '16: >70%   FEV1 Jan '18: 74%   FEV1 Nov '18: 68%   FEV1 Aug '19: 59%   FEV1 Sept '20: 53%   FEV1 Dec '21: 58%  FEV1 Nov '22: 63%   FEV1 Nov '23: 60%          Assessment:      Alpha one antitrypsin deficiency, PiZZ < 30  Obstructive airways disease,     Chronic hypoxemia , 2L NC  gerd      Plan:         Problem List Items Addressed This Visit       Chronic hypoxemic respiratory failure (HCC) - Primary      Continues to need oxygen at night @ 2 L NC          Alpha-1-antitrypsin deficiency (HCC)     albuterol prn     Glassia.     Stable symptoms and stable FEV1.  Doing well with infusions.                This note was transcribed using Dragon

## 2024-10-02 ENCOUNTER — PATIENT MESSAGE (OUTPATIENT)
Dept: PULMONOLOGY | Age: 49
End: 2024-10-02

## 2024-10-02 DIAGNOSIS — E88.01 ALPHA-1-ANTITRYPSIN DEFICIENCY (HCC): Primary | ICD-10-CM

## 2024-10-16 ENCOUNTER — HOSPITAL ENCOUNTER (OUTPATIENT)
Dept: PULMONOLOGY | Age: 49
Discharge: HOME | End: 2024-10-16
Attending: INTERNAL MEDICINE
Payer: COMMERCIAL

## 2024-10-16 DIAGNOSIS — E88.01 ALPHA-1-ANTITRYPSIN DEFICIENCY (HCC): ICD-10-CM

## 2024-10-16 LAB
DLCO %PRED: 61 %
DLCO PRED: NORMAL
DLCO/VA %PRED: NORMAL
DLCO/VA PRED: NORMAL
DLCO/VA: NORMAL
DLCO: NORMAL
EXPIRATORY TIME-POST: NORMAL
EXPIRATORY TIME: NORMAL
FEF 25-75 %CHNG: NORMAL
FEF 25-75 POST %PRED: NORMAL
FEF 25-75% %PRED-PRE: NORMAL
FEF 25-75% PRED: NORMAL
FEF 25-75-POST: NORMAL
FEF 25-75-PRE: NORMAL
FEV1 %PRED-POST: 67 %
FEV1 %PRED-PRE: 51 %
FEV1 PRED: NORMAL
FEV1-POST: NORMAL
FEV1-PRE: NORMAL
FEV1/FVC %PRED-POST: 41 %
FEV1/FVC %PRED-PRE: 34 %
FEV1/FVC PRED: NORMAL
FEV1/FVC-POST: NORMAL
FEV1/FVC-PRE: NORMAL
FVC %PRED-POST: NORMAL
FVC %PRED-PRE: NORMAL
FVC PRED: NORMAL
FVC-POST: NORMAL
FVC-PRE: NORMAL
GAW %PRED: NORMAL
GAW PRED: NORMAL
GAW: NORMAL
IC PRE %PRED: NORMAL
IC PRED: NORMAL
IC: NORMAL
MEP: NORMAL
MIP: NORMAL
MVV %PRED-PRE: NORMAL
MVV PRED: NORMAL
MVV-PRE: NORMAL
PEF %PRED-POST: NORMAL
PEF %PRED-PRE: NORMAL
PEF PRED: NORMAL
PEF%CHNG: NORMAL
PEF-POST: NORMAL
PEF-PRE: NORMAL
RAW %PRED: NORMAL
RAW PRED: NORMAL
RAW: NORMAL
RV PRE %PRED: NORMAL
RV PRED: NORMAL
RV: NORMAL
SVC %PRED: NORMAL
SVC PRED: NORMAL
SVC: NORMAL
TLC PRE %PRED: 118 %
TLC PRED: NORMAL
TLC: NORMAL
VA %PRED: NORMAL
VA PRED: NORMAL
VA: NORMAL
VTG %PRED: NORMAL
VTG PRED: NORMAL
VTG: NORMAL

## 2024-10-16 PROCEDURE — 94729 DIFFUSING CAPACITY: CPT

## 2024-10-16 PROCEDURE — 6370000000 HC RX 637 (ALT 250 FOR IP): Performed by: INTERNAL MEDICINE

## 2024-10-16 PROCEDURE — 94664 DEMO&/EVAL PT USE INHALER: CPT

## 2024-10-16 PROCEDURE — 94726 PLETHYSMOGRAPHY LUNG VOLUMES: CPT

## 2024-10-16 PROCEDURE — 94060 EVALUATION OF WHEEZING: CPT

## 2024-10-16 PROCEDURE — 94640 AIRWAY INHALATION TREATMENT: CPT

## 2024-10-16 RX ORDER — ALBUTEROL SULFATE 90 UG/1
4 INHALANT RESPIRATORY (INHALATION) ONCE
Status: COMPLETED | OUTPATIENT
Start: 2024-10-16 | End: 2024-10-16

## 2024-10-16 RX ADMIN — ALBUTEROL SULFATE 4 PUFF: 90 AEROSOL, METERED RESPIRATORY (INHALATION) at 08:09

## 2024-10-16 ASSESSMENT — PULMONARY FUNCTION TESTS
FEV1_PERCENT_PREDICTED_PRE: 51
FEV1/FVC_PERCENT_PREDICTED_POST: 41
FEV1_PERCENT_PREDICTED_POST: 67
FEV1/FVC_PERCENT_PREDICTED_PRE: 34

## 2025-02-16 ENCOUNTER — OFFICE VISIT (OUTPATIENT)
Age: 50
End: 2025-02-16

## 2025-02-16 VITALS
BODY MASS INDEX: 25.25 KG/M2 | HEART RATE: 72 BPM | DIASTOLIC BLOOD PRESSURE: 93 MMHG | TEMPERATURE: 98.1 F | SYSTOLIC BLOOD PRESSURE: 153 MMHG | WEIGHT: 176 LBS | OXYGEN SATURATION: 95 %

## 2025-02-16 DIAGNOSIS — R03.0 ELEVATED BLOOD PRESSURE READING: ICD-10-CM

## 2025-02-16 DIAGNOSIS — H00.014 HORDEOLUM EXTERNUM OF LEFT UPPER EYELID: Primary | ICD-10-CM

## 2025-02-16 RX ORDER — CEPHALEXIN 500 MG/1
500 CAPSULE ORAL 4 TIMES DAILY
Qty: 28 CAPSULE | Refills: 0 | Status: SHIPPED | OUTPATIENT
Start: 2025-02-16 | End: 2025-02-23

## 2025-02-16 RX ORDER — POLYMYXIN B SULFATE AND TRIMETHOPRIM 1; 10000 MG/ML; [USP'U]/ML
1 SOLUTION OPHTHALMIC EVERY 4 HOURS
Qty: 3 ML | Refills: 0 | Status: SHIPPED | OUTPATIENT
Start: 2025-02-16 | End: 2025-02-26

## 2025-05-13 ENCOUNTER — OFFICE VISIT (OUTPATIENT)
Dept: PULMONOLOGY | Age: 50
End: 2025-05-13
Payer: COMMERCIAL

## 2025-05-13 VITALS
HEART RATE: 69 BPM | HEIGHT: 70 IN | WEIGHT: 171.5 LBS | TEMPERATURE: 97.7 F | DIASTOLIC BLOOD PRESSURE: 82 MMHG | SYSTOLIC BLOOD PRESSURE: 117 MMHG | BODY MASS INDEX: 24.55 KG/M2 | RESPIRATION RATE: 18 BRPM | OXYGEN SATURATION: 95 %

## 2025-05-13 DIAGNOSIS — J43.9 PULMONARY EMPHYSEMA, UNSPECIFIED EMPHYSEMA TYPE (HCC): ICD-10-CM

## 2025-05-13 DIAGNOSIS — J96.11 CHRONIC HYPOXEMIC RESPIRATORY FAILURE (HCC): ICD-10-CM

## 2025-05-13 DIAGNOSIS — E88.01 ALPHA-1-ANTITRYPSIN DEFICIENCY (HCC): Primary | ICD-10-CM

## 2025-05-13 PROCEDURE — 1036F TOBACCO NON-USER: CPT | Performed by: INTERNAL MEDICINE

## 2025-05-13 PROCEDURE — 3079F DIAST BP 80-89 MM HG: CPT | Performed by: INTERNAL MEDICINE

## 2025-05-13 PROCEDURE — 3017F COLORECTAL CA SCREEN DOC REV: CPT | Performed by: INTERNAL MEDICINE

## 2025-05-13 PROCEDURE — G8420 CALC BMI NORM PARAMETERS: HCPCS | Performed by: INTERNAL MEDICINE

## 2025-05-13 PROCEDURE — 3023F SPIROM DOC REV: CPT | Performed by: INTERNAL MEDICINE

## 2025-05-13 PROCEDURE — 99214 OFFICE O/P EST MOD 30 MIN: CPT | Performed by: INTERNAL MEDICINE

## 2025-05-13 PROCEDURE — G8427 DOCREV CUR MEDS BY ELIG CLIN: HCPCS | Performed by: INTERNAL MEDICINE

## 2025-05-13 PROCEDURE — 3074F SYST BP LT 130 MM HG: CPT | Performed by: INTERNAL MEDICINE

## 2025-05-13 RX ORDER — ALBUTEROL SULFATE 90 UG/1
2 INHALANT RESPIRATORY (INHALATION) EVERY 6 HOURS PRN
Qty: 1 EACH | Refills: 5 | Status: SHIPPED | OUTPATIENT
Start: 2025-05-13

## 2025-05-13 NOTE — PROGRESS NOTES
1/14 6/14 7/14   FVC % 100    99    115   FEV1 % 62    72      84   FEV1/FVC 49 57       59   TLC%            101    105   DLCO % 52    66      70        FEV1 Jul '16: >70%   FEV1 Jan '18: 74%   FEV1 Nov '18: 68%   FEV1 Aug '19: 59%   FEV1 Sept '20: 53%   FEV1 Dec '21: 58%  FEV1 Nov '22: 63%   FEV1 Nov '23: 60%        Results  Testing  Pulmonary function test has been anywhere from 51% up to 63%, with the most recent number being 51%.        Assessment:      Alpha one antitrypsin deficiency, PiZZ < 30  Obstructive airways disease,     Chronic hypoxemia , 2L NC  gerd      Plan:         Problem List Items Addressed This Visit    None    Assessment & Plan  1. Alpha-1 antitrypsin deficiency.  His pulmonary function test results have fluctuated between 51% and 63% since 2019, with the most recent reading at 51%. This is concerning, especially since he has been off his supplementation since the beginning of the year. Despite this, his diffusion capacity remains high, which is somewhat reassuring. He will be reinitiated on a replacement product for alpha-1 antitrypsin deficiency. A repeat pulmonary function test is scheduled for October 2025 to monitor his condition. He will continue to benefit from 2 L of oxygen at night. A prescription for albuterol has been refilled and sent to [facility]. He is not currently using Breo or Spiriva. If he encounters any issues, he is advised to contact us via [email or web address] or phone call.    2. Acid reflux.  He is currently taking Nexium for acid reflux.    Follow-up  The patient will follow up in 6 months.              This note was transcribed using Dragon Dictation software. Please disregard any translational errors.

## 2025-05-20 ENCOUNTER — TELEPHONE (OUTPATIENT)
Dept: SLEEP MEDICINE | Age: 50
End: 2025-05-20

## 2025-05-20 NOTE — TELEPHONE ENCOUNTER
Received fax from [facility] needed for Glassia 1000MG/50ML Solution     KEY:  [id number]  Patient name: Kush Sanchez  : 1975       Sent to PA Pool

## 2025-05-21 NOTE — TELEPHONE ENCOUNTER
Spoke with Aundrea from [employer] let her know that the pa needs to be ran through pt medical coverage not pharmacy.

## 2025-06-02 ENCOUNTER — TELEPHONE (OUTPATIENT)
Dept: PULMONOLOGY | Age: 50
End: 2025-06-02

## 2025-06-02 NOTE — TELEPHONE ENCOUNTER
Fidelina from [facility] wants clarification on Glassia. She wants to know if this is through a specialty pharmacy or site of care.  I did tell her I see this is being run as medical.   She would like clarification. She provided her direct number.

## 2025-08-11 ENCOUNTER — APPOINTMENT (OUTPATIENT)
Dept: CT IMAGING | Age: 50
End: 2025-08-11
Payer: COMMERCIAL

## 2025-08-11 ENCOUNTER — APPOINTMENT (OUTPATIENT)
Dept: GENERAL RADIOLOGY | Age: 50
End: 2025-08-11
Payer: COMMERCIAL

## 2025-08-11 ENCOUNTER — HOSPITAL ENCOUNTER (OUTPATIENT)
Age: 50
Setting detail: OBSERVATION
Discharge: HOME | End: 2025-08-12
Attending: STUDENT IN AN ORGANIZED HEALTH CARE EDUCATION/TRAINING PROGRAM
Payer: COMMERCIAL

## 2025-08-11 DIAGNOSIS — R07.9 CHEST PAIN, UNSPECIFIED TYPE: Primary | ICD-10-CM

## 2025-08-11 LAB
ALBUMIN SERPL-MCNC: 4.3 G/DL (ref 3.4–5)
ALBUMIN/GLOB SERPL: 1.3 {RATIO} (ref 1.1–2.2)
ALP SERPL-CCNC: 81 U/L (ref 40–129)
ALT SERPL-CCNC: 85 U/L (ref 10–40)
ANION GAP SERPL CALCULATED.3IONS-SCNC: 13 MMOL/L (ref 3–16)
AST SERPL-CCNC: 73 U/L (ref 15–37)
BASOPHILS # BLD: 0 K/UL (ref 0–0.2)
BASOPHILS NFR BLD: 0.5 %
BILIRUB SERPL-MCNC: 1.2 MG/DL (ref 0–1)
BUN SERPL-MCNC: 15 MG/DL (ref 7–20)
CALCIUM SERPL-MCNC: 9.4 MG/DL (ref 8.3–10.6)
CHLORIDE SERPL-SCNC: 102 MMOL/L (ref 99–110)
CO2 SERPL-SCNC: 21 MMOL/L (ref 21–32)
CREAT SERPL-MCNC: 0.8 MG/DL (ref 0.9–1.3)
D-DIMER QUANTITATIVE: 0.98 UG/ML FEU (ref 0–0.6)
DEPRECATED RDW RBC AUTO: 12.8 % (ref 12.4–15.4)
EKG ATRIAL RATE: 70 BPM
EKG DIAGNOSIS: NORMAL
EKG P-R INTERVAL: 142 MS
EKG Q-T INTERVAL: 396 MS
EKG QRS DURATION: 74 MS
EKG QTC CALCULATION (BAZETT): 427 MS
EKG R AXIS: 74 DEGREES
EKG T AXIS: 66 DEGREES
EKG VENTRICULAR RATE: 70 BPM
EOSINOPHIL # BLD: 0.3 K/UL (ref 0–0.6)
EOSINOPHIL NFR BLD: 3.5 %
GFR SERPLBLD CREATININE-BSD FMLA CKD-EPI: >90 ML/MIN/{1.73_M2}
GLUCOSE SERPL-MCNC: 105 MG/DL (ref 70–99)
HCT VFR BLD AUTO: 50.1 % (ref 40.5–52.5)
HGB BLD-MCNC: 16.8 G/DL (ref 13.5–17.5)
LYMPHOCYTES # BLD: 2.3 K/UL (ref 1–5.1)
LYMPHOCYTES NFR BLD: 30 %
MCH RBC QN AUTO: 29 PG (ref 26–34)
MCHC RBC AUTO-ENTMCNC: 33.5 G/DL (ref 31–36)
MCV RBC AUTO: 86.5 FL (ref 80–100)
MONOCYTES # BLD: 0.5 K/UL (ref 0–1.3)
MONOCYTES NFR BLD: 6.1 %
NEUTROPHILS # BLD: 4.5 K/UL (ref 1.7–7.7)
NEUTROPHILS NFR BLD: 59.9 %
PLATELET # BLD AUTO: 131 K/UL (ref 135–450)
PLATELET BLD QL SMEAR: ABNORMAL
PMV BLD AUTO: 11.9 FL (ref 5–10.5)
POTASSIUM SERPL-SCNC: 4.1 MMOL/L (ref 3.5–5.1)
PROT SERPL-MCNC: 7.5 G/DL (ref 6.4–8.2)
RBC # BLD AUTO: 5.79 M/UL (ref 4.2–5.9)
SLIDE REVIEW: ABNORMAL
SODIUM SERPL-SCNC: 136 MMOL/L (ref 136–145)
TROPONIN, HIGH SENSITIVITY: <6 NG/L (ref 0–22)
WBC # BLD AUTO: 7.5 K/UL (ref 4–11)

## 2025-08-11 PROCEDURE — 84484 ASSAY OF TROPONIN QUANT: CPT

## 2025-08-11 PROCEDURE — 93010 ELECTROCARDIOGRAM REPORT: CPT | Performed by: INTERNAL MEDICINE

## 2025-08-11 PROCEDURE — 93005 ELECTROCARDIOGRAM TRACING: CPT | Performed by: STUDENT IN AN ORGANIZED HEALTH CARE EDUCATION/TRAINING PROGRAM

## 2025-08-11 PROCEDURE — 71046 X-RAY EXAM CHEST 2 VIEWS: CPT

## 2025-08-11 PROCEDURE — 36415 COLL VENOUS BLD VENIPUNCTURE: CPT

## 2025-08-11 PROCEDURE — 85025 COMPLETE CBC W/AUTO DIFF WBC: CPT

## 2025-08-11 PROCEDURE — 99285 EMERGENCY DEPT VISIT HI MDM: CPT

## 2025-08-11 PROCEDURE — G0378 HOSPITAL OBSERVATION PER HR: HCPCS

## 2025-08-11 PROCEDURE — 71260 CT THORAX DX C+: CPT

## 2025-08-11 PROCEDURE — 6360000004 HC RX CONTRAST MEDICATION: Performed by: STUDENT IN AN ORGANIZED HEALTH CARE EDUCATION/TRAINING PROGRAM

## 2025-08-11 PROCEDURE — 94760 N-INVAS EAR/PLS OXIMETRY 1: CPT

## 2025-08-11 PROCEDURE — 85379 FIBRIN DEGRADATION QUANT: CPT

## 2025-08-11 PROCEDURE — 6370000000 HC RX 637 (ALT 250 FOR IP): Performed by: STUDENT IN AN ORGANIZED HEALTH CARE EDUCATION/TRAINING PROGRAM

## 2025-08-11 PROCEDURE — 80053 COMPREHEN METABOLIC PANEL: CPT

## 2025-08-11 PROCEDURE — 2500000003 HC RX 250 WO HCPCS

## 2025-08-11 RX ORDER — SODIUM CHLORIDE 0.9 % (FLUSH) 0.9 %
5-40 SYRINGE (ML) INJECTION EVERY 12 HOURS SCHEDULED
Status: DISCONTINUED | OUTPATIENT
Start: 2025-08-11 | End: 2025-08-12 | Stop reason: HOSPADM

## 2025-08-11 RX ORDER — LIDOCAINE 4 G/G
1 PATCH TOPICAL ONCE
Status: COMPLETED | OUTPATIENT
Start: 2025-08-11 | End: 2025-08-12

## 2025-08-11 RX ORDER — ACETAMINOPHEN 325 MG/1
650 TABLET ORAL EVERY 6 HOURS PRN
Status: DISCONTINUED | OUTPATIENT
Start: 2025-08-11 | End: 2025-08-12 | Stop reason: HOSPADM

## 2025-08-11 RX ORDER — SODIUM CHLORIDE 9 MG/ML
INJECTION, SOLUTION INTRAVENOUS PRN
Status: DISCONTINUED | OUTPATIENT
Start: 2025-08-11 | End: 2025-08-12 | Stop reason: HOSPADM

## 2025-08-11 RX ORDER — ONDANSETRON 2 MG/ML
4 INJECTION INTRAMUSCULAR; INTRAVENOUS EVERY 6 HOURS PRN
Status: DISCONTINUED | OUTPATIENT
Start: 2025-08-11 | End: 2025-08-12 | Stop reason: HOSPADM

## 2025-08-11 RX ORDER — CYCLOBENZAPRINE HCL 10 MG
10 TABLET ORAL ONCE
Status: COMPLETED | OUTPATIENT
Start: 2025-08-11 | End: 2025-08-11

## 2025-08-11 RX ORDER — MAGNESIUM SULFATE IN WATER 40 MG/ML
2000 INJECTION, SOLUTION INTRAVENOUS PRN
Status: DISCONTINUED | OUTPATIENT
Start: 2025-08-11 | End: 2025-08-12 | Stop reason: HOSPADM

## 2025-08-11 RX ORDER — SODIUM CHLORIDE 0.9 % (FLUSH) 0.9 %
5-40 SYRINGE (ML) INJECTION PRN
Status: DISCONTINUED | OUTPATIENT
Start: 2025-08-11 | End: 2025-08-12 | Stop reason: HOSPADM

## 2025-08-11 RX ORDER — ENOXAPARIN SODIUM 100 MG/ML
40 INJECTION SUBCUTANEOUS DAILY
Status: DISCONTINUED | OUTPATIENT
Start: 2025-08-11 | End: 2025-08-12 | Stop reason: HOSPADM

## 2025-08-11 RX ORDER — POTASSIUM CHLORIDE 1500 MG/1
40 TABLET, EXTENDED RELEASE ORAL PRN
Status: DISCONTINUED | OUTPATIENT
Start: 2025-08-11 | End: 2025-08-12 | Stop reason: HOSPADM

## 2025-08-11 RX ORDER — IOPAMIDOL 755 MG/ML
75 INJECTION, SOLUTION INTRAVASCULAR
Status: COMPLETED | OUTPATIENT
Start: 2025-08-11 | End: 2025-08-11

## 2025-08-11 RX ORDER — POTASSIUM CHLORIDE 7.45 MG/ML
10 INJECTION INTRAVENOUS PRN
Status: DISCONTINUED | OUTPATIENT
Start: 2025-08-11 | End: 2025-08-12 | Stop reason: HOSPADM

## 2025-08-11 RX ORDER — ONDANSETRON 4 MG/1
4 TABLET, ORALLY DISINTEGRATING ORAL EVERY 8 HOURS PRN
Status: DISCONTINUED | OUTPATIENT
Start: 2025-08-11 | End: 2025-08-12 | Stop reason: HOSPADM

## 2025-08-11 RX ORDER — POLYETHYLENE GLYCOL 3350 17 G/17G
17 POWDER, FOR SOLUTION ORAL DAILY PRN
Status: DISCONTINUED | OUTPATIENT
Start: 2025-08-11 | End: 2025-08-12 | Stop reason: HOSPADM

## 2025-08-11 RX ORDER — ACETAMINOPHEN 650 MG/1
650 SUPPOSITORY RECTAL EVERY 6 HOURS PRN
Status: DISCONTINUED | OUTPATIENT
Start: 2025-08-11 | End: 2025-08-12 | Stop reason: HOSPADM

## 2025-08-11 RX ORDER — ACETAMINOPHEN 325 MG/1
650 TABLET ORAL ONCE
Status: COMPLETED | OUTPATIENT
Start: 2025-08-11 | End: 2025-08-11

## 2025-08-11 RX ADMIN — CYCLOBENZAPRINE 10 MG: 10 TABLET, FILM COATED ORAL at 17:16

## 2025-08-11 RX ADMIN — SODIUM CHLORIDE, PRESERVATIVE FREE 10 ML: 5 INJECTION INTRAVENOUS at 20:34

## 2025-08-11 RX ADMIN — ACETAMINOPHEN 650 MG: 325 TABLET ORAL at 17:16

## 2025-08-11 RX ADMIN — IOPAMIDOL 75 ML: 755 INJECTION, SOLUTION INTRAVENOUS at 13:35

## 2025-08-11 ASSESSMENT — PAIN - FUNCTIONAL ASSESSMENT
PAIN_FUNCTIONAL_ASSESSMENT: 0-10
PAIN_FUNCTIONAL_ASSESSMENT: 0-10
PAIN_FUNCTIONAL_ASSESSMENT: ACTIVITIES ARE NOT PREVENTED

## 2025-08-11 ASSESSMENT — PAIN DESCRIPTION - FREQUENCY
FREQUENCY: CONTINUOUS
FREQUENCY: CONTINUOUS

## 2025-08-11 ASSESSMENT — PAIN DESCRIPTION - LOCATION
LOCATION: CHEST

## 2025-08-11 ASSESSMENT — PAIN DESCRIPTION - DESCRIPTORS
DESCRIPTORS: ACHING
DESCRIPTORS: DISCOMFORT

## 2025-08-11 ASSESSMENT — PAIN SCALES - GENERAL
PAINLEVEL_OUTOF10: 4
PAINLEVEL_OUTOF10: 8
PAINLEVEL_OUTOF10: 4
PAINLEVEL_OUTOF10: 0
PAINLEVEL_OUTOF10: 4

## 2025-08-11 ASSESSMENT — HEART SCORE: ECG: NON-SPECIFC REPOLARIZATION DISTURBANCE/LBTB/PM

## 2025-08-11 ASSESSMENT — PAIN DESCRIPTION - PAIN TYPE
TYPE: ACUTE PAIN
TYPE: ACUTE PAIN

## 2025-08-11 ASSESSMENT — PAIN DESCRIPTION - ORIENTATION
ORIENTATION: LEFT
ORIENTATION: LEFT;MID
ORIENTATION: LEFT
ORIENTATION: LEFT

## 2025-08-11 ASSESSMENT — PAIN DESCRIPTION - ONSET
ONSET: ON-GOING
ONSET: ON-GOING

## 2025-08-12 VITALS
RESPIRATION RATE: 16 BRPM | TEMPERATURE: 97.7 F | WEIGHT: 171.74 LBS | DIASTOLIC BLOOD PRESSURE: 79 MMHG | HEIGHT: 70 IN | BODY MASS INDEX: 24.59 KG/M2 | SYSTOLIC BLOOD PRESSURE: 129 MMHG | OXYGEN SATURATION: 95 % | HEART RATE: 64 BPM

## 2025-08-12 LAB
ANION GAP SERPL CALCULATED.3IONS-SCNC: 10 MMOL/L (ref 3–16)
BASOPHILS # BLD: 0.1 K/UL (ref 0–0.2)
BASOPHILS NFR BLD: 1.3 %
BUN SERPL-MCNC: 17 MG/DL (ref 7–20)
CALCIUM SERPL-MCNC: 9.1 MG/DL (ref 8.3–10.6)
CHLORIDE SERPL-SCNC: 102 MMOL/L (ref 99–110)
CO2 SERPL-SCNC: 27 MMOL/L (ref 21–32)
CREAT SERPL-MCNC: 0.9 MG/DL (ref 0.9–1.3)
DEPRECATED RDW RBC AUTO: 13 % (ref 12.4–15.4)
EOSINOPHIL # BLD: 0.3 K/UL (ref 0–0.6)
EOSINOPHIL NFR BLD: 5 %
GFR SERPLBLD CREATININE-BSD FMLA CKD-EPI: >90 ML/MIN/{1.73_M2}
GLUCOSE SERPL-MCNC: 125 MG/DL (ref 70–99)
HCT VFR BLD AUTO: 49.5 % (ref 40.5–52.5)
HGB BLD-MCNC: 16.6 G/DL (ref 13.5–17.5)
LYMPHOCYTES # BLD: 2 K/UL (ref 1–5.1)
LYMPHOCYTES NFR BLD: 35.8 %
MCH RBC QN AUTO: 29.1 PG (ref 26–34)
MCHC RBC AUTO-ENTMCNC: 33.4 G/DL (ref 31–36)
MCV RBC AUTO: 87.1 FL (ref 80–100)
MONOCYTES # BLD: 0.4 K/UL (ref 0–1.3)
MONOCYTES NFR BLD: 7.5 %
NEUTROPHILS # BLD: 2.8 K/UL (ref 1.7–7.7)
NEUTROPHILS NFR BLD: 50.4 %
PLATELET # BLD AUTO: 122 K/UL (ref 135–450)
PLATELET BLD QL SMEAR: ABNORMAL
PMV BLD AUTO: 11.6 FL (ref 5–10.5)
POTASSIUM SERPL-SCNC: 4.5 MMOL/L (ref 3.5–5.1)
RBC # BLD AUTO: 5.69 M/UL (ref 4.2–5.9)
SLIDE REVIEW: ABNORMAL
SODIUM SERPL-SCNC: 139 MMOL/L (ref 136–145)
WBC # BLD AUTO: 5.5 K/UL (ref 4–11)

## 2025-08-12 PROCEDURE — 94760 N-INVAS EAR/PLS OXIMETRY 1: CPT

## 2025-08-12 PROCEDURE — G0378 HOSPITAL OBSERVATION PER HR: HCPCS

## 2025-08-12 PROCEDURE — 2500000003 HC RX 250 WO HCPCS

## 2025-08-12 PROCEDURE — 85025 COMPLETE CBC W/AUTO DIFF WBC: CPT

## 2025-08-12 PROCEDURE — 99222 1ST HOSP IP/OBS MODERATE 55: CPT | Performed by: INTERNAL MEDICINE

## 2025-08-12 PROCEDURE — 6370000000 HC RX 637 (ALT 250 FOR IP)

## 2025-08-12 PROCEDURE — 36415 COLL VENOUS BLD VENIPUNCTURE: CPT

## 2025-08-12 PROCEDURE — 80048 BASIC METABOLIC PNL TOTAL CA: CPT

## 2025-08-12 RX ORDER — PANTOPRAZOLE SODIUM 40 MG/1
40 TABLET, DELAYED RELEASE ORAL
Status: DISCONTINUED | OUTPATIENT
Start: 2025-08-12 | End: 2025-08-12 | Stop reason: HOSPADM

## 2025-08-12 RX ORDER — NAPROXEN SODIUM 220 MG/1
220 TABLET, FILM COATED ORAL PRN
Qty: 20 TABLET | Refills: 0 | Status: SHIPPED | OUTPATIENT
Start: 2025-08-12

## 2025-08-12 RX ADMIN — PANTOPRAZOLE SODIUM 40 MG: 40 TABLET, DELAYED RELEASE ORAL at 08:41

## 2025-08-12 RX ADMIN — SODIUM CHLORIDE, PRESERVATIVE FREE 10 ML: 5 INJECTION INTRAVENOUS at 08:43

## 2025-08-14 ENCOUNTER — TELEPHONE (OUTPATIENT)
Dept: FAMILY MEDICINE CLINIC | Age: 50
End: 2025-08-14

## 2025-08-19 ENCOUNTER — OFFICE VISIT (OUTPATIENT)
Dept: FAMILY MEDICINE CLINIC | Age: 50
End: 2025-08-19

## 2025-08-19 VITALS
WEIGHT: 173.4 LBS | SYSTOLIC BLOOD PRESSURE: 122 MMHG | DIASTOLIC BLOOD PRESSURE: 84 MMHG | OXYGEN SATURATION: 95 % | RESPIRATION RATE: 18 BRPM | HEIGHT: 70 IN | BODY MASS INDEX: 24.82 KG/M2 | HEART RATE: 55 BPM

## 2025-08-19 DIAGNOSIS — J96.11 CHRONIC HYPOXEMIC RESPIRATORY FAILURE (HCC): Primary | ICD-10-CM

## 2025-08-19 DIAGNOSIS — R07.81 PLEURODYNIA: ICD-10-CM

## 2025-08-19 DIAGNOSIS — R03.0 BLOOD PRESSURE ELEVATED WITHOUT HISTORY OF HTN: ICD-10-CM

## 2025-08-19 DIAGNOSIS — Z09 HOSPITAL DISCHARGE FOLLOW-UP: ICD-10-CM

## 2025-08-19 DIAGNOSIS — Z13.1 SCREENING FOR DIABETES MELLITUS: ICD-10-CM

## 2025-08-19 LAB
EST. AVERAGE GLUCOSE BLD GHB EST-MCNC: 122.6 MG/DL
HBA1C MFR BLD: 5.9 %

## 2025-08-19 RX ORDER — PREDNISONE 10 MG/1
10 TABLET ORAL 2 TIMES DAILY
Qty: 10 TABLET | Refills: 0 | Status: SHIPPED | OUTPATIENT
Start: 2025-08-19 | End: 2025-08-24

## 2025-08-19 ASSESSMENT — PATIENT HEALTH QUESTIONNAIRE - PHQ9
2. FEELING DOWN, DEPRESSED OR HOPELESS: NOT AT ALL
SUM OF ALL RESPONSES TO PHQ QUESTIONS 1-9: 0
1. LITTLE INTEREST OR PLEASURE IN DOING THINGS: NOT AT ALL
SUM OF ALL RESPONSES TO PHQ QUESTIONS 1-9: 0

## 2025-08-22 ENCOUNTER — TELEPHONE (OUTPATIENT)
Dept: FAMILY MEDICINE CLINIC | Age: 50
End: 2025-08-22